# Patient Record
Sex: MALE | Race: ASIAN | NOT HISPANIC OR LATINO | URBAN - METROPOLITAN AREA
[De-identification: names, ages, dates, MRNs, and addresses within clinical notes are randomized per-mention and may not be internally consistent; named-entity substitution may affect disease eponyms.]

---

## 2019-03-03 ENCOUNTER — INPATIENT (INPATIENT)
Facility: HOSPITAL | Age: 74
LOS: 17 days | Discharge: HOME HEALTH | End: 2019-03-21
Attending: HOSPITALIST | Admitting: HOSPITALIST
Payer: MEDICARE

## 2019-03-03 VITALS
TEMPERATURE: 99 F | DIASTOLIC BLOOD PRESSURE: 62 MMHG | SYSTOLIC BLOOD PRESSURE: 127 MMHG | RESPIRATION RATE: 16 BRPM | HEART RATE: 97 BPM | OXYGEN SATURATION: 100 %

## 2019-03-03 DIAGNOSIS — N17.9 ACUTE KIDNEY FAILURE, UNSPECIFIED: ICD-10-CM

## 2019-03-03 LAB
ALBUMIN SERPL ELPH-MCNC: 3 G/DL — LOW (ref 3.3–5)
ALP SERPL-CCNC: 367 U/L — HIGH (ref 40–120)
ALT FLD-CCNC: 25 U/L — SIGNIFICANT CHANGE UP (ref 4–41)
ANION GAP SERPL CALC-SCNC: 18 MMO/L — HIGH (ref 7–14)
ANISOCYTOSIS BLD QL: SLIGHT — SIGNIFICANT CHANGE UP
AST SERPL-CCNC: 21 U/L — SIGNIFICANT CHANGE UP (ref 4–40)
BASE EXCESS BLDV CALC-SCNC: -8.2 MMOL/L — SIGNIFICANT CHANGE UP
BASOPHILS # BLD AUTO: 0.04 K/UL — SIGNIFICANT CHANGE UP (ref 0–0.2)
BASOPHILS NFR BLD AUTO: 0.2 % — SIGNIFICANT CHANGE UP (ref 0–2)
BASOPHILS NFR SPEC: 0 % — SIGNIFICANT CHANGE UP (ref 0–2)
BILIRUB SERPL-MCNC: 0.5 MG/DL — SIGNIFICANT CHANGE UP (ref 0.2–1.2)
BLOOD GAS VENOUS - CREATININE: 1.72 MG/DL — HIGH (ref 0.5–1.3)
BUN SERPL-MCNC: 36 MG/DL — HIGH (ref 7–23)
CALCIUM SERPL-MCNC: 8.3 MG/DL — LOW (ref 8.4–10.5)
CHLORIDE BLDV-SCNC: 105 MMOL/L — SIGNIFICANT CHANGE UP (ref 96–108)
CHLORIDE SERPL-SCNC: 99 MMOL/L — SIGNIFICANT CHANGE UP (ref 98–107)
CO2 SERPL-SCNC: 15 MMOL/L — LOW (ref 22–31)
CREAT SERPL-MCNC: 1.79 MG/DL — HIGH (ref 0.5–1.3)
EOSINOPHIL # BLD AUTO: 0.25 K/UL — SIGNIFICANT CHANGE UP (ref 0–0.5)
EOSINOPHIL NFR BLD AUTO: 1.5 % — SIGNIFICANT CHANGE UP (ref 0–6)
EOSINOPHIL NFR FLD: 2 % — SIGNIFICANT CHANGE UP (ref 0–6)
GAS PNL BLDV: 128 MMOL/L — LOW (ref 136–146)
GLUCOSE BLDV-MCNC: 141 — HIGH (ref 70–99)
GLUCOSE SERPL-MCNC: 150 MG/DL — HIGH (ref 70–99)
HCO3 BLDV-SCNC: 17 MMOL/L — LOW (ref 20–27)
HCT VFR BLD CALC: 32.3 % — LOW (ref 39–50)
HCT VFR BLDV CALC: 32.6 % — LOW (ref 39–51)
HGB BLD-MCNC: 10.3 G/DL — LOW (ref 13–17)
HGB BLDV-MCNC: 10.5 G/DL — LOW (ref 13–17)
HYPOCHROMIA BLD QL: SLIGHT — SIGNIFICANT CHANGE UP
IMM GRANULOCYTES NFR BLD AUTO: 5.4 % — HIGH (ref 0–1.5)
LACTATE BLDV-MCNC: 2.8 MMOL/L — HIGH (ref 0.5–2)
LG PLATELETS BLD QL AUTO: SLIGHT — SIGNIFICANT CHANGE UP
LIDOCAIN IGE QN: 222.5 U/L — HIGH (ref 7–60)
LYMPHOCYTES # BLD AUTO: 0.99 K/UL — LOW (ref 1–3.3)
LYMPHOCYTES # BLD AUTO: 6.1 % — LOW (ref 13–44)
LYMPHOCYTES NFR SPEC AUTO: 9 % — LOW (ref 13–44)
MCHC RBC-ENTMCNC: 26.9 PG — LOW (ref 27–34)
MCHC RBC-ENTMCNC: 31.9 % — LOW (ref 32–36)
MCV RBC AUTO: 84.3 FL — SIGNIFICANT CHANGE UP (ref 80–100)
MICROCYTES BLD QL: SLIGHT — SIGNIFICANT CHANGE UP
MONOCYTES # BLD AUTO: 4.88 K/UL — HIGH (ref 0–0.9)
MONOCYTES NFR BLD AUTO: 30.2 % — HIGH (ref 2–14)
MONOCYTES NFR BLD: 23 % — HIGH (ref 2–9)
NEUTROPHIL AB SER-ACNC: 66 % — SIGNIFICANT CHANGE UP (ref 43–77)
NEUTROPHILS # BLD AUTO: 9.11 K/UL — HIGH (ref 1.8–7.4)
NEUTROPHILS NFR BLD AUTO: 56.6 % — SIGNIFICANT CHANGE UP (ref 43–77)
NRBC # BLD: 0 /100WBC — SIGNIFICANT CHANGE UP
NRBC # FLD: 0.05 K/UL — LOW (ref 25–125)
OVALOCYTES BLD QL SMEAR: SLIGHT — SIGNIFICANT CHANGE UP
PCO2 BLDV: 35 MMHG — LOW (ref 41–51)
PH BLDV: 7.3 PH — LOW (ref 7.32–7.43)
PLATELET # BLD AUTO: 179 K/UL — SIGNIFICANT CHANGE UP (ref 150–400)
PLATELET COUNT - ESTIMATE: NORMAL — SIGNIFICANT CHANGE UP
PMV BLD: 13 FL — SIGNIFICANT CHANGE UP (ref 7–13)
PO2 BLDV: 27 MMHG — LOW (ref 35–40)
POLYCHROMASIA BLD QL SMEAR: SLIGHT — SIGNIFICANT CHANGE UP
POTASSIUM BLDV-SCNC: 4.2 MMOL/L — SIGNIFICANT CHANGE UP (ref 3.4–4.5)
POTASSIUM SERPL-MCNC: 3 MMOL/L — LOW (ref 3.5–5.3)
POTASSIUM SERPL-SCNC: 3 MMOL/L — LOW (ref 3.5–5.3)
PROT SERPL-MCNC: 7.9 G/DL — SIGNIFICANT CHANGE UP (ref 6–8.3)
RBC # BLD: 3.83 M/UL — LOW (ref 4.2–5.8)
RBC # FLD: 19.2 % — HIGH (ref 10.3–14.5)
SAO2 % BLDV: 37.2 % — LOW (ref 60–85)
SODIUM SERPL-SCNC: 132 MMOL/L — LOW (ref 135–145)
TROPONIN T, HIGH SENSITIVITY: 18 NG/L — SIGNIFICANT CHANGE UP (ref ?–14)
WBC # BLD: 16.14 K/UL — HIGH (ref 3.8–10.5)
WBC # FLD AUTO: 16.14 K/UL — HIGH (ref 3.8–10.5)

## 2019-03-03 PROCEDURE — 99223 1ST HOSP IP/OBS HIGH 75: CPT

## 2019-03-03 PROCEDURE — 76705 ECHO EXAM OF ABDOMEN: CPT | Mod: 26

## 2019-03-03 PROCEDURE — 71045 X-RAY EXAM CHEST 1 VIEW: CPT | Mod: 26

## 2019-03-03 RX ORDER — SODIUM CHLORIDE 9 MG/ML
1000 INJECTION, SOLUTION INTRAVENOUS ONCE
Qty: 0 | Refills: 0 | Status: COMPLETED | OUTPATIENT
Start: 2019-03-03 | End: 2019-03-03

## 2019-03-03 RX ADMIN — SODIUM CHLORIDE 1000 MILLILITER(S): 9 INJECTION, SOLUTION INTRAVENOUS at 20:35

## 2019-03-03 NOTE — ED PROVIDER NOTE - ATTENDING CONTRIBUTION TO CARE
Attending note:   After face to face evaluation of this patient, I concur with above noted hx, pe, and care plan for this patient.  74 y/o M with cll, not on medication with three months of diarrhea since being in [place], just returned today.  +Mild abdominal pain, nothing new.   Evaluation in progress

## 2019-03-03 NOTE — H&P ADULT - PROBLEM SELECTOR PLAN 5
- Unclear cause for patient's elevated alk phos, US without any abnormal findings  - will trend in AM

## 2019-03-03 NOTE — H&P ADULT - PROBLEM SELECTOR PLAN 4
- Noted to have elevated blood lactate, likely 2/2 low interstitial volume from diarrhea, will hydrate and recheck in AM

## 2019-03-03 NOTE — ED ADULT NURSE NOTE - OBJECTIVE STATEMENT
Break coverage- Pt received to spot #1. AAOx4, as per daughter at bedside, pt returning from [place] today. c/o recurrent diarrhea, nausea/vomiting, and generalized abdominal pain for over 1 month. Daughter states pt has been seen in the hospital in [place] and received IVF hydration. Abdomen soft and non distended. Denies fever/chills. MD barrientos performed at bedside. #20g IVSL placed to left ac, labs drawn and sent. no acute distress. Family members at bedside. Awaiting further plan of care.

## 2019-03-03 NOTE — H&P ADULT - PROBLEM SELECTOR PLAN 10
- Patient's prelim CXR shows a possible opacity in R basilar area but patient without cough and his lung ausculation is clear, will monitor off abx and f/u of official CXR read

## 2019-03-03 NOTE — H&P ADULT - NSHPLABSRESULTS_GEN_ALL_CORE
LABS and ADDITIONAL STUDIES:                        10.3   16.14 )-----------( 179      ( 03 Mar 2019 20:00 )             32.3     03-03    132<L>  |  99  |  36<H>  ----------------------------<  150<H>  3.0<L>   |  15<L>  |  1.79<H>    Ca    8.3<L>      03 Mar 2019 20:00    TPro  7.9  /  Alb  3.0<L>  /  TBili  0.5  /  DBili  x   /  AST  21  /  ALT  25  /  AlkPhos  367<H>  03-03    LIVER FUNCTIONS - ( 03 Mar 2019 20:00 )  Alb: 3.0 g/dL / Pro: 7.9 g/dL / ALK PHOS: 367 u/L / ALT: 25 u/L / AST: 21 u/L / GGT: x    Blood Gas Venous Comprehensive (03.03.19 @ 20:00)    Blood Gas Venous - Lactate: 2.8: Please note updated reference range. mmol/L    Troponin T, High Sensitivity Result (03.03.19 @ 20:00)    Troponin T, High Sensitivity: 18    < from: Xray Chest 1 View- PORTABLE-Urgent (03.03.19 @ 20:58) >    ******PRELIMINARY REPORT******            INTERPRETATION:  Hazyright basilar opacity may represent pneumonia given   the patient's clinical context.    < end of copied text >    < from: US Abdomen Limited (03.03.19 @ 22:59) >    FINDINGS:    Liver: Within normal limits.    Bile ducts: Normal caliber. Common hepatic duct measures 4 mm.     Gallbladder: Underdistended. No cholelithiasis. No gallbladder wall   thickening or pericholecystic fluid. Unable to assess sonographic Merlos   sign secondary to analgesia.        Pancreas: Visualized portions are within normal limits.    Right kidney: 10.7 cm. No hydronephrosis.    Ascites: None.    IVC: Visualized portions are within normal limits.    IMPRESSION:     No cholelithiasis.    < end of copied text >    EKG - NSR with PAC, nl axis, QTc 442, no significant ST-T wave changes LABS and ADDITIONAL STUDIES:                        10.3   16.14 )-----------( 179      ( 03 Mar 2019 20:00 )             32.3     03-03    132<L>  |  99  |  36<H>  ----------------------------<  150<H>  3.0<L>   |  15<L>  |  1.79<H>    Ca    8.3<L>      03 Mar 2019 20:00    TPro  7.9  /  Alb  3.0<L>  /  TBili  0.5  /  DBili  x   /  AST  21  /  ALT  25  /  AlkPhos  367<H>  03-03    LIVER FUNCTIONS - ( 03 Mar 2019 20:00 )  Alb: 3.0 g/dL / Pro: 7.9 g/dL / ALK PHOS: 367 u/L / ALT: 25 u/L / AST: 21 u/L / GGT: x    Blood Gas Venous Comprehensive (03.03.19 @ 20:00)    Blood Gas Venous - Lactate: 2.8: Please note updated reference range. mmol/L    Troponin T, High Sensitivity Result (03.03.19 @ 20:00)    Troponin T, High Sensitivity: 18  Troponin T, High Sensitivity (03.04.19 @ 00:27)    Troponin T, High Sensitivity: 15    < from: Xray Chest 1 View- PORTABLE-Urgent (03.03.19 @ 20:58) >    ******PRELIMINARY REPORT******            INTERPRETATION:  Hazyright basilar opacity may represent pneumonia given   the patient's clinical context.    < end of copied text >    < from: US Abdomen Limited (03.03.19 @ 22:59) >    FINDINGS:    Liver: Within normal limits.    Bile ducts: Normal caliber. Common hepatic duct measures 4 mm.     Gallbladder: Underdistended. No cholelithiasis. No gallbladder wall   thickening or pericholecystic fluid. Unable to assess sonographic Merlos   sign secondary to analgesia.        Pancreas: Visualized portions are within normal limits.    Right kidney: 10.7 cm. No hydronephrosis.    Ascites: None.    IVC: Visualized portions are within normal limits.    IMPRESSION:     No cholelithiasis.    < end of copied text >    EKG - NSR with PAC, nl axis, QTc 442, no significant ST-T wave changes

## 2019-03-03 NOTE — H&P ADULT - NSHPREVIEWOFSYSTEMS_GEN_ALL_CORE
REVIEW OF SYSTEMS:    CONSTITUTIONAL: No weakness, fevers or chills  EYES: No visual changes or eye discharge  ENT: No rhinorrhea or sore throat  NECK: No pain or stiffness  RESPIRATORY: No cough, wheezing, hemoptysis; No shortness of breath  CARDIOVASCULAR: No chest pain or palpitations; No lower extremity edema  GASTROINTESTINAL: +generalized abdominal pain, +intermittent diarrhea, no nausea/vomiting  BACK: No back pain  GENITOURINARY: No dysuria, frequency or hematuria  NEUROLOGICAL: No numbness or weakness  SKIN: No itching, burning, rashes, or lesions

## 2019-03-03 NOTE — H&P ADULT - PMH
(2) assistive person
Anemia, unspecified type    CLL (chronic lymphocytic leukemia)    Essential hypertension

## 2019-03-03 NOTE — ED ADULT TRIAGE NOTE - CHIEF COMPLAINT QUOTE
just came back from [place] today, c/o abdominal pain, diarrhea, nausea/vomiting x more than a month

## 2019-03-03 NOTE — H&P ADULT - PROBLEM SELECTOR PLAN 2
- Patient and his family denies any history of renal insufficiency in past, said that he was first told he had renal issues while in [place] but they are not sure what the BUN/Cr levels were  - BUN/Cr ratio a little over 20, will give additional hydration - Patient and his family denies any history of renal insufficiency in past, said that he was first told he had renal issues while in [place] but they are not sure what the BUN/Cr levels were  - BUN/Cr ratio a little over 20, will give additional hydration    ADDENDUM:   Patient's FeNa shows his PEDRO to be pre-renal, will c/w IVF and monitor BUN/Cr in AM

## 2019-03-03 NOTE — H&P ADULT - PROBLEM SELECTOR PLAN 1
- Unclear cause of the diarrhea, doubt infectious given chronicity though will r/o for infectious diarrhea with stool cultures and c diff toxin by PCR  - GI eval in AM  - Will check CT A/P  - Diet as tolerated  - Noted to have an elevated lipase but doubt patient has pancreatitis, will monitor his abdomen and will see if there is any pancreatic inflammation on CT A/P - Unclear cause of the diarrhea, doubt infectious given chronicity though will r/o for infectious diarrhea with stool cultures and c diff toxin by PCR  - GI eval in AM  - Will check CT A/P  - Diet as tolerated  - Noted to have an elevated lipase but doubt patient has pancreatitis as he does not have symptoms consistent with pancreatitis and his US does not reveal any pancreatic inflammation, will see if there is any pancreatic inflammation on CT A/P

## 2019-03-03 NOTE — ED PROVIDER NOTE - CLINICAL SUMMARY MEDICAL DECISION MAKING FREE TEXT BOX
Pt presenting with intermittent diarrhea in setting of recent travel to [place] and some diarrhea preceding trip with + weight loss. Will eval for causes with stool cultures and O&P if sample provided by pt, EKG, IV fluid, labs including blood smear, lactate; dispo depending on w/u; no indication for emergent CT at this time given pt well appearing, NAD, normal physical exam.

## 2019-03-03 NOTE — H&P ADULT - PROBLEM SELECTOR PLAN 8
- Holding his losartan given PEDRO  - Will hold metoprolol and amlodipine for now as the patient is volume depleted on examination currently, restart as indicated

## 2019-03-03 NOTE — ED ADULT NURSE NOTE - ED STAT RN HANDOFF DETAILS
handoff report given to RN, pt in NAD, awaiting transportation.  Will continue to monitor patient closely.

## 2019-03-03 NOTE — H&P ADULT - HISTORY OF PRESENT ILLNESS
This is a 73M with history of CLL (not on therapy), Anemia, and HTN who presents to the hospital with complaints of persistent intermittent diarrhea since October of last year. Said that about a week prior to his trip to [facility] he started having significant diarrhea (5-7 episodes of loose stools, ?black stools as per patient at that time) and went to see a doctor. Was told that he likely had a viral illness and that it would improve over time. His diarrhea did improve and he went to [facility] but on his second day there he started having diarrhea again (states that he ate sweets from the [employer] that others in his family did not prior to the onset of his diarrhea in [place]). He started having intermittent diarrhea and went to a doctor in [facility] where he had a colonoscopy done which he states was negative for abnormal findings (pt not sure if he had a biopsy done during the colonoscopy). He was then given some  medications with minimal improvement in his symptoms. He continued to have intermittent diarrhea (said that he has 4-5 days of diarrhea a week) and was unable to get any relief in [facility]. Upon arriving back from [facility] he continued to have intermittent diarrhea and therefore presented to the ED for evaluation. Said that currently he had about 5 episodes of diarrhea today, describes it as yellowish in color, associated with generalized abdominal pain. Denies any hematochezia, melena, or BRBPR currently. No fevers/chills. He denies any other complaints at present.    On arrival to the ED, his vitals were T 98.8, P 97, /62, R 16, O2 sat 100% RA. His lab work was significant for leukocytosis (unknown baseline), normocytic anemia (unknown baseline), mild hyponatremia/hypokalemia/AG gap, elevated BUN/Cr, elevated alk phos, and elevated lipase. He was also noted to have a lactate of 2.8. His CXR showed a possible R basilar opacity but the patient was not complaining of any PNA type symptoms. His US abd was negative for any acute findings. He was given LR 1L. He was admitted to medicine.

## 2019-03-03 NOTE — H&P ADULT - ASSESSMENT
This is a 73M with history as above who presents to the hospital with c/o intermittent diarrhea for the past 6 months.

## 2019-03-03 NOTE — ED PROVIDER NOTE - OBJECTIVE STATEMENT
74 yo male with no pertinent PMH presents to the ED for diarrhea intermittently x several months (since 10/18), just arrived back from [place] where he was since October visiting for leisure. Pt accompanied by family in ED. States he had several days of diarrhea prior to leaving for [facility] and his PMD attributed to gastroenteritis vs. food poisoning. Improved spontaneously and left for [facility]. There, has had recurrent intermittent diarrhea throughout his stay there. Notes about 6 episodes of loose green stool today alone. Denies fever, chills, CP, palpitations, rash, cough, known sick contacts, jaundice. C/o vague chest and abdominal pain, generalized x several months. Daughter states pt default complains of CP when it is unclear when pain is coming from and this is a longstanding way of describing pain for him. Pt appears NAD in the ED. Unable to describe pain well. No h/o afib, not on anticoagulation, non-smoker. Pt had extensive w/u in [place] including endoscopy, labs and given macrobid for unknown reason, Elavil, multivitamins, probiotics. Denies h/o mental health issues, denies urinary symptoms, n/v. 74 yo male with PMH of chronic leukemia never on chemo (being followed by outside oncologist), presents to the ED for diarrhea intermittently x several months (since 10/18), just arrived back from [place] where he was since October visiting for leisure. Pt accompanied by family in ED. States he had several days of diarrhea prior to leaving for [facility] and his PMD attributed to gastroenteritis vs. food poisoning. Improved spontaneously and left for [facility]. There, has had recurrent intermittent diarrhea throughout his stay there. Notes about 6 episodes of loose green stool today alone. Denies fever, chills, CP, palpitations, rash, cough, known sick contacts, jaundice. C/o vague chest and abdominal pain, generalized x several months. Daughter states pt default complains of CP when it is unclear when pain is coming from and this is a longstanding way of describing pain for him. Pt appears NAD in the ED. Unable to describe pain well. No h/o afib, not on anticoagulation, non-smoker. Pt had extensive w/u in [place] including endoscopy, labs and given macrobid for unknown reason, Elavil, multivitamins, probiotics. Denies h/o mental health issues, denies urinary symptoms, n/v.

## 2019-03-03 NOTE — H&P ADULT - FAMILY HISTORY
Father  Still living? Unknown  Family history of myocardial infarction, Age at diagnosis: Age Unknown     Mother  Still living? Unknown  Family history of myocardial infarction, Age at diagnosis: Age Unknown     Sibling  Still living? Unknown  Family history of myocardial infarction, Age at diagnosis: Age Unknown

## 2019-03-03 NOTE — H&P ADULT - NSHPPHYSICALEXAM_GEN_ALL_CORE
Vital Signs Last 24 Hrs  T(C): 37.1 (03 Mar 2019 19:14), Max: 37.1 (03 Mar 2019 19:14)  T(F): 98.8 (03 Mar 2019 19:14), Max: 98.8 (03 Mar 2019 19:14)  HR: 97 (03 Mar 2019 19:14) (97 - 97)  BP: 127/62 (03 Mar 2019 19:14) (127/62 - 127/62)  BP(mean): --  RR: 16 (03 Mar 2019 19:14) (16 - 16)  SpO2: 100% (03 Mar 2019 19:14) (100% - 100%)    GENERAL: No acute distress, well-developed  ENT: EOMI, PERRL, conjunctiva and sclera clear, Neck supple, No JVD, dry mucus membranes   CHEST/LUNG: Clear to auscultation bilaterally; No wheeze, equal breath sounds bilaterally   BACK: No spinal tenderness  HEART: Regular rate and rhythm; No murmurs, rubs, or gallops  ABDOMEN: Soft, Nontender, Nondistended; Bowel sounds present  EXTREMITIES:  No clubbing, cyanosis, or edema  PSYCH: Nl behavior, nl affect  NEUROLOGY: AAOx3, non-focal  SKIN: Normal color, No rashes or lesions

## 2019-03-03 NOTE — H&P ADULT - PROBLEM SELECTOR PLAN 3
- Patient with hyponatremia, hypokalemia, and low bicarb likely due to electrolyte and fluid losses in diarrhea  - Would c/w hydration and monitor

## 2019-03-04 DIAGNOSIS — R74.8 ABNORMAL LEVELS OF OTHER SERUM ENZYMES: ICD-10-CM

## 2019-03-04 DIAGNOSIS — R93.89 ABNORMAL FINDINGS ON DIAGNOSTIC IMAGING OF OTHER SPECIFIED BODY STRUCTURES: ICD-10-CM

## 2019-03-04 DIAGNOSIS — E87.8 OTHER DISORDERS OF ELECTROLYTE AND FLUID BALANCE, NOT ELSEWHERE CLASSIFIED: ICD-10-CM

## 2019-03-04 DIAGNOSIS — R19.7 DIARRHEA, UNSPECIFIED: ICD-10-CM

## 2019-03-04 DIAGNOSIS — D64.9 ANEMIA, UNSPECIFIED: ICD-10-CM

## 2019-03-04 DIAGNOSIS — C91.90 LYMPHOID LEUKEMIA, UNSPECIFIED NOT HAVING ACHIEVED REMISSION: ICD-10-CM

## 2019-03-04 DIAGNOSIS — N17.9 ACUTE KIDNEY FAILURE, UNSPECIFIED: ICD-10-CM

## 2019-03-04 DIAGNOSIS — Z29.9 ENCOUNTER FOR PROPHYLACTIC MEASURES, UNSPECIFIED: ICD-10-CM

## 2019-03-04 DIAGNOSIS — I10 ESSENTIAL (PRIMARY) HYPERTENSION: ICD-10-CM

## 2019-03-04 DIAGNOSIS — R79.89 OTHER SPECIFIED ABNORMAL FINDINGS OF BLOOD CHEMISTRY: ICD-10-CM

## 2019-03-04 LAB
ALBUMIN SERPL ELPH-MCNC: 2.6 G/DL — LOW (ref 3.3–5)
ALP SERPL-CCNC: 337 U/L — HIGH (ref 40–120)
ALT FLD-CCNC: 23 U/L — SIGNIFICANT CHANGE UP (ref 4–41)
ANION GAP SERPL CALC-SCNC: 15 MMO/L — HIGH (ref 7–14)
AST SERPL-CCNC: 21 U/L — SIGNIFICANT CHANGE UP (ref 4–40)
BASOPHILS # BLD AUTO: 0.04 K/UL — SIGNIFICANT CHANGE UP (ref 0–0.2)
BASOPHILS NFR BLD AUTO: 0.2 % — SIGNIFICANT CHANGE UP (ref 0–2)
BILIRUB DIRECT SERPL-MCNC: 0.2 MG/DL — SIGNIFICANT CHANGE UP (ref 0.1–0.2)
BILIRUB SERPL-MCNC: 0.5 MG/DL — SIGNIFICANT CHANGE UP (ref 0.2–1.2)
BUN SERPL-MCNC: 32 MG/DL — HIGH (ref 7–23)
C DIFF TOX GENS STL QL NAA+PROBE: SIGNIFICANT CHANGE UP
CALCIUM SERPL-MCNC: 8.3 MG/DL — LOW (ref 8.4–10.5)
CHLORIDE SERPL-SCNC: 102 MMOL/L — SIGNIFICANT CHANGE UP (ref 98–107)
CO2 SERPL-SCNC: 17 MMOL/L — LOW (ref 22–31)
CREAT ?TM UR-MCNC: 92.4 MG/DL — SIGNIFICANT CHANGE UP
CREAT SERPL-MCNC: 1.54 MG/DL — HIGH (ref 0.5–1.3)
EOSINOPHIL # BLD AUTO: 0.42 K/UL — SIGNIFICANT CHANGE UP (ref 0–0.5)
EOSINOPHIL NFR BLD AUTO: 2.2 % — SIGNIFICANT CHANGE UP (ref 0–6)
GLUCOSE SERPL-MCNC: 118 MG/DL — HIGH (ref 70–99)
HCT VFR BLD CALC: 28.8 % — LOW (ref 39–50)
HGB BLD-MCNC: 9.3 G/DL — LOW (ref 13–17)
IMM GRANULOCYTES NFR BLD AUTO: 4.2 % — HIGH (ref 0–1.5)
LACTATE SERPL-SCNC: 1.7 MMOL/L — SIGNIFICANT CHANGE UP (ref 0.5–2)
LYMPHOCYTES # BLD AUTO: 1.31 K/UL — SIGNIFICANT CHANGE UP (ref 1–3.3)
LYMPHOCYTES # BLD AUTO: 6.9 % — LOW (ref 13–44)
MAGNESIUM SERPL-MCNC: 1.3 MG/DL — LOW (ref 1.6–2.6)
MCHC RBC-ENTMCNC: 26.6 PG — LOW (ref 27–34)
MCHC RBC-ENTMCNC: 32.3 % — SIGNIFICANT CHANGE UP (ref 32–36)
MCV RBC AUTO: 82.5 FL — SIGNIFICANT CHANGE UP (ref 80–100)
MONOCYTES # BLD AUTO: 6.17 K/UL — HIGH (ref 0–0.9)
MONOCYTES NFR BLD AUTO: 32.6 % — HIGH (ref 2–14)
NEUTROPHILS # BLD AUTO: 10.17 K/UL — HIGH (ref 1.8–7.4)
NEUTROPHILS NFR BLD AUTO: 53.9 % — SIGNIFICANT CHANGE UP (ref 43–77)
NRBC # FLD: 0.05 K/UL — LOW (ref 25–125)
PHOSPHATE SERPL-MCNC: 3.5 MG/DL — SIGNIFICANT CHANGE UP (ref 2.5–4.5)
PLASMODIUM AG BLD QL: SIGNIFICANT CHANGE UP
PLATELET # BLD AUTO: 151 K/UL — SIGNIFICANT CHANGE UP (ref 150–400)
PMV BLD: 11.8 FL — SIGNIFICANT CHANGE UP (ref 7–13)
POTASSIUM SERPL-MCNC: 3.8 MMOL/L — SIGNIFICANT CHANGE UP (ref 3.5–5.3)
POTASSIUM SERPL-SCNC: 3.8 MMOL/L — SIGNIFICANT CHANGE UP (ref 3.5–5.3)
PROT SERPL-MCNC: 7.4 G/DL — SIGNIFICANT CHANGE UP (ref 6–8.3)
RBC # BLD: 3.49 M/UL — LOW (ref 4.2–5.8)
RBC # FLD: 19.4 % — HIGH (ref 10.3–14.5)
SODIUM SERPL-SCNC: 134 MMOL/L — LOW (ref 135–145)
SODIUM UR-SCNC: < 20 MMOL/L — SIGNIFICANT CHANGE UP
TROPONIN T, HIGH SENSITIVITY: 15 NG/L — SIGNIFICANT CHANGE UP (ref ?–14)
WBC # BLD: 18.9 K/UL — HIGH (ref 3.8–10.5)
WBC # FLD AUTO: 18.9 K/UL — HIGH (ref 3.8–10.5)

## 2019-03-04 PROCEDURE — 99222 1ST HOSP IP/OBS MODERATE 55: CPT | Mod: GC

## 2019-03-04 PROCEDURE — 74176 CT ABD & PELVIS W/O CONTRAST: CPT | Mod: 26

## 2019-03-04 RX ORDER — METOPROLOL TARTRATE 50 MG
1 TABLET ORAL
Qty: 0 | Refills: 0 | COMMUNITY

## 2019-03-04 RX ORDER — SODIUM CHLORIDE 9 MG/ML
1000 INJECTION, SOLUTION INTRAVENOUS ONCE
Qty: 0 | Refills: 0 | Status: COMPLETED | OUTPATIENT
Start: 2019-03-04 | End: 2019-03-04

## 2019-03-04 RX ORDER — SODIUM CHLORIDE 9 MG/ML
1000 INJECTION, SOLUTION INTRAVENOUS
Qty: 0 | Refills: 0 | Status: DISCONTINUED | OUTPATIENT
Start: 2019-03-04 | End: 2019-03-07

## 2019-03-04 RX ORDER — HEPARIN SODIUM 5000 [USP'U]/ML
5000 INJECTION INTRAVENOUS; SUBCUTANEOUS EVERY 8 HOURS
Qty: 0 | Refills: 0 | Status: DISCONTINUED | OUTPATIENT
Start: 2019-03-04 | End: 2019-03-07

## 2019-03-04 RX ORDER — POTASSIUM CHLORIDE 20 MEQ
20 PACKET (EA) ORAL
Qty: 0 | Refills: 0 | Status: COMPLETED | OUTPATIENT
Start: 2019-03-04 | End: 2019-03-04

## 2019-03-04 RX ORDER — MAGNESIUM SULFATE 500 MG/ML
1 VIAL (ML) INJECTION ONCE
Qty: 0 | Refills: 0 | Status: COMPLETED | OUTPATIENT
Start: 2019-03-04 | End: 2019-03-04

## 2019-03-04 RX ORDER — SODIUM CHLORIDE 9 MG/ML
1000 INJECTION, SOLUTION INTRAVENOUS
Qty: 0 | Refills: 0 | Status: DISCONTINUED | OUTPATIENT
Start: 2019-03-04 | End: 2019-03-04

## 2019-03-04 RX ORDER — MORPHINE SULFATE 50 MG/1
2 CAPSULE, EXTENDED RELEASE ORAL ONCE
Qty: 0 | Refills: 0 | Status: DISCONTINUED | OUTPATIENT
Start: 2019-03-04 | End: 2019-03-04

## 2019-03-04 RX ADMIN — HEPARIN SODIUM 5000 UNIT(S): 5000 INJECTION INTRAVENOUS; SUBCUTANEOUS at 23:11

## 2019-03-04 RX ADMIN — HEPARIN SODIUM 5000 UNIT(S): 5000 INJECTION INTRAVENOUS; SUBCUTANEOUS at 05:11

## 2019-03-04 RX ADMIN — SODIUM CHLORIDE 1000 MILLILITER(S): 9 INJECTION, SOLUTION INTRAVENOUS at 12:05

## 2019-03-04 RX ADMIN — HEPARIN SODIUM 5000 UNIT(S): 5000 INJECTION INTRAVENOUS; SUBCUTANEOUS at 13:54

## 2019-03-04 RX ADMIN — MORPHINE SULFATE 2 MILLIGRAM(S): 50 CAPSULE, EXTENDED RELEASE ORAL at 08:31

## 2019-03-04 RX ADMIN — MORPHINE SULFATE 2 MILLIGRAM(S): 50 CAPSULE, EXTENDED RELEASE ORAL at 07:48

## 2019-03-04 RX ADMIN — SODIUM CHLORIDE 150 MILLILITER(S): 9 INJECTION, SOLUTION INTRAVENOUS at 23:10

## 2019-03-04 RX ADMIN — Medication 20 MILLIEQUIVALENT(S): at 01:09

## 2019-03-04 RX ADMIN — Medication 20 MILLIEQUIVALENT(S): at 05:12

## 2019-03-04 RX ADMIN — SODIUM CHLORIDE 150 MILLILITER(S): 9 INJECTION, SOLUTION INTRAVENOUS at 12:05

## 2019-03-04 RX ADMIN — SODIUM CHLORIDE 75 MILLILITER(S): 9 INJECTION, SOLUTION INTRAVENOUS at 01:09

## 2019-03-04 RX ADMIN — Medication 20 MILLIEQUIVALENT(S): at 03:11

## 2019-03-04 RX ADMIN — Medication 100 GRAM(S): at 13:54

## 2019-03-04 NOTE — CONSULT NOTE ADULT - ATTENDING COMMENTS
Agree with above, except additional workup in [place] provided by family.    Impression:    1.  Chronic diarrhea for more than 6 weeks.  Workup in [place] included A.  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  Colonoscopy with mild inflammatory changes in the rectum and sigmoid colon, biopsies demonstrated chronic active colitis    #2.  Mild anemia, Hb 10, without overt GI bleed.    #3.  Abnormal LFTs (elevated alk phos), with normal bile ducts by ultrasound, which does not exclude choledocholithiasis.      #4.  Elevated lipase more than 3x upper limit of normal.    Recommendations:    #1.  Follow CBC/LFTs    #2.  Send amylase level x 1    #3.  MRI abdomen and MRCP (with and without contrast) to look for evidence of pancreatitis and choledocholithiasis    #4.  IV fluids, suggest LR at rate listed above    #5.  Solid food diet as tolerated (lactose free)    #6.  Pain control as necessary Agree with above, except additional workup in [place] provided by family.    Impression:    1.  Chronic diarrhea for more than 6 weeks.  Workup in [place] included A.  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  Colonoscopy with mild inflammatory changes in the rectum and sigmoid colon, biopsies demonstrated chronic active colitis    #2.  Mild anemia, Hb 10, without overt GI bleed.    #3.  Abnormal LFTs (elevated alk phos), with normal bile ducts by ultrasound, which does not exclude choledocholithiasis.      #4.  Elevated lipase more than 3x upper limit of normal.    Recommendations:    #1.  Follow CBC/LFTs    #2.  Send amylase level x 1    #3.  Consider other lab workup as described abovce.    #4.  MRI abdomen and MRCP (with and without contrast) to look for evidence of pancreatitis and choledocholithiasis    #5.  IV fluids, suggest LR at rate listed above    #6.  Solid food diet as tolerated (lactose free)    #7.  Pain control as necessary    #8.  After stool studies are done infection has been ruled out, may start on mesalamine for possible ulcerative colitis. Agree with above, except additional workup in [place] provided by family.    Impression:    1.  Chronic diarrhea for more than 6 weeks.  Workup in [place] included A.  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  Colonoscopy with mild inflammatory changes in the rectum and sigmoid colon, biopsies demonstrated chronic active colitis    #2.  Mild anemia, Hb 10, without overt GI bleed.    #3.  Abnormal LFTs (elevated alk phos), with normal bile ducts by ultrasound, which does not exclude choledocholithiasis.      #4.  Elevated lipase more than 3x upper limit of normal.    #5.  CLL not on treatment    Recommendations:    #1.  Follow CBC/LFTs    #2.  Send amylase level x 1    #3.  Consider other lab workup as described abovce.    #4.  MRI abdomen and MRCP (with and without contrast) to look for evidence of pancreatitis and choledocholithiasis    #5.  IV fluids, suggest LR at rate listed above    #6.  Solid food diet as tolerated (lactose free)    #7.  Pain control as necessary    #8.  After stool studies are done infection has been ruled out, may start on mesalamine for possible ulcerative colitis.

## 2019-03-04 NOTE — PROGRESS NOTE ADULT - SUBJECTIVE AND OBJECTIVE BOX
Patient is a 73y old  Male who presents with a chief complaint of Diarrhea (04 Mar 2019 08:27)      SUBJECTIVE / OVERNIGHT EVENTS:    Events noted.  RESPIRATORY: No cough, wheezing, chills or hemoptysis;  CARDIOVASCULAR: No chest pain, palpitations, dizziness, or leg swelling  GASTROINTESTINAL: No abdominal or epigastric pain.   NEUROLOGICAL: No headaches,     MEDICATIONS  (STANDING):  heparin  Injectable 5000 Unit(s) SubCutaneous every 8 hours  lactated ringers. 1000 milliLiter(s) (150 mL/Hr) IV Continuous <Continuous>    MEDICATIONS  (PRN):        CAPILLARY BLOOD GLUCOSE        I&O's Summary      PHYSICAL EXAM:  GENERAL: NAD  NECK: Supple, No JVD  CHEST/LUNG: Clear to auscultation bilaterally; No wheezing.  HEART: Regular rate and rhythm; No murmurs, rubs, or gallops  ABDOMEN: Soft, Nontender, Nondistended; Bowel sounds present  EXTREMITIES:   No clubbing, cyanosis, or edema  NEUROLOGY: AAO X 3      LABS:                        9.3    18.90 )-----------( 151      ( 04 Mar 2019 08:15 )             28.8     03-04    134<L>  |  102  |  32<H>  ----------------------------<  118<H>  3.8   |  17<L>  |  1.54<H>    Ca    8.3<L>      04 Mar 2019 05:20  Phos  3.5     03-04  Mg     1.3     03-04    TPro  7.4  /  Alb  2.6<L>  /  TBili  0.5  /  DBili  0.2  /  AST  21  /  ALT  23  /  AlkPhos  337<H>  03-04            CAPILLARY BLOOD GLUCOSE                    RADIOLOGY & ADDITIONAL TESTS:    Imaging Personally Reviewed:    Consultant(s) Notes Reviewed:      Care Discussed with Consultants/Other Providers:

## 2019-03-04 NOTE — ED ADULT NURSE REASSESSMENT NOTE - NS ED NURSE REASSESS COMMENT FT1
handoff received from day RN- pt awake, a/ox3, vitally stable in NAD, pt appears comfortable, fluids running as ordered, will continue to monitor
pt is in bed  A and OX  3 in NAD, resting comfortably in bed  denies pain at thi time,  LR ongoing as per order to left AC by pump  IV flushed well no SS of infection/infiltration.
break coverage: pt awake and alert. breathing unlabored. awaits bed assigmnent in nad.

## 2019-03-04 NOTE — PROGRESS NOTE ADULT - ASSESSMENT
· Assessment	  This is a 73M with history as above who presents to the hospital with c/o intermittent diarrhea for the past 6 months.     Problem/Plan - 1:  ·  Problem: Diarrhea, unspecified type.  Plan: - Unclear cause of the diarrhea, doubt infectious given chronicity though will r/o for infectious diarrhea with stool cultures and c diff toxin by PCR  -      Problem/Plan - 2:  ·  Problem: PEDRO (acute kidney injury).  Plan: - Patient and his family denies any history of renal insufficiency in past, said that he was first told he had renal issues while in [place] but they are not sure what the BUN/Cr levels were  -     ADDENDUM:   Patient's FeNa shows his PEDRO to be pre-renal, will c/w IVF      Problem/Plan - 3:  ·  Problem: Electrolyte abnormality.  Plan: - Patient with hyponatremia, hypokalemia, and low bicarb likely due to electrolyte and fluid losses in diarrhea  - Would c/w hydration and monitor.      Problem/Plan - 4:  ·  Problem: Lactate blood increased.  Plan: - Noted to have elevated blood lactate, likely 2/2 low interstitial volume from diarrhea,      Problem/Plan - 5:  ·  Problem: Alkaline phosphatase elevation.  Plan: - Unclear cause for patient's elevated alk phos, US without any abnormal findings       Problem/Plan - 6:  Problem: CLL (chronic lymphocytic leukemia). Plan: - Patient's leukocytosis likely chronic 2/2 CLL, will monitor for now.     Problem/Plan - 7:  ·  Problem: Anemia, unspecified type.  Plan: -  will monitor for now.

## 2019-03-04 NOTE — CONSULT NOTE ADULT - ASSESSMENT
Impression:  1) Diarrhea- Differential diagnosis includes infectious enteritis given recent trip to [place], osmotic diarrhea from lactose intolerance, malabsorptive disorder (celiac disease, SIBO), IBD (atypical for age and acuity), maldigestive etiology (pancreatic insufficiency), microscopic colitis  2) HTN    Recommendations:  -Check GI PCR, stool giardia antigen, ova and parasite  -Check fecal electrolytes to calculate osmotic gap, fecal osm, fecal fat, fecal calprotectin, stool elastase  -Lactose free diet  -Monitor and chart frequency of BMs  -Check TSH, TTG and total IgA Impression:  1) Diarrhea- Differential diagnosis includes infectious enteritis given recent trip to [place], osmotic diarrhea from lactose intolerance, malabsorptive disorder (celiac disease, SIBO), IBD (atypical for age and acuity), maldigestive etiology (pancreatic insufficiency), microscopic colitis, malignancy  2) Normocytic anemia- Pt currently has no overt bleeding however could have occult bleeding from PUD malignancy (lymphoma, CRC), other differential includes anemia on chronic disease, bone marrow suppression  2) HTN    Recommendations:  -Check GI PCR, stool giardia antigen, ova and parasite  -Check fecal electrolytes to calculate osmotic gap, fecal osm, fecal fat, fecal calprotectin, stool elastase  -Lactose free diet  -Monitor and chart frequency of BMs  -Check TSH, TTG and total IgA  -Check iron studies, ferritin, retic count, LDH, haptoglobin  -Pending above work up patient may need a EGD/colonoscopy for further evaluation given anemia Impression:  1) Diarrhea- Differential diagnosis includes infectious enteritis given recent trip to [place], osmotic diarrhea from lactose intolerance, malabsorptive disorder (celiac disease, SIBO), IBD (atypical for age and acuity), maldigestive etiology (pancreatic insufficiency), microscopic colitis, malignancy  2) Normocytic anemia- Pt currently has no overt bleeding however could have occult bleeding from PUD malignancy (lymphoma, CRC), other differential includes anemia on chronic disease, bone marrow suppression  3) Elevated alkaline phosphatase and lipase- Consistent with gallstone pancreatitis however US negative for CBD stone  2) HTN    Recommendations:  -Check GI PCR, stool giardia antigen, ova and parasite  -Check MRI/MRCP to evaluate biliary tree given elevated alkaline phosphate and lipase  -Give another 1L bolus and then start 150cc/hr for 2L for possible pancreatitis  -Check fecal electrolytes to calculate osmotic gap, fecal osm, fecal fat, fecal calprotectin, stool elastase  -Lactose free diet  -Monitor and chart frequency of BMs  -Check TSH, TTG and total IgA  -Check iron studies, ferritin, retic count, LDH, haptoglobin  -Pending above work up patient may need a EGD/colonoscopy for further evaluation given anemia

## 2019-03-04 NOTE — CONSULT NOTE ADULT - SUBJECTIVE AND OBJECTIVE BOX
Chief Complaint:  Patient is a 73y old  Male who presents with a chief complaint of Diarrhea (03 Mar 2019 23:55)      HPI:  74yo M with history of CLL (not on therapy), HTN who presents to the hospital for complaints of diarrhea for the past 40 days. Pt states that he was in [place] starting 1/29, and was given a type of candy from [employer], he took one of it and the next day started having 6-7 episodes of diarrhea daily, which has subsided to 4-5 BMs since last week. Pt describes his BM as watery, loose initially, but over the past week has gotten soft and more formed. Pt went to see a doctor there, and reportedly had a colonoscopy there that was negative for any pathology. Pt otherwise denies abdominal pain, nausea, vomiting, fever, chills, melena, hematochezia, dyschezia, but has lost about 10kg since then. Pt endorses decreased PO intake as he states that he will have to go to the bathroom after eating. Pt has since only been taking rice water and fruits. Pt had no prior EGD or colonoscopy in the past.         Allergies:  No Known Allergies      Home Medications:    Hospital Medications:  heparin  Injectable 5000 Unit(s) SubCutaneous every 8 hours  lactated ringers. 1000 milliLiter(s) IV Continuous <Continuous>      PMHX/PSHX:  Anemia, unspecified type  Essential hypertension  CLL (chronic lymphocytic leukemia)  No significant past surgical history      Family history:  Family history of myocardial infarction (Father, Mother, Sibling)      There is no family history of peptic ulcer disease, gastric cancer, colon polyps, colon cancer, celiac disease, biliary, hepatic, or pancreatic disease.  None of the female relatives have breast, uterine, or ovarian cancer.     Social History: Pt denies alcohol use, smoke 5-6 cigarettes but stopped 4 months ago    ROS:     General:  No wt loss, fevers, chills, night sweats, fatigue,   Eyes:  Good vision, no reported pain  ENT:  No sore throat, pain, runny nose, dysphagia  CV:  No pain, palpitations, hypo/hypertension  Resp:  No dyspnea, cough, tachypnea, wheezing  GI:  See HPI  :  No pain, bleeding, incontinence, nocturia  Muscle:  No pain, weakness  Neuro:  No weakness, tingling, memory problems  Psych:  No fatigue, insomnia, mood problems, depression  Endocrine:  No polyuria, polydipsia, cold/heat intolerance  Heme:  No petechiae, ecchymosis, easy bruisability  Skin:  No rash, tattoos, scars, edema      PHYSICAL EXAM:     GENERAL:  Appears stated age, well-groomed  HEENT:  NC/AT,  conjunctivae clear and pink, no thyromegaly, nodules  CHEST:  Full & symmetric excursion, no increased effort, breath sounds clear  HEART:  Regular rhythm, S1, S2, no murmur/rub/S3/S4, no abdominal bruit  ABDOMEN:  Soft, non-tender, non-distended, normoactive bowel sounds  EXTEREMITIES:  no cyanosis,clubbing or edema  SKIN:  No rash/erythema/ecchymoses  NEURO:  Alert, oriented, no asterixis, no tremor    Vital Signs:  Vital Signs Last 24 Hrs  T(C): 36.5 (04 Mar 2019 03:10), Max: 37.1 (03 Mar 2019 19:14)  T(F): 97.7 (04 Mar 2019 03:10), Max: 98.8 (03 Mar 2019 19:14)  HR: 96 (04 Mar 2019 06:35) (93 - 97)  BP: 126/76 (04 Mar 2019 06:35) (105/41 - 134/59)  BP(mean): --  RR: 16 (04 Mar 2019 06:35) (16 - 18)  SpO2: 100% (04 Mar 2019 06:35) (100% - 100%)  Daily     Daily     LABS:                        9.3    18.90 )-----------( 151      ( 04 Mar 2019 08:15 )             28.8     Mean Cell Volume: 82.5 fL (03-04-19 @ 08:15)    03-04    134<L>  |  102  |  32<H>  ----------------------------<  118<H>  3.8   |  17<L>  |  1.54<H>    Ca    8.3<L>      04 Mar 2019 05:20  Phos  3.5     03-04  Mg     1.3     03-04    TPro  7.4  /  Alb  2.6<L>  /  TBili  0.5  /  DBili  0.2  /  AST  21  /  ALT  23  /  AlkPhos  337<H>  03-04    LIVER FUNCTIONS - ( 04 Mar 2019 05:20 )  Alb: 2.6 g/dL / Pro: 7.4 g/dL / ALK PHOS: 337 u/L / ALT: 23 u/L / AST: 21 u/L / GGT: x               Amylase Serum--      Lipase [id number]       Ammonia--                          9.3    18.90 )-----------( 151      ( 04 Mar 2019 08:15 )             28.8                         10.3   16.14 )-----------( 179      ( 03 Mar 2019 20:00 )             32.3     Imaging:    Clostridium difficile Toxin by PCR (03.03.19 @ 05:30)    Clostridium difficile Toxin by PCR: NotDetec: The results of this test should be interpreted with  consideration of all clinical and laboratory findings. C.  difficile PCR is more sensitive and specific than EIA,  therefore, repeat testing during one episode does not  provide additional clinically useful information. One test  per episode is sufficient for determining C. difficile  infection status.    A result of C. difficile tcdB gene not detected does not  preclude the possibility of colonization with C. difficile.  Negative results may occur from improper specimen  collection, handling or storage, or because the number of  organisms in the specimen is below the analytical  sensitivity of the test.    This test is performed on the Cepheid Gene Xpert detection  system which automates and integrates sample purification,  Nucleic acid amplification and detection of the target  sequence in simple or complex samples using real-time PCR  and RT-PCR assays.

## 2019-03-05 LAB
ANION GAP SERPL CALC-SCNC: 15 MMO/L — HIGH (ref 7–14)
BASOPHILS # BLD AUTO: 0.03 K/UL — SIGNIFICANT CHANGE UP (ref 0–0.2)
BASOPHILS NFR BLD AUTO: 0.2 % — SIGNIFICANT CHANGE UP (ref 0–2)
BUN SERPL-MCNC: 22 MG/DL — SIGNIFICANT CHANGE UP (ref 7–23)
CALCIUM SERPL-MCNC: 8.1 MG/DL — LOW (ref 8.4–10.5)
CHLORIDE SERPL-SCNC: 103 MMOL/L — SIGNIFICANT CHANGE UP (ref 98–107)
CHLORIDE STL-SCNC: 30 MMOL/L — SIGNIFICANT CHANGE UP
CO2 SERPL-SCNC: 16 MMOL/L — LOW (ref 22–31)
CREAT SERPL-MCNC: 1.08 MG/DL — SIGNIFICANT CHANGE UP (ref 0.5–1.3)
EOSINOPHIL # BLD AUTO: 0.85 K/UL — HIGH (ref 0–0.5)
EOSINOPHIL NFR BLD AUTO: 4.4 % — SIGNIFICANT CHANGE UP (ref 0–6)
FERRITIN SERPL-MCNC: 355.7 NG/ML — SIGNIFICANT CHANGE UP (ref 30–400)
GI PCR PANEL, STOOL: SIGNIFICANT CHANGE UP
GLUCOSE SERPL-MCNC: 101 MG/DL — HIGH (ref 70–99)
HAPTOGLOB SERPL-MCNC: 139 MG/DL — SIGNIFICANT CHANGE UP (ref 34–200)
HCT VFR BLD CALC: 31.3 % — LOW (ref 39–50)
HGB BLD-MCNC: 9.9 G/DL — LOW (ref 13–17)
IMM GRANULOCYTES NFR BLD AUTO: 3 % — HIGH (ref 0–1.5)
IRON SATN MFR SERPL: 156 UG/DL — SIGNIFICANT CHANGE UP (ref 155–535)
IRON SATN MFR SERPL: 38 UG/DL — LOW (ref 45–165)
LDH SERPL L TO P-CCNC: 207 U/L — SIGNIFICANT CHANGE UP (ref 135–225)
LYMPHOCYTES # BLD AUTO: 10.8 % — LOW (ref 13–44)
LYMPHOCYTES # BLD AUTO: 2.1 K/UL — SIGNIFICANT CHANGE UP (ref 1–3.3)
MANUAL SMEAR VERIFICATION: SIGNIFICANT CHANGE UP
MCHC RBC-ENTMCNC: 27.5 PG — SIGNIFICANT CHANGE UP (ref 27–34)
MCHC RBC-ENTMCNC: 31.6 % — LOW (ref 32–36)
MCV RBC AUTO: 86.9 FL — SIGNIFICANT CHANGE UP (ref 80–100)
MONOCYTES # BLD AUTO: 4.89 K/UL — HIGH (ref 0–0.9)
MONOCYTES NFR BLD AUTO: 25.1 % — HIGH (ref 2–14)
NEUTROPHILS # BLD AUTO: 11 K/UL — HIGH (ref 1.8–7.4)
NEUTROPHILS NFR BLD AUTO: 56.5 % — SIGNIFICANT CHANGE UP (ref 43–77)
NRBC # FLD: 0.07 K/UL — LOW (ref 25–125)
PLATELET # BLD AUTO: 163 K/UL — SIGNIFICANT CHANGE UP (ref 150–400)
PMV BLD: 12.2 FL — SIGNIFICANT CHANGE UP (ref 7–13)
POTASSIUM SERPL-MCNC: 3.3 MMOL/L — LOW (ref 3.5–5.3)
POTASSIUM SERPL-SCNC: 3.3 MMOL/L — LOW (ref 3.5–5.3)
POTASSIUM STL-SCNC: 45 MMOL/L — SIGNIFICANT CHANGE UP
RBC # BLD: 3.6 M/UL — LOW (ref 4.2–5.8)
RBC # FLD: 19.4 % — HIGH (ref 10.3–14.5)
RETICS #: 106 K/UL — SIGNIFICANT CHANGE UP (ref 25–125)
RETICS/RBC NFR: 3 % — HIGH (ref 0.5–2.5)
SODIUM SERPL-SCNC: 134 MMOL/L — LOW (ref 135–145)
SODIUM STL-SCNC: <20 MMOL/L — SIGNIFICANT CHANGE UP
SPECIMEN SOURCE: SIGNIFICANT CHANGE UP
SPECIMEN SOURCE: SIGNIFICANT CHANGE UP
TSH SERPL-MCNC: 1.98 UIU/ML — SIGNIFICANT CHANGE UP (ref 0.27–4.2)
UIBC SERPL-MCNC: 117.8 UG/DL — SIGNIFICANT CHANGE UP (ref 110–370)
WBC # BLD: 19.46 K/UL — HIGH (ref 3.8–10.5)
WBC # FLD AUTO: 19.46 K/UL — HIGH (ref 3.8–10.5)

## 2019-03-05 PROCEDURE — 93010 ELECTROCARDIOGRAM REPORT: CPT

## 2019-03-05 PROCEDURE — 99232 SBSQ HOSP IP/OBS MODERATE 35: CPT | Mod: GC

## 2019-03-05 RX ORDER — POTASSIUM CHLORIDE 20 MEQ
40 PACKET (EA) ORAL EVERY 4 HOURS
Qty: 0 | Refills: 0 | Status: COMPLETED | OUTPATIENT
Start: 2019-03-05 | End: 2019-03-05

## 2019-03-05 RX ORDER — ACETAMINOPHEN 500 MG
650 TABLET ORAL EVERY 6 HOURS
Qty: 0 | Refills: 0 | Status: DISCONTINUED | OUTPATIENT
Start: 2019-03-05 | End: 2019-03-21

## 2019-03-05 RX ORDER — MAGNESIUM SULFATE 500 MG/ML
2 VIAL (ML) INJECTION ONCE
Qty: 0 | Refills: 0 | Status: COMPLETED | OUTPATIENT
Start: 2019-03-05 | End: 2019-03-05

## 2019-03-05 RX ADMIN — SODIUM CHLORIDE 150 MILLILITER(S): 9 INJECTION, SOLUTION INTRAVENOUS at 13:33

## 2019-03-05 RX ADMIN — HEPARIN SODIUM 5000 UNIT(S): 5000 INJECTION INTRAVENOUS; SUBCUTANEOUS at 21:17

## 2019-03-05 RX ADMIN — Medication 40 MILLIEQUIVALENT(S): at 18:37

## 2019-03-05 RX ADMIN — Medication 650 MILLIGRAM(S): at 17:08

## 2019-03-05 RX ADMIN — Medication 650 MILLIGRAM(S): at 18:00

## 2019-03-05 RX ADMIN — HEPARIN SODIUM 5000 UNIT(S): 5000 INJECTION INTRAVENOUS; SUBCUTANEOUS at 06:33

## 2019-03-05 RX ADMIN — Medication 40 MILLIEQUIVALENT(S): at 14:03

## 2019-03-05 RX ADMIN — SODIUM CHLORIDE 150 MILLILITER(S): 9 INJECTION, SOLUTION INTRAVENOUS at 21:17

## 2019-03-05 RX ADMIN — Medication 50 GRAM(S): at 17:08

## 2019-03-05 RX ADMIN — HEPARIN SODIUM 5000 UNIT(S): 5000 INJECTION INTRAVENOUS; SUBCUTANEOUS at 13:34

## 2019-03-05 NOTE — PROGRESS NOTE ADULT - SUBJECTIVE AND OBJECTIVE BOX
Chief Complaint:  Patient is a 73y old  Male who presents with a chief complaint of Diarrhea (04 Mar 2019 17:48)      Interval Events: Per conversation with family members last night, pt had an EGD and colonoscopy in [place]. EGD showed portal hypertensive gastropathy and GAVE like mucosa, duodenitis, colonoscopy showing multiple telangiectasia, path with mild chronic active inflammation in sigmoid, rectal mucosa.     ROS: All 12 point system except listed above were otherwise negative.    Allergies:  No Known Allergies        Hospital Medications:  heparin  Injectable 5000 Unit(s) SubCutaneous every 8 hours  lactated ringers. 1000 milliLiter(s) IV Continuous <Continuous>      PMHX/PSHX:  Anemia, unspecified type  Essential hypertension  CLL (chronic lymphocytic leukemia)  No significant past surgical history      Family history:  Family history of myocardial infarction (Father, Mother, Sibling)    There is no family history of peptic ulcer disease, gastric cancer, colon polyps, colon cancer, celiac disease, biliary, hepatic, or pancreatic disease.  None of the female relatives have breast, uterine, or ovarian cancer.     PHYSICAL EXAM:   Vital Signs:  Vital Signs Last 24 Hrs  T(C): 36.3 (05 Mar 2019 06:12), Max: 36.7 (04 Mar 2019 18:25)  T(F): 97.4 (05 Mar 2019 06:12), Max: 98.1 (04 Mar 2019 18:25)  HR: 100 (05 Mar 2019 06:12) (94 - 105)  BP: 141/68 (05 Mar 2019 06:12) (130/72 - 141/68)  BP(mean): --  RR: 18 (05 Mar 2019 06:12) (15 - 18)  SpO2: 100% (05 Mar 2019 06:12) (100% - 100%)  Daily Height in cm: 160.02 (04 Mar 2019 20:49)    Daily     GENERAL:  Appears stated age, well-groomed  HEENT:  NC/AT,  conjunctivae clear and pink, no thyromegaly, nodules  CHEST:  Full & symmetric excursion, no increased effort, breath sounds clear  HEART:  Regular rhythm, S1, S2, no murmur/rub/S3/S4, no abdominal bruit  ABDOMEN:  Soft, non-tender, non-distended, normoactive bowel sounds  EXTEREMITIES:  no cyanosis,clubbing or edema  SKIN:  No rash/erythema/ecchymoses  NEURO:  Alert, oriented, no asterixis, no tremor    LABS:                        9.9    19.46 )-----------( 163      ( 05 Mar 2019 06:45 )             31.3     Mean Cell Volume: 86.9 fL (03-05-19 @ 06:45)    03-05    134<L>  |  103  |  22  ----------------------------<  101<H>  3.3<L>   |  16<L>  |  1.08    Ca    8.1<L>      05 Mar 2019 06:45  Phos  3.5     03-04  Mg     1.3     03-04    TPro  7.4  /  Alb  2.6<L>  /  TBili  0.5  /  DBili  0.2  /  AST  21  /  ALT  23  /  AlkPhos  337<H>  03-04    LIVER FUNCTIONS - ( 04 Mar 2019 05:20 )  Alb: 2.6 g/dL / Pro: 7.4 g/dL / ALK PHOS: 337 u/L / ALT: 23 u/L / AST: 21 u/L / GGT: x                                       9.9    19.46 )-----------( 163      ( 05 Mar 2019 06:45 )             31.3                         9.3    18.90 )-----------( 151      ( 04 Mar 2019 08:15 )             28.8                         10.3   16.14 )-----------( 179      ( 03 Mar 2019 20:00 )             32.3     Imaging:

## 2019-03-05 NOTE — PROGRESS NOTE ADULT - SUBJECTIVE AND OBJECTIVE BOX
Patient is a 73y old  Male who presents with a chief complaint of Diarrhea (05 Mar 2019 11:07)      SUBJECTIVE / OVERNIGHT EVENTS:    Events noted.  RESPIRATORY: No cough, wheezing, chills or hemoptysis; No shortness of breath  CARDIOVASCULAR: No chest pain, palpitations, dizziness, or leg swelling  GASTROINTESTINAL: No abdominal or epigastric pain. No nausea, vomiting, or hematemesis; No diarrhea or constipation.   NEUROLOGICAL: No headaches,     MEDICATIONS  (STANDING):  heparin  Injectable 5000 Unit(s) SubCutaneous every 8 hours  lactated ringers. 1000 milliLiter(s) (150 mL/Hr) IV Continuous <Continuous>    MEDICATIONS  (PRN):  acetaminophen   Tablet .. 650 milliGRAM(s) Oral every 6 hours PRN Moderate Pain (4 - 6)        CAPILLARY BLOOD GLUCOSE        I&O's Summary    04 Mar 2019 07:01  -  05 Mar 2019 07:00  --------------------------------------------------------  IN: 1350 mL / OUT: 0 mL / NET: 1350 mL        PHYSICAL EXAM:  GENERAL: NAD  NECK: Supple, No JVD  CHEST/LUNG: Clear to auscultation bilaterally; No wheezing.  HEART: Regular rate and rhythm; No murmurs, rubs, or gallops  ABDOMEN: Soft, Nontender, Nondistended; Bowel sounds present  EXTREMITIES:   No clubbing, cyanosis, or edema  NEUROLOGY: AAO X 3      LABS:                        9.9    19.46 )-----------( 163      ( 05 Mar 2019 06:45 )             31.3     03-05    134<L>  |  103  |  22  ----------------------------<  101<H>  3.3<L>   |  16<L>  |  1.08    Ca    8.1<L>      05 Mar 2019 06:45  Phos  3.5     03-04  Mg     1.3     03-04    TPro  7.4  /  Alb  2.6<L>  /  TBili  0.5  /  DBili  0.2  /  AST  21  /  ALT  23  /  AlkPhos  337<H>  03-04            CAPILLARY BLOOD GLUCOSE        03-04 @ 16:22  Culture-urine --  Culture results --  method type --  Organism --  Organism Identification --  Specimen source FECES  03-03 @ 22:55  Culture-urine --  Culture results --  method type --  Organism --  Organism Identification --  Specimen source FECES           03-04 @ 16:22  Culture blood --  Culture results --  Gram stain --  Gram stain blood --  Method type --  Organism --  Organism identification --  Specimen source FECES   03-03 @ 22:55  Culture blood --  Culture results --  Gram stain --  Gram stain blood --  Method type --  Organism --  Organism identification --  Specimen source FECES      RADIOLOGY & ADDITIONAL TESTS:    Imaging Personally Reviewed:    Consultant(s) Notes Reviewed:      Care Discussed with Consultants/Other Providers:

## 2019-03-05 NOTE — PROGRESS NOTE ADULT - ASSESSMENT
Impression:  1) Diarrhea- Differential diagnosis includes infectious enteritis given recent trip to [place] vs IBD. Other differential includes osmotic diarrhea from lactose intolerance, malabsorptive disorder (celiac disease, SIBO), maldigestive etiology (pancreatic insufficiency), microscopic colitis, malignancy  2) Normocytic anemia- Pt currently has no overt bleeding, had recent EGD and colonoscopy revealing no PUD, malignancy. Iron studies not consistent with iron def anemia.   3) Elevated alkaline phosphatase and lipase- Consistent with gallstone pancreatitis however US negative for CBD stone, MRCP pending  2) HTN    Recommendations:  -Follow up GI PCR, stool giardia antigen, ova and parasite  -Follow up MRI/MRCP to evaluate biliary tree given elevated alkaline phosphate and lipase  -Continue with aggressive LR hydration   -Lactose free diet  -Monitor and chart frequency of BMs  -Holding off EGD and colonoscopy given recent endoscopic findings, will consider mesalamine for UC once infectious etiology has been ruled out

## 2019-03-05 NOTE — PROGRESS NOTE ADULT - ASSESSMENT
· Assessment	  This is a 73M with history as above who presents to the hospital with c/o intermittent diarrhea for the past 6 months.     Problem/Plan - 1:  ·  Problem: Diarrhea, unspecified type.  Plan: - Stool studies  -      Problem/Plan - 2:  ·  Problem: PEDRO (acute kidney injury).  Plan: -BMP     Problem/Plan - 3:  ·  Problem: Electrolyte abnormality.  Plan: - Patient with hyponatremia, hypokalemia, and low bicarb likely due to electrolyte and fluid losses in diarrhea  - Cw hydration and monitor.      Problem/Plan - 4:  ·  Problem: Lactate blood increased.  Plan: - Noted to have elevated blood lactate, likely 2/2 low interstitial volume from diarrhea,      Problem/Plan - 5:  ·  Problem: Alkaline phosphatase elevation.  Plan: - Unclear cause for patient's elevated alk phos, US without any abnormal findings       Problem/Plan - 6:  Problem: CLL (chronic lymphocytic leukemia). Plan: - Patient's leukocytosis likely chronic 2/2 CLL, Monitor for now     Problem/Plan - 7:  ·  Problem: Anemia, unspecified type.  Plan: -   monitor for now.

## 2019-03-06 LAB
ANION GAP SERPL CALC-SCNC: 13 MMO/L — SIGNIFICANT CHANGE UP (ref 7–14)
BACTERIA STL CULT: SIGNIFICANT CHANGE UP
BUN SERPL-MCNC: 22 MG/DL — SIGNIFICANT CHANGE UP (ref 7–23)
CALCIUM SERPL-MCNC: 8.1 MG/DL — LOW (ref 8.4–10.5)
CHLORIDE SERPL-SCNC: 107 MMOL/L — SIGNIFICANT CHANGE UP (ref 98–107)
CO2 SERPL-SCNC: 18 MMOL/L — LOW (ref 22–31)
CREAT SERPL-MCNC: 1.09 MG/DL — SIGNIFICANT CHANGE UP (ref 0.5–1.3)
GLUCOSE SERPL-MCNC: 117 MG/DL — HIGH (ref 70–99)
HCT VFR BLD CALC: 29.9 % — LOW (ref 39–50)
HGB BLD-MCNC: 9.2 G/DL — LOW (ref 13–17)
MAGNESIUM SERPL-MCNC: 1.8 MG/DL — SIGNIFICANT CHANGE UP (ref 1.6–2.6)
MCHC RBC-ENTMCNC: 26.7 PG — LOW (ref 27–34)
MCHC RBC-ENTMCNC: 30.8 % — LOW (ref 32–36)
MCV RBC AUTO: 86.7 FL — SIGNIFICANT CHANGE UP (ref 80–100)
NRBC # FLD: 0.12 K/UL — LOW (ref 25–125)
O+P SPEC CONC: SIGNIFICANT CHANGE UP
O+P SPEC CONC: SIGNIFICANT CHANGE UP
PLATELET # BLD AUTO: 154 K/UL — SIGNIFICANT CHANGE UP (ref 150–400)
PMV BLD: 12.5 FL — SIGNIFICANT CHANGE UP (ref 7–13)
POTASSIUM SERPL-MCNC: 4 MMOL/L — SIGNIFICANT CHANGE UP (ref 3.5–5.3)
POTASSIUM SERPL-SCNC: 4 MMOL/L — SIGNIFICANT CHANGE UP (ref 3.5–5.3)
RBC # BLD: 3.45 M/UL — LOW (ref 4.2–5.8)
RBC # FLD: 19.5 % — HIGH (ref 10.3–14.5)
SODIUM SERPL-SCNC: 138 MMOL/L — SIGNIFICANT CHANGE UP (ref 135–145)
SPECIMEN SOURCE: SIGNIFICANT CHANGE UP
SPECIMEN SOURCE: SIGNIFICANT CHANGE UP
TRI STN SPEC: SIGNIFICANT CHANGE UP
TRI STN SPEC: SIGNIFICANT CHANGE UP
WBC # BLD: 18.43 K/UL — HIGH (ref 3.8–10.5)
WBC # FLD AUTO: 18.43 K/UL — HIGH (ref 3.8–10.5)

## 2019-03-06 PROCEDURE — 99232 SBSQ HOSP IP/OBS MODERATE 35: CPT

## 2019-03-06 PROCEDURE — 74183 MRI ABD W/O CNTR FLWD CNTR: CPT | Mod: 26

## 2019-03-06 RX ORDER — MORPHINE SULFATE 50 MG/1
2 CAPSULE, EXTENDED RELEASE ORAL ONCE
Qty: 0 | Refills: 0 | Status: DISCONTINUED | OUTPATIENT
Start: 2019-03-06 | End: 2019-03-06

## 2019-03-06 RX ORDER — ONDANSETRON 8 MG/1
4 TABLET, FILM COATED ORAL ONCE
Qty: 0 | Refills: 0 | Status: COMPLETED | OUTPATIENT
Start: 2019-03-06 | End: 2019-03-06

## 2019-03-06 RX ORDER — MESALAMINE 400 MG
800 TABLET, DELAYED RELEASE (ENTERIC COATED) ORAL THREE TIMES A DAY
Qty: 0 | Refills: 0 | Status: DISCONTINUED | OUTPATIENT
Start: 2019-03-06 | End: 2019-03-21

## 2019-03-06 RX ADMIN — Medication 650 MILLIGRAM(S): at 06:17

## 2019-03-06 RX ADMIN — SODIUM CHLORIDE 150 MILLILITER(S): 9 INJECTION, SOLUTION INTRAVENOUS at 04:05

## 2019-03-06 RX ADMIN — MORPHINE SULFATE 2 MILLIGRAM(S): 50 CAPSULE, EXTENDED RELEASE ORAL at 13:10

## 2019-03-06 RX ADMIN — HEPARIN SODIUM 5000 UNIT(S): 5000 INJECTION INTRAVENOUS; SUBCUTANEOUS at 06:07

## 2019-03-06 RX ADMIN — Medication 650 MILLIGRAM(S): at 00:10

## 2019-03-06 RX ADMIN — Medication 650 MILLIGRAM(S): at 07:15

## 2019-03-06 RX ADMIN — HEPARIN SODIUM 5000 UNIT(S): 5000 INJECTION INTRAVENOUS; SUBCUTANEOUS at 21:15

## 2019-03-06 RX ADMIN — Medication 800 MILLIGRAM(S): at 14:26

## 2019-03-06 RX ADMIN — ONDANSETRON 4 MILLIGRAM(S): 8 TABLET, FILM COATED ORAL at 20:23

## 2019-03-06 RX ADMIN — MORPHINE SULFATE 2 MILLIGRAM(S): 50 CAPSULE, EXTENDED RELEASE ORAL at 12:50

## 2019-03-06 RX ADMIN — HEPARIN SODIUM 5000 UNIT(S): 5000 INJECTION INTRAVENOUS; SUBCUTANEOUS at 14:26

## 2019-03-06 RX ADMIN — Medication 650 MILLIGRAM(S): at 01:07

## 2019-03-06 RX ADMIN — MORPHINE SULFATE 2 MILLIGRAM(S): 50 CAPSULE, EXTENDED RELEASE ORAL at 09:20

## 2019-03-06 RX ADMIN — MORPHINE SULFATE 2 MILLIGRAM(S): 50 CAPSULE, EXTENDED RELEASE ORAL at 08:53

## 2019-03-06 RX ADMIN — Medication 800 MILLIGRAM(S): at 21:15

## 2019-03-06 RX ADMIN — SODIUM CHLORIDE 150 MILLILITER(S): 9 INJECTION, SOLUTION INTRAVENOUS at 17:44

## 2019-03-06 NOTE — PROGRESS NOTE ADULT - ASSESSMENT
Impression:  1) Diarrhea- Cdiff and GI PCR returned negative, ova parasite, giardia Ag pending. Pt also could have IBD given chronicity.  S/P colonoscopy in [place] showing mild chronic inflammation  2) Normocytic anemia- Pt currently has no overt bleeding, had recent EGD and colonoscopy revealing no PUD, malignancy. Iron studies not consistent with iron def anemia.   3) Elevated alkaline phosphatase and lipase- Consistent with gallstone pancreatitis however US negative for CBD stone, MRCP pending  2) HTN    Recommendations:  -Follow up stool giardia antigen, ova and parasite  -Follow up MRI/MRCP to evaluate biliary tree given elevated alkaline phosphate and lipase  -Continue with LR hydration   -Lactose free diet  -Monitor and chart frequency of BMs  -Start Mesalamine 1g 4 times daily (Pentasa) for UC Impression:  1) Diarrhea- Cdiff and GI PCR returned negative, ova parasite, giardia Ag pending. Pt also could have IBD given chronicity.  S/P colonoscopy in [place] showing mild chronic inflammation  2) Normocytic anemia- Pt currently has no overt bleeding, had recent EGD and colonoscopy revealing no PUD, malignancy. Iron studies not consistent with iron def anemia.   3) Elevated alkaline phosphatase and lipase- Consistent with gallstone pancreatitis however US negative for CBD stone, MRCP pending  2) HTN    Recommendations:  -Follow up stool giardia antigen, ova and parasite  -Follow up MRI/MRCP to evaluate biliary tree given elevated alkaline phosphate and lipase  -Continue with LR hydration   -Lactose free diet  -Monitor and chart frequency of BMs  -Start Delzicol 800mg TID for UC

## 2019-03-06 NOTE — PROGRESS NOTE ADULT - ASSESSMENT
· Assessment	  This is a 73M with history as above who presents to the hospital with c/o intermittent diarrhea for the past 6 months.     Problem/Plan - 1:  ·  Problem: Diarrhea, unspecified type.  Plan: - Stool studies  - MRI abd: Enteritis/Ascites       Problem/Plan - 3:  ·  Problem: Electrolyte abnormality.  Plan: - Patient with hyponatremia, hypokalemia, and low bicarb likely due to electrolyte and fluid losses in diarrhea  - Cw hydration and monitor.      Problem/Plan - 4:  ·  Problem: Lactate blood increased.  Plan: - Noted to have elevated blood lactate, likely 2/2 low interstitial volume from diarrhea,      Problem/Plan - 5:  ·  Problem: Alkaline phosphatase elevation.  Plan: - Unclear cause for patient's elevated alk phos, US without any abnormal findings       Problem/Plan - 6:  Problem: CLL (chronic lymphocytic leukemia). Plan: - Patient's leukocytosis likely chronic 2/2 CLL, Monitor for now     Problem/Plan - 7:  ·  Problem: Anemia, unspecified type.  Plan: -   monitor for now.

## 2019-03-06 NOTE — PROGRESS NOTE ADULT - SUBJECTIVE AND OBJECTIVE BOX
Patient is a 73y old  Male who presents with a chief complaint of Diarrhea (06 Mar 2019 07:56)      SUBJECTIVE / OVERNIGHT EVENTS:    Events noted.  RESPIRATORY: No cough, wheezing, chills or hemoptysis;   CARDIOVASCULAR: No chest pain, palpitations, dizziness, or leg swelling  GASTROINTESTINAL: No abdominal or epigastric pain.   NEUROLOGICAL: No headaches,     MEDICATIONS  (STANDING):  heparin  Injectable 5000 Unit(s) SubCutaneous every 8 hours  lactated ringers. 1000 milliLiter(s) (150 mL/Hr) IV Continuous <Continuous>  mesalamine DR Capsule 800 milliGRAM(s) Oral three times a day    MEDICATIONS  (PRN):  acetaminophen   Tablet .. 650 milliGRAM(s) Oral every 6 hours PRN Moderate Pain (4 - 6)        CAPILLARY BLOOD GLUCOSE        I&O's Summary      PHYSICAL EXAM:  GENERAL: NAD  NECK: Supple, No JVD  CHEST/LUNG: Clear to auscultation bilaterally; No wheezing.  HEART: Regular rate and rhythm; No murmurs, rubs, or gallops  ABDOMEN: Soft, Nontender, Nondistended; Bowel sounds present  EXTREMITIES:   No clubbing, cyanosis, or edema  NEUROLOGY: AAO X 3      LABS:                        9.2    18.43 )-----------( 154      ( 06 Mar 2019 06:10 )             29.9     03-06    138  |  107  |  22  ----------------------------<  117<H>  4.0   |  18<L>  |  1.09    Ca    8.1<L>      06 Mar 2019 06:10  Mg     1.8     03-06              CAPILLARY BLOOD GLUCOSE        03-04 @ 16:22  Culture-urine --  Culture results --  method type --  Organism --  Organism Identification --  Specimen source FECES  03-04 @ 15:36  Culture-urine --  Culture results --  method type --  Organism --  Organism Identification --  Specimen source FECES  03-03 @ 22:55  Culture-urine --  Culture results --  method type --  Organism --  Organism Identification --  Specimen source FECES           03-04 @ 16:22  Culture blood --  Culture results --  Gram stain --  Gram stain blood --  Method type --  Organism --  Organism identification --  Specimen source FECES   03-04 @ 15:36  Culture blood --  Culture results --  Gram stain --  Gram stain blood --  Method type --  Organism --  Organism identification --  Specimen source FECES   03-03 @ 22:55  Culture blood --  Culture results --  Gram stain --  Gram stain blood --  Method type --  Organism --  Organism identification --  Specimen source FECES      RADIOLOGY & ADDITIONAL TESTS:    Imaging Personally Reviewed:    Consultant(s) Notes Reviewed:      Care Discussed with Consultants/Other Providers:

## 2019-03-06 NOTE — PROGRESS NOTE ADULT - SUBJECTIVE AND OBJECTIVE BOX
Chief Complaint:  Patient is a 73y old  Male who presents with a chief complaint of Diarrhea (05 Mar 2019 17:07)      Interval Events: Pt had 6-7 loose BMs yesterday, still endorses abdominal pain. Cdiff and GI PCR were negative.   ROS: All 12 point system except listed above were otherwise negative.    Allergies:  No Known Allergies        Hospital Medications:  acetaminophen   Tablet .. 650 milliGRAM(s) Oral every 6 hours PRN  heparin  Injectable 5000 Unit(s) SubCutaneous every 8 hours  lactated ringers. 1000 milliLiter(s) IV Continuous <Continuous>      PMHX/PSHX:  Anemia, unspecified type  Essential hypertension  CLL (chronic lymphocytic leukemia)  No significant past surgical history      Family history:  Family history of myocardial infarction (Father, Mother, Sibling)    There is no family history of peptic ulcer disease, gastric cancer, colon polyps, colon cancer, celiac disease, biliary, hepatic, or pancreatic disease.  None of the female relatives have breast, uterine, or ovarian cancer.     PHYSICAL EXAM:   Vital Signs:  Vital Signs Last 24 Hrs  T(C): 36.1 (06 Mar 2019 00:11), Max: 36.9 (05 Mar 2019 12:56)  T(F): 97 (06 Mar 2019 00:11), Max: 98.4 (05 Mar 2019 12:56)  HR: 98 (06 Mar 2019 00:11) (98 - 113)  BP: 147/74 (06 Mar 2019 00:11) (125/71 - 147/74)  BP(mean): --  RR: 18 (06 Mar 2019 00:11) (17 - 20)  SpO2: 99% (06 Mar 2019 00:11) (99% - 100%)  Daily     Daily     GENERAL:  Appears stated age, well-groomed  HEENT:  NC/AT,  conjunctivae clear and pink, no thyromegaly, nodules  CHEST:  Full & symmetric excursion, no increased effort, breath sounds clear  HEART:  Regular rhythm, S1, S2, no murmur/rub/S3/S4, no abdominal bruit  ABDOMEN:  Soft, non-tender, non-distended, normoactive bowel sounds  EXTEREMITIES:  no cyanosis,clubbing or edema  SKIN:  No rash/erythema/ecchymoses  NEURO:  Alert, oriented, no asterixis, no tremor      LABS:                        9.2    18.43 )-----------( 154      ( 06 Mar 2019 06:10 )             29.9     Mean Cell Volume: 86.7 fL (03-06-19 @ 06:10)    03-06    138  |  107  |  22  ----------------------------<  117<H>  4.0   |  18<L>  |  1.09    Ca    8.1<L>      06 Mar 2019 06:10  Mg     1.8     03-06                                    9.2    18.43 )-----------( 154      ( 06 Mar 2019 06:10 )             29.9                         9.9    19.46 )-----------( 163      ( 05 Mar 2019 06:45 )             31.3                         9.3    18.90 )-----------( 151      ( 04 Mar 2019 08:15 )             28.8                         10.3   16.14 )-----------( 179      ( 03 Mar 2019 20:00 )             32.3     Imaging:

## 2019-03-07 LAB
ALBUMIN FLD-MCNC: < 1 G/DL — SIGNIFICANT CHANGE UP
ALBUMIN SERPL ELPH-MCNC: 2 G/DL — LOW (ref 3.3–5)
ALP SERPL-CCNC: 346 U/L — HIGH (ref 40–120)
ALT FLD-CCNC: 22 U/L — SIGNIFICANT CHANGE UP (ref 4–41)
ANION GAP SERPL CALC-SCNC: 13 MMO/L — SIGNIFICANT CHANGE UP (ref 7–14)
APTT BLD: 42.3 SEC — HIGH (ref 27.5–36.3)
AST SERPL-CCNC: 18 U/L — SIGNIFICANT CHANGE UP (ref 4–40)
BASOPHILS # BLD AUTO: 0.03 K/UL — SIGNIFICANT CHANGE UP (ref 0–0.2)
BASOPHILS NFR BLD AUTO: 0.1 % — SIGNIFICANT CHANGE UP (ref 0–2)
BILIRUB SERPL-MCNC: 0.3 MG/DL — SIGNIFICANT CHANGE UP (ref 0.2–1.2)
BODY FLUID TYPE: SIGNIFICANT CHANGE UP
BUN SERPL-MCNC: 22 MG/DL — SIGNIFICANT CHANGE UP (ref 7–23)
CALCIUM SERPL-MCNC: 7.8 MG/DL — LOW (ref 8.4–10.5)
CHLORIDE SERPL-SCNC: 108 MMOL/L — HIGH (ref 98–107)
CLARITY SPEC: SIGNIFICANT CHANGE UP
CO2 SERPL-SCNC: 18 MMOL/L — LOW (ref 22–31)
COLOR FLD: COLORLESS — SIGNIFICANT CHANGE UP
CREAT SERPL-MCNC: 1.12 MG/DL — SIGNIFICANT CHANGE UP (ref 0.5–1.3)
EOSINOPHIL # BLD AUTO: 0.55 K/UL — HIGH (ref 0–0.5)
EOSINOPHIL NFR BLD AUTO: 2.7 % — SIGNIFICANT CHANGE UP (ref 0–6)
G LAMBLIA AG STL QL: SIGNIFICANT CHANGE UP
GLUCOSE FLD-MCNC: 178 MG/DL — SIGNIFICANT CHANGE UP
GLUCOSE SERPL-MCNC: 119 MG/DL — HIGH (ref 70–99)
GRAM STN FLD: SIGNIFICANT CHANGE UP
HCT VFR BLD CALC: 28.6 % — LOW (ref 39–50)
HGB BLD-MCNC: 8.8 G/DL — LOW (ref 13–17)
IMM GRANULOCYTES NFR BLD AUTO: 3.9 % — HIGH (ref 0–1.5)
LDH SERPL L TO P-CCNC: 51 U/L — SIGNIFICANT CHANGE UP
LYMPHOCYTES # BLD AUTO: 1.46 K/UL — SIGNIFICANT CHANGE UP (ref 1–3.3)
LYMPHOCYTES # BLD AUTO: 7 % — LOW (ref 13–44)
LYMPHOCYTES NFR FLD: 14 % — SIGNIFICANT CHANGE UP
MACROPHAGES # FLD: 72 % — SIGNIFICANT CHANGE UP
MCHC RBC-ENTMCNC: 26.5 PG — LOW (ref 27–34)
MCHC RBC-ENTMCNC: 30.8 % — LOW (ref 32–36)
MCV RBC AUTO: 86.1 FL — SIGNIFICANT CHANGE UP (ref 80–100)
MONOCYTES # BLD AUTO: 4.66 K/UL — HIGH (ref 0–0.9)
MONOCYTES NFR BLD AUTO: 22.5 % — HIGH (ref 2–14)
NEUTROPHILS # BLD AUTO: 13.24 K/UL — HIGH (ref 1.8–7.4)
NEUTROPHILS NFR BLD AUTO: 63.8 % — SIGNIFICANT CHANGE UP (ref 43–77)
NEUTS SEG NFR FLD MANUAL: 14 % — SIGNIFICANT CHANGE UP
NRBC # FLD: 0.16 K/UL — LOW (ref 25–125)
PLATELET # BLD AUTO: 152 K/UL — SIGNIFICANT CHANGE UP (ref 150–400)
PMV BLD: 12 FL — SIGNIFICANT CHANGE UP (ref 7–13)
POTASSIUM SERPL-MCNC: 3.8 MMOL/L — SIGNIFICANT CHANGE UP (ref 3.5–5.3)
POTASSIUM SERPL-SCNC: 3.8 MMOL/L — SIGNIFICANT CHANGE UP (ref 3.5–5.3)
PROT FLD-MCNC: 0.7 G/DL — SIGNIFICANT CHANGE UP
PROT SERPL-MCNC: 5.9 G/DL — LOW (ref 6–8.3)
RBC # BLD: 3.32 M/UL — LOW (ref 4.2–5.8)
RBC # FLD: 20 % — HIGH (ref 10.3–14.5)
RCV VOL RI: 1000 CELL/UL — HIGH (ref 0–5)
SODIUM SERPL-SCNC: 139 MMOL/L — SIGNIFICANT CHANGE UP (ref 135–145)
SPECIMEN SOURCE: SIGNIFICANT CHANGE UP
TOTAL CELLS COUNTED, BODY FLUID: 100 CELLS — SIGNIFICANT CHANGE UP
TOTAL NUCLEATED CELL COUNT, BODY FLUID: 302 CELL/UL — HIGH (ref 0–5)
WBC # BLD: 20.75 K/UL — HIGH (ref 3.8–10.5)
WBC # FLD AUTO: 20.75 K/UL — HIGH (ref 3.8–10.5)

## 2019-03-07 PROCEDURE — 49083 ABD PARACENTESIS W/IMAGING: CPT | Mod: GC

## 2019-03-07 PROCEDURE — 88189 FLOWCYTOMETRY/READ 16 & >: CPT

## 2019-03-07 PROCEDURE — 99232 SBSQ HOSP IP/OBS MODERATE 35: CPT | Mod: GC

## 2019-03-07 RX ORDER — HEPARIN SODIUM 5000 [USP'U]/ML
5000 INJECTION INTRAVENOUS; SUBCUTANEOUS EVERY 8 HOURS
Qty: 0 | Refills: 0 | Status: DISCONTINUED | OUTPATIENT
Start: 2019-03-07 | End: 2019-03-12

## 2019-03-07 RX ORDER — SODIUM CHLORIDE 9 MG/ML
1000 INJECTION, SOLUTION INTRAVENOUS
Qty: 0 | Refills: 0 | Status: DISCONTINUED | OUTPATIENT
Start: 2019-03-07 | End: 2019-03-12

## 2019-03-07 RX ORDER — MORPHINE SULFATE 50 MG/1
2 CAPSULE, EXTENDED RELEASE ORAL ONCE
Qty: 0 | Refills: 0 | Status: DISCONTINUED | OUTPATIENT
Start: 2019-03-07 | End: 2019-03-07

## 2019-03-07 RX ORDER — ONDANSETRON 8 MG/1
4 TABLET, FILM COATED ORAL ONCE
Qty: 0 | Refills: 0 | Status: COMPLETED | OUTPATIENT
Start: 2019-03-07 | End: 2019-03-07

## 2019-03-07 RX ORDER — LIDOCAINE HCL 20 MG/ML
10 VIAL (ML) INJECTION ONCE
Qty: 0 | Refills: 0 | Status: DISCONTINUED | OUTPATIENT
Start: 2019-03-07 | End: 2019-03-21

## 2019-03-07 RX ADMIN — HEPARIN SODIUM 5000 UNIT(S): 5000 INJECTION INTRAVENOUS; SUBCUTANEOUS at 05:06

## 2019-03-07 RX ADMIN — SODIUM CHLORIDE 150 MILLILITER(S): 9 INJECTION, SOLUTION INTRAVENOUS at 05:06

## 2019-03-07 RX ADMIN — SODIUM CHLORIDE 75 MILLILITER(S): 9 INJECTION, SOLUTION INTRAVENOUS at 14:37

## 2019-03-07 RX ADMIN — Medication 800 MILLIGRAM(S): at 21:36

## 2019-03-07 RX ADMIN — Medication 800 MILLIGRAM(S): at 05:06

## 2019-03-07 RX ADMIN — MORPHINE SULFATE 2 MILLIGRAM(S): 50 CAPSULE, EXTENDED RELEASE ORAL at 16:49

## 2019-03-07 RX ADMIN — MORPHINE SULFATE 2 MILLIGRAM(S): 50 CAPSULE, EXTENDED RELEASE ORAL at 17:15

## 2019-03-07 RX ADMIN — ONDANSETRON 4 MILLIGRAM(S): 8 TABLET, FILM COATED ORAL at 19:55

## 2019-03-07 RX ADMIN — Medication 800 MILLIGRAM(S): at 14:36

## 2019-03-07 RX ADMIN — HEPARIN SODIUM 5000 UNIT(S): 5000 INJECTION INTRAVENOUS; SUBCUTANEOUS at 21:37

## 2019-03-07 NOTE — PROCEDURE NOTE - ADDITIONAL PROCEDURE DETAILS
Primary team requesting diagnostic paracentesis. Pt initially examined with bedside US, 2.0 cm pocket found in RLQ. Site cleansed with chlorhexidene swabs. 18G needle used to aspirate 65 cc of straw colored fluid.

## 2019-03-07 NOTE — DISCHARGE NOTE PROVIDER - NSDCCPCAREPLAN_GEN_ALL_CORE_FT
PRINCIPAL DISCHARGE DIAGNOSIS  Problem: Diarrhea  Assessment and Plan of Treatment:       SECONDARY DISCHARGE DIAGNOSES  Problem: PEDRO (acute kidney injury)  Assessment and Plan of Treatment: PRINCIPAL DISCHARGE DIAGNOSIS  Diagnosis: Diarrhea  Assessment and Plan of Treatment: You were evaluated by gatroenterology.      SECONDARY DISCHARGE DIAGNOSES  Diagnosis: PEDRO (acute kidney injury)  Assessment and Plan of Treatment: PRINCIPAL DISCHARGE DIAGNOSIS  Diagnosis: Diarrhea  Assessment and Plan of Treatment: You were evaluated by gatroenterology and infectious disease. Your stool studies were negative for infectious process. Please continue with delzicol. Your diarrhea has improved, please follow up with GI and your PCP as outpatient if it returns.      SECONDARY DISCHARGE DIAGNOSES  Diagnosis: GI bleed  Assessment and Plan of Treatment: You underwent an a push enteroscopy on 3/12 and an endoscopy on 3/16. Biopsies were taken which showed chronic duodenitis. You were transfused 2 u PRBC while admitted. Your gastrin level was found to be normal. Please follow up with GI as outpatient.    Diagnosis: Anemia, unspecified type  Assessment and Plan of Treatment: Follow-up with your outpatient provider for further care/recommendations. Monitor for signs/symptoms indicating worsening of disease, such as, easy bleeding/bruising, pale skin, fatigue, dizziness, increased heart rate, or chest pain.    Diagnosis: Leukocytosis  Assessment and Plan of Treatment: You were found to have an elevated WBC, which has improved during admission. You were also found to have a ESBL in the urine - please continue with ertapenem as directed. You were found to ha    Diagnosis: CLL (chronic lymphocytic leukemia)  Assessment and Plan of Treatment: PB flow cytometry was not indicative of CLL. Please follow up with hematology as outpatient for further management and to follow up FISH results.    Diagnosis: PEDRO (acute kidney injury)  Assessment and Plan of Treatment:     Diagnosis: Acute hypernatremia  Assessment and Plan of Treatment: PRINCIPAL DISCHARGE DIAGNOSIS  Diagnosis: Diarrhea  Assessment and Plan of Treatment: You were evaluated by gatroenterology and infectious disease. Your stool studies were negative for infectious process. Per ID, test for microsporidium is still pending. Please follow up with ID as outpatient to follow up results and for further managment. Please continue with delzicol. Your diarrhea has improved, please follow up with GI and your PCP as outpatient if it returns.      SECONDARY DISCHARGE DIAGNOSES  Diagnosis: GI bleed  Assessment and Plan of Treatment: You underwent an a push enteroscopy on 3/12 and an endoscopy on 3/16. Biopsies were taken which showed chronic duodenitis. You were transfused 2 u PRBC while admitted. Your gastrin level was found to be normal. Please continue with PPI. Please follow up with GI as outpatient.    Diagnosis: Anemia, unspecified type  Assessment and Plan of Treatment: Follow-up with your outpatient provider for further care/recommendations. Monitor for signs/symptoms indicating worsening of disease, such as, easy bleeding/bruising, pale skin, fatigue, dizziness, increased heart rate, or chest pain.    Diagnosis: Leukocytosis  Assessment and Plan of Treatment: You were found to have an elevated WBC, which has improved during admission. You were also found to have a ESBL in the urine - please continue with ertapenem as directed. Post endoscopy, you were also found to have with increased cough - CT chest with TIB pattern in upper lungs, and pulm edema.  Azithromycin added for atypical coverage.  Legionella Ag neg.  Complete 5 day course of azithro through 3/19    Diagnosis: CLL (chronic lymphocytic leukemia)  Assessment and Plan of Treatment: PB flow cytometry was not indicative of CLL. Please follow up with hematology as outpatient for further management and to follow up FISH results.    Diagnosis: PEDRO (acute kidney injury)  Assessment and Plan of Treatment: In order to prevent further disease progression, continue to follow recommendations made by your primary provider/nephrologist. Continue a diet that is low in sodium and avoid foods that are concentrated in potassium and phosphorus. Continue your medications/supplementations as directed and avoid over-the-counter drugs that are harmful to kidneys, such as, Non-Steroidal Anti-Inflammatory Drugs (NSAIDs). Follow-up as outpatient to monitor your kidney function, as well as, vitamin D, Calcium, potassium, and phosphorus levels.    Diagnosis: Acute hypernatremia  Assessment and Plan of Treatment: You were given IV fluid to decrease sodium levels. Please ensure adequate oral intake and hydration. PRINCIPAL DISCHARGE DIAGNOSIS  Diagnosis: Diarrhea  Assessment and Plan of Treatment: You were evaluated by gatroenterology and infectious disease. Your stool studies were negative for infectious process. Per ID, test for microsporidium is still pending. Please follow up with ID as outpatient to follow up results and for further managment. Please continue with delzicol. Your diarrhea has improved, please follow up with GI as outpatient.      SECONDARY DISCHARGE DIAGNOSES  Diagnosis: GI bleed  Assessment and Plan of Treatment: You underwent an a push enteroscopy on 3/12 and an endoscopy on 3/16. Biopsies were taken which showed chronic duodenitis. You were transfused 2 u PRBC while admitted. Your gastrin level was found to be normal. Please continue with PPI. Please follow up with GI as outpatient.    Diagnosis: Anemia, unspecified type  Assessment and Plan of Treatment: Follow-up with your outpatient provider for further care/recommendations. Monitor for signs/symptoms indicating worsening of disease, such as, easy bleeding/bruising, pale skin, fatigue, dizziness, increased heart rate, or chest pain.    Diagnosis: Leukocytosis  Assessment and Plan of Treatment: You were found to have an elevated WBC, which has improved during admission. You were also found to have a ESBL in the urine - please continue with ertapenem as directed. Post endoscopy, you were also found to have with increased cough - CT chest with TIB pattern in upper lungs, and pulm edema.  Azithromycin added for atypical coverage.  Legionella Ag neg.  Complete 5 day course of azithro through 3/19    Diagnosis: CLL (chronic lymphocytic leukemia)  Assessment and Plan of Treatment: PB flow cytometry was not indicative of CLL. Please follow up with hematology as outpatient for further management and to follow up FISH results.    Diagnosis: PEDRO (acute kidney injury)  Assessment and Plan of Treatment: In order to prevent further disease progression, continue to follow recommendations made by your primary provider/nephrologist. Continue a diet that is low in sodium and avoid foods that are concentrated in potassium and phosphorus. Continue your medications/supplementations as directed and avoid over-the-counter drugs that are harmful to kidneys, such as, Non-Steroidal Anti-Inflammatory Drugs (NSAIDs). Follow-up as outpatient to monitor your kidney function, as well as, vitamin D, Calcium, potassium, and phosphorus levels.    Diagnosis: Acute hypernatremia  Assessment and Plan of Treatment: You were given IV fluid to decrease sodium levels. Please ensure adequate oral intake and hydration.

## 2019-03-07 NOTE — PROGRESS NOTE ADULT - SUBJECTIVE AND OBJECTIVE BOX
Chief Complaint:  Patient is a 73y old  Male who presents with a chief complaint of Diarrhea (06 Mar 2019 16:49)      Interval Events: Pt endorses 7 episodes of loose BMs yesterday. Pt was started on Mesalamine. MRCP showed no CBD stone, but with mural thickening of the duodenum and proximal jejunum suggesting enteritis, mod ascites. Pt still endorses abdominal pain but is improved. After MRI, patient vomited x1, given zofran.   ROS: All 12 point system except listed above were otherwise negative.    Allergies:  No Known Allergies        Hospital Medications:  acetaminophen   Tablet .. 650 milliGRAM(s) Oral every 6 hours PRN  heparin  Injectable 5000 Unit(s) SubCutaneous every 8 hours  lactated ringers. 1000 milliLiter(s) IV Continuous <Continuous>  mesalamine DR Capsule 800 milliGRAM(s) Oral three times a day      PMHX/PSHX:  Anemia, unspecified type  Essential hypertension  CLL (chronic lymphocytic leukemia)  No significant past surgical history      Family history:  Family history of myocardial infarction (Father, Mother, Sibling)    There is no family history of peptic ulcer disease, gastric cancer, colon polyps, colon cancer, celiac disease, biliary, hepatic, or pancreatic disease.  None of the female relatives have breast, uterine, or ovarian cancer.     PHYSICAL EXAM:   Vital Signs:  Vital Signs Last 24 Hrs  T(C): 37 (07 Mar 2019 05:09), Max: 37 (07 Mar 2019 05:09)  T(F): 98.6 (07 Mar 2019 05:09), Max: 98.6 (07 Mar 2019 05:09)  HR: 106 (07 Mar 2019 05:09) (106 - 115)  BP: 128/72 (07 Mar 2019 05:09) (115/74 - 133/79)  BP(mean): --  RR: 17 (07 Mar 2019 05:09) (17 - 19)  SpO2: 98% (07 Mar 2019 05:09) (98% - 100%)  Daily     Daily     GENERAL:  Appears stated age, well-groomed  HEENT:  NC/AT,  conjunctivae clear and pink, no thyromegaly, nodules  CHEST:  Full & symmetric excursion, no increased effort, breath sounds clear  HEART:  Regular rhythm, S1, S2, no murmur/rub/S3/S4, no abdominal bruit  ABDOMEN:  Soft, non-tender, non-distended, normoactive bowel sounds  EXTEREMITIES:  no cyanosis,clubbing or edema  SKIN:  No rash/erythema/ecchymoses  NEURO:  Alert, oriented, no asterixis, no tremor    LABS:                        8.8    20.75 )-----------( 152      ( 07 Mar 2019 05:30 )             28.6     Mean Cell Volume: 86.1 fL (03-07-19 @ 05:30)    03-07    139  |  108<H>  |  22  ----------------------------<  119<H>  3.8   |  18<L>  |  1.12    Ca    7.8<L>      07 Mar 2019 05:30  Mg     1.8     03-06    TPro  5.9<L>  /  Alb  2.0<L>  /  TBili  0.3  /  DBili  x   /  AST  18  /  ALT  22  /  AlkPhos  346<H>  03-07    LIVER FUNCTIONS - ( 07 Mar 2019 05:30 )  Alb: 2.0 g/dL / Pro: 5.9 g/dL / ALK PHOS: 346 u/L / ALT: 22 u/L / AST: 18 u/L / GGT: x                                       8.8    20.75 )-----------( 152      ( 07 Mar 2019 05:30 )             28.6                         9.2    18.43 )-----------( 154      ( 06 Mar 2019 06:10 )             29.9                         9.9    19.46 )-----------( 163      ( 05 Mar 2019 06:45 )             31.3     Imaging:

## 2019-03-07 NOTE — DISCHARGE NOTE PROVIDER - NSDCCAREPROVSEEN_GEN_ALL_CORE_FT
[facility] Gastroenterology, Team  Elina [facility] Medicine, ADS  Adeline Marques

## 2019-03-07 NOTE — DISCHARGE NOTE PROVIDER - CARE PROVIDER_API CALL
Yara CARDONA,   Phone: [phone]  Fax: (   )    -  Follow Up Time: [facility],   Phone: [phone]  Fax: (   )    -  Follow Up Time:     Adeline Marques)  Infectious Disease; Internal Medicine  [address]  [place] [address]  Phone: [phone]  Fax: [phone]  Follow Up Time:     Coonr Garcia)  Hematology; Medical Oncology  [address]  [place] [address]  Phone: [phone]  Fax: [phone]  Follow Up Time:

## 2019-03-07 NOTE — DISCHARGE NOTE PROVIDER - HOSPITAL COURSE
This is a 73M with history as above who presents to the hospital with c/o intermittent diarrhea for the past 6 months. This is a 73M with history as above who presents to the hospital with c/o intermittent diarrhea for the past 6 months.        HOSPITAL COURSE    GI BLEED/Diarrhea     - GI Following     - s/p push enteroscopy 3/12 with bx: Duodenum, biopsy-Chronic active duodenitis with gastric foveolar metaplasia.    - GIB 2/2 ulcerations seen on push enteroscopy and EGD, rpt scope on 3/16 with healing non-bleeding ulcerations.    - Gastrin level normal     - CT A/P: Mild wall thickening of the duodenum extending of the adjacent mesenteric fat, suggesting underlying duodenitis.    - MRI/MRCP - MRCP- No evidence of cholelithiasis or biliary obstruction. Hepatic steatosis. Mural thickening proximal small bowel consistent with enteritis. Moderate ascites.    - stool studies- negative    - TODD:<1:80, ASMA: <1:20,  LKM Ab:<20.1, quant IgM: 118 WNL , IgA: 564 elevated, Ig elevated, AMA:<20.1    - Flow cytometry:  The findings show mild increase in large granular lymphocytes (NK-like T-cells and natural killer cells).    -Lactose free diet    -Continue with Delzicol 800mg TID; unclear if patient actually has IBD - would benefit from eventual repeat endoscopic evaluation        LEUKOCYTOSIS    - ID consulted     - CT chest- Tracheal wall thickening, centrilobular and tree-in-bud micronodules predominantly within the upper lungs suggestive of bronchitis/bronchiolitis. Interlobular septal thickening and patchy groundglass opacities, trace bilateral pleural effusions and anasarca suggestive of superimposed pulmonary edema. Ascites new from 3/4/2019. Narrowing of bilateral main bronchi which may represent bronchomalacia    - Check blood cultures- NTD    - UA pos, Ucx grew esbl ecoli     - IV ceftriaxone ;3/18:switched to ertapenem - continue with treatment till 3/24 per ID     - Legionella Ag neg.      - Complete 5 day course of azithro through 3/19    - CMV pcr (-), Cmv IGg/IGM (prior exposure), histoplasma (-), HIV (neg), strongyloides (neg)        Anemia     - w/u shows no evidence of iron def, b12 or folate def or hemolysis - anemia of chronic disease, renal insufficiency and from acute blood loss    - 3/12  & 3/14 - s/p 1 unit PRBC     - vit K for elevated INR x 3 days as per heme Dr. Garcia     - keep plts > 50K         Hypernatremia    -renal following     -improved with IVF        Tachycardia    --120s    -EKG- ST low voltage-no ST T wave abnormalities    -CK/trop negative    -CXR- Small bilateral pleural effusions and atelectasis.    --Echo:Mitral annular calcification, otherwise normal mitral valve. Minimal mitral regurgitation.Normal left ventricular internal dimensions and wall thicknesses. Endocardium not well visualized; grossly normal left ventricular systolic function. Normal right ventricular size and function.    - Metoprolol 25mg BID    -improved        Moderate ascities    -3/ s/p diagnostic paracentesis performed by procedure team        PEDRO    - BUN/Cr ratio a little over 20    -renal following     -Worsening PEDRO, may be prerenal azotemia v ATN. No evidence of obstruction on abd CT.     -improved        Lactate blood increased:    - Noted to have elevated blood lactate, likely 2/2 low interstitial volume from diarrhea    -resolved         Alkaline phosphatase elevation:    -GI following     -GGTP is normal thus elevation of alk phos not from liver, US and MRCP are negative for gallstones. Other differential dx includes DILI (patient had no new meds), ischemic cholangiopathy, infiltrative disease, congestive cholangiopathy, cholestasis of sepsis    -f/u HBsAg neg, HBsAb neg; HCV, alpha-1-antitrypsin     - GI f/u as OP         CLL    - Patient's leukocytosis likely chronic 2/2 CLL    -heme/onc (lev) consulted    --  no current evidence of CLL on PB flow cytometry    - obtain FISH on PB - sent out; to be followed up as outpatient             Essential hypertension:    - Holding his losartan given PEDRO    -c/w metoprolol         Dispo: DC home with home PT - pt's hospital course was d/w attending

## 2019-03-07 NOTE — PROGRESS NOTE ADULT - ASSESSMENT
Impression:  1) Diarrhea- Infectious vs IBD. Cdiff, GI PCR, ova and parasite returned negative. S/P colonoscopy in [place] showing mild chronic inflammation.   2) Normocytic anemia- Pt currently has no overt bleeding, had recent EGD and colonoscopy revealing no PUD, malignancy. Iron studies not consistent with iron def anemia.   3) Elevated alkaline phosphatase and lipase- Consistent with gallstone pancreatitis however US and MRCP are negative for gallstones. Other differential dx includes DILI (patient had no new meds), ischemic cholangiopathy, infiltrative disease, congestive cholangiopathy, cholestasis of sepsis  2) HTN    Recommendations:  -Follow up stool giardia antigen and viral hepatitis  -Consider diagnostic paracentesis of the ascites seen on imaging, likely contributing to patient's abdominal pain  -Obtain TODD, ASMA, LKM Ab, quant IgM, IgA, IgG, AMA  -Consider TTE to evaluate for congestive cholangiopathy  -Continue with LR hydration   -Check strongyloides Ab  -Lactose free diet  -Monitor and chart frequency of BMs  -Possible push enteroscopy with biopsy tomorrow  -NPO past midnight  -Continue with Delzicol 800mg TID Impression:  1) Diarrhea- Infectious vs IBD. Cdiff, GI PCR, ova and parasite returned negative. S/P colonoscopy in [place] showing mild chronic inflammation.   2) Normocytic anemia- Pt currently has no overt bleeding, had recent EGD and colonoscopy revealing no PUD, malignancy. Iron studies not consistent with iron def anemia.   3) Elevated alkaline phosphatase and lipase- Consistent with gallstone pancreatitis however US and MRCP are negative for gallstones. Other differential dx includes DILI (patient had no new meds), ischemic cholangiopathy, infiltrative disease, congestive cholangiopathy, cholestasis of sepsis  2) HTN    Recommendations:  -Follow up stool giardia antigen and viral hepatitis  -Consider diagnostic paracentesis of the ascites seen on imaging, likely contributing to patient's abdominal pain  -Obtain TODD, ASMA, LKM Ab, quant IgM, IgA, IgG, AMA  -Consider TTE to evaluate for congestive cholangiopathy  -Continue with LR hydration   -Check strongyloides Ab  -Lactose free diet  -Monitor and chart frequency of BMs  -Check GGT  -Possible push enteroscopy with biopsy tomorrow  -NPO past midnight  -Continue with Delzicol 800mg TID Impression:  1) Diarrhea- Infectious vs IBD. Cdiff, GI PCR, ova and parasite returned negative. S/P colonoscopy in [place] showing mild chronic inflammation.   2) Normocytic anemia- Pt currently has no overt bleeding, had recent EGD and colonoscopy revealing no PUD, malignancy. Iron studies not consistent with iron def anemia.   3) Elevated alkaline phosphatase and lipase- Consistent with gallstone pancreatitis however US and MRCP are negative for gallstones. Other differential dx includes DILI (patient had no new meds), ischemic cholangiopathy, infiltrative disease, congestive cholangiopathy, cholestasis of sepsis  2) HTN    Recommendations:  -Follow up stool giardia antigen and viral hepatitis  -Consider diagnostic paracentesis of the ascites seen on imaging, likely contributing to patient's abdominal pain  -Obtain TODD, ASMA, LKM Ab, quant IgM, IgA, IgG, AMA  -Consider TTE to evaluate for congestive cholangiopathy  -Continue with LR hydration   -Lactose free diet  -Monitor and chart frequency of BMs  -Check GGT  -Possible push enteroscopy with biopsy tomorrow  -NPO past midnight  -Continue with Delzicol 800mg TID

## 2019-03-07 NOTE — PROGRESS NOTE ADULT - SUBJECTIVE AND OBJECTIVE BOX
Patient is a 73y old  Male who presents with a chief complaint of Diarrhea (07 Mar 2019 13:01)      SUBJECTIVE / OVERNIGHT EVENTS:    Events noted.  Feels better.  CONSTITUTIONAL: No fever,  or fatigue  NECK: No pain or stiffness  RESPIRATORY: No cough, wheezing, chills or hemoptysis; No shortness of breath  CARDIOVASCULAR: No chest pain, palpitations, dizziness, or leg swelling  GASTROINTESTINAL: No abdominal or epigastric pain. No nausea, vomiting, or hematemesis; No diarrhea or constipation.   NEUROLOGICAL: No headaches,     MEDICATIONS  (STANDING):  heparin  Injectable 5000 Unit(s) SubCutaneous every 8 hours  lactated ringers. 1000 milliLiter(s) (75 mL/Hr) IV Continuous <Continuous>  lidocaine 1% Injectable 10 milliLiter(s) Local Injection once  mesalamine DR Capsule 800 milliGRAM(s) Oral three times a day    MEDICATIONS  (PRN):  acetaminophen   Tablet .. 650 milliGRAM(s) Oral every 6 hours PRN Moderate Pain (4 - 6)        CAPILLARY BLOOD GLUCOSE        I&O's Summary    06 Mar 2019 07:01  -  07 Mar 2019 07:00  --------------------------------------------------------  IN: 1920 mL / OUT: 0 mL / NET: 1920 mL        PHYSICAL EXAM:  GENERAL: NAD  NECK: Supple, No JVD  CHEST/LUNG: Clear to auscultation bilaterally; No wheezing.  HEART: Regular rate and rhythm; No murmurs, rubs, or gallops  ABDOMEN: Soft, Nontender, Nondistended; Bowel sounds present  EXTREMITIES:   No clubbing, cyanosis, or edema  NEUROLOGY: AAO X 3      LABS:                        8.8    20.75 )-----------( 152      ( 07 Mar 2019 05:30 )             28.6     03-07    139  |  108<H>  |  22  ----------------------------<  119<H>  3.8   |  18<L>  |  1.12    Ca    7.8<L>      07 Mar 2019 05:30  Mg     1.8     03-06    TPro  5.9<L>  /  Alb  2.0<L>  /  TBili  0.3  /  DBili  x   /  AST  18  /  ALT  22  /  AlkPhos  346<H>  03-07            CAPILLARY BLOOD GLUCOSE        03-04 @ 16:22  Culture-urine --  Culture results --  method type --  Organism --  Organism Identification --  Specimen source FECES  03-04 @ 15:36  Culture-urine --  Culture results --  method type --  Organism --  Organism Identification --  Specimen source FECES  03-03 @ 22:55  Culture-urine --  Culture results --  method type --  Organism --  Organism Identification --  Specimen source FECES           03-04 @ 16:22  Culture blood --  Culture results --  Gram stain --  Gram stain blood --  Method type --  Organism --  Organism identification --  Specimen source FECES   03-04 @ 15:36  Culture blood --  Culture results --  Gram stain --  Gram stain blood --  Method type --  Organism --  Organism identification --  Specimen source FECES   03-03 @ 22:55  Culture blood --  Culture results --  Gram stain --  Gram stain blood --  Method type --  Organism --  Organism identification --  Specimen source FECES      RADIOLOGY & ADDITIONAL TESTS:    Imaging Personally Reviewed:    Consultant(s) Notes Reviewed:      Care Discussed with Consultants/Other Providers:

## 2019-03-07 NOTE — DISCHARGE NOTE PROVIDER - PROVIDER TOKENS
FREE:[LAST:[Yara CARDONA],PHONE:[phone]],FAX:[(   )    -]] FREE:[LAST:[Yara GI],PHONE:[phone]],FAX:[(   )    -]],PROVIDER:[TOKEN:[[id number]]],PROVIDER:[TOKEN:[[id number]]]

## 2019-03-07 NOTE — PROGRESS NOTE ADULT - ASSESSMENT
· Assessment	  This is a 73M with history as above who presents to the hospital with c/o intermittent diarrhea for the past 6 months.     Problem/Plan - 1:  ·  Problem: Diarrhea, unspecified type.  Plan: -  - MRI abd: Enteritis/Ascites. Stable. Dc planning.

## 2019-03-08 LAB
ALBUMIN SERPL ELPH-MCNC: 2.1 G/DL — LOW (ref 3.3–5)
ALP SERPL-CCNC: 421 U/L — HIGH (ref 40–120)
ALT FLD-CCNC: 26 U/L — SIGNIFICANT CHANGE UP (ref 4–41)
ANION GAP SERPL CALC-SCNC: 11 MMO/L — SIGNIFICANT CHANGE UP (ref 7–14)
AST SERPL-CCNC: 24 U/L — SIGNIFICANT CHANGE UP (ref 4–40)
BILIRUB SERPL-MCNC: 0.4 MG/DL — SIGNIFICANT CHANGE UP (ref 0.2–1.2)
BUN SERPL-MCNC: 24 MG/DL — HIGH (ref 7–23)
CALCIUM SERPL-MCNC: 7.8 MG/DL — LOW (ref 8.4–10.5)
CHLORIDE SERPL-SCNC: 111 MMOL/L — HIGH (ref 98–107)
CK SERPL-CCNC: 29 U/L — LOW (ref 30–200)
CO2 SERPL-SCNC: 17 MMOL/L — LOW (ref 22–31)
CREAT SERPL-MCNC: 1.17 MG/DL — SIGNIFICANT CHANGE UP (ref 0.5–1.3)
CULTURE - ACID FAST SMEAR CONCENTRATED: SIGNIFICANT CHANGE UP
GGT SERPL-CCNC: 48 U/L — SIGNIFICANT CHANGE UP (ref 8–61)
GLUCOSE SERPL-MCNC: 117 MG/DL — HIGH (ref 70–99)
HCT VFR BLD CALC: 29.7 % — LOW (ref 39–50)
HGB BLD-MCNC: 9.1 G/DL — LOW (ref 13–17)
IGA FLD-MCNC: 564 MG/DL — HIGH (ref 70–400)
IGG FLD-MCNC: 1875 MG/DL — HIGH (ref 700–1600)
IGM SERPL-MCNC: 118 MG/DL — SIGNIFICANT CHANGE UP (ref 40–230)
MCHC RBC-ENTMCNC: 26.5 PG — LOW (ref 27–34)
MCHC RBC-ENTMCNC: 30.6 % — LOW (ref 32–36)
MCV RBC AUTO: 86.6 FL — SIGNIFICANT CHANGE UP (ref 80–100)
NRBC # FLD: 0.22 K/UL — LOW (ref 25–125)
PLATELET # BLD AUTO: 146 K/UL — LOW (ref 150–400)
PMV BLD: 12.7 FL — SIGNIFICANT CHANGE UP (ref 7–13)
POTASSIUM SERPL-MCNC: 3.7 MMOL/L — SIGNIFICANT CHANGE UP (ref 3.5–5.3)
POTASSIUM SERPL-SCNC: 3.7 MMOL/L — SIGNIFICANT CHANGE UP (ref 3.5–5.3)
PROT SERPL-MCNC: 6.2 G/DL — SIGNIFICANT CHANGE UP (ref 6–8.3)
RBC # BLD: 3.43 M/UL — LOW (ref 4.2–5.8)
RBC # FLD: 20.4 % — HIGH (ref 10.3–14.5)
SODIUM SERPL-SCNC: 139 MMOL/L — SIGNIFICANT CHANGE UP (ref 135–145)
SPECIMEN SOURCE: SIGNIFICANT CHANGE UP
TROPONIN T, HIGH SENSITIVITY: 15 NG/L — SIGNIFICANT CHANGE UP (ref ?–14)
WBC # BLD: 25.2 K/UL — HIGH (ref 3.8–10.5)
WBC # FLD AUTO: 25.2 K/UL — HIGH (ref 3.8–10.5)

## 2019-03-08 PROCEDURE — 71045 X-RAY EXAM CHEST 1 VIEW: CPT | Mod: 26

## 2019-03-08 PROCEDURE — 93010 ELECTROCARDIOGRAM REPORT: CPT

## 2019-03-08 RX ORDER — PANTOPRAZOLE SODIUM 20 MG/1
40 TABLET, DELAYED RELEASE ORAL ONCE
Qty: 0 | Refills: 0 | Status: COMPLETED | OUTPATIENT
Start: 2019-03-08 | End: 2019-03-08

## 2019-03-08 RX ORDER — METOPROLOL TARTRATE 50 MG
25 TABLET ORAL
Qty: 0 | Refills: 0 | Status: DISCONTINUED | OUTPATIENT
Start: 2019-03-08 | End: 2019-03-12

## 2019-03-08 RX ORDER — METOPROLOL TARTRATE 50 MG
25 TABLET ORAL
Qty: 0 | Refills: 0 | Status: DISCONTINUED | OUTPATIENT
Start: 2019-03-08 | End: 2019-03-08

## 2019-03-08 RX ADMIN — HEPARIN SODIUM 5000 UNIT(S): 5000 INJECTION INTRAVENOUS; SUBCUTANEOUS at 22:01

## 2019-03-08 RX ADMIN — SODIUM CHLORIDE 75 MILLILITER(S): 9 INJECTION, SOLUTION INTRAVENOUS at 17:30

## 2019-03-08 RX ADMIN — HEPARIN SODIUM 5000 UNIT(S): 5000 INJECTION INTRAVENOUS; SUBCUTANEOUS at 06:14

## 2019-03-08 RX ADMIN — Medication 800 MILLIGRAM(S): at 22:01

## 2019-03-08 RX ADMIN — SODIUM CHLORIDE 75 MILLILITER(S): 9 INJECTION, SOLUTION INTRAVENOUS at 22:02

## 2019-03-08 RX ADMIN — Medication 25 MILLIGRAM(S): at 12:38

## 2019-03-08 RX ADMIN — Medication 25 MILLIGRAM(S): at 22:02

## 2019-03-08 RX ADMIN — Medication 800 MILLIGRAM(S): at 06:13

## 2019-03-08 RX ADMIN — PANTOPRAZOLE SODIUM 40 MILLIGRAM(S): 20 TABLET, DELAYED RELEASE ORAL at 09:16

## 2019-03-08 NOTE — CONSULT NOTE ADULT - SUBJECTIVE AND OBJECTIVE BOX
CHIEF COMPLAINT:    HPI:This is a 73M with history of CLL (not on therapy), Anemia, and HTN who presents to the hospital with complaints of persistent intermittent diarrhea since October of last year. Said that about a week prior to his trip to [facility] he started having significant diarrhea (5-7 episodes of loose stools, ?black stools as per patient at that time)  He continued to have intermittent diarrhea (said that he has 4-5 days of diarrhea a week) and was unable to get any relief in [place]. Upon arriving back from [facility] he continued to have intermittent diarrhea and therefore presented to the ED for evaluation. Said that currently he had about 5 episodes of diarrhea today, describes it as yellowish in color, associated with generalized abdominal pain. Denies any hematochezia, melena, or BRBPR currently. No fevers/chills. He denies any other complaints at present.    On arrival to the ED, his vitals were T 98.8, P 97, /62, R 16, O2 sat 100% RA. His lab work was significant for leukocytosis (unknown baseline), normocytic anemia (unknown baseline), mild hyponatremia/hypokalemia/AG gap, elevated BUN/Cr, elevated alk phos, and elevated lipase. He was also noted to have a lactate of 2.8. His CXR showed a possible R basilar opacity but the patient was not complaining of any PNA type symptoms. His US abd was negative for any acute findings. He was given LR 1L. He was admitted to medicine.scheduled for endoscopy-noted to be tachycardic no chest pain or dyspnea has abdominal pain.      PAST MEDICAL & SURGICAL HISTORY:  Anemia, unspecified type  Essential hypertension  CLL (chronic lymphocytic leukemia)  No significant past surgical history      MEDICATIONS  (STANDING):  heparin  Injectable 5000 Unit(s) SubCutaneous every 8 hours  lactated ringers. 1000 milliLiter(s) (75 mL/Hr) IV Continuous <Continuous>  lidocaine 1% Injectable 10 milliLiter(s) Local Injection once  mesalamine DR Capsule 800 milliGRAM(s) Oral three times a day  pantoprazole  Injectable 40 milliGRAM(s) IV Push once    MEDICATIONS  (PRN):  acetaminophen   Tablet .. 650 milliGRAM(s) Oral every 6 hours PRN Moderate Pain (4 - 6)      FAMILY HISTORY:  Family history of myocardial infarction (Father, Mother, Sibling): Father, mother, brother    No family history of sudden cardiac death    SOCIAL HISTORY:  Smoking-Non Smoker  Alcohol-Denies  Ilic[employer] Drug use-Denies    REVIEW OF SYSTEMS:  Constitutional: [ ] fever, [ ]weight loss, [ ]fatigue Activity [ ] Bedbound,[x ] Ambulates [x ] Unassisted[ ] Cane/Walker [ ] Assistence.  Eyes: [ ] visual changes  Respiratory: [ ]shortness of breath;  [ ] cough, [ ]wheezing, [ ]chills, [ ]hemoptysis  Cardiovascular: [ ] chest pain, [ ]palpitations, [ ]dizziness,  [ ]leg swelling[ ]orthopnea [ ]PND  Gastrointestinal: [x ] abdominal pain, [ ]nausea, [ ]vomiting,  [ ]diarrhea,[ ]constipation  Genitourinary: [ ] dysuria, [ ] hematuria  Neurologic: [ ] headaches [ ] tremors[ ] weakness  Skin: [ ] itching, [ ]burning, [ ] rashes  Endocrine: [ ] heat or cold intolerance  Musculoskeletal: [ ] joint pain or swelling; [ ] muscle, back, or extremity pain  Psychiatric: [ ] depression, [ ]anxiety, [ ]mood swings, or [ ]difficulty sleeping  Hematologic: [ ] easy bruising, [ ] bleeding gums       [ x] All others negative	  [ ] Unable to obtain    Vital Signs Last 24 Hrs  T(C): 36.4 (08 Mar 2019 06:11), Max: 36.9 (07 Mar 2019 18:43)  T(F): 97.5 (08 Mar 2019 06:11), Max: 98.5 (07 Mar 2019 18:43)  HR: 117 (08 Mar 2019 07:33) (101 - 122)  BP: 144/86 (08 Mar 2019 06:11) (120/70 - 144/86)  RR: 18 (08 Mar 2019 06:11) (16 - 18)  SpO2: 100% (08 Mar 2019 06:11) (99% - 100%)  I&O's Summary      PHYSICAL EXAM:  General: No acute distress BMI-20.6  HEENT: EOMI, PERRL[ ] Icteric  Neck: Supple, No JVD  Lungs: Equal air entry bilaterally; [ ] Rales [ ] Rhonchi [ ] Wheezing  Heart: Regular rate and rhythm;[x ] Murmurs-   2/6 [x ] Systolic [ ] Diastolic [ ] Radiation,No rubs, or gallops  Abdomen: Nontender, bowel sounds present  Extremities: No clubbing, cyanosis, or edema[ ] Calf tenderness  Nervous system:  Alert & Oriented X3, no focal deficits  Psychiatric: Normal affect  Skin: No rashes or lesions      LABS:  03-08    139  |  111<H>  |  24<H>  ----------------------------<  117<H>  3.7   |  17<L>  |  1.17    Ca    7.8<L>      08 Mar 2019 06:30    TPro  6.2  /  Alb  2.1<L>  /  TBili  0.4  /  DBili  x   /  AST  24  /  ALT  26  /  AlkPhos  421<H>  03-08    Creatinine Trend: 1.17<--, 1.12<--, 1.09<--, 1.08<--, 1.54<--, 1.79<--                        9.1    25.20 )-----------( 146      ( 08 Mar 2019 06:30 )             29.7     PTT - ( 07 Mar 2019 17:30 )  PTT:42.3 SEC    Lipid Panel:   Cardiac Enzymes: CARDIAC MARKERS ( 08 Mar 2019 06:30 )  x     / x     / 29 u/L / x     / x            Thyroid Stimulating Hormone, Serum in AM (03.05.19 @ 06:45)    Thyroid Stimulating Hormone, Serum: 1.98 uIU/mL        RADIOLOGY:  MR MRCP WAW IC  IMPRESSION: No evidence of cholelithiasis or biliary obstruction.  Hepatic steatosis.  Mural thickening proximal small bowel consistent with enteritis.  Moderate ascites.    ECG [my interpretation]:  Sinus rhythm at 94 BPM with Premature atrial complexes  Low voltage QRS  Borderline ECG

## 2019-03-08 NOTE — CONSULT NOTE ADULT - ATTENDING COMMENTS
Thank you for the courtesy of the consultation,I would be available for any further discussion if needed.  Johny Lemus MD,FACC.  [address] [place], [place] [id number] [phone]

## 2019-03-08 NOTE — CONSULT NOTE ADULT - ASSESSMENT
73M with history of CLL (not on therapy), Anemia, and HTN who presents to the hospital with complaints of persistent intermittent diarrhea since October of last year.    Noted to be tachycardic-sinus no evidence for ischemia.  ECG-Sinus tachycardia low voltage-no ST T wave abnormalities  TTE-evaluate for effusion.  Start low dose BBlocker-Metoprolol 25mg BID.  Will follow

## 2019-03-08 NOTE — PROGRESS NOTE ADULT - SUBJECTIVE AND OBJECTIVE BOX
Patient is a 73y old  Male who presents with a chief complaint of Diarrhea (08 Mar 2019 09:07)      SUBJECTIVE / OVERNIGHT EVENTS:    Events noted.  Tachycardia  CONSTITUTIONAL: No fever,  or fatigue  RESPIRATORY: No cough, wheezing,  No shortness of breath  CARDIOVASCULAR: No chest pain, palpitations, dizziness, or leg swelling  GASTROINTESTINAL: No abdominal or epigastric pain. No nausea, vomiting.  NEUROLOGICAL: No headaches,     MEDICATIONS  (STANDING):  heparin  Injectable 5000 Unit(s) SubCutaneous every 8 hours  lactated ringers. 1000 milliLiter(s) (75 mL/Hr) IV Continuous <Continuous>  lidocaine 1% Injectable 10 milliLiter(s) Local Injection once  mesalamine DR Capsule 800 milliGRAM(s) Oral three times a day  metoprolol tartrate 25 milliGRAM(s) Oral two times a day    MEDICATIONS  (PRN):  acetaminophen   Tablet .. 650 milliGRAM(s) Oral every 6 hours PRN Moderate Pain (4 - 6)        CAPILLARY BLOOD GLUCOSE        I&O's Summary      PHYSICAL EXAM:  GENERAL: NAD  NECK: Supple, No JVD  CHEST/LUNG: Clear to auscultation bilaterally; No wheezing.  HEART: Regular rate and rhythm; No murmurs, rubs, or gallops  ABDOMEN: Soft, Nontender, Nondistended; Bowel sounds present  EXTREMITIES:   No edema  NEUROLOGY: AAO X 3      LABS:                        9.1    25.20 )-----------( 146      ( 08 Mar 2019 06:30 )             29.7     03-08    139  |  111<H>  |  24<H>  ----------------------------<  117<H>  3.7   |  17<L>  |  1.17    Ca    7.8<L>      08 Mar 2019 06:30    TPro  6.2  /  Alb  2.1<L>  /  TBili  0.4  /  DBili  x   /  AST  24  /  ALT  26  /  AlkPhos  421<H>  03-08    PTT - ( 07 Mar 2019 17:30 )  PTT:42.3 SEC  CARDIAC MARKERS ( 08 Mar 2019 06:30 )  x     / x     / 29 u/L / x     / x            CAPILLARY BLOOD GLUCOSE        03-07 @ 16:35  Culture-urine --  Culture results --  method type --  Organism --  Organism Identification --  Specimen source PERITONEAL FLUID  03-04 @ 16:22  Culture-urine --  Culture results --  method type --  Organism --  Organism Identification --  Specimen source FECES  03-04 @ 15:36  Culture-urine --  Culture results --  method type --  Organism --  Organism Identification --  Specimen source FECES  03-03 @ 22:55  Culture-urine --  Culture results --  method type --  Organism --  Organism Identification --  Specimen source FECES           03-07 @ 16:35  Culture blood --  Culture results --  Gram stain   NOS^No Organisms Seen  WBC^White Blood Cells  QNTY CELLS IN GRAM STAIN: RARE (1+)  Gram stain blood --  Method type --  Organism --  Organism identification --  Specimen source PERITONEAL FLUID   03-04 @ 16:22  Culture blood --  Culture results --  Gram stain --  Gram stain blood --  Method type --  Organism --  Organism identification --  Specimen source FECES   03-04 @ 15:36  Culture blood --  Culture results --  Gram stain --  Gram stain blood --  Method type --  Organism --  Organism identification --  Specimen source FECES   03-03 @ 22:55  Culture blood --  Culture results --  Gram stain --  Gram stain blood --  Method type --  Organism --  Organism identification --  Specimen source FECES      RADIOLOGY & ADDITIONAL TESTS:    Imaging Personally Reviewed:    Consultant(s) Notes Reviewed:      Care Discussed with Consultants/Other Providers:

## 2019-03-08 NOTE — PROGRESS NOTE ADULT - ASSESSMENT
· Assessment	  This is a 73M with history as above who presents to the hospital with c/o intermittent diarrhea for the past 6 months.     Problem/Plan - 1:  ·  Problem: Diarrhea, unspecified type.  Plan: -  - MRI abd: Enteritis/Ascites.     Tachycardia:  On metoprolol

## 2019-03-09 LAB
ALBUMIN SERPL ELPH-MCNC: 1.9 G/DL — LOW (ref 3.3–5)
ALP SERPL-CCNC: 395 U/L — HIGH (ref 40–120)
ALT FLD-CCNC: 29 U/L — SIGNIFICANT CHANGE UP (ref 4–41)
ANION GAP SERPL CALC-SCNC: 11 MMO/L — SIGNIFICANT CHANGE UP (ref 7–14)
AST SERPL-CCNC: 33 U/L — SIGNIFICANT CHANGE UP (ref 4–40)
BILIRUB SERPL-MCNC: 0.4 MG/DL — SIGNIFICANT CHANGE UP (ref 0.2–1.2)
BUN SERPL-MCNC: 27 MG/DL — HIGH (ref 7–23)
CALCIUM SERPL-MCNC: 7.6 MG/DL — LOW (ref 8.4–10.5)
CHLORIDE SERPL-SCNC: 112 MMOL/L — HIGH (ref 98–107)
CO2 SERPL-SCNC: 19 MMOL/L — LOW (ref 22–31)
CREAT SERPL-MCNC: 1.28 MG/DL — SIGNIFICANT CHANGE UP (ref 0.5–1.3)
GLUCOSE SERPL-MCNC: 95 MG/DL — SIGNIFICANT CHANGE UP (ref 70–99)
HCT VFR BLD CALC: 25.5 % — LOW (ref 39–50)
HGB BLD-MCNC: 7.8 G/DL — LOW (ref 13–17)
LKM AB SER-ACNC: < 20.1 UNITS — SIGNIFICANT CHANGE UP (ref 0–20)
MCHC RBC-ENTMCNC: 26.7 PG — LOW (ref 27–34)
MCHC RBC-ENTMCNC: 30.6 % — LOW (ref 32–36)
MCV RBC AUTO: 87.3 FL — SIGNIFICANT CHANGE UP (ref 80–100)
NRBC # FLD: 0.17 K/UL — LOW (ref 25–125)
PLATELET # BLD AUTO: 126 K/UL — LOW (ref 150–400)
PMV BLD: 12.9 FL — SIGNIFICANT CHANGE UP (ref 7–13)
POTASSIUM SERPL-MCNC: 3.3 MMOL/L — LOW (ref 3.5–5.3)
POTASSIUM SERPL-SCNC: 3.3 MMOL/L — LOW (ref 3.5–5.3)
PROT SERPL-MCNC: 5.4 G/DL — LOW (ref 6–8.3)
RBC # BLD: 2.92 M/UL — LOW (ref 4.2–5.8)
RBC # FLD: 20.3 % — HIGH (ref 10.3–14.5)
SODIUM SERPL-SCNC: 142 MMOL/L — SIGNIFICANT CHANGE UP (ref 135–145)
WBC # BLD: 20.95 K/UL — HIGH (ref 3.8–10.5)
WBC # FLD AUTO: 20.95 K/UL — HIGH (ref 3.8–10.5)

## 2019-03-09 RX ADMIN — SODIUM CHLORIDE 75 MILLILITER(S): 9 INJECTION, SOLUTION INTRAVENOUS at 21:46

## 2019-03-09 RX ADMIN — HEPARIN SODIUM 5000 UNIT(S): 5000 INJECTION INTRAVENOUS; SUBCUTANEOUS at 05:55

## 2019-03-09 RX ADMIN — HEPARIN SODIUM 5000 UNIT(S): 5000 INJECTION INTRAVENOUS; SUBCUTANEOUS at 21:46

## 2019-03-09 RX ADMIN — Medication 800 MILLIGRAM(S): at 21:47

## 2019-03-09 RX ADMIN — Medication 800 MILLIGRAM(S): at 05:55

## 2019-03-09 RX ADMIN — Medication 800 MILLIGRAM(S): at 14:17

## 2019-03-09 RX ADMIN — Medication 25 MILLIGRAM(S): at 21:47

## 2019-03-09 RX ADMIN — Medication 25 MILLIGRAM(S): at 09:06

## 2019-03-09 RX ADMIN — HEPARIN SODIUM 5000 UNIT(S): 5000 INJECTION INTRAVENOUS; SUBCUTANEOUS at 14:17

## 2019-03-09 NOTE — PROGRESS NOTE ADULT - SUBJECTIVE AND OBJECTIVE BOX
Patient is a 73y old  Male who presents with a chief complaint of Diarrhea (08 Mar 2019 15:27)      SUBJECTIVE / OVERNIGHT EVENTS:    Events noted.  CONSTITUTIONAL: No fever,  or fatigue  RESPIRATORY: No cough, wheezing,  No shortness of breath  CARDIOVASCULAR: No chest pain, palpitations, dizziness, or leg swelling  GASTROINTESTINAL: No abdominal or epigastric pain. No nausea, vomiting.  NEUROLOGICAL: No headaches,     MEDICATIONS  (STANDING):  heparin  Injectable 5000 Unit(s) SubCutaneous every 8 hours  lactated ringers. 1000 milliLiter(s) (75 mL/Hr) IV Continuous <Continuous>  lidocaine 1% Injectable 10 milliLiter(s) Local Injection once  mesalamine DR Capsule 800 milliGRAM(s) Oral three times a day  metoprolol tartrate 25 milliGRAM(s) Oral two times a day    MEDICATIONS  (PRN):  acetaminophen   Tablet .. 650 milliGRAM(s) Oral every 6 hours PRN Moderate Pain (4 - 6)        CAPILLARY BLOOD GLUCOSE        I&O's Summary      PHYSICAL EXAM:  GENERAL: NAD  NECK: Supple, No JVD  CHEST/LUNG: Clear to auscultation bilaterally; No wheezing.  HEART: Regular rate and rhythm; No murmurs, rubs, or gallops  ABDOMEN: Soft, Nontender, Nondistended; Bowel sounds present  EXTREMITIES:   No edema  NEUROLOGY: AAO X 3      LABS:                        7.8    20.95 )-----------( 126      ( 09 Mar 2019 06:30 )             25.5     03-09    142  |  112<H>  |  27<H>  ----------------------------<  95  3.3<L>   |  19<L>  |  1.28    Ca    7.6<L>      09 Mar 2019 06:30    TPro  5.4<L>  /  Alb  1.9<L>  /  TBili  0.4  /  DBili  x   /  AST  33  /  ALT  29  /  AlkPhos  395<H>  03-09      CARDIAC MARKERS ( 08 Mar 2019 06:30 )  x     / x     / 29 u/L / x     / x            CAPILLARY BLOOD GLUCOSE        03-07 @ 16:35  Culture-urine --  Culture results --  method type --  Organism --  Organism Identification --  Specimen source PERITONEAL FLUID  03-04 @ 16:22  Culture-urine --  Culture results --  method type --  Organism --  Organism Identification --  Specimen source FECES  03-04 @ 15:36  Culture-urine --  Culture results --  method type --  Organism --  Organism Identification --  Specimen source FECES  03-03 @ 22:55  Culture-urine --  Culture results --  method type --  Organism --  Organism Identification --  Specimen source FECES           03-07 @ 16:35  Culture blood --  Culture results --  Gram stain   NOS^No Organisms Seen  WBC^White Blood Cells  QNTY CELLS IN GRAM STAIN: RARE (1+)  Gram stain blood --  Method type --  Organism --  Organism identification --  Specimen source PERITONEAL FLUID   03-04 @ 16:22  Culture blood --  Culture results --  Gram stain --  Gram stain blood --  Method type --  Organism --  Organism identification --  Specimen source FECES   03-04 @ 15:36  Culture blood --  Culture results --  Gram stain --  Gram stain blood --  Method type --  Organism --  Organism identification --  Specimen source FECES   03-03 @ 22:55  Culture blood --  Culture results --  Gram stain --  Gram stain blood --  Method type --  Organism --  Organism identification --  Specimen source FECES      RADIOLOGY & ADDITIONAL TESTS:    Imaging Personally Reviewed:    Consultant(s) Notes Reviewed:      Care Discussed with Consultants/Other Providers:

## 2019-03-09 NOTE — PROGRESS NOTE ADULT - ASSESSMENT
· Assessment	  This is a 73M with history as above who presents to the hospital with c/o intermittent diarrhea for the past 6 months.     Problem/Plan - 1:  ·  Problem: Diarrhea, unspecified type.  Plan: -  - MRI abd: Enteritis/Ascites.     Tachycardia:  On metoprolol  Cardio f/up    Anemia:  EGD once stable

## 2019-03-10 LAB
ALBUMIN SERPL ELPH-MCNC: 1.9 G/DL — LOW (ref 3.3–5)
ALP SERPL-CCNC: 395 U/L — HIGH (ref 40–120)
ALT FLD-CCNC: 37 U/L — SIGNIFICANT CHANGE UP (ref 4–41)
ANION GAP SERPL CALC-SCNC: 13 MMO/L — SIGNIFICANT CHANGE UP (ref 7–14)
AST SERPL-CCNC: 35 U/L — SIGNIFICANT CHANGE UP (ref 4–40)
BASOPHILS # BLD AUTO: 0.06 K/UL — SIGNIFICANT CHANGE UP (ref 0–0.2)
BASOPHILS NFR BLD AUTO: 0.3 % — SIGNIFICANT CHANGE UP (ref 0–2)
BASOPHILS NFR SPEC: 0 % — SIGNIFICANT CHANGE UP (ref 0–2)
BILIRUB SERPL-MCNC: 0.4 MG/DL — SIGNIFICANT CHANGE UP (ref 0.2–1.2)
BUN SERPL-MCNC: 25 MG/DL — HIGH (ref 7–23)
CALCIUM SERPL-MCNC: 7.7 MG/DL — LOW (ref 8.4–10.5)
CHLORIDE SERPL-SCNC: 112 MMOL/L — HIGH (ref 98–107)
CO2 SERPL-SCNC: 19 MMOL/L — LOW (ref 22–31)
CREAT SERPL-MCNC: 1.34 MG/DL — HIGH (ref 0.5–1.3)
EOSINOPHIL # BLD AUTO: 1.59 K/UL — HIGH (ref 0–0.5)
EOSINOPHIL NFR BLD AUTO: 7.3 % — HIGH (ref 0–6)
EOSINOPHIL NFR FLD: 8 % — HIGH (ref 0–6)
GLUCOSE SERPL-MCNC: 86 MG/DL — SIGNIFICANT CHANGE UP (ref 70–99)
HCT VFR BLD CALC: 27.4 % — LOW (ref 39–50)
HGB BLD-MCNC: 8.3 G/DL — LOW (ref 13–17)
IMM GRANULOCYTES NFR BLD AUTO: 4.4 % — HIGH (ref 0–1.5)
LYMPHOCYTES # BLD AUTO: 11 % — LOW (ref 13–44)
LYMPHOCYTES # BLD AUTO: 2.41 K/UL — SIGNIFICANT CHANGE UP (ref 1–3.3)
LYMPHOCYTES NFR SPEC AUTO: 14 % — SIGNIFICANT CHANGE UP (ref 13–44)
MAGNESIUM SERPL-MCNC: 1.2 MG/DL — LOW (ref 1.6–2.6)
MCHC RBC-ENTMCNC: 26.6 PG — LOW (ref 27–34)
MCHC RBC-ENTMCNC: 30.3 % — LOW (ref 32–36)
MCV RBC AUTO: 87.8 FL — SIGNIFICANT CHANGE UP (ref 80–100)
MONOCYTES # BLD AUTO: 5.72 K/UL — HIGH (ref 0–0.9)
MONOCYTES NFR BLD AUTO: 26.1 % — HIGH (ref 2–14)
MONOCYTES NFR BLD: 17 % — HIGH (ref 2–9)
NEUTROPHIL AB SER-ACNC: 61 % — SIGNIFICANT CHANGE UP (ref 43–77)
NEUTROPHILS # BLD AUTO: 11.16 K/UL — HIGH (ref 1.8–7.4)
NEUTROPHILS NFR BLD AUTO: 50.9 % — SIGNIFICANT CHANGE UP (ref 43–77)
NRBC # BLD: 1 /100WBC — SIGNIFICANT CHANGE UP
NRBC # FLD: 0.26 K/UL — LOW (ref 25–125)
NRBC FLD-RTO: 1.2 — SIGNIFICANT CHANGE UP
PHOSPHATE SERPL-MCNC: 2.1 MG/DL — LOW (ref 2.5–4.5)
PLATELET # BLD AUTO: 111 K/UL — LOW (ref 150–400)
PMV BLD: SIGNIFICANT CHANGE UP FL (ref 7–13)
POTASSIUM SERPL-MCNC: 3.5 MMOL/L — SIGNIFICANT CHANGE UP (ref 3.5–5.3)
POTASSIUM SERPL-SCNC: 3.5 MMOL/L — SIGNIFICANT CHANGE UP (ref 3.5–5.3)
PROT SERPL-MCNC: 5.7 G/DL — LOW (ref 6–8.3)
RBC # BLD: 3.12 M/UL — LOW (ref 4.2–5.8)
RBC # FLD: 20.3 % — HIGH (ref 10.3–14.5)
SODIUM SERPL-SCNC: 144 MMOL/L — SIGNIFICANT CHANGE UP (ref 135–145)
WBC # BLD: 21.91 K/UL — HIGH (ref 3.8–10.5)
WBC # FLD AUTO: 21.91 K/UL — HIGH (ref 3.8–10.5)

## 2019-03-10 PROCEDURE — 93306 TTE W/DOPPLER COMPLETE: CPT | Mod: 26

## 2019-03-10 RX ORDER — MAGNESIUM SULFATE 500 MG/ML
2 VIAL (ML) INJECTION ONCE
Qty: 0 | Refills: 0 | Status: COMPLETED | OUTPATIENT
Start: 2019-03-10 | End: 2019-03-10

## 2019-03-10 RX ADMIN — HEPARIN SODIUM 5000 UNIT(S): 5000 INJECTION INTRAVENOUS; SUBCUTANEOUS at 06:05

## 2019-03-10 RX ADMIN — Medication 50 GRAM(S): at 17:10

## 2019-03-10 RX ADMIN — Medication 800 MILLIGRAM(S): at 17:09

## 2019-03-10 RX ADMIN — HEPARIN SODIUM 5000 UNIT(S): 5000 INJECTION INTRAVENOUS; SUBCUTANEOUS at 17:09

## 2019-03-10 RX ADMIN — HEPARIN SODIUM 5000 UNIT(S): 5000 INJECTION INTRAVENOUS; SUBCUTANEOUS at 22:01

## 2019-03-10 RX ADMIN — Medication 25 MILLIGRAM(S): at 10:28

## 2019-03-10 RX ADMIN — SODIUM CHLORIDE 75 MILLILITER(S): 9 INJECTION, SOLUTION INTRAVENOUS at 06:05

## 2019-03-10 RX ADMIN — Medication 800 MILLIGRAM(S): at 22:02

## 2019-03-10 RX ADMIN — Medication 800 MILLIGRAM(S): at 06:05

## 2019-03-10 NOTE — PROGRESS NOTE ADULT - SUBJECTIVE AND OBJECTIVE BOX
Patient is a 73y old  Male who presents with a chief complaint of Diarrhea (09 Mar 2019 17:53)      SUBJECTIVE / OVERNIGHT EVENTS:    Events noted.  CONSTITUTIONAL: No fever,  or fatigue  RESPIRATORY: No cough, wheezing,  No shortness of breath  CARDIOVASCULAR: No chest pain, palpitations, dizziness, or leg swelling  GASTROINTESTINAL: No abdominal or epigastric pain. No nausea, vomiting.  NEUROLOGICAL: No headaches,     MEDICATIONS  (STANDING):  heparin  Injectable 5000 Unit(s) SubCutaneous every 8 hours  lactated ringers. 1000 milliLiter(s) (75 mL/Hr) IV Continuous <Continuous>  lidocaine 1% Injectable 10 milliLiter(s) Local Injection once  mesalamine DR Capsule 800 milliGRAM(s) Oral three times a day  metoprolol tartrate 25 milliGRAM(s) Oral two times a day    MEDICATIONS  (PRN):  acetaminophen   Tablet .. 650 milliGRAM(s) Oral every 6 hours PRN Moderate Pain (4 - 6)        CAPILLARY BLOOD GLUCOSE        I&O's Summary      PHYSICAL EXAM:  GENERAL: NAD  NECK: Supple, No JVD  CHEST/LUNG: Clear to auscultation bilaterally; No wheezing.  HEART: Regular rate and rhythm; No murmurs, rubs, or gallops  ABDOMEN: Soft, Nontender, Nondistended; Bowel sounds present  EXTREMITIES:   No edema  NEUROLOGY: AAO X 3      LABS:                        8.3    21.91 )-----------( 111      ( 10 Mar 2019 06:34 )             27.4     03-10    144  |  112<H>  |  25<H>  ----------------------------<  86  3.5   |  19<L>  |  1.34<H>    Ca    7.7<L>      10 Mar 2019 06:34  Phos  2.1     03-10  Mg     1.2     03-10    TPro  5.7<L>  /  Alb  1.9<L>  /  TBili  0.4  /  DBili  x   /  AST  35  /  ALT  37  /  AlkPhos  395<H>  03-10            CAPILLARY BLOOD GLUCOSE        03-07 @ 16:35  Culture-urine --  Culture results --  method type --  Organism --  Organism Identification --  Specimen source PERITONEAL FLUID  03-04 @ 16:22  Culture-urine --  Culture results --  method type --  Organism --  Organism Identification --  Specimen source FECES  03-04 @ 15:36  Culture-urine --  Culture results --  method type --  Organism --  Organism Identification --  Specimen source FECES           03-07 @ 16:35  Culture blood --  Culture results --  Gram stain   NOS^No Organisms Seen  WBC^White Blood Cells  QNTY CELLS IN GRAM STAIN: RARE (1+)  Gram stain blood --  Method type --  Organism --  Organism identification --  Specimen source PERITONEAL FLUID   03-04 @ 16:22  Culture blood --  Culture results --  Gram stain --  Gram stain blood --  Method type --  Organism --  Organism identification --  Specimen source FECES   03-04 @ 15:36  Culture blood --  Culture results --  Gram stain --  Gram stain blood --  Method type --  Organism --  Organism identification --  Specimen source FECES      RADIOLOGY & ADDITIONAL TESTS:    Imaging Personally Reviewed:    Consultant(s) Notes Reviewed:      Care Discussed with Consultants/Other Providers:

## 2019-03-11 LAB
ALBUMIN SERPL ELPH-MCNC: 2.1 G/DL — LOW (ref 3.3–5)
ALP SERPL-CCNC: 392 U/L — HIGH (ref 40–120)
ALT FLD-CCNC: 40 U/L — SIGNIFICANT CHANGE UP (ref 4–41)
ANA TITR SER: NEGATIVE — SIGNIFICANT CHANGE UP
ANION GAP SERPL CALC-SCNC: 11 MMO/L — SIGNIFICANT CHANGE UP (ref 7–14)
ANION GAP SERPL CALC-SCNC: 12 MMO/L — SIGNIFICANT CHANGE UP (ref 7–14)
AST SERPL-CCNC: 35 U/L — SIGNIFICANT CHANGE UP (ref 4–40)
BASOPHILS # BLD AUTO: 0.06 K/UL — SIGNIFICANT CHANGE UP (ref 0–0.2)
BASOPHILS NFR BLD AUTO: 0.3 % — SIGNIFICANT CHANGE UP (ref 0–2)
BILIRUB SERPL-MCNC: 0.4 MG/DL — SIGNIFICANT CHANGE UP (ref 0.2–1.2)
BUN SERPL-MCNC: 22 MG/DL — SIGNIFICANT CHANGE UP (ref 7–23)
BUN SERPL-MCNC: 22 MG/DL — SIGNIFICANT CHANGE UP (ref 7–23)
CALCIUM SERPL-MCNC: 7.8 MG/DL — LOW (ref 8.4–10.5)
CALCIUM SERPL-MCNC: 8.2 MG/DL — LOW (ref 8.4–10.5)
CHLORIDE SERPL-SCNC: 112 MMOL/L — HIGH (ref 98–107)
CHLORIDE SERPL-SCNC: 114 MMOL/L — HIGH (ref 98–107)
CO2 SERPL-SCNC: 16 MMOL/L — LOW (ref 22–31)
CO2 SERPL-SCNC: 20 MMOL/L — LOW (ref 22–31)
CREAT SERPL-MCNC: 1.25 MG/DL — SIGNIFICANT CHANGE UP (ref 0.5–1.3)
CREAT SERPL-MCNC: 1.29 MG/DL — SIGNIFICANT CHANGE UP (ref 0.5–1.3)
EOSINOPHIL # BLD AUTO: 1.53 K/UL — HIGH (ref 0–0.5)
EOSINOPHIL NFR BLD AUTO: 7.2 % — HIGH (ref 0–6)
GLUCOSE SERPL-MCNC: 142 MG/DL — HIGH (ref 70–99)
GLUCOSE SERPL-MCNC: 97 MG/DL — SIGNIFICANT CHANGE UP (ref 70–99)
HCT VFR BLD CALC: 26.6 % — LOW (ref 39–50)
HGB BLD-MCNC: 8.1 G/DL — LOW (ref 13–17)
IMM GRANULOCYTES NFR BLD AUTO: 5.5 % — HIGH (ref 0–1.5)
LYMPHOCYTES # BLD AUTO: 11.1 % — LOW (ref 13–44)
LYMPHOCYTES # BLD AUTO: 2.35 K/UL — SIGNIFICANT CHANGE UP (ref 1–3.3)
MAGNESIUM SERPL-MCNC: 1.4 MG/DL — LOW (ref 1.6–2.6)
MAGNESIUM SERPL-MCNC: 1.9 MG/DL — SIGNIFICANT CHANGE UP (ref 1.6–2.6)
MANUAL SMEAR VERIFICATION: SIGNIFICANT CHANGE UP
MCHC RBC-ENTMCNC: 26.9 PG — LOW (ref 27–34)
MCHC RBC-ENTMCNC: 30.5 % — LOW (ref 32–36)
MCV RBC AUTO: 88.4 FL — SIGNIFICANT CHANGE UP (ref 80–100)
MITOCHONDRIA AB SER-ACNC: SIGNIFICANT CHANGE UP
MONOCYTES # BLD AUTO: 6.02 K/UL — HIGH (ref 0–0.9)
MONOCYTES NFR BLD AUTO: 28.5 % — HIGH (ref 2–14)
NEUTROPHILS # BLD AUTO: 10.01 K/UL — HIGH (ref 1.8–7.4)
NEUTROPHILS NFR BLD AUTO: 47.4 % — SIGNIFICANT CHANGE UP (ref 43–77)
NRBC # FLD: 0.23 K/UL — LOW (ref 25–125)
NRBC FLD-RTO: 1.1 — SIGNIFICANT CHANGE UP
PHOSPHATE SERPL-MCNC: 2.2 MG/DL — LOW (ref 2.5–4.5)
PHOSPHATE SERPL-MCNC: 3.9 MG/DL — SIGNIFICANT CHANGE UP (ref 2.5–4.5)
PLATELET # BLD AUTO: 98 K/UL — LOW (ref 150–400)
PMV BLD: 13.2 FL — HIGH (ref 7–13)
POTASSIUM SERPL-MCNC: 3.2 MMOL/L — LOW (ref 3.5–5.3)
POTASSIUM SERPL-MCNC: 5.4 MMOL/L — HIGH (ref 3.5–5.3)
POTASSIUM SERPL-SCNC: 3.2 MMOL/L — LOW (ref 3.5–5.3)
POTASSIUM SERPL-SCNC: 5.4 MMOL/L — HIGH (ref 3.5–5.3)
PROT SERPL-MCNC: 6 G/DL — SIGNIFICANT CHANGE UP (ref 6–8.3)
RBC # BLD: 3.01 M/UL — LOW (ref 4.2–5.8)
RBC # FLD: 20.4 % — HIGH (ref 10.3–14.5)
SMOOTH MUSCLE AB SER-ACNC: SIGNIFICANT CHANGE UP
SODIUM SERPL-SCNC: 142 MMOL/L — SIGNIFICANT CHANGE UP (ref 135–145)
SODIUM SERPL-SCNC: 143 MMOL/L — SIGNIFICANT CHANGE UP (ref 135–145)
STRONGYLOIDES AB SER-ACNC: NEGATIVE — SIGNIFICANT CHANGE UP
WBC # BLD: 21.14 K/UL — HIGH (ref 3.8–10.5)
WBC # FLD AUTO: 21.14 K/UL — HIGH (ref 3.8–10.5)

## 2019-03-11 PROCEDURE — 93010 ELECTROCARDIOGRAM REPORT: CPT

## 2019-03-11 PROCEDURE — 99232 SBSQ HOSP IP/OBS MODERATE 35: CPT | Mod: GC

## 2019-03-11 RX ORDER — POTASSIUM CHLORIDE 20 MEQ
20 PACKET (EA) ORAL ONCE
Qty: 0 | Refills: 0 | Status: COMPLETED | OUTPATIENT
Start: 2019-03-11 | End: 2019-03-11

## 2019-03-11 RX ORDER — PANTOPRAZOLE SODIUM 20 MG/1
40 TABLET, DELAYED RELEASE ORAL DAILY
Qty: 0 | Refills: 0 | Status: DISCONTINUED | OUTPATIENT
Start: 2019-03-11 | End: 2019-03-12

## 2019-03-11 RX ORDER — POTASSIUM PHOSPHATE, MONOBASIC POTASSIUM PHOSPHATE, DIBASIC 236; 224 MG/ML; MG/ML
15 INJECTION, SOLUTION INTRAVENOUS ONCE
Qty: 0 | Refills: 0 | Status: COMPLETED | OUTPATIENT
Start: 2019-03-11 | End: 2019-03-11

## 2019-03-11 RX ORDER — SOD SULF/SODIUM/NAHCO3/KCL/PEG
1000 SOLUTION, RECONSTITUTED, ORAL ORAL EVERY 4 HOURS
Qty: 0 | Refills: 0 | Status: COMPLETED | OUTPATIENT
Start: 2019-03-11 | End: 2019-03-11

## 2019-03-11 RX ORDER — MAGNESIUM SULFATE 500 MG/ML
1 VIAL (ML) INJECTION ONCE
Qty: 0 | Refills: 0 | Status: COMPLETED | OUTPATIENT
Start: 2019-03-11 | End: 2019-03-11

## 2019-03-11 RX ADMIN — Medication 25 MILLIGRAM(S): at 21:55

## 2019-03-11 RX ADMIN — Medication 30 MILLILITER(S): at 15:20

## 2019-03-11 RX ADMIN — Medication 100 GRAM(S): at 10:21

## 2019-03-11 RX ADMIN — Medication 20 MILLIEQUIVALENT(S): at 13:33

## 2019-03-11 RX ADMIN — SODIUM CHLORIDE 75 MILLILITER(S): 9 INJECTION, SOLUTION INTRAVENOUS at 21:56

## 2019-03-11 RX ADMIN — Medication 25 MILLIGRAM(S): at 10:26

## 2019-03-11 RX ADMIN — HEPARIN SODIUM 5000 UNIT(S): 5000 INJECTION INTRAVENOUS; SUBCUTANEOUS at 13:33

## 2019-03-11 RX ADMIN — Medication 1000 MILLILITER(S): at 19:04

## 2019-03-11 RX ADMIN — POTASSIUM PHOSPHATE, MONOBASIC POTASSIUM PHOSPHATE, DIBASIC 62.5 MILLIMOLE(S): 236; 224 INJECTION, SOLUTION INTRAVENOUS at 10:21

## 2019-03-11 RX ADMIN — PANTOPRAZOLE SODIUM 40 MILLIGRAM(S): 20 TABLET, DELAYED RELEASE ORAL at 15:24

## 2019-03-11 RX ADMIN — Medication 800 MILLIGRAM(S): at 21:55

## 2019-03-11 RX ADMIN — Medication 1000 MILLILITER(S): at 21:56

## 2019-03-11 RX ADMIN — HEPARIN SODIUM 5000 UNIT(S): 5000 INJECTION INTRAVENOUS; SUBCUTANEOUS at 21:55

## 2019-03-11 RX ADMIN — Medication 800 MILLIGRAM(S): at 10:25

## 2019-03-11 RX ADMIN — SODIUM CHLORIDE 75 MILLILITER(S): 9 INJECTION, SOLUTION INTRAVENOUS at 08:36

## 2019-03-11 RX ADMIN — Medication 800 MILLIGRAM(S): at 13:33

## 2019-03-11 RX ADMIN — HEPARIN SODIUM 5000 UNIT(S): 5000 INJECTION INTRAVENOUS; SUBCUTANEOUS at 05:16

## 2019-03-11 NOTE — PROGRESS NOTE ADULT - SUBJECTIVE AND OBJECTIVE BOX
Chief Complaint:  Patient is a 73y old  Male who presents with a chief complaint of Diarrhea (11 Mar 2019 07:36)      Interval Events:   Patient states he feels the same today.  Diarrhea not improved.    Allergies:  No Known Allergies      Hospital Medications:  acetaminophen   Tablet .. 650 milliGRAM(s) Oral every 6 hours PRN  heparin  Injectable 5000 Unit(s) SubCutaneous every 8 hours  lactated ringers. 1000 milliLiter(s) IV Continuous <Continuous>  lidocaine 1% Injectable 10 milliLiter(s) Local Injection once  mesalamine DR Capsule 800 milliGRAM(s) Oral three times a day  metoprolol tartrate 25 milliGRAM(s) Oral two times a day      PMHX/PSHX:  Anemia, unspecified type  Essential hypertension  CLL (chronic lymphocytic leukemia)  No significant past surgical history      Family history:  Family history of myocardial infarction (Father, Mother, Sibling)          PHYSICAL EXAM:     GENERAL:  Appears stated age, well-groomed, well-nourished, no distress  HEENT:  NC/AT,  conjunctivae clear, sclera -anicteric  CHEST:  Full & symmetric excursion, no increased  HEART:  Regular rhythm  ABDOMEN:  Soft, non-tender, non-distended, normoactive bowel sounds,  no masses ,no hepato-splenomegaly,   EXTREMITIES:  no cyanosis,clubbing or edema  SKIN:  No rash/erythema/ecchymoses/petechiae/wounds/abscess/warm/dry  NEURO:  Alert, oriented    Vital Signs:  Vital Signs Last 24 Hrs  T(C): 36.5 (11 Mar 2019 05:14), Max: 36.5 (10 Mar 2019 21:58)  T(F): 97.7 (11 Mar 2019 05:14), Max: 97.7 (10 Mar 2019 21:58)  HR: 94 (11 Mar 2019 05:14) (84 - 94)  BP: 131/64 (11 Mar 2019 05:14) (105/58 - 131/64)  BP(mean): --  RR: 18 (11 Mar 2019 05:14) (17 - 18)  SpO2: 100% (11 Mar 2019 05:14) (100% - 100%)  Daily     Daily     LABS:                        8.1    21.14 )-----------( 98       ( 11 Mar 2019 06:30 )             26.6     03-11    143  |  112<H>  |  22  ----------------------------<  97  3.2<L>   |  20<L>  |  1.29    Ca    7.8<L>      11 Mar 2019 06:30  Phos  2.2     03-11  Mg     1.4     03-11    TPro  6.0  /  Alb  2.1<L>  /  TBili  0.4  /  DBili  x   /  AST  35  /  ALT  40  /  AlkPhos  392<H>  03-11    LIVER FUNCTIONS - ( 11 Mar 2019 06:30 )  Alb: 2.1 g/dL / Pro: 6.0 g/dL / ALK PHOS: 392 u/L / ALT: 40 u/L / AST: 35 u/L / GGT: x                   Imaging: Chief Complaint:  Patient is a 73y old  Male who presents with a chief complaint of Diarrhea (11 Mar 2019 07:36)      Interval Events:   Patient states he feels the same today.  Diarrhea not improved.    Allergies:  No Known Allergies      Hospital Medications:  acetaminophen   Tablet .. 650 milliGRAM(s) Oral every 6 hours PRN  heparin  Injectable 5000 Unit(s) SubCutaneous every 8 hours  lactated ringers. 1000 milliLiter(s) IV Continuous <Continuous>  lidocaine 1% Injectable 10 milliLiter(s) Local Injection once  mesalamine DR Capsule 800 milliGRAM(s) Oral three times a day  metoprolol tartrate 25 milliGRAM(s) Oral two times a day      PMHX/PSHX:  Anemia, unspecified type  Essential hypertension  CLL (chronic lymphocytic leukemia)  No significant past surgical history      Family history:  Family history of myocardial infarction (Father, Mother, Sibling).  Patient denies history of ulcer disease or colon cancer.          PHYSICAL EXAM:     GENERAL:  Appears stated age, well-groomed, well-nourished, no distress  HEENT:  NC/AT,  conjunctivae clear, sclera -anicteric  CHEST:  Full & symmetric excursion, no increased  HEART:  Regular rhythm  ABDOMEN:  Soft, non-tender, non-distended, normoactive bowel sounds,  no masses ,no hepato-splenomegaly,   EXTREMITIES:  no cyanosis,clubbing or edema  SKIN:  No rash/erythema/ecchymoses/petechiae/wounds/abscess/warm/dry  NEURO:  Alert, oriented    Vital Signs:  Vital Signs Last 24 Hrs  T(C): 36.5 (11 Mar 2019 05:14), Max: 36.5 (10 Mar 2019 21:58)  T(F): 97.7 (11 Mar 2019 05:14), Max: 97.7 (10 Mar 2019 21:58)  HR: 94 (11 Mar 2019 05:14) (84 - 94)  BP: 131/64 (11 Mar 2019 05:14) (105/58 - 131/64)  BP(mean): --  RR: 18 (11 Mar 2019 05:14) (17 - 18)  SpO2: 100% (11 Mar 2019 05:14) (100% - 100%)  Daily     Daily     LABS:                        8.1    21.14 )-----------( 98       ( 11 Mar 2019 06:30 )             26.6     03-11    143  |  112<H>  |  22  ----------------------------<  97  3.2<L>   |  20<L>  |  1.29    Ca    7.8<L>      11 Mar 2019 06:30  Phos  2.2     03-11  Mg     1.4     03-11    TPro  6.0  /  Alb  2.1<L>  /  TBili  0.4  /  DBili  x   /  AST  35  /  ALT  40  /  AlkPhos  392<H>  03-11    LIVER FUNCTIONS - ( 11 Mar 2019 06:30 )  Alb: 2.1 g/dL / Pro: 6.0 g/dL / ALK PHOS: 392 u/L / ALT: 40 u/L / AST: 35 u/L / GGT: x                   Imaging:

## 2019-03-11 NOTE — PROGRESS NOTE ADULT - SUBJECTIVE AND OBJECTIVE BOX
Patient is a 73y old  Male who presents with a chief complaint of Diarrhea (11 Mar 2019 18:33)      SUBJECTIVE / OVERNIGHT EVENTS:    Events noted.  Diarrhea    MEDICATIONS  (STANDING):  heparin  Injectable 5000 Unit(s) SubCutaneous every 8 hours  lactated ringers. 1000 milliLiter(s) (75 mL/Hr) IV Continuous <Continuous>  lidocaine 1% Injectable 10 milliLiter(s) Local Injection once  mesalamine DR Capsule 800 milliGRAM(s) Oral three times a day  metoprolol tartrate 25 milliGRAM(s) Oral two times a day  pantoprazole    Tablet 40 milliGRAM(s) Oral daily    MEDICATIONS  (PRN):  acetaminophen   Tablet .. 650 milliGRAM(s) Oral every 6 hours PRN Moderate Pain (4 - 6)  aluminum hydroxide/magnesium hydroxide/simethicone Suspension 30 milliLiter(s) Oral every 6 hours PRN Dyspepsia        CAPILLARY BLOOD GLUCOSE        I&O's Summary    10 Mar 2019 07:01  -  11 Mar 2019 07:00  --------------------------------------------------------  IN: 1110 mL / OUT: 0 mL / NET: 1110 mL        PHYSICAL EXAM:  GENERAL: NAD  NECK: Supple, No JVD  CHEST/LUNG: Clear to auscultation bilaterally; No wheezing.  HEART: Regular rate and rhythm; No murmurs, rubs, or gallops  ABDOMEN: Soft, Nontender, Nondistended; Bowel sounds present  EXTREMITIES:   No edema  NEUROLOGY: AAO X 3      LABS:                        8.1    21.14 )-----------( 98       ( 11 Mar 2019 06:30 )             26.6     03-11    142  |  114<H>  |  22  ----------------------------<  142<H>  5.4<H>   |  16<L>  |  1.25    Ca    8.2<L>      11 Mar 2019 20:20  Phos  3.9     03-11  Mg     1.9     03-11    TPro  6.0  /  Alb  2.1<L>  /  TBili  0.4  /  DBili  x   /  AST  35  /  ALT  40  /  AlkPhos  392<H>  03-11            CAPILLARY BLOOD GLUCOSE        03-07 @ 16:35  Culture-urine --  Culture results --  method type --  Organism --  Organism Identification --  Specimen source PERITONEAL FLUID           03-07 @ 16:35  Culture blood --  Culture results --  Gram stain   NOS^No Organisms Seen  WBC^White Blood Cells  QNTY CELLS IN GRAM STAIN: RARE (1+)  Gram stain blood --  Method type --  Organism --  Organism identification --  Specimen source PERITONEAL FLUID      RADIOLOGY & ADDITIONAL TESTS:    Imaging Personally Reviewed:    Consultant(s) Notes Reviewed:      Care Discussed with Consultants/Other Providers:

## 2019-03-11 NOTE — PROGRESS NOTE ADULT - ASSESSMENT
Impression:  1) Diarrhea- Infectious vs IBD. Cdiff, GI PCR, ova and parasite returned negative. S/P colonoscopy in [place] showing mild chronic inflammation.   2) Normocytic anemia- Pt currently has no overt bleeding, had recent EGD and colonoscopy revealing no PUD, malignancy. Iron studies not consistent with iron def anemia.   3) Elevated alkaline phosphatase and lipase- Consistent with gallstone pancreatitis however US and MRCP are negative for gallstones. Other differential dx includes DILI (patient had no new meds), ischemic cholangiopathy, infiltrative disease, congestive cholangiopathy, cholestasis of sepsis  2) HTN    Recommendations:  -f/u TODD, ASMA, LKM Ab, quant IgM, IgA, IgG, AMA  -Consider TTE to evaluate for congestive cholangiopathy  -Continue with LR hydration   -Lactose free diet  -Monitor and chart frequency of BMs  -Continue with Delzicol 800mg TID   -Possible push enteroscopy with biopsy tomorrow  -NPO past midnight Impression:  1) Diarrhea- Infectious vs IBD vs. microscopic colitis (lymphocytic, collagenous).  Cdiff, GI PCR, ova and parasite studies all returned negative. S/P colonoscopy in [place] showing mild chronic inflammation.   2) Normocytic anemia- Pt currently has no overt bleeding, had recent EGD and colonoscopy revealing no PUD, colon cancer. Iron studies not consistent with iron def anemia.   3) Elevated alkaline phosphatase and lipase- Consistent with gallstone pancreatitis however US and MRCP are negative for gallstones. Other differential dx includes DILI (patient had no new meds), ischemic cholangiopathy, infiltrative disease, congestive cholangiopathy, cholestasis of sepsis  4) ascites, rule out veno-occlusive disease from pyrrolizidine alkaloids (reported in [place]), lymphoma,    5) duodenal thickening noted on MRCP with duodenitis suggested, rule out infection such as Strongyloides, celiac disease (can cause ascites and liver dysfunction)    Recommendations:  -f/u TTG IgA for celiac testing, TODD, ASMA, LKM Ab, quant IgM, IgA, IgG, AMA  -Consider abdominal sonogram with Doppler study  -Continue with LR hydration   -Lactose free diet  -Monitor and chart frequency of BMs  -Continue with Delzicol 800mg TID   -Possible push enteroscopy with biopsy tomorrow  -NPO past midnight Impression:  1) Diarrhea- Infectious vs IBD vs. microscopic colitis (lymphocytic, collagenous).  Cdiff, GI PCR, ova and parasite studies all returned negative. S/P colonoscopy in [place] showing mild chronic inflammation.   2) Normocytic anemia- Pt currently has no overt bleeding, had recent EGD and colonoscopy revealing no PUD, colon cancer. Iron studies not consistent with iron def anemia.   3) Elevated alkaline phosphatase and lipase- Consistent with gallstone pancreatitis however US and MRCP are negative for gallstones. Other differential dx includes DILI (patient had no new meds), ischemic cholangiopathy, infiltrative disease, congestive cholangiopathy, cholestasis of sepsis  4) ascites, rule out veno-occlusive disease from pyrrolizidine alkaloids (reported in [place]), lymphoma,    5) duodenal thickening noted on MRCP with duodenitis suggested, rule out infection such as Strongyloides, celiac disease (can cause ascites and liver dysfunction)    Recommendations:  -f/u TTG IgA for celiac testing, TODD, ASMA, LKM Ab, quant IgM, IgA, IgG, AMA  -Consider abdominal sonogram with Doppler study  -Continue with LR hydration   -Lactose free diet  -Monitor and chart frequency of BMs  -Continue with Delzicol 800mg TID   -Possible push enteroscopy with flexible sig with biopsy tomorrow  -NPO past midnight  -Movi prep tonight

## 2019-03-11 NOTE — PROGRESS NOTE ADULT - SUBJECTIVE AND OBJECTIVE BOX
Patient is a 73y old  Male who presents with a chief complaint of Diarrhea (11 Mar 2019 08:28)  Late note   Called by RN to evaluate pt. with c/o chest pain  Vital Signs Last 24 Hrs  T(C): 36.4 (11 Mar 2019 13:31), Max: 36.7 (11 Mar 2019 10:42)  T(F): 97.6 (11 Mar 2019 13:31), Max: 98 (11 Mar 2019 10:42)  HR: 81 (11 Mar 2019 17:52) (71 - 114)  BP: 133/74 (11 Mar 2019 17:52) (105/58 - 133/74)  BP(mean): --  RR: 17 (11 Mar 2019 17:52) (17 - 18)  SpO2: 100% (11 Mar 2019 17:52) (100% - 100%)                              8.1    21.14 )-----------( 98       ( 11 Mar 2019 06:30 )             26.6     03-11    143  |  112<H>  |  22  ----------------------------<  97  3.2<L>   |  20<L>  |  1.29    Ca    7.8<L>      11 Mar 2019 06:30  Phos  2.2     03-11  Mg     1.4     03-11    TPro  6.0  /  Alb  2.1<L>  /  TBili  0.4  /  DBili  x   /  AST  35  /  ALT  40  /  AlkPhos  392<H>  03-11    LIVER FUNCTIONS - ( 11 Mar 2019 06:30 )  Alb: 2.1 g/dL / Pro: 6.0 g/dL / ALK PHOS: 392 u/L / ALT: 40 u/L / AST: 35 u/L / GGT: x                                   CAPILLARY BLOOD GLUCOSE        acetaminophen   Tablet .. 650 milliGRAM(s) Oral every 6 hours PRN  aluminum hydroxide/magnesium hydroxide/simethicone Suspension 30 milliLiter(s) Oral every 6 hours PRN  heparin  Injectable 5000 Unit(s) SubCutaneous every 8 hours  lactated ringers. 1000 milliLiter(s) IV Continuous <Continuous>  lidocaine 1% Injectable 10 milliLiter(s) Local Injection once  mesalamine DR Capsule 800 milliGRAM(s) Oral three times a day  metoprolol tartrate 25 milliGRAM(s) Oral two times a day  pantoprazole    Tablet 40 milliGRAM(s) Oral daily  polyethylene glycol/electrolyte Solution 1000 milliLiter(s) Oral every 4 hours      REVIEW OF SYSTEMS:    RESPIRATORY: No cough, wheezing, chills or hemoptysis; No shortness of breath  CARDIOVASCULAR: poschest pain, palpitations, dizziness, or leg swelling  GASTROINTESTINAL: pos epigastric pain. No nausea, vomiting, or hematemesis; No diarrhea or constipation. No melena or hematochezia.  GENITOURINARY: No dysuria, frequency, hematuria, or incontinence  NEUROLOGICAL: No headaches, memory loss, loss of strength, numbness, or tremors    PHYSICAL EXAM:    GENERAL: NAD, well-groomed, well-developed  NERVOUS SYSTEM:  Alert & Oriented X3, Good concentration; Motor Strength 5/5 B/L upper and lower extremities; DTRs 2+ intact and symmetric  CHEST/LUNG: Clear to percussion bilaterally; No rales, rhonchi, wheezing, or rubs  HEART: Regular rate and rhythm; No murmurs, rubs, or gallops  ABDOMEN: Soft, Nontender, Nondistended; Bowel sounds present    RADIOLOGY :      Assessment: Patient 73y with Acute renal failure  Anemia, unspecified type  Essential hypertension  CLL (chronic lymphocytic leukemia)  No significant past surgical history      Plan:  -d/w Dr. Lemus Cardiologist no need cardiac enzymes as this pain is not cardiac related   -EKG with no changes   -maalox given and protonix added : chest pain resolved   d/w medical attending

## 2019-03-11 NOTE — PROGRESS NOTE ADULT - SUBJECTIVE AND OBJECTIVE BOX
PRESENTING CC:Abdominal pain    SUBJ: 73M with history of CLL (not on therapy), Anemia, and HTN who presents to the hospital with complaints of persistent intermittent diarrhea since October of last year.noted to be tachycardic no chest pain or dyspnea has abdominal pain.Started on BBlockers is less tachycardic-TTE-Normal LV Systolic Function no effusion.      PMH -reviewed admission note, no change since admission  Heart failure: acute [ ] chronic [ ] acute or chronic [ ] diastolic [ ] systolic [ ] combined systolic and diastolic[ ]  PEDRO: ATN[ ] renal medullary necrosis [ ] CKD I [ ]CKDII [ ]CKD III [ ]CKD IV [ ]CKD V [ ]Other pathological lesions [ ]    MEDICATIONS  (STANDING):  heparin  Injectable 5000 Unit(s) SubCutaneous every 8 hours  lactated ringers. 1000 milliLiter(s) (75 mL/Hr) IV Continuous <Continuous>  lidocaine 1% Injectable 10 milliLiter(s) Local Injection once  mesalamine DR Capsule 800 milliGRAM(s) Oral three times a day  metoprolol tartrate 25 milliGRAM(s) Oral two times a day    MEDICATIONS  (PRN):  acetaminophen   Tablet .. 650 milliGRAM(s) Oral every 6 hours PRN Moderate Pain (4 - 6)          FAMILY HISTORY:  Family history of myocardial infarction (Father, Mother, Sibling): Father, mother, brother  No family history of sudden cardiac death      REVIEW OF SYSTEMS:  Constitutional: [ ] fever, [ ]weight loss,  [ ]fatigue  Eyes: [ ] visual changes  Respiratory: [ ]shortness of breath;  [ ] cough, [ ]wheezing, [ ]chills, [ ]hemoptysis  Cardiovascular: [ ] chest pain, [ ]palpitations, [ ]dizziness,  [ ]leg swelling[ ]orthopnea[ ]PND  Gastrointestinal: [x] abdominal pain, [ ]nausea, [ ]vomiting,  [x ]diarrhea   Genitourinary: [ ] dysuria, [ ] hematuria  Neurologic: [ ] headaches [ ] tremors[ ]weakness  Skin: [ ] itching, [ ]burning, [ ] rashes  Endocrine: [ ] heat or cold intolerance  Musculoskeletal: [ ] joint pain or swelling; [ ] muscle, back, or extremity pain  Psychiatric: [ ] depression, [ ]anxiety, [ ]mood swings, or [ ]difficulty sleeping  Hematologic: [ ] easy bruising, [ ] bleeding gums    [x] All remaining systems negative except as per above.   [ ]Unable to obtain.    Vital Signs Last 24 Hrs  T(C): 36.5 (11 Mar 2019 05:14), Max: 36.5 (10 Mar 2019 21:58)  T(F): 97.7 (11 Mar 2019 05:14), Max: 97.7 (10 Mar 2019 21:58)  HR: 94 (11 Mar 2019 05:14) (84 - 94)  BP: 131/64 (11 Mar 2019 05:14) (105/58 - 131/64)  RR: 18 (11 Mar 2019 05:14) (17 - 18)  SpO2: 100% (11 Mar 2019 05:14) (100% - 100%)  I&O's Summary    10 Mar 2019 07:01  -  11 Mar 2019 07:00  --------------------------------------------------------  IN: 1110 mL / OUT: 0 mL / NET: 1110 mL        PHYSICAL EXAM:  General: No acute distress BMI-  HEENT: EOMI, PERRL  Neck: Supple, [ ] JVD  Lungs: Equal air entry bilaterally; [ ] rales [ ] wheezing [ ] rhonchi  Heart: Regular rate and rhythm; [x ] murmur   2/6 [x ] systolic [ ] diastolic [ ] radiation[ ] rubs [ ]  gallops  Abdomen: Nontender, bowel sounds present  Extremities: No clubbing, cyanosis, [ ] edema  Nervous system:  Alert & Oriented X3, no focal deficits  Psychiatric: Normal affect  Skin: No rashes or lesions    LABS:  03-10    144  |  112<H>  |  25<H>  ----------------------------<  86  3.5   |  19<L>  |  1.34<H>    Ca    7.7<L>      10 Mar 2019 06:34  Phos  2.1     03-10  Mg     1.2     03-10    TPro  5.7<L>  /  Alb  1.9<L>  /  TBili  0.4  /  DBili  x   /  AST  35  /  ALT  37  /  AlkPhos  395<H>  03-10    Creatinine Trend: 1.34<--, 1.28<--, 1.17<--, 1.12<--, 1.09<--, 1.08<--                        8.3    21.91 )-----------( 111      ( 10 Mar 2019 06:34 )             27.4     TELEMETRY:Sinus rhythm    ECHO:Study Date: 3/10/2019  Grossly normal left ventricular systolic function. EF-62 %  Minimal mitral regurgitation.  Normal left atrium.   Normal right ventricular size and function  Normal pericardium with no pericardial effusion.        IMPRESSION AND PLAN:    · Assessment		  73M with history of CLL (not on therapy), Anemia, and HTN who presents to the hospital with complaints of persistent intermittent diarrhea since October of last year.    Noted to be tachycardic-sinus no evidence for ischemia.  ECG-Sinus tachycardia low voltage-no ST T wave abnormalities  TTE-EF 62% No effusion.  Start low dose BBlocker-Metoprolol 25mg BID.Tachycardia resolved  PEDRO-consider IVF.  No cardiac contraindications for Endoscopy.

## 2019-03-11 NOTE — PROGRESS NOTE ADULT - ASSESSMENT
Assessment	  This is a 73M with history as above who presents to the hospital with c/o intermittent diarrhea for the past 6 months.     Problem/Plan - 1:  ·  Problem: Diarrhea, Anemia:    Push enteroscopy and flexible sigmoidoscopy tomorrow.  Gi f/up noted.

## 2019-03-12 LAB
ANION GAP SERPL CALC-SCNC: 14 MMO/L — SIGNIFICANT CHANGE UP (ref 7–14)
ANION GAP SERPL CALC-SCNC: 14 MMO/L — SIGNIFICANT CHANGE UP (ref 7–14)
ANION GAP SERPL CALC-SCNC: 16 MMO/L — HIGH (ref 7–14)
APTT BLD: 45.1 SEC — HIGH (ref 27.5–36.3)
BASE EXCESS BLDA CALC-SCNC: -7.3 MMOL/L — SIGNIFICANT CHANGE UP
BLD GP AB SCN SERPL QL: NEGATIVE — SIGNIFICANT CHANGE UP
BUN SERPL-MCNC: 21 MG/DL — SIGNIFICANT CHANGE UP (ref 7–23)
BUN SERPL-MCNC: 21 MG/DL — SIGNIFICANT CHANGE UP (ref 7–23)
BUN SERPL-MCNC: 24 MG/DL — HIGH (ref 7–23)
CALCIUM SERPL-MCNC: 8 MG/DL — LOW (ref 8.4–10.5)
CALCIUM SERPL-MCNC: 8.2 MG/DL — LOW (ref 8.4–10.5)
CALCIUM SERPL-MCNC: 8.2 MG/DL — LOW (ref 8.4–10.5)
CHLORIDE SERPL-SCNC: 114 MMOL/L — HIGH (ref 98–107)
CHLORIDE SERPL-SCNC: 118 MMOL/L — HIGH (ref 98–107)
CHLORIDE SERPL-SCNC: 120 MMOL/L — HIGH (ref 98–107)
CO2 SERPL-SCNC: 15 MMOL/L — LOW (ref 22–31)
CO2 SERPL-SCNC: 18 MMOL/L — LOW (ref 22–31)
CO2 SERPL-SCNC: 19 MMOL/L — LOW (ref 22–31)
CREAT SERPL-MCNC: 1.32 MG/DL — HIGH (ref 0.5–1.3)
CREAT SERPL-MCNC: 1.36 MG/DL — HIGH (ref 0.5–1.3)
CREAT SERPL-MCNC: 1.39 MG/DL — HIGH (ref 0.5–1.3)
GLUCOSE BLDA-MCNC: 143 MG/DL — HIGH (ref 70–99)
GLUCOSE SERPL-MCNC: 121 MG/DL — HIGH (ref 70–99)
GLUCOSE SERPL-MCNC: 147 MG/DL — HIGH (ref 70–99)
GLUCOSE SERPL-MCNC: 99 MG/DL — SIGNIFICANT CHANGE UP (ref 70–99)
HCO3 BLDA-SCNC: 19 MMOL/L — LOW (ref 22–26)
HCT VFR BLD CALC: 26.7 % — LOW (ref 39–50)
HCT VFR BLD CALC: 28.1 % — LOW (ref 39–50)
HCT VFR BLDA CALC: 26.5 % — LOW (ref 39–51)
HGB BLD-MCNC: 8.1 G/DL — LOW (ref 13–17)
HGB BLD-MCNC: 8.7 G/DL — LOW (ref 13–17)
HGB BLDA-MCNC: 8.5 G/DL — LOW (ref 13–17)
INR BLD: 1.82 — HIGH (ref 0.88–1.17)
MCHC RBC-ENTMCNC: 26.7 PG — LOW (ref 27–34)
MCHC RBC-ENTMCNC: 27.9 PG — SIGNIFICANT CHANGE UP (ref 27–34)
MCHC RBC-ENTMCNC: 30.3 % — LOW (ref 32–36)
MCHC RBC-ENTMCNC: 31 % — LOW (ref 32–36)
MCV RBC AUTO: 88.1 FL — SIGNIFICANT CHANGE UP (ref 80–100)
MCV RBC AUTO: 90.1 FL — SIGNIFICANT CHANGE UP (ref 80–100)
NRBC # FLD: 0.31 K/UL — LOW (ref 25–125)
NRBC # FLD: 0.44 K/UL — LOW (ref 25–125)
NRBC FLD-RTO: 1.2 — SIGNIFICANT CHANGE UP
NRBC FLD-RTO: 1.4 — SIGNIFICANT CHANGE UP
PCO2 BLDA: 28 MMHG — LOW (ref 35–48)
PH BLDA: 7.39 PH — SIGNIFICANT CHANGE UP (ref 7.35–7.45)
PLATELET # BLD AUTO: 104 K/UL — LOW (ref 150–400)
PLATELET # BLD AUTO: 96 K/UL — LOW (ref 150–400)
PMV BLD: SIGNIFICANT CHANGE UP FL (ref 7–13)
PMV BLD: SIGNIFICANT CHANGE UP FL (ref 7–13)
PO2 BLDA: 89 MMHG — SIGNIFICANT CHANGE UP (ref 83–108)
POTASSIUM BLDA-SCNC: 3.2 MMOL/L — LOW (ref 3.4–4.5)
POTASSIUM SERPL-MCNC: 3.4 MMOL/L — LOW (ref 3.5–5.3)
POTASSIUM SERPL-MCNC: 3.4 MMOL/L — LOW (ref 3.5–5.3)
POTASSIUM SERPL-MCNC: 3.5 MMOL/L — SIGNIFICANT CHANGE UP (ref 3.5–5.3)
POTASSIUM SERPL-SCNC: 3.4 MMOL/L — LOW (ref 3.5–5.3)
POTASSIUM SERPL-SCNC: 3.4 MMOL/L — LOW (ref 3.5–5.3)
POTASSIUM SERPL-SCNC: 3.5 MMOL/L — SIGNIFICANT CHANGE UP (ref 3.5–5.3)
PROTHROM AB SERPL-ACNC: 21.1 SEC — HIGH (ref 9.8–13.1)
RBC # BLD: 3.03 M/UL — LOW (ref 4.2–5.8)
RBC # BLD: 3.12 M/UL — LOW (ref 4.2–5.8)
RBC # FLD: 21.7 % — HIGH (ref 10.3–14.5)
RBC # FLD: 22 % — HIGH (ref 10.3–14.5)
RH IG SCN BLD-IMP: POSITIVE — SIGNIFICANT CHANGE UP
RH IG SCN BLD-IMP: POSITIVE — SIGNIFICANT CHANGE UP
SAO2 % BLDA: 97.1 % — SIGNIFICANT CHANGE UP (ref 95–99)
SODIUM BLDA-SCNC: 144 MMOL/L — SIGNIFICANT CHANGE UP (ref 136–146)
SODIUM SERPL-SCNC: 148 MMOL/L — HIGH (ref 135–145)
SODIUM SERPL-SCNC: 149 MMOL/L — HIGH (ref 135–145)
SODIUM SERPL-SCNC: 151 MMOL/L — HIGH (ref 135–145)
SPECIMEN SOURCE: SIGNIFICANT CHANGE UP
WBC # BLD: 25.31 K/UL — HIGH (ref 3.8–10.5)
WBC # BLD: 31.5 K/UL — HIGH (ref 3.8–10.5)
WBC # FLD AUTO: 25.31 K/UL — HIGH (ref 3.8–10.5)
WBC # FLD AUTO: 31.5 K/UL — HIGH (ref 3.8–10.5)

## 2019-03-12 PROCEDURE — 88305 TISSUE EXAM BY PATHOLOGIST: CPT | Mod: 26

## 2019-03-12 PROCEDURE — 88312 SPECIAL STAINS GROUP 1: CPT | Mod: 26

## 2019-03-12 PROCEDURE — 71045 X-RAY EXAM CHEST 1 VIEW: CPT | Mod: 26

## 2019-03-12 PROCEDURE — 93010 ELECTROCARDIOGRAM REPORT: CPT

## 2019-03-12 PROCEDURE — 44361 SMALL BOWEL ENDOSCOPY/BIOPSY: CPT | Mod: GC

## 2019-03-12 RX ORDER — PANTOPRAZOLE SODIUM 20 MG/1
80 TABLET, DELAYED RELEASE ORAL ONCE
Qty: 0 | Refills: 0 | Status: COMPLETED | OUTPATIENT
Start: 2019-03-12 | End: 2019-03-12

## 2019-03-12 RX ORDER — PANTOPRAZOLE SODIUM 20 MG/1
8 TABLET, DELAYED RELEASE ORAL
Qty: 80 | Refills: 0 | Status: DISCONTINUED | OUTPATIENT
Start: 2019-03-12 | End: 2019-03-19

## 2019-03-12 RX ORDER — SODIUM CHLORIDE 9 MG/ML
1000 INJECTION INTRAMUSCULAR; INTRAVENOUS; SUBCUTANEOUS
Qty: 0 | Refills: 0 | Status: DISCONTINUED | OUTPATIENT
Start: 2019-03-12 | End: 2019-03-13

## 2019-03-12 RX ORDER — PANTOPRAZOLE SODIUM 20 MG/1
40 TABLET, DELAYED RELEASE ORAL EVERY 12 HOURS
Qty: 0 | Refills: 0 | Status: DISCONTINUED | OUTPATIENT
Start: 2019-03-12 | End: 2019-03-12

## 2019-03-12 RX ADMIN — SODIUM CHLORIDE 75 MILLILITER(S): 9 INJECTION, SOLUTION INTRAVENOUS at 03:45

## 2019-03-12 RX ADMIN — SODIUM CHLORIDE 100 MILLILITER(S): 9 INJECTION INTRAMUSCULAR; INTRAVENOUS; SUBCUTANEOUS at 18:17

## 2019-03-12 RX ADMIN — PANTOPRAZOLE SODIUM 10 MG/HR: 20 TABLET, DELAYED RELEASE ORAL at 18:17

## 2019-03-12 RX ADMIN — PANTOPRAZOLE SODIUM 80 MILLIGRAM(S): 20 TABLET, DELAYED RELEASE ORAL at 18:17

## 2019-03-12 NOTE — PROGRESS NOTE ADULT - SUBJECTIVE AND OBJECTIVE BOX
Patient is a 73y old  Male who presents with a chief complaint of Diarrhea (11 Mar 2019 18:33)    Called by RN to evaluate pt. with tachycardia and hypoxic   Vital Signs Last 24 Hrs  T(C): 36.4 (12 Mar 2019 09:31), Max: 36.4 (11 Mar 2019 20:48)  T(F): 97.6 (12 Mar 2019 09:31), Max: 97.6 (11 Mar 2019 20:48)  HR: 85 (12 Mar 2019 09:31) (81 - 94)  BP: 142/77 (12 Mar 2019 09:31) (118/84 - 142/77)  BP(mean): --  RR: 18 (12 Mar 2019 09:31) (17 - 18)  SpO2: 98% (12 Mar 2019 09:31) (98% - 100%)      PT/INR - ( 12 Mar 2019 15:38 )   PT: 21.1 SEC;   INR: 1.82          PTT - ( 12 Mar 2019 15:38 )  PTT:45.1 SEC                        8.1    31.50 )-----------( 96       ( 12 Mar 2019 15:38 )             26.7     03-12    149<H>  |  120<H>  |  24<H>  ----------------------------<  147<H>  3.5   |  15<L>  |  1.39<H>    Ca    8.2<L>      12 Mar 2019 15:38  Phos  3.9     03-11  Mg     1.9     03-11    TPro  6.0  /  Alb  2.1<L>  /  TBili  0.4  /  DBili  x   /  AST  35  /  ALT  40  /  AlkPhos  392<H>  03-11    LIVER FUNCTIONS - ( 11 Mar 2019 06:30 )  Alb: 2.1 g/dL / Pro: 6.0 g/dL / ALK PHOS: 392 u/L / ALT: 40 u/L / AST: 35 u/L / GGT: x                                   CAPILLARY BLOOD GLUCOSE        acetaminophen   Tablet .. 650 milliGRAM(s) Oral every 6 hours PRN  aluminum hydroxide/magnesium hydroxide/simethicone Suspension 30 milliLiter(s) Oral every 6 hours PRN  lidocaine 1% Injectable 10 milliLiter(s) Local Injection once  mesalamine DR Capsule 800 milliGRAM(s) Oral three times a day  metoprolol tartrate 25 milliGRAM(s) Oral two times a day  pantoprazole  Injectable 80 milliGRAM(s) IV Push once  pantoprazole Infusion 8 mG/Hr IV Continuous <Continuous>  sodium chloride 0.9%. 1000 milliLiter(s) IV Continuous <Continuous>      REVIEW OF SYSTEMS:    RESPIRATORY: No cough, wheezing, chills or hemoptysis; No shortness of breath  CARDIOVASCULAR: No chest pain, palpitations, dizziness, or leg swelling  GASTROINTESTINAL: No abdominal or epigastric pain. No nausea, vomiting, or hematemesis; No diarrhea or constipation. No melena or hematochezia.  GENITOURINARY: No dysuria, frequency, hematuria, or incontinence  NEUROLOGICAL: No headaches, memory loss, loss of strength, numbness, or tremors    PHYSICAL EXAM:    GENERAL: NAD, well-groomed, well-developed  NERVOUS SYSTEM:  Alert & Oriented X3, Good concentration; Motor Strength 5/5 B/L upper and lower extremities; DTRs 2+ intact and symmetric  CHEST/LUNG: Clear to percussion bilaterally; No rales, pos rhonchi, wheezing, or rubs  HEART: Fast rate and rhythm; No murmurs, rubs, or gallops  ABDOMEN: Soft, Nontender, Nondistended; Bowel sounds present    RADIOLOGY :      Assessment: Patient 73y with Acute renal failure  Anemia, unspecified type  Essential hypertension  CLL (chronic lymphocytic leukemia)  No significant past surgical history      Plan:  -pt seen and examined   -s/p push enteroscopy: - Normal esophagus.                       - Non-bleeding erosive gastropathy. Biopsied.                       - Duodenitis.                       - One oozing duodenal ulcer with no stigmata of bleeding.                       - Multiple non-bleeding duodenal ulcers with no stigmata                        of bleeding Biopsied. This is suggestive of the presence                        of gastrinoma.                       - Await pathology results.                       - STAT serum gastrin level.                       - AFTER serum gastrin is obtained start IV bolus of 80                        mg pantoprazole followed by infusion.                       - Q6H CBC.                       - Transfuse as needed  -pt on 4 liter nasal cannula o2 sat >95%  -Ekg sinus tachycardia: CXR: No signs of pneumonia  MICU consulted   tachycardia most likely 2/2 anemia   cbc hgb down trending   1 unit PRBC ordered will repeat cbc may require 2nd PRBC  no need for tele as per MICU   d/w medical attending and Dr. Lemus Cardiologist Patient is a 73y old  Male who presents with a chief complaint of Diarrhea (11 Mar 2019 18:33)    Called by RN to evaluate pt. with tachycardia and hypoxic   Vital Signs Last 24 Hrs  T(C): 36.4 (12 Mar 2019 09:31), Max: 36.4 (11 Mar 2019 20:48)  T(F): 97.6 (12 Mar 2019 09:31), Max: 97.6 (11 Mar 2019 20:48)  HR: 85 (12 Mar 2019 09:31) (81 - 94)  BP: 142/77 (12 Mar 2019 09:31) (118/84 - 142/77)  BP(mean): --  RR: 18 (12 Mar 2019 09:31) (17 - 18)  SpO2: 98% (12 Mar 2019 09:31) (98% - 100%)      PT/INR - ( 12 Mar 2019 15:38 )   PT: 21.1 SEC;   INR: 1.82          PTT - ( 12 Mar 2019 15:38 )  PTT:45.1 SEC                        8.1    31.50 )-----------( 96       ( 12 Mar 2019 15:38 )             26.7     03-12    149<H>  |  120<H>  |  24<H>  ----------------------------<  147<H>  3.5   |  15<L>  |  1.39<H>    Ca    8.2<L>      12 Mar 2019 15:38  Phos  3.9     03-11  Mg     1.9     03-11    TPro  6.0  /  Alb  2.1<L>  /  TBili  0.4  /  DBili  x   /  AST  35  /  ALT  40  /  AlkPhos  392<H>  03-11    LIVER FUNCTIONS - ( 11 Mar 2019 06:30 )  Alb: 2.1 g/dL / Pro: 6.0 g/dL / ALK PHOS: 392 u/L / ALT: 40 u/L / AST: 35 u/L / GGT: x                                   CAPILLARY BLOOD GLUCOSE        acetaminophen   Tablet .. 650 milliGRAM(s) Oral every 6 hours PRN  aluminum hydroxide/magnesium hydroxide/simethicone Suspension 30 milliLiter(s) Oral every 6 hours PRN  lidocaine 1% Injectable 10 milliLiter(s) Local Injection once  mesalamine DR Capsule 800 milliGRAM(s) Oral three times a day  metoprolol tartrate 25 milliGRAM(s) Oral two times a day  pantoprazole  Injectable 80 milliGRAM(s) IV Push once  pantoprazole Infusion 8 mG/Hr IV Continuous <Continuous>  sodium chloride 0.9%. 1000 milliLiter(s) IV Continuous <Continuous>      REVIEW OF SYSTEMS:    RESPIRATORY: No cough, wheezing, chills or hemoptysis; No shortness of breath  CARDIOVASCULAR: No chest pain, palpitations, dizziness, or leg swelling  GASTROINTESTINAL: No abdominal or epigastric pain. No nausea, vomiting, or hematemesis; No diarrhea or constipation. No melena or hematochezia.  GENITOURINARY: No dysuria, frequency, hematuria, or incontinence  NEUROLOGICAL: No headaches, memory loss, loss of strength, numbness, or tremors    PHYSICAL EXAM:    GENERAL: NAD, well-groomed, well-developed  NERVOUS SYSTEM:  Alert & Oriented X3, Good concentration; Motor Strength 5/5 B/L upper and lower extremities; DTRs 2+ intact and symmetric  CHEST/LUNG: Clear to percussion bilaterally; No rales, pos rhonchi, wheezing, or rubs  HEART: Fast rate and rhythm; No murmurs, rubs, or gallops  ABDOMEN: Soft, Nontender, Nondistended; Bowel sounds present    RADIOLOGY :      Assessment: Patient 73y with Acute renal failure  Anemia, unspecified type  Essential hypertension  CLL (chronic lymphocytic leukemia)  No significant past surgical history      Plan:  -pt seen and examined   -s/p push enteroscopy: - Normal esophagus.                       - Non-bleeding erosive gastropathy. Biopsied.                       - Duodenitis.                       - One oozing duodenal ulcer with no stigmata of bleeding.                       - Multiple non-bleeding duodenal ulcers with no stigmata                        of bleeding Biopsied. This is suggestive of the presence                        of gastrinoma.                       - Await pathology results.                       - STAT serum gastrin level.                       - AFTER serum gastrin is obtained start IV bolus of 80                        mg pantoprazole followed by infusion.                       - Q6H CBC.                       - Transfuse as needed  -pt on 4 liter nasal cannula o2 sat >95%  -Ekg sinus tachycardia: CXR: No signs of pneumonia  MICU consulted   tachycardia most likely 2/2 anemia   cbc hgb down trending   1 unit PRBC ordered will repeat cbc may require 2nd PRBC  no need for tele or micu admission  as per MICU   d/w medical attending and Dr. Lemus Cardiologist   clinical impact nurse aware of pt : vss q 4hrs

## 2019-03-12 NOTE — DIETITIAN INITIAL EVALUATION ADULT. - PERTINENT LABORATORY DATA
03-12 Na 149 mmol/L<H> Glu 147 mg/dL<H> K+ 3.5 mmol/L Cr 1.39 mg/dL<H> BUN 24 mg/dL<H> Phos n/a   Alb n/a   PAB n/a   Hgb 8.1 g/dL<L> Hct 26.7 %<L> HgA1C n/a    Glucose, Serum: 147 mg/dL <H>

## 2019-03-12 NOTE — DIETITIAN INITIAL EVALUATION ADULT. - ORAL INTAKE PTA
PO intake likely poor given patient noted to have chronic diarrhea prior to admission and weight loss/poor

## 2019-03-12 NOTE — DIETITIAN INITIAL EVALUATION ADULT. - PROBLEM SELECTOR PLAN 1
- Unclear cause of the diarrhea, doubt infectious given chronicity though will r/o for infectious diarrhea with stool cultures and c diff toxin by PCR  - GI eval in AM  - Will check CT A/P  - Diet as tolerated  - Noted to have an elevated lipase but doubt patient has pancreatitis as he does not have symptoms consistent with pancreatitis and his US does not reveal any pancreatic inflammation, will see if there is any pancreatic inflammation on CT A/P

## 2019-03-12 NOTE — DIETITIAN INITIAL EVALUATION ADULT. - ENERGY NEEDS
Height (cm): 160.02 (04 Mar 2019 20:49)  Weight (kg): 52.8 (04 Mar 2019 20:49)  BMI (kg/m2): 20.6 (04 Mar 2019 20:49)  IBW: 56kg +/-10% *IBW used to calculate protein needs.  No edema noted. No pressure ulcers/DTI noted per flowsheets.

## 2019-03-12 NOTE — PROGRESS NOTE ADULT - SUBJECTIVE AND OBJECTIVE BOX
Patient is a 73y old  Male who presents with a chief complaint of Diarrhea (12 Mar 2019 17:47)      SUBJECTIVE / OVERNIGHT EVENTS:    Events noted.  Bj NP.  NP's note reviewed  S/p push enteroscopy: Oozing duodenal ulcer.      MEDICATIONS  (STANDING):  lidocaine 1% Injectable 10 milliLiter(s) Local Injection once  mesalamine DR Capsule 800 milliGRAM(s) Oral three times a day  pantoprazole Infusion 8 mG/Hr (10 mL/Hr) IV Continuous <Continuous>  sodium chloride 0.9%. 1000 milliLiter(s) (100 mL/Hr) IV Continuous <Continuous>    MEDICATIONS  (PRN):  acetaminophen   Tablet .. 650 milliGRAM(s) Oral every 6 hours PRN Moderate Pain (4 - 6)  aluminum hydroxide/magnesium hydroxide/simethicone Suspension 30 milliLiter(s) Oral every 6 hours PRN Dyspepsia        CAPILLARY BLOOD GLUCOSE        I&O's Summary      PHYSICAL EXAM:  GENERAL: NAD  NECK: Supple, No JVD  CHEST/LUNG: Clear to auscultation bilaterally; No wheezing.  HEART: Regular rate and rhythm; No murmurs, rubs, or gallops  ABDOMEN: Soft, Nontender, Nondistended; Bowel sounds present  EXTREMITIES:   No edema  NEUROLOGY: AAO X 3      LABS:                        8.1    31.50 )-----------( 96       ( 12 Mar 2019 15:38 )             26.7     03-12    149<H>  |  120<H>  |  24<H>  ----------------------------<  147<H>  3.5   |  15<L>  |  1.39<H>    Ca    8.2<L>      12 Mar 2019 15:38  Phos  3.9     03-11  Mg     1.9     03-11    TPro  6.0  /  Alb  2.1<L>  /  TBili  0.4  /  DBili  x   /  AST  35  /  ALT  40  /  AlkPhos  392<H>  03-11    PT/INR - ( 12 Mar 2019 15:38 )   PT: 21.1 SEC;   INR: 1.82          PTT - ( 12 Mar 2019 15:38 )  PTT:45.1 SEC        CAPILLARY BLOOD GLUCOSE        03-07 @ 17:11  Culture-urine --  Culture results --  method type --  Organism --  Organism Identification --  Specimen source PERITONEAL FLUID  03-07 @ 16:35  Culture-urine --  Culture results --  method type --  Organism --  Organism Identification --  Specimen source PERITONEAL FLUID           03-07 @ 17:11  Culture blood --  Culture results --  Gram stain --  Gram stain blood --  Method type --  Organism --  Organism identification --  Specimen source PERITONEAL FLUID   03-07 @ 16:35  Culture blood --  Culture results --  Gram stain   NOS^No Organisms Seen  WBC^White Blood Cells  QNTY CELLS IN GRAM STAIN: RARE (1+)  Gram stain blood --  Method type --  Organism --  Organism identification --  Specimen source PERITONEAL FLUID      RADIOLOGY & ADDITIONAL TESTS:    Imaging Personally Reviewed:    Consultant(s) Notes Reviewed:      Care Discussed with Consultants/Other Providers:

## 2019-03-12 NOTE — DIETITIAN INITIAL EVALUATION ADULT. - OTHER INFO
Initial Dietitian Evaluation 2/2 to extended length of stay. Per chart review patient with medical history of CLL (not on therapy), Anemia, and HTN presenting with report of persistent intermittent diarrhea since October 2018. Unable to obtain detailed diet history as patient off of unit during time of visit. Per EMR, patient recently returned from trip to [place]. Patient reported chronic diarrhea since October and endorses a10kg weight loss. NKFA. Patient currently NPO for enteroscopy and flexible sigmoidoscopy. Patient still continues to have diarrhea.

## 2019-03-12 NOTE — CONSULT NOTE ADULT - ASSESSMENT
73M with history of CLL (not on therapy), Anemia, and HTN who presents to the hospital with complaints of persistent intermittent diarrhea since October of last year today received push enteroscopy showing on duodenal ulcer, multiple non-bleeding duodenal ulcers afterwards pt w/ tachypnea with tachycardia.    - on exam pt w/o SOB or CP. Pt hyperdynamic on bedside US, lungs A-lined out  - sinus tachycardia likely 2/2 possible anemia, repeat Hgb 8.1, stable  - tranfuse pt w/ 1 u pRBC  - return to floor for care  - not MICU candidate at this time

## 2019-03-12 NOTE — DIETITIAN INITIAL EVALUATION ADULT. - SOURCE
Patient off of floor for procedure. Information obtained from extensive chart review./other (specify)
2017 13:51

## 2019-03-12 NOTE — DIETITIAN INITIAL EVALUATION ADULT. - DIET TYPE
NPO after midnight/DASH/TLC (sodium and cholesterol restricted diet)/clear fluid/NPO for procedure/test/Lactose Restricted

## 2019-03-12 NOTE — PROGRESS NOTE ADULT - ASSESSMENT
Assessment	  This is a 73M with history as above who presents to the hospital with c/o intermittent diarrhea for the past 6 months.    Active upper GI bleed:  Sec to PUD  IV PPI  Serial CBC  PRBC    Leukocytosis:  CBC    Hypoxia/Tachycardia:  Likely from acute blood loss  MICU eval noted.    Dw family.

## 2019-03-12 NOTE — DIETITIAN INITIAL EVALUATION ADULT. - NS AS NUTRI INTERV MEALS SNACK
Patient NPO for procedure. Recommend to advance diet as tolerated and medically appropriate: Clear Liquids -> Regular, Lactose Free diet.

## 2019-03-12 NOTE — CONSULT NOTE ADULT - SUBJECTIVE AND OBJECTIVE BOX
CHIEF COMPLAINT:    HPI:  This is a 73M with history of CLL (not on therapy), Anemia, and HTN who presents to the hospital with complaints of persistent intermittent diarrhea since October of last year. Said that about a week prior to his trip to [facility] he started having significant diarrhea (5-7 episodes of loose stools, ?black stools as per patient at that time) and went to see a doctor. Was told that he likely had a viral illness and that it would improve over time. His diarrhea did improve and he went to [facility] but on his second day there he started having diarrhea again (states that he ate sweets from the [employer] that others in his family did not prior to the onset of his diarrhea in [place]). He started having intermittent diarrhea and went to a doctor in [facility] where he had a colonoscopy done which he states was negative for abnormal findings (pt not sure if he had a biopsy done during the colonoscopy). He was then given some  medications with minimal improvement in his symptoms. He continued to have intermittent diarrhea (said that he has 4-5 days of diarrhea a week) and was unable to get any relief in [facility]. Upon arriving back from [facility] he continued to have intermittent diarrhea and therefore presented to the ED for evaluation. Said that currently he had about 5 episodes of diarrhea today, describes it as yellowish in color, associated with generalized abdominal pain. Denies any hematochezia, melena, or BRBPR currently. No fevers/chills. He denies any other complaints at present.    On arrival to the ED, his vitals were T 98.8, P 97, /62, R 16, O2 sat 100% RA. His lab work was significant for leukocytosis (unknown baseline), normocytic anemia (unknown baseline), mild hyponatremia/hypokalemia/AG gap, elevated BUN/Cr, elevated alk phos, and elevated lipase. He was also noted to have a lactate of 2.8. His CXR showed a possible R basilar opacity but the patient was not complaining of any PNA type symptoms. His US abd was negative for any acute findings. He was given LR 1L. He was admitted to medicine. (03 Mar 2019 23:55)      PAST MEDICAL & SURGICAL HISTORY:  Anemia, unspecified type  Essential hypertension  CLL (chronic lymphocytic leukemia)  No significant past surgical history      FAMILY HISTORY:  Family history of myocardial infarction (Father, Mother, Sibling): Father, mother, brother      SOCIAL HISTORY:  Smoking: __ packs x ___ years  EtOH Use:  Marital Status:  Occupation:  Recent Travel:  Country of Birth:  Advance Directives:    Allergies    No Known Allergies    Intolerances        HOME MEDICATIONS:      CONSTITUTIONAL: No weakness, fevers or chills  EYES/ENT: No visual changes;  No vertigo or throat pain   NECK: No pain or stiffness  RESPIRATORY: No cough, wheezing, hemoptysis; No shortness of breath  CARDIOVASCULAR: No chest pain or palpitations  GASTROINTESTINAL: No abdominal or epigastric pain. No nausea, vomiting, or hematemesis; No diarrhea or constipation. No melena or hematochezia.  GENITOURINARY: No dysuria, frequency or hematuria  NEUROLOGICAL: No numbness or weakness  SKIN: No itching, burning, rashes, or lesions   All other review of systems is negative unless indicated above.    [ ] All other systems negative  [ ] Unable to assess ROS because ________    OBJECTIVE:  ICU Vital Signs Last 24 Hrs  T(C): 36.4 (12 Mar 2019 09:31), Max: 36.4 (11 Mar 2019 20:48)  T(F): 97.6 (12 Mar 2019 09:31), Max: 97.6 (11 Mar 2019 20:48)  HR: 85 (12 Mar 2019 09:31) (85 - 94)  BP: 142/77 (12 Mar 2019 09:31) (118/84 - 142/77)  BP(mean): --  ABP: --  ABP(mean): --  RR: 18 (12 Mar 2019 09:31) (18 - 18)  SpO2: 98% (12 Mar 2019 09:31) (98% - 100%)        CAPILLARY BLOOD GLUCOSE          PHYSICAL EXAM:  General: Awake, alert, oriented X 3.   HEENT: Atraumatic, normocephalic.                Mallampatti Grade 4  Lymph Nodes: No palpable lymphadenopathy  Neck: No JVD. No carotid bruit.   Respiratory: CTABL  Cardiovascular: S1 S2 normal.Abdomen: Soft, non-tender, non-distended. No organomegaly.  Extremities: Warm to touch. 1+ pitting edema noted in both lower extremities this am.  Skin: No rashes or skin lesions  Neurological: Motor and sensory examination equal and normal in all four extremities.  Psychiatry: Appropriate mood and affect.  HOSPITAL MEDICATIONS:  MEDICATIONS  (STANDING):  lidocaine 1% Injectable 10 milliLiter(s) Local Injection once  mesalamine DR Capsule 800 milliGRAM(s) Oral three times a day  pantoprazole Infusion 8 mG/Hr (10 mL/Hr) IV Continuous <Continuous>  sodium chloride 0.9%. 1000 milliLiter(s) (100 mL/Hr) IV Continuous <Continuous>    MEDICATIONS  (PRN):  acetaminophen   Tablet .. 650 milliGRAM(s) Oral every 6 hours PRN Moderate Pain (4 - 6)  aluminum hydroxide/magnesium hydroxide/simethicone Suspension 30 milliLiter(s) Oral every 6 hours PRN Dyspepsia      LABS:                        8.1    31.50 )-----------( 96       ( 12 Mar 2019 15:38 )             26.7     03-12    149<H>  |  120<H>  |  24<H>  ----------------------------<  147<H>  3.5   |  15<L>  |  1.39<H>    Ca    8.2<L>      12 Mar 2019 15:38  Phos  3.9     03-11  Mg     1.9     03-11    TPro  6.0  /  Alb  2.1<L>  /  TBili  0.4  /  DBili  x   /  AST  35  /  ALT  40  /  AlkPhos  392<H>  03-11    PT/INR - ( 12 Mar 2019 15:38 )   PT: 21.1 SEC;   INR: 1.82          PTT - ( 12 Mar 2019 15:38 )  PTT:45.1 SEC    Arterial Blood Gas:  03-12 @ 15:38  7.39/28/89/19/97.1/-7.3  ABG lactate: --        MICROBIOLOGY:     RADIOLOGY:  [ ] Reviewed and interpreted by me    EKG:

## 2019-03-12 NOTE — DIETITIAN INITIAL EVALUATION ADULT. - PERTINENT MEDS FT
acetaminophen   Tablet .. PRN  aluminum hydroxide/magnesium hydroxide/simethicone Suspension PRN  lidocaine 1% Injectable  mesalamine DR Capsule  metoprolol tartrate  pantoprazole  Injectable  pantoprazole Infusion  sodium chloride 0.9%.

## 2019-03-13 DIAGNOSIS — E87.0 HYPEROSMOLALITY AND HYPERNATREMIA: ICD-10-CM

## 2019-03-13 LAB
ANION GAP SERPL CALC-SCNC: 15 MMO/L — HIGH (ref 7–14)
APPEARANCE UR: SIGNIFICANT CHANGE UP
BACTERIA # UR AUTO: HIGH
BACTERIA FLD CULT: SIGNIFICANT CHANGE UP
BILIRUB UR-MCNC: NEGATIVE — SIGNIFICANT CHANGE UP
BLOOD UR QL VISUAL: SIGNIFICANT CHANGE UP
BUN SERPL-MCNC: 27 MG/DL — HIGH (ref 7–23)
CALCIUM SERPL-MCNC: 7.6 MG/DL — LOW (ref 8.4–10.5)
CHLORIDE SERPL-SCNC: 119 MMOL/L — HIGH (ref 98–107)
CHLORIDE UR-SCNC: 33 MMOL/L — SIGNIFICANT CHANGE UP
CO2 SERPL-SCNC: 16 MMOL/L — LOW (ref 22–31)
COLOR SPEC: YELLOW — SIGNIFICANT CHANGE UP
CREAT SERPL-MCNC: 1.68 MG/DL — HIGH (ref 0.5–1.3)
GLUCOSE SERPL-MCNC: 140 MG/DL — HIGH (ref 70–99)
GLUCOSE UR-MCNC: NEGATIVE — SIGNIFICANT CHANGE UP
HCT VFR BLD CALC: 28.4 % — LOW (ref 39–50)
HCT VFR BLD CALC: 30.4 % — LOW (ref 39–50)
HGB BLD-MCNC: 8.9 G/DL — LOW (ref 13–17)
HGB BLD-MCNC: 9.3 G/DL — LOW (ref 13–17)
HYALINE CASTS # UR AUTO: NEGATIVE — SIGNIFICANT CHANGE UP
KETONES UR-MCNC: NEGATIVE — SIGNIFICANT CHANGE UP
LEUKOCYTE ESTERASE UR-ACNC: SIGNIFICANT CHANGE UP
MAGNESIUM SERPL-MCNC: 1.6 MG/DL — SIGNIFICANT CHANGE UP (ref 1.6–2.6)
MCHC RBC-ENTMCNC: 28.3 PG — SIGNIFICANT CHANGE UP (ref 27–34)
MCHC RBC-ENTMCNC: 28.5 PG — SIGNIFICANT CHANGE UP (ref 27–34)
MCHC RBC-ENTMCNC: 30.6 % — LOW (ref 32–36)
MCHC RBC-ENTMCNC: 31.3 % — LOW (ref 32–36)
MCV RBC AUTO: 91 FL — SIGNIFICANT CHANGE UP (ref 80–100)
MCV RBC AUTO: 92.4 FL — SIGNIFICANT CHANGE UP (ref 80–100)
NITRITE UR-MCNC: NEGATIVE — SIGNIFICANT CHANGE UP
NRBC # FLD: 0.39 K/UL — LOW (ref 25–125)
NRBC # FLD: 0.4 K/UL — LOW (ref 25–125)
OSMOLALITY UR: 445 MOSMO/KG — SIGNIFICANT CHANGE UP (ref 50–1200)
PH UR: 5.5 — SIGNIFICANT CHANGE UP (ref 5–8)
PHOSPHATE SERPL-MCNC: 4.3 MG/DL — SIGNIFICANT CHANGE UP (ref 2.5–4.5)
PLATELET # BLD AUTO: 85 K/UL — LOW (ref 150–400)
PLATELET # BLD AUTO: 91 K/UL — LOW (ref 150–400)
PMV BLD: SIGNIFICANT CHANGE UP FL (ref 7–13)
PMV BLD: SIGNIFICANT CHANGE UP FL (ref 7–13)
POTASSIUM SERPL-MCNC: 3.9 MMOL/L — SIGNIFICANT CHANGE UP (ref 3.5–5.3)
POTASSIUM SERPL-SCNC: 3.9 MMOL/L — SIGNIFICANT CHANGE UP (ref 3.5–5.3)
POTASSIUM UR-SCNC: 47.6 MMOL/L — SIGNIFICANT CHANGE UP
PROT UR-MCNC: 30 — SIGNIFICANT CHANGE UP
RBC # BLD: 3.12 M/UL — LOW (ref 4.2–5.8)
RBC # BLD: 3.29 M/UL — LOW (ref 4.2–5.8)
RBC # FLD: 20 % — HIGH (ref 10.3–14.5)
RBC # FLD: 20.1 % — HIGH (ref 10.3–14.5)
RBC CASTS # UR COMP ASSIST: SIGNIFICANT CHANGE UP (ref 0–?)
SODIUM SERPL-SCNC: 150 MMOL/L — HIGH (ref 135–145)
SODIUM UR-SCNC: < 20 MMOL/L — SIGNIFICANT CHANGE UP
SP GR SPEC: 1.02 — SIGNIFICANT CHANGE UP (ref 1–1.04)
SQUAMOUS # UR AUTO: SIGNIFICANT CHANGE UP
UROBILINOGEN FLD QL: NORMAL — SIGNIFICANT CHANGE UP
WBC # BLD: 52.81 K/UL — CRITICAL HIGH (ref 3.8–10.5)
WBC # BLD: 54.12 K/UL — CRITICAL HIGH (ref 3.8–10.5)
WBC # FLD AUTO: 52.81 K/UL — CRITICAL HIGH (ref 3.8–10.5)
WBC # FLD AUTO: 54.12 K/UL — CRITICAL HIGH (ref 3.8–10.5)
WBC UR QL: >50 — HIGH (ref 0–?)

## 2019-03-13 PROCEDURE — 99232 SBSQ HOSP IP/OBS MODERATE 35: CPT | Mod: GC

## 2019-03-13 RX ORDER — SODIUM CHLORIDE 9 MG/ML
1000 INJECTION, SOLUTION INTRAVENOUS
Qty: 0 | Refills: 0 | Status: DISCONTINUED | OUTPATIENT
Start: 2019-03-13 | End: 2019-03-14

## 2019-03-13 RX ORDER — SODIUM CHLORIDE 9 MG/ML
1000 INJECTION, SOLUTION INTRAVENOUS
Qty: 0 | Refills: 0 | Status: DISCONTINUED | OUTPATIENT
Start: 2019-03-13 | End: 2019-03-13

## 2019-03-13 RX ORDER — CEFTRIAXONE 500 MG/1
1 INJECTION, POWDER, FOR SOLUTION INTRAMUSCULAR; INTRAVENOUS EVERY 24 HOURS
Qty: 0 | Refills: 0 | Status: DISCONTINUED | OUTPATIENT
Start: 2019-03-14 | End: 2019-03-18

## 2019-03-13 RX ORDER — METOPROLOL TARTRATE 50 MG
25 TABLET ORAL
Qty: 0 | Refills: 0 | Status: DISCONTINUED | OUTPATIENT
Start: 2019-03-13 | End: 2019-03-21

## 2019-03-13 RX ORDER — CEFTRIAXONE 500 MG/1
INJECTION, POWDER, FOR SOLUTION INTRAMUSCULAR; INTRAVENOUS
Qty: 0 | Refills: 0 | Status: DISCONTINUED | OUTPATIENT
Start: 2019-03-13 | End: 2019-03-18

## 2019-03-13 RX ORDER — CEFTRIAXONE 500 MG/1
1 INJECTION, POWDER, FOR SOLUTION INTRAMUSCULAR; INTRAVENOUS ONCE
Qty: 0 | Refills: 0 | Status: COMPLETED | OUTPATIENT
Start: 2019-03-13 | End: 2019-03-13

## 2019-03-13 RX ADMIN — CEFTRIAXONE 100 GRAM(S): 500 INJECTION, POWDER, FOR SOLUTION INTRAMUSCULAR; INTRAVENOUS at 23:30

## 2019-03-13 RX ADMIN — SODIUM CHLORIDE 75 MILLILITER(S): 9 INJECTION, SOLUTION INTRAVENOUS at 13:24

## 2019-03-13 RX ADMIN — Medication 800 MILLIGRAM(S): at 21:15

## 2019-03-13 RX ADMIN — SODIUM CHLORIDE 100 MILLILITER(S): 9 INJECTION INTRAMUSCULAR; INTRAVENOUS; SUBCUTANEOUS at 06:02

## 2019-03-13 RX ADMIN — Medication 25 MILLIGRAM(S): at 18:00

## 2019-03-13 RX ADMIN — Medication 800 MILLIGRAM(S): at 13:24

## 2019-03-13 RX ADMIN — Medication 800 MILLIGRAM(S): at 06:02

## 2019-03-13 RX ADMIN — PANTOPRAZOLE SODIUM 10 MG/HR: 20 TABLET, DELAYED RELEASE ORAL at 06:02

## 2019-03-13 NOTE — PROVIDER CONTACT NOTE (OTHER) - BACKGROUND
Patient admitted with acute renal failure
S/P enteroscopy 3/12
patient admitted for acute renal failure

## 2019-03-13 NOTE — CONSULT NOTE ADULT - SUBJECTIVE AND OBJECTIVE BOX
Patient is a 73y old  Male who presents with a chief complaint of Diarrhea (13 Mar 2019 14:54), has a diagnosis of CLL for ~ 1 year, never needed treatment, was in [place] for 3 months, denies any kind of treatment there, developed diarrhea and was at 2 different hospitals, had a colonoscopy, no better and came here, s/p enteroscopy, has started having GI bleeding. Has lost 20 pounds in 3 months from diarrhea. No fevers or chills, no pain and rest of the detailed ROS unremarkable.       PAST MEDICAL & SURGICAL HISTORY:  Anemia, unspecified type  Essential hypertension  CLL (chronic lymphocytic leukemia)  No significant past surgical history      Meds:  acetaminophen   Tablet .. 650 milliGRAM(s) Oral every 6 hours PRN  aluminum hydroxide/magnesium hydroxide/simethicone Suspension 30 milliLiter(s) Oral every 6 hours PRN  lidocaine 1% Injectable 10 milliLiter(s) Local Injection once  mesalamine DR Capsule 800 milliGRAM(s) Oral three times a day  metoprolol tartrate 25 milliGRAM(s) Oral two times a day  pantoprazole Infusion 8 mG/Hr IV Continuous <Continuous>  sodium chloride 0.45%. 1000 milliLiter(s) IV Continuous <Continuous>      No Known Allergies      FAMILY HISTORY:  Family history of myocardial infarction (Father, Mother, Sibling): Father, mother, brother      Vital Signs Last 24 Hrs  T(C): 36.2 (13 Mar 2019 14:28), Max: 36.4 (13 Mar 2019 10:19)  T(F): 97.1 (13 Mar 2019 14:28), Max: 97.6 (13 Mar 2019 10:19)  HR: 103 (13 Mar 2019 14:28) (76 - 110)  BP: 142/84 (13 Mar 2019 14:28) (96/68 - 142/84)  BP(mean): --  RR: 19 (13 Mar 2019 14:28) (17 - 22)  SpO2: 100% (13 Mar 2019 14:28) (99% - 100%)                          9.3    54.12 )-----------( 85       ( 13 Mar 2019 09:15 )             30.4       03-13    150<H>  |  119<H>  |  27<H>  ----------------------------<  140<H>  3.9   |  16<L>  |  1.68<H>    Ca    7.6<L>      13 Mar 2019 05:11  Phos  4.3     03-13  Mg     1.6     03-13        PT/INR - ( 12 Mar 2019 15:38 )   PT: 21.1 SEC;   INR: 1.82          PTT - ( 12 Mar 2019 15:38 )  PTT:45.1 SEC        Ass/rec: Patient is a 73y old  Male who presents with a chief complaint of Diarrhea (13 Mar 2019 14:54), has a diagnosis of CLL for ~ 1 year, never needed treatment, was in [place] for 3 months, denies any kind of treatment there, developed diarrhea and was at 2 different hospitals, had a colonoscopy, no better and came here, s/p enteroscopy, has started having GI bleeding. Has lost 20 pounds in 3 months from diarrhea. No fevers or chills, no pain and rest of the detailed ROS unremarkable.       PAST MEDICAL & SURGICAL HISTORY:  Anemia, unspecified type  Essential hypertension  CLL (chronic lymphocytic leukemia)  No significant past surgical history      Meds:  acetaminophen   Tablet .. 650 milliGRAM(s) Oral every 6 hours PRN  aluminum hydroxide/magnesium hydroxide/simethicone Suspension 30 milliLiter(s) Oral every 6 hours PRN  lidocaine 1% Injectable 10 milliLiter(s) Local Injection once  mesalamine DR Capsule 800 milliGRAM(s) Oral three times a day  metoprolol tartrate 25 milliGRAM(s) Oral two times a day  pantoprazole Infusion 8 mG/Hr IV Continuous <Continuous>  sodium chloride 0.45%. 1000 milliLiter(s) IV Continuous <Continuous>      No Known Allergies    SHx: Smokes 4-5 cigarettes a day for > 40 years, drinks alcohol in reasonable amounts      FAMILY HISTORY:  Family history of myocardial infarction (Father, Mother, Sibling): Father, mother, brother      Vital Signs Last 24 Hrs  T(C): 36.2 (13 Mar 2019 14:28), Max: 36.4 (13 Mar 2019 10:19)  T(F): 97.1 (13 Mar 2019 14:28), Max: 97.6 (13 Mar 2019 10:19)  HR: 103 (13 Mar 2019 14:28) (76 - 110)  BP: 142/84 (13 Mar 2019 14:28) (96/68 - 142/84)  BP(mean): --  RR: 19 (13 Mar 2019 14:28) (17 - 22)  SpO2: 100% (13 Mar 2019 14:28) (99% - 100%)                          9.3    54.12 )-----------( 85       ( 13 Mar 2019 09:15 )             30.4       03-13    150<H>  |  119<H>  |  27<H>  ----------------------------<  140<H>  3.9   |  16<L>  |  1.68<H>    Ca    7.6<L>      13 Mar 2019 05:11  Phos  4.3     03-13  Mg     1.6     03-13        PT/INR - ( 12 Mar 2019 15:38 )   PT: 21.1 SEC;   INR: 1.82          PTT - ( 12 Mar 2019 15:38 )  PTT:45.1 SEC      PBS: RBCs are mainly normocytic, normochromic, significant polychromasia, moderate rouleoux formation, occasional nucleated red cell with deformed nucleus seen.   WBCs increased, majority of cells are PMNs with prominent granules, no bilobed or hypersegmented PMNs seen, slight left shift. Lymphocytes are large with convoluted nuclei and do not appear to be mature, small cells (CLL), and occasional smudge cell seen, no blasts seen.  Plts decreased, a few large and giant plts seen.      Flow cytometry on peritoneal fluid: TM Interpretation:   Flow Cytometry Final Report  ________________________________________________________________________  Specimen: Peritoneal fluid  Collected: 03/07/2019 17:20  Received: 03/07/2019 17:20  Processed: 03/07/2019 18:00  Reported: 03/11/2019 17:39  Accession #: [id number]  Surgical Pathology Number:  ________________________________________________________________________  CLINICAL DATA:  _______________________________________________________________________  DIAGNOSIS:  Peritoneal fluid:    - The findings show mild increase in large granular lymphocytes (NK-like T-cells and natural  killer cells).    Please see interpretation.    INTERPRETATION:  CYTOSPIN: Scattered lymphocytes.  IMMUNOPHENOTYPE: Lymphocytes (26% of cells): Heterogeneous population of T-cells (with normal CD4  to CD8 ratio, including increased proportion natural killer-like T-cells), mildly increased  proportion of natural killer cells, and rare polytypic B-cells.    The findings show mild increase in large granular lymphocytes (NK-like T-cells and natural killer  cells).  _____________________________________________________________________  Viability ................. 92 %    Values reported are based on the lymphocyte gate. (Bright CD45 positive; low side scatter, low  forward scatter).  26% of cells.    CD45 .......... 100 %  CD2 ........... 94 %  CD3 ........... 67 %  CD5 ........... 70 %  CD7 ........... 81 %  CD4 ........... 13 %  CD8 ........... 10 %  CD16 .......... 27 %  CD56........... 37 %  CD57 .......... 49 %  CD10 .......... <1  CD19 .......... 2 %  CD20 .......... 1 %  CD23 .......... <1  FMC-7 ......... 2 %  CD19/kappa ......... 1 %  CD19/lambda ........ <1  HLA-DR ........ 35 %  CD38 .......... 38 %    Verified By: Nataly Berg  (Electronic Signature)This test was developed and its performance characteristics determined by the  Flow Cytometry Laboratory at [facility]. It has not been cleared or  approved by the U.S. Food and Drug Administration.  The FDA has determined that such clearance or approval is not necessary. This test is used for  clinical purposes. It should not be regarded as investigational or for research. This laboratory is  certified under the Clinical Laboratory Improvement Amendment of 1988 ("CLIA") as qualified to  perform high complexity clinical testing. (03.07.19 @ 17:20)    CT a/p: IMPRESSION:   .  Mild wall thickening of the duodenum extending of the adjacent mesenteric   fat, suggesting underlying duodenitis.                    ELA KEARNEY M.D., ATTENDING RADIOLOGIST  This document has been electronically signed. Mar  4 2019  8:40AM      MRCP: IMPRESSION: No evidence of cholelithiasis or biliary obstruction.    Hepatic steatosis.    Mural thickening proximal small bowel consistent with enteritis.    Moderate ascites.                      COOKIE CALHOUN M.D., ATTENDING RADIOLOGIST  This document has been electronically signed. Mar  6 2019 11:51AM

## 2019-03-13 NOTE — PROGRESS NOTE ADULT - ASSESSMENT
Assessment	  This is a 73M with history as above who presents to the hospital with c/o intermittent diarrhea for the past 6 months.    Active upper GI bleed:  Sec to PUD  IV PPI  Serial CBC  PRBC    Leukocytosis:  CBC  Hx of CLL  Infection work up in progress  IV Ceftriaxone      Dw family.

## 2019-03-13 NOTE — PROGRESS NOTE ADULT - ASSESSMENT
Impression:  1) GIB - 2/2 ulcerations seen on push enteroscopy.  Concerning for gastrinoma cause.  2) Elevated alkaline phosphatase and lipase- Consistent with gallstone pancreatitis however US and MRCP are negative for gallstones. Other differential dx includes DILI (patient had no new meds), ischemic cholangiopathy, infiltrative disease, congestive cholangiopathy, cholestasis of sepsis      Recommendations:  -f/u gastrin level  -PPI IV for 72 hours then PO  -Continue with LR hydration   -Lactose free diet  -Monitor and chart frequency of BMs  -Continue with Delzicol 800mg TID     [place], PGY-4  Gastroenterology Fellow  Pager x [phone] or [phone]  (After 5 pm or on weekends please page GI on call) Impression:  1) GIB - 2/2 ulcerations seen on push enteroscopy.  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  2) Elevated alkaline phosphatase and lipase- Consistent with gallstone pancreatitis however US and MRCP are negative for gallstones. Other differential dx includes DILI (patient had no new meds), ischemic cholangiopathy, infiltrative disease, congestive cholangiopathy, cholestasis of sepsis      Recommendations:  -f/u gastrin level  -PPI IV for 72 hours then PO  -Continue with LR hydration   -Lactose free diet  -Monitor and chart frequency of BMs  -Continue with Delzicol 800mg TID       [place], PGY-4  Gastroenterology Fellow  Pager x [phone] or [phone]  (After 5 pm or on weekends please page GI on call) Impression:  1) GIB - 2/2 ulcerations seen on push enteroscopy.  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  2) Elevated alkaline phosphatase and lipase- Consistent with gallstone pancreatitis however US and MRCP are negative for gallstones. Other differential dx includes DILI (patient had no new meds), ischemic cholangiopathy, infiltrative disease, congestive cholangiopathy, cholestasis of sepsis  3) history of colonic inflammation found on colonoscopy in [place], no other results known, possible colitis      Recommendations:  -f/u gastrin level  -PPI IV for 72 hours then PO  -Continue with LR hydration   -Lactose free diet  -Monitor and chart frequency of BMs  -Continue with Delzicol 800mg TID       [place], PGY-4  Gastroenterology Fellow  Pager x [phone] or [phone]  (After 5 pm or on weekends please page GI on call)

## 2019-03-13 NOTE — CONSULT NOTE ADULT - ASSESSMENT
73y Male w CLL (not on therapy), Anemia, and HTN a/w  persistent diarrhea, and suspected GIB, with PEDRO and hypernatremia

## 2019-03-13 NOTE — CONSULT NOTE ADULT - SUBJECTIVE AND OBJECTIVE BOX
[facility] Consult Note Nephrology - CONSULTATION NOTE - [phone] - Dr Marquis / Dr Matthews / Dr Dallas / Dr Lo / Dr Morales / Dr Nelson / Dr Marcum / Dr Valero    Patient is a 73y Male w CLL (not on therapy), Anemia, and HTN who presents to the hospital with complaints of persistent intermittent diarrhea since October of last year. Said that about a week prior to his trip to [facility] he started having significant diarrhea (5-7 episodes of loose stools, ?black stools as per patient at that time) and went to see a doctor. Was told that he likely had a viral illness and that it would improve over time. His diarrhea did improve and he went to [facility] but on his second day there he started having diarrhea again (states that [facility] that others in his family did not prior to the onset of his diarrhea in [facility]). He started having intermittent diarrhea and went to a doctor in [facility] where he had a colonoscopy done which he states was negative for abnormal findings (pt not sure if he had a biopsy done during the colonoscopy). He was then given some  medications with minimal improvement in his symptoms. He continued to have intermittent diarrhea (said that he has 4-5 days of diarrhea a week) and was unable to get any relief in [place]. Upon arriving back from [facility] he continued to have intermittent diarrhea and therefore presented to the ED for evaluation. Said that currently he had about 5 episodes of diarrhea today, describes it as yellowish in color, associated with generalized abdominal pain. Denies any hematochezia, melena, or BRBPR currently. No fevers/chills. He denies any other complaints at present.    On arrival to the ED, his vitals were T 98.8, P 97, /62, R 16, O2 sat 100% RA. His lab work was significant for leukocytosis (unknown baseline), normocytic anemia (unknown baseline), mild hyponatremia/hypokalemia/AG gap, elevated BUN/Cr, elevated alk phos, and elevated lipase. He was also noted to have a lactate of 2.8. His CXR showed a possible R basilar opacity but the patient was not complaining of any PNA type symptoms. His US abd was negative for any acute findings. He was given LR 1L. He was admitted to medicine.     PAST MEDICAL & SURGICAL HISTORY:  Anemia, unspecified type  Essential hypertension  CLL (chronic lymphocytic leukemia)  No significant past surgical history    Allergies:  No Known Allergies    Home Medications Reviewed  Hospital Medications:   MEDICATIONS  (STANDING):  lidocaine 1% Injectable 10 milliLiter(s) Local Injection once  mesalamine DR Capsule 800 milliGRAM(s) Oral three times a day  pantoprazole Infusion 8 mG/Hr (10 mL/Hr) IV Continuous <Continuous>  sodium chloride 0.45%. 1000 milliLiter(s) (75 mL/Hr) IV Continuous <Continuous>    SOCIAL HISTORY:  Denies ETOh,Smoking,   FAMILY HISTORY:  Family history of myocardial infarction (Father, Mother, Sibling): Father, mother, brother    REVIEW OF SYSTEMS:  CONSTITUTIONAL: No weakness, fevers or chills  EYES/ENT: No visual changes;  No vertigo or throat pain   NECK: No pain or stiffness  RESPIRATORY: No cough, wheezing, hemoptysis; No shortness of breath  CARDIOVASCULAR: No chest pain or palpitations.  GASTROINTESTINAL: No abdominal or epigastric pain. No nausea, vomiting, or hematemesis; No diarrhea or constipation. No melena or hematochezia.  GENITOURINARY: No dysuria, frequency, foamy urine, urinary urgency, incontinence or hematuria  NEUROLOGICAL: No numbness or weakness  SKIN: No itching, burning, rashes, or lesions   VASCULAR: No bilateral lower extremity edema.   All other review of systems is negative unless indicated above.    VITALS:  T(F): 97.6 (03-13-19 @ 10:19), Max: 97.6 (03-13-19 @ 10:19)  HR: 109 (03-13-19 @ 10:19)  BP: 96/68 (03-13-19 @ 10:19)  RR: 22 (03-13-19 @ 10:19)  SpO2: 100% (03-13-19 @ 10:19)  Wt(kg): --    03-12 @ 07:01  -  03-13 @ 07:00  --------------------------------------------------------  IN: 310 mL / OUT: 0 mL / NET: 310 mL        PHYSICAL EXAM:  Constitutional: NAD  HEENT: anicteric sclera, oropharynx clear, MMM  Neck: No JVD  Respiratory: CTAB, no wheezes, rales or rhonchi  Cardiovascular: S1, S2, RRR  Gastrointestinal: BS+, soft, NT/ND  Extremities: No cyanosis or clubbing. No peripheral edema  Neurological: A/O x 3, no focal deficits  Psychiatric: Normal mood, normal affect  : No CVA tenderness. No mata.   Skin: No rashes  Vascular Access:    LABS:  03-13    150<H>  |  119<H>  |  27<H>  ----------------------------<  140<H>  3.9   |  16<L>  |  1.68<H>    Ca    7.6<L>      13 Mar 2019 05:11  Phos  4.3     03-13  Mg     1.6     03-13      Creatinine Trend: 1.68 <--, 1.39 <--, 1.32 <--, 1.36 <--, 1.25 <--, 1.29 <--, 1.34 <--, 1.28 <--, 1.17 <--, 1.12 <--                        9.3    54.12 )-----------( 85       ( 13 Mar 2019 09:15 )             30.4     Urine Studies:      RADIOLOGY & ADDITIONAL STUDIES:  < from: Xray Chest 1 View- PORTABLE-Urgent (03.12.19 @ 15:28) >  IMPRESSION:  Platelike atelectasis in left upper lung and linear   atelectasis in both lower lungs.    No focal lung consolidation seen.    6 mm nodular density projecting over the periphery of the left lower   lung. Question whether this might represent the nipple as no left lower   lobe nodule is seen on included portions of the chest on the CT scan of   the abdomen and pelvis from March 4, 2019. Repeat PA chest x-ray with   nipple markers could be obtained for further evaluation, if felt to be   clinically indicated.    < end of copied text > [facility] Consult Note Nephrology - CONSULTATION NOTE - [phone] - Dr Marquis / Dr Matthews / Dr Dallas / Dr Lo / Dr Morales / Dr Nelson / Dr Marcum / Dr Valero    Patient is a 73y Male w CLL (not on therapy), Anemia, and HTN a/w  persistent diarrhea, begining since October of 2018. He reports 5-8 loose BM daily. No improvement with various medications hes been given by other doctors. Stool is described as yellowish in color, associated with generalized abdominal pain. No fevers/chills. On admission, labs significant for leukocytosis, anemia, and PEDRO. GI was consult, and push enteroscopy performed, showing multiple nonbleeding ulcers. Concerning for gastrinoma. Pt now c/o bloody stool, he was transfused one unit prbc yesterday. MICU called yesterday for tachypnea and tachycardia after enteroscopy. Pt placed on protonix gtt. Renal consult for hypernatremia, serum Na 150 today, rising since admission, and PEDRO with Cr peak 1.68 today. Pt without hx of renal disease, no urinary complaints.     PAST MEDICAL & SURGICAL HISTORY:  Anemia, unspecified type  Essential hypertension  CLL (chronic lymphocytic leukemia)  No significant past surgical history    Allergies:  No Known Allergies    Home Medications Reviewed  Hospital Medications:   MEDICATIONS  (STANDING):  lidocaine 1% Injectable 10 milliLiter(s) Local Injection once  mesalamine DR Capsule 800 milliGRAM(s) Oral three times a day  pantoprazole Infusion 8 mG/Hr (10 mL/Hr) IV Continuous <Continuous>  sodium chloride 0.45%. 1000 milliLiter(s) (75 mL/Hr) IV Continuous <Continuous>    SOCIAL HISTORY:  Denies ETOh,Smoking,   FAMILY HISTORY:  Family history of myocardial infarction (Father, Mother, Sibling): Father, mother, brother    REVIEW OF SYSTEMS:  CONSTITUTIONAL: +weakness, No fevers or chills  EYES/ENT: No visual changes;  No vertigo or throat pain   NECK: No pain or stiffness  RESPIRATORY: No cough, wheezing, hemoptysis; No shortness of breath  CARDIOVASCULAR: No chest pain or palpitations.  GASTROINTESTINAL: Mild abdominal pain. No nausea, vomiting, or hematemesis; +diarrhea. +hematochezia.  GENITOURINARY: No dysuria, frequency, foamy urine, urinary urgency, incontinence or hematuria  NEUROLOGICAL: No numbness or weakness  SKIN: No itching, burning, rashes, or lesions   VASCULAR: No bilateral lower extremity edema.   All other review of systems is negative unless indicated above.    VITALS:  T(F): 97.6 (03-13-19 @ 10:19), Max: 97.6 (03-13-19 @ 10:19)  HR: 109 (03-13-19 @ 10:19)  BP: 96/68 (03-13-19 @ 10:19)  RR: 22 (03-13-19 @ 10:19)  SpO2: 100% (03-13-19 @ 10:19)  Wt(kg): --    03-12 @ 07:01  -  03-13 @ 07:00  --------------------------------------------------------  IN: 310 mL / OUT: 0 mL / NET: 310 mL        PHYSICAL EXAM:  Constitutional: NAD  HEENT: anicteric sclera, oropharynx clear, MMM  Neck: No JVD  Respiratory: CTAB, no wheezes, rales or rhonchi  Cardiovascular: S1, S2, RRR  Gastrointestinal: BS+, soft, NT/ND  Extremities: No cyanosis or clubbing. No peripheral edema  Neurological: A/O x 3, no focal deficits  Psychiatric: Normal mood, normal affect  : No CVA tenderness. No mata.   Skin: No rashes    LABS:  03-13    150<H>  |  119<H>  |  27<H>  ----------------------------<  140<H>  3.9   |  16<L>  |  1.68<H>    Ca    7.6<L>      13 Mar 2019 05:11  Phos  4.3     03-13  Mg     1.6     03-13      Creatinine Trend: 1.68 <--, 1.39 <--, 1.32 <--, 1.36 <--, 1.25 <--, 1.29 <--, 1.34 <--, 1.28 <--, 1.17 <--, 1.12 <--                        9.3    54.12 )-----------( 85       ( 13 Mar 2019 09:15 )             30.4     Urine Studies:      RADIOLOGY & ADDITIONAL STUDIES:  < from: Xray Chest 1 View- PORTABLE-Urgent (03.12.19 @ 15:28) >  IMPRESSION:  Platelike atelectasis in left upper lung and linear   atelectasis in both lower lungs.    No focal lung consolidation seen.    6 mm nodular density projecting over the periphery of the left lower   lung. Question whether this might represent the nipple as no left lower   lobe nodule is seen on included portions of the chest on the CT scan of   the abdomen and pelvis from March 4, 2019. Repeat PA chest x-ray with   nipple markers could be obtained for further evaluation, if felt to be   clinically indicated.    < end of copied text >    < from: CT Abdomen and Pelvis No Cont (03.04.19 @ 05:56) >  IMPRESSION:   .  Mild wall thickening of the duodenum extending of the adjacent mesenteric   fat, suggesting underlying duodenitis.    < end of copied text >

## 2019-03-13 NOTE — CONSULT NOTE ADULT - SUBJECTIVE AND OBJECTIVE BOX
Patient is a 73y old  Male who presents with a chief complaint of Diarrhea (13 Mar 2019 10:48)      HPI:    This is a 73M with history of CLL (not on therapy), Anemia, and HTN who presents to the hospital with complaints of persistent intermittent diarrhea since October of last year. Said that about a week prior to his trip to [facility] he started having significant diarrhea (5-7 episodes of loose stools, ?black stools as per patient at that time) and went to see a doctor. Was told that he likely had a viral illness and that it would improve over time. His diarrhea did improve and he went to [facility] but on his second day there he started having diarrhea again (states that he ate sweets from the [facility] that others in his family did not prior to the onset of his diarrhea in [facility]). He started having intermittent diarrhea and went to a doctor in [facility] where he had a colonoscopy done which he states was negative for abnormal findings (pt not sure if he had a biopsy done during the colonoscopy). He was then given some  medications with minimal improvement in his symptoms. He continued to have intermittent diarrhea (said that he has 4-5 days of diarrhea a week) and was unable to get any relief in [facility]. Upon arriving back from [facility] he continued to have intermittent diarrhea and therefore presented to the ED for evaluation. Said that currently he had about 5 episodes of diarrhea today, describes it as yellowish in color, associated with generalized abdominal pain. Denies any hematochezia, melena, or BRBPR currently. No fevers/chills. He denies any other complaints at present.    On arrival to the ED, his vitals were T 98.8, P 97, /62, R 16, O2 sat 100% RA. His lab work was significant for leukocytosis (unknown baseline), normocytic anemia (unknown baseline), mild hyponatremia/hypokalemia/AG gap, elevated BUN/Cr, elevated alk phos, and elevated lipase. He was also noted to have a lactate of 2.8. His CXR showed a possible R basilar opacity but the patient was not complaining of any PNA type symptoms. His US abd was negative for any acute findings. He was given LR 1L. He was admitted to medicine. (03 Mar 2019 23:55)      HOSP COURSE:          REVIEW OF SYSTEMS:    CONSTITUTIONAL: No fever, weight loss, or fatigue  EYES: No eye pain, visual disturbances, or discharge  ENMT:  No sore throat  NECK: No pain or stiffness  RESPIRATORY: No cough, wheezing, chills or hemoptysis; No shortness of breath  CARDIOVASCULAR: No chest pain, palpitations, dizziness, or leg swelling  GASTROINTESTINAL: No abdominal or epigastric pain. No nausea, vomiting, or hematemesis; No diarrhea or constipation. No melena or hematochezia.  GENITOURINARY: No dysuria, frequency, hematuria, or incontinence  NEUROLOGICAL: No headaches, memory loss, loss of strength, numbness, or tremors  SKIN: No itching, burning, rashes, or lesions   LYMPH NODES: No enlarged glands  MUSCULOSKELETAL: No joint pain or swelling; No muscle, back, or extremity pain      PAST MEDICAL & SURGICAL HISTORY:  Anemia, unspecified type  Essential hypertension  CLL (chronic lymphocytic leukemia)  No significant past surgical history      Allergies    No Known Allergies    Intolerances        FAMILY HISTORY:  Family history of myocardial infarction (Father, Mother, Sibling): Father, mother, brother      SOCIAL HISTORY:        MEDICATIONS  (STANDING):  lidocaine 1% Injectable 10 milliLiter(s) Local Injection once  mesalamine DR Capsule 800 milliGRAM(s) Oral three times a day  pantoprazole Infusion 8 mG/Hr (10 mL/Hr) IV Continuous <Continuous>  sodium chloride 0.45%. 1000 milliLiter(s) (75 mL/Hr) IV Continuous <Continuous>    MEDICATIONS  (PRN):  acetaminophen   Tablet .. 650 milliGRAM(s) Oral every 6 hours PRN Moderate Pain (4 - 6)  aluminum hydroxide/magnesium hydroxide/simethicone Suspension 30 milliLiter(s) Oral every 6 hours PRN Dyspepsia      Vital Signs Last 24 Hrs  T(C): 36.2 (13 Mar 2019 14:28), Max: 36.4 (13 Mar 2019 10:19)  T(F): 97.1 (13 Mar 2019 14:28), Max: 97.6 (13 Mar 2019 10:19)  HR: 103 (13 Mar 2019 14:28) (76 - 110)  BP: 142/84 (13 Mar 2019 14:28) (96/68 - 142/84)  BP(mean): --  RR: 19 (13 Mar 2019 14:28) (17 - 22)  SpO2: 100% (13 Mar 2019 14:28) (99% - 100%)    PHYSICAL EXAM:    GENERAL: NAD, well-groomed  HEAD:  Atraumatic, Normocephalic  EYES: EOMI, PERRLA, conjunctiva and sclera clear  ENMT: No tonsillar erythema, exudates, or enlargement; Moist mucous membranes  NECK: Supple, No JVD  CHEST/LUNG: Clear to percussion bilaterally; No rales, rhonchi, wheezing, or rubs  HEART: Regular rate and rhythm; No murmurs, rubs, or gallops  ABDOMEN: Soft, Nontender, Nondistended; Bowel sounds present  EXTREMITIES:  2+ Peripheral Pulses, No clubbing, cyanosis, or edema  LYMPH: No lymphadenopathy noted  SKIN: No rashes or lesions    LABS:  CBC Full  -  ( 13 Mar 2019 09:15 )  WBC Count : 54.12 K/uL  Hemoglobin : 9.3 g/dL  Hematocrit : 30.4 %  Platelet Count - Automated : 85 K/uL  Mean Cell Volume : 92.4 fL  Mean Cell Hemoglobin : 28.3 pg  Mean Cell Hemoglobin Concentration : 30.6 %  Auto Neutrophil # : x  Auto Lymphocyte # : x  Auto Monocyte # : x  Auto Eosinophil # : x  Auto Basophil # : x  Auto Neutrophil % : x  Auto Lymphocyte % : x  Auto Monocyte % : x  Auto Eosinophil % : x  Auto Basophil % : x      03-13    150<H>  |  119<H>  |  27<H>  ----------------------------<  140<H>  3.9   |  16<L>  |  1.68<H>    Ca    7.6<L>      13 Mar 2019 05:11  Phos  4.3     03-13  Mg     1.6     03-13              MICROBIOLOGY:      Culture - Yeast and Fungus (03.07.19 @ 17:11)    Culture - Yeast and Fungus:   CULTURE NEGATIVE FOR YEASTS AND MOLDS   AFTER 4 DAYS    Specimen Source: PERITONEAL FLUID        Culture - Body Fluid with Gram Stain (03.07.19 @ 16:35)    Gram Stain:   NOS^No Organisms Seen  WBC^White Blood Cells  QNTY CELLS IN GRAM STAIN: RARE (1+)    Culture - Body Fluid:   NO GROWTH - PRELIMINARY RESULTS  NO ORGANISMS ISOLATED AT 24 HOURS  NO ORGANISMS ISOLATED AT 48 HRS.  NO ORGANISMS ISOLATED AT 72 HRS.  NO GROWTH AFTER 4 DAYS INCUBATION  NO GROWTH AFTER 5 DAYS INCUBATION    Specimen Source: PERITONEAL FLUID        Culture - Acid Fast - Body Fluid w/Smear (03.07.19 @ 16:35)    Culture - Acid Fast Smear Concentrated:   AFB SMEAR= NO ACID FAST BACILLI SEEN    Specimen Source: PERITONEAL FLUID      Culture - Stool (03.03.19 @ 22:55)    Culture - Stool:   ****************** PRELIMINARY **************************  NEGATIVE FOR SALMONELLA, SHIGELLA, YERSINIA,  E. COLI O157, AEROMONAS, PLESIOMONAS, AND VIBRIO SP.                      AFTER 24 HOURS  *********************************************************  **********************FINAL REPORT************************  CULTURE NEGATIVE FOR SALMONELLA, SHIGELLA, YERSINIA, E COLI  O157, AEROMONAS, PLESIOMONAS, CAMPYLOBACTER AND VIBRIO SP.  **********************************************************    Specimen Source: FECES                      RADIOLOGY:      < from: Xray Chest 1 View- PORTABLE-Urgent (03.12.19 @ 15:28) >  IMPRESSION:  Platelike atelectasis in left upper lung and linear   atelectasis in both lower lungs.    No focal lung consolidation seen.    6 mm nodular density projecting over the periphery of the left lower   lung. Question whether this might represent the nipple as no left lower   lobe nodule is seen on included portions of the chest on the CT scan of   the abdomen and pelvis from March 4, 2019. Repeat PA chest x-ray with   nipple markers could be obtained for further evaluation, if felt to be   clinically indicated.    < end of copied text >        < from: MR MRCP w/wo IV Cont (03.06.19 @ 11:22) >  FINDINGS:    LOWER CHEST: Dependent atelectasis right lower lobe.    LIVER: Diffuse fatty infiltration. No liver mass.     BILE DUCTS: No intra or extrahepatic biliary ductal dilatation. Common   bile duct measures 3 mm.  GALLBLADDER: Within normal limits.  SPLEEN: 10 cm. Within normal limits.  PANCREAS: Within normal limits.  ADRENALS: Within normal limits.  KIDNEYS/URETERS: Within normal limits.    VISUALIZED PORTIONS:    BOWEL: Circumferential mural thickening duodenum and proximal jejunum   suggesting enteritis.   PERITONEUM: Moderate ascites.  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  RETROPERITONEUM: Mildly enlarged retroperitoneal lymph nodes. Reference   left para-aortic node (10, 75) measures 1.1 cm in short axis.    ABDOMINAL WALL: Within normal limits.  BONES: Within normal limits.    IMPRESSION: No evidence of cholelithiasis or biliary obstruction.    Hepatic steatosis.    Mural thickening proximal small bowel consistent with enteritis.    Moderate ascites.    < end of copied text >        < from: CT Abdomen and Pelvis No Cont (03.04.19 @ 05:56) >  PROCEDURE:   CT of the Abdomen and Pelvis wasperformed without intravenous contrast.   Intravenous contrast: None.  Oral contrast: None.  Sagittal and coronal reformats were performed.    FINDINGS:    LOWER CHEST: Within normal limits.    LIVER: Within normal limits.  BILE DUCTS: Normal caliber.  GALLBLADDER: Within normal limits.  SPLEEN: Within normal limits.  PANCREAS: Within normal limits.  ADRENALS: Within normal limits.  KIDNEYS/URETERS: Within normal limits.    BLADDER: Within normal limits.  REPRODUCTIVE ORGANS: Prostate within normal limits.    BOWEL: No bowel obstruction. Appendix is normal. Mild wall thickening of   the duodenum with stranding of the adjacent mesenteric fat.  PERITONEUM: No ascites.  VESSELS:  Within normal limits.  RETROPERITONEUM: Nonspecific increase in number of small retroperitoneal   and gastrohepatic lymph nodes,  likely reactive.  ABDOMINAL WALL: Small amount of subcutaneous tissues.  BONES: Degenerative changes    IMPRESSION:   .  Mild wall thickening of the duodenum extending of the adjacent mesenteric   fat, suggesting underlying duodenitis.    < end of copied text >            < from: US Abdomen Limited (03.03.19 @ 22:59) >  FINDINGS:    Liver: Within normal limits.    Bile ducts: Normal caliber. Common hepatic duct measures 4 mm.     Gallbladder: Underdistended. No cholelithiasis. No gallbladder wall   thickening or pericholecystic fluid. Unable to assess sonographic Merlos   sign secondary to analgesia.        Pancreas: Visualized portions are within normal limits.    Right kidney: 10.7 cm. No hydronephrosis.    Ascites: None.    IVC: Visualized portions are within normal limits.    IMPRESSION:     No cholelithiasis.    < end of copied text > Patient is a 73y old  Male who presents with a chief complaint of Diarrhea (13 Mar 2019 10:48)      HPI:    This is a 73M with history of CLL (not on therapy), Anemia, and HTN who presents to the hospital with complaints of persistent intermittent diarrhea since October of last year. Said that about a week prior to his trip to [facility] he started having significant diarrhea (5-7 episodes of loose stools, ?black stools as per patient at that time) and went to see a doctor. Was told that he likely had a viral illness and that it would improve over time. His diarrhea did improve and he went to [facility] but on his second day there he started having diarrhea again (states that he ate sweets from the [facility] that others in his family did not prior to the onset of his diarrhea in [place]). He started having intermittent diarrhea and went to a doctor in [facility] where he had a colonoscopy done which he states was negative for abnormal findings (pt not sure if he had a biopsy done during the colonoscopy). He was then given some  medications with minimal improvement in his symptoms. He continued to have intermittent diarrhea (said that he has 4-5 days of diarrhea a week) and was unable to get any relief in [facility]. Upon arriving back from [facility] he continued to have intermittent diarrhea and therefore presented to the ED for evaluation. Said that currently he had about 5 episodes of diarrhea today, describes it as yellowish in color, associated with generalized abdominal pain. Denies any hematochezia, melena, or BRBPR currently. No fevers/chills. He denies any other complaints at present.    On arrival to the ED, his vitals were T 98.8, P 97, /62, R 16, O2 sat 100% RA. His lab work was significant for leukocytosis (unknown baseline), normocytic anemia (unknown baseline), mild hyponatremia/hypokalemia/AG gap, elevated BUN/Cr, elevated alk phos, and elevated lipase. He was also noted to have a lactate of 2.8. His CXR showed a possible R basilar opacity but the patient was not complaining of any PNA type symptoms. His US abd was negative for any acute findings. He was given LR 1L. He was admitted to medicine. (03 Mar 2019 23:55)      HOSP COURSE:    CTap showed mild wall thickening of the duodenum extending of the adjacent mesenteric fat, suggesting underlying duodenitis.  EGD showed normal esophagus, non bleeding erosive gastropathy - biopsied (s/o gastrinoma), duodenitis and one oozing duodenal ulcer with no bleeding, Multiple non-bleeding duodenal ulcers,                    MRCP- No evidence of cholelithiasis or biliary obstruction.Hepatic steatosis.Mural thickening proximal small bowel consistent with enteritis. Moderate ascites.  GI is following, s/p push enteroscopy with biopsy  3/8 showing erosive gastritis and oozing duodenal ulcer.        Thus far  stool cx (-).   On 3/7 had diagnostic paracentesis.  WBC trending upwards.  s/p push enteroscopy with bx : erosive gastritis, oozing duodenal ulcer.  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    MICU consulted for  tachy/SOB.      ID consult called to evaluate leukocytosis.              REVIEW OF SYSTEMS:    CONSTITUTIONAL: No fever, weight loss, or fatigue  EYES: No eye pain, visual disturbances, or discharge  ENMT:  No sore throat  NECK: No pain or stiffness  RESPIRATORY: No cough, wheezing, chills or hemoptysis; No shortness of breath  CARDIOVASCULAR: No chest pain, palpitations, dizziness, or leg swelling  GASTROINTESTINAL: No abdominal or epigastric pain. No nausea, vomiting, or hematemesis; No diarrhea or constipation. No melena or hematochezia.  GENITOURINARY: No dysuria, frequency, hematuria, or incontinence  NEUROLOGICAL: No headaches, memory loss, loss of strength, numbness, or tremors  SKIN: No itching, burning, rashes, or lesions   LYMPH NODES: No enlarged glands  MUSCULOSKELETAL: No joint pain or swelling; No muscle, back, or extremity pain      PAST MEDICAL & SURGICAL HISTORY:  Anemia, unspecified type  Essential hypertension  CLL (chronic lymphocytic leukemia)  No significant past surgical history      Allergies    No Known Allergies    Intolerances        FAMILY HISTORY:  Family history of myocardial infarction (Father, Mother, Sibling): Father, mother, brother      SOCIAL HISTORY:        MEDICATIONS  (STANDING):  lidocaine 1% Injectable 10 milliLiter(s) Local Injection once  mesalamine DR Capsule 800 milliGRAM(s) Oral three times a day  pantoprazole Infusion 8 mG/Hr (10 mL/Hr) IV Continuous <Continuous>  sodium chloride 0.45%. 1000 milliLiter(s) (75 mL/Hr) IV Continuous <Continuous>    MEDICATIONS  (PRN):  acetaminophen   Tablet .. 650 milliGRAM(s) Oral every 6 hours PRN Moderate Pain (4 - 6)  aluminum hydroxide/magnesium hydroxide/simethicone Suspension 30 milliLiter(s) Oral every 6 hours PRN Dyspepsia      Vital Signs Last 24 Hrs  T(C): 36.2 (13 Mar 2019 14:28), Max: 36.4 (13 Mar 2019 10:19)  T(F): 97.1 (13 Mar 2019 14:28), Max: 97.6 (13 Mar 2019 10:19)  HR: 103 (13 Mar 2019 14:28) (76 - 110)  BP: 142/84 (13 Mar 2019 14:28) (96/68 - 142/84)  BP(mean): --  RR: 19 (13 Mar 2019 14:28) (17 - 22)  SpO2: 100% (13 Mar 2019 14:28) (99% - 100%)    PHYSICAL EXAM:    GENERAL: NAD, well-groomed  HEAD:  Atraumatic, Normocephalic  EYES: EOMI, PERRLA, conjunctiva and sclera clear  ENMT: No tonsillar erythema, exudates, or enlargement; Moist mucous membranes  NECK: Supple, No JVD  CHEST/LUNG: Clear to percussion bilaterally; No rales, rhonchi, wheezing, or rubs  HEART: Regular rate and rhythm; No murmurs, rubs, or gallops  ABDOMEN: Soft, Nontender, Nondistended; Bowel sounds present  EXTREMITIES:  2+ Peripheral Pulses, No clubbing, cyanosis, or edema  LYMPH: No lymphadenopathy noted  SKIN: No rashes or lesions    LABS:  CBC Full  -  ( 13 Mar 2019 09:15 )  WBC Count : 54.12 K/uL  Hemoglobin : 9.3 g/dL  Hematocrit : 30.4 %  Platelet Count - Automated : 85 K/uL  Mean Cell Volume : 92.4 fL  Mean Cell Hemoglobin : 28.3 pg  Mean Cell Hemoglobin Concentration : 30.6 %  Auto Neutrophil # : x  Auto Lymphocyte # : x  Auto Monocyte # : x  Auto Eosinophil # : x  Auto Basophil # : x  Auto Neutrophil % : x  Auto Lymphocyte % : x  Auto Monocyte % : x  Auto Eosinophil % : x  Auto Basophil % : x      03-13    150<H>  |  119<H>  |  27<H>  ----------------------------<  140<H>  3.9   |  16<L>  |  1.68<H>    Ca    7.6<L>      13 Mar 2019 05:11  Phos  4.3     03-13  Mg     1.6     03-13              MICROBIOLOGY:      Culture - Yeast and Fungus (03.07.19 @ 17:11)    Culture - Yeast and Fungus:   CULTURE NEGATIVE FOR YEASTS AND MOLDS   AFTER 4 DAYS    Specimen Source: PERITONEAL FLUID        Culture - Body Fluid with Gram Stain (03.07.19 @ 16:35)    Gram Stain:   NOS^No Organisms Seen  WBC^White Blood Cells  QNTY CELLS IN GRAM STAIN: RARE (1+)    Culture - Body Fluid:   NO GROWTH - PRELIMINARY RESULTS  NO ORGANISMS ISOLATED AT 24 HOURS  NO ORGANISMS ISOLATED AT 48 HRS.  NO ORGANISMS ISOLATED AT 72 HRS.  NO GROWTH AFTER 4 DAYS INCUBATION  NO GROWTH AFTER 5 DAYS INCUBATION    Specimen Source: PERITONEAL FLUID        Culture - Acid Fast - Body Fluid w/Smear (03.07.19 @ 16:35)    Culture - Acid Fast Smear Concentrated:   AFB SMEAR= NO ACID FAST BACILLI SEEN    Specimen Source: PERITONEAL FLUID      Culture - Stool (03.03.19 @ 22:55)    Culture - Stool:   ****************** PRELIMINARY **************************  NEGATIVE FOR SALMONELLA, SHIGELLA, YERSINIA,  E. COLI O157, AEROMONAS, PLESIOMONAS, AND VIBRIO SP.                      AFTER 24 HOURS  *********************************************************  **********************FINAL REPORT************************  CULTURE NEGATIVE FOR SALMONELLA, SHIGELLA, YERSINIA, E COLI  O157, AEROMONAS, PLESIOMONAS, CAMPYLOBACTER AND VIBRIO SP.  **********************************************************    Specimen Source: FECES                      RADIOLOGY:      < from: Xray Chest 1 View- PORTABLE-Urgent (03.12.19 @ 15:28) >  IMPRESSION:  Platelike atelectasis in left upper lung and linear   atelectasis in both lower lungs.    No focal lung consolidation seen.    6 mm nodular density projecting over the periphery of the left lower   lung. Question whether this might represent the nipple as no left lower   lobe nodule is seen on included portions of the chest on the CT scan of   the abdomen and pelvis from March 4, 2019. Repeat PA chest x-ray with   nipple markers could be obtained for further evaluation, if felt to be   clinically indicated.    < end of copied text >        < from: MR MRCP w/wo IV Cont (03.06.19 @ 11:22) >  FINDINGS:    LOWER CHEST: Dependent atelectasis right lower lobe.    LIVER: Diffuse fatty infiltration. No liver mass.     BILE DUCTS: No intra or extrahepatic biliary ductal dilatation. Common   bile duct measures 3 mm.  GALLBLADDER: Within normal limits.  SPLEEN: 10 cm. Within normal limits.  PANCREAS: Within normal limits.  ADRENALS: Within normal limits.  KIDNEYS/URETERS: Within normal limits.    VISUALIZED PORTIONS:    BOWEL: Circumferential mural thickening duodenum and proximal jejunum   suggesting enteritis.   PERITONEUM: Moderate ascites.  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  RETROPERITONEUM: Mildly enlarged retroperitoneal lymph nodes. Reference   left para-aortic node (10, 75) measures 1.1 cm in short axis.    ABDOMINAL WALL: Within normal limits.  BONES: Within normal limits.    IMPRESSION: No evidence of cholelithiasis or biliary obstruction.    Hepatic steatosis.    Mural thickening proximal small bowel consistent with enteritis.    Moderate ascites.    < end of copied text >        < from: CT Abdomen and Pelvis No Cont (03.04.19 @ 05:56) >  PROCEDURE:   CT of the Abdomen and Pelvis wasperformed without intravenous contrast.   Intravenous contrast: None.  Oral contrast: None.  Sagittal and coronal reformats were performed.    FINDINGS:    LOWER CHEST: Within normal limits.    LIVER: Within normal limits.  BILE DUCTS: Normal caliber.  GALLBLADDER: Within normal limits.  SPLEEN: Within normal limits.  PANCREAS: Within normal limits.  ADRENALS: Within normal limits.  KIDNEYS/URETERS: Within normal limits.    BLADDER: Within normal limits.  REPRODUCTIVE ORGANS: Prostate within normal limits.    BOWEL: No bowel obstruction. Appendix is normal. Mild wall thickening of   the duodenum with stranding of the adjacent mesenteric fat.  PERITONEUM: No ascites.  VESSELS:  Within normal limits.  RETROPERITONEUM: Nonspecific increase in number of small retroperitoneal   and gastrohepatic lymph nodes,  likely reactive.  ABDOMINAL WALL: Small amount of subcutaneous tissues.  BONES: Degenerative changes    IMPRESSION:   .  Mild wall thickening of the duodenum extending of the adjacent mesenteric   fat, suggesting underlying duodenitis.    < end of copied text >            < from: US Abdomen Limited (03.03.19 @ 22:59) >  FINDINGS:    Liver: Within normal limits.    Bile ducts: Normal caliber. Common hepatic duct measures 4 mm.     Gallbladder: Underdistended. No cholelithiasis. No gallbladder wall   thickening or pericholecystic fluid. Unable to assess sonographic Merlos   sign secondary to analgesia.        Pancreas: Visualized portions are within normal limits.    Right kidney: 10.7 cm. No hydronephrosis.    Ascites: None.    IVC: Visualized portions are within normal limits.    IMPRESSION:     No cholelithiasis.    < end of copied text > Patient is a 73y old  Male who presents with a chief complaint of Diarrhea (13 Mar 2019 10:48)      HPI:    This is a 73M with history of CLL (not on therapy), Anemia, and HTN who presents to the hospital with complaints of persistent intermittent diarrhea since October of last year. Said that about a week prior to his trip to [facility] he started having significant diarrhea (5-7 episodes of loose stools, ?black stools as per patient at that time) and went to see a doctor. Was told that he likely had a viral illness and that it would improve over time. His diarrhea did improve and he went to [facility] but on his second day there he started having diarrhea again (states that he ate sweets from the [facility] that others in his family did not prior to the onset of his diarrhea in [place]). He started having intermittent diarrhea and went to a doctor in [facility] where he had a colonoscopy done which he states was negative for abnormal findings (pt not sure if he had a biopsy done during the colonoscopy). He was then given some  medications with minimal improvement in his symptoms. He continued to have intermittent diarrhea (said that he has 4-5 days of diarrhea a week) and was unable to get any relief in [facility]. Upon arriving back from [facility] he continued to have intermittent diarrhea and therefore presented to the ED for evaluation. Said that currently he had about 5 episodes of diarrhea today, describes it as yellowish in color, associated with generalized abdominal pain. Denies any hematochezia, melena, or BRBPR currently. No fevers/chills. He denies any other complaints at present.    On arrival to the ED, his vitals were T 98.8, P 97, /62, R 16, O2 sat 100% RA. His lab work was significant for leukocytosis (unknown baseline), normocytic anemia (unknown baseline), mild hyponatremia/hypokalemia/AG gap, elevated BUN/Cr, elevated alk phos, and elevated lipase. He was also noted to have a lactate of 2.8. His CXR showed a possible R basilar opacity but the patient was not complaining of any PNA type symptoms. His US abd was negative for any acute findings. He was given LR 1L. He was admitted to medicine. (03 Mar 2019 23:55)      HOSP COURSE:    CTap showed mild wall thickening of the duodenum extending of the adjacent mesenteric fat, suggesting underlying duodenitis.  EGD showed normal esophagus, non bleeding erosive gastropathy - biopsied (s/o gastrinoma), duodenitis and one oozing duodenal ulcer with no bleeding, Multiple non-bleeding duodenal ulcers,                    MRCP- No evidence of cholelithiasis or biliary obstruction.Hepatic steatosis.Mural thickening proximal small bowel consistent with enteritis. Moderate ascites.  GI is following, s/p push enteroscopy with biopsy  3/8 showing erosive gastritis and oozing duodenal ulcer.        Thus far  stool studies neg, including stool cx, O&P, GI pcr, C.diff, Giardia.   Malaria scrn (-). On 3/7 had diagnostic paracentesis.  WBC trending upwards.  s/p push enteroscopy with bx : erosive gastritis, oozing duodenal ulcer.  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    MICU consulted for  tachy/SOB.      ID consult called to evaluate leukocytosis.              REVIEW OF SYSTEMS:    CONSTITUTIONAL: No fever, weight loss, or fatigue  EYES: No eye pain, visual disturbances, or discharge  ENMT:  No sore throat  NECK: No pain or stiffness  RESPIRATORY: No cough, wheezing, chills or hemoptysis; No shortness of breath  CARDIOVASCULAR: No chest pain, palpitations, dizziness, or leg swelling  GASTROINTESTINAL: No abdominal or epigastric pain. No nausea, vomiting, or hematemesis; No diarrhea or constipation. No melena or hematochezia.  GENITOURINARY: No dysuria, frequency, hematuria, or incontinence  NEUROLOGICAL: No headaches, memory loss, loss of strength, numbness, or tremors  SKIN: No itching, burning, rashes, or lesions   LYMPH NODES: No enlarged glands  MUSCULOSKELETAL: No joint pain or swelling; No muscle, back, or extremity pain      PAST MEDICAL & SURGICAL HISTORY:  Anemia, unspecified type  Essential hypertension  CLL (chronic lymphocytic leukemia)  No significant past surgical history      Allergies    No Known Allergies    Intolerances        FAMILY HISTORY:  Family history of myocardial infarction (Father, Mother, Sibling): Father, mother, brother      SOCIAL HISTORY:        MEDICATIONS  (STANDING):  lidocaine 1% Injectable 10 milliLiter(s) Local Injection once  mesalamine DR Capsule 800 milliGRAM(s) Oral three times a day  pantoprazole Infusion 8 mG/Hr (10 mL/Hr) IV Continuous <Continuous>  sodium chloride 0.45%. 1000 milliLiter(s) (75 mL/Hr) IV Continuous <Continuous>    MEDICATIONS  (PRN):  acetaminophen   Tablet .. 650 milliGRAM(s) Oral every 6 hours PRN Moderate Pain (4 - 6)  aluminum hydroxide/magnesium hydroxide/simethicone Suspension 30 milliLiter(s) Oral every 6 hours PRN Dyspepsia      Vital Signs Last 24 Hrs  T(C): 36.2 (13 Mar 2019 14:28), Max: 36.4 (13 Mar 2019 10:19)  T(F): 97.1 (13 Mar 2019 14:28), Max: 97.6 (13 Mar 2019 10:19)  HR: 103 (13 Mar 2019 14:28) (76 - 110)  BP: 142/84 (13 Mar 2019 14:28) (96/68 - 142/84)  BP(mean): --  RR: 19 (13 Mar 2019 14:28) (17 - 22)  SpO2: 100% (13 Mar 2019 14:28) (99% - 100%)    PHYSICAL EXAM:    GENERAL: NAD, well-groomed  HEAD:  Atraumatic, Normocephalic  EYES: EOMI, PERRLA, conjunctiva and sclera clear  ENMT: No tonsillar erythema, exudates, or enlargement; Moist mucous membranes  NECK: Supple, No JVD  CHEST/LUNG: Clear to percussion bilaterally; No rales, rhonchi, wheezing, or rubs  HEART: Regular rate and rhythm; No murmurs, rubs, or gallops  ABDOMEN: Soft, Nontender, Nondistended; Bowel sounds present  EXTREMITIES:  2+ Peripheral Pulses, No clubbing, cyanosis, or edema  LYMPH: No lymphadenopathy noted  SKIN: No rashes or lesions    LABS:  CBC Full  -  ( 13 Mar 2019 09:15 )  WBC Count : 54.12 K/uL  Hemoglobin : 9.3 g/dL  Hematocrit : 30.4 %  Platelet Count - Automated : 85 K/uL  Mean Cell Volume : 92.4 fL  Mean Cell Hemoglobin : 28.3 pg  Mean Cell Hemoglobin Concentration : 30.6 %  Auto Neutrophil # : x  Auto Lymphocyte # : x  Auto Monocyte # : x  Auto Eosinophil # : x  Auto Basophil # : x  Auto Neutrophil % : x  Auto Lymphocyte % : x  Auto Monocyte % : x  Auto Eosinophil % : x  Auto Basophil % : x      03-13    150<H>  |  119<H>  |  27<H>  ----------------------------<  140<H>  3.9   |  16<L>  |  1.68<H>    Ca    7.6<L>      13 Mar 2019 05:11  Phos  4.3     03-13  Mg     1.6     03-13              MICROBIOLOGY:      Culture - Yeast and Fungus (03.07.19 @ 17:11)    Culture - Yeast and Fungus:   CULTURE NEGATIVE FOR YEASTS AND MOLDS   AFTER 4 DAYS    Specimen Source: PERITONEAL FLUID        Culture - Body Fluid with Gram Stain (03.07.19 @ 16:35)    Gram Stain:   NOS^No Organisms Seen  WBC^White Blood Cells  QNTY CELLS IN GRAM STAIN: RARE (1+)    Culture - Body Fluid:   NO GROWTH - PRELIMINARY RESULTS  NO ORGANISMS ISOLATED AT 24 HOURS  NO ORGANISMS ISOLATED AT 48 HRS.  NO ORGANISMS ISOLATED AT 72 HRS.  NO GROWTH AFTER 4 DAYS INCUBATION  NO GROWTH AFTER 5 DAYS INCUBATION    Specimen Source: PERITONEAL FLUID        Culture - Acid Fast - Body Fluid w/Smear (03.07.19 @ 16:35)    Culture - Acid Fast Smear Concentrated:   AFB SMEAR= NO ACID FAST BACILLI SEEN    Specimen Source: PERITONEAL FLUID      Culture - Stool (03.03.19 @ 22:55)    Culture - Stool:   ****************** PRELIMINARY **************************  NEGATIVE FOR SALMONELLA, SHIGELLA, YERSINIA,  E. COLI O157, AEROMONAS, PLESIOMONAS, AND VIBRIO SP.                      AFTER 24 HOURS  *********************************************************  **********************FINAL REPORT************************  CULTURE NEGATIVE FOR SALMONELLA, SHIGELLA, YERSINIA, E COLI  O157, AEROMONAS, PLESIOMONAS, CAMPYLOBACTER AND VIBRIO SP.  **********************************************************    Specimen Source: FECES                      RADIOLOGY:      < from: Xray Chest 1 View- PORTABLE-Urgent (03.12.19 @ 15:28) >  IMPRESSION:  Platelike atelectasis in left upper lung and linear   atelectasis in both lower lungs.    No focal lung consolidation seen.    6 mm nodular density projecting over the periphery of the left lower   lung. Question whether this might represent the nipple as no left lower   lobe nodule is seen on included portions of the chest on the CT scan of   the abdomen and pelvis from March 4, 2019. Repeat PA chest x-ray with   nipple markers could be obtained for further evaluation, if felt to be   clinically indicated.    < end of copied text >        < from: MR MRCP w/wo IV Cont (03.06.19 @ 11:22) >  FINDINGS:    LOWER CHEST: Dependent atelectasis right lower lobe.    LIVER: Diffuse fatty infiltration. No liver mass.     BILE DUCTS: No intra or extrahepatic biliary ductal dilatation. Common   bile duct measures 3 mm.  GALLBLADDER: Within normal limits.  SPLEEN: 10 cm. Within normal limits.  PANCREAS: Within normal limits.  ADRENALS: Within normal limits.  KIDNEYS/URETERS: Within normal limits.    VISUALIZED PORTIONS:    BOWEL: Circumferential mural thickening duodenum and proximal jejunum   suggesting enteritis.   PERITONEUM: Moderate ascites.  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  RETROPERITONEUM: Mildly enlarged retroperitoneal lymph nodes. Reference   left para-aortic node (10, 75) measures 1.1 cm in short axis.    ABDOMINAL WALL: Within normal limits.  BONES: Within normal limits.    IMPRESSION: No evidence of cholelithiasis or biliary obstruction.    Hepatic steatosis.    Mural thickening proximal small bowel consistent with enteritis.    Moderate ascites.    < end of copied text >        < from: CT Abdomen and Pelvis No Cont (03.04.19 @ 05:56) >  PROCEDURE:   CT of the Abdomen and Pelvis wasperformed without intravenous contrast.   Intravenous contrast: None.  Oral contrast: None.  Sagittal and coronal reformats were performed.    FINDINGS:    LOWER CHEST: Within normal limits.    LIVER: Within normal limits.  BILE DUCTS: Normal caliber.  GALLBLADDER: Within normal limits.  SPLEEN: Within normal limits.  PANCREAS: Within normal limits.  ADRENALS: Within normal limits.  KIDNEYS/URETERS: Within normal limits.    BLADDER: Within normal limits.  REPRODUCTIVE ORGANS: Prostate within normal limits.    BOWEL: No bowel obstruction. Appendix is normal. Mild wall thickening of   the duodenum with stranding of the adjacent mesenteric fat.  PERITONEUM: No ascites.  VESSELS:  Within normal limits.  RETROPERITONEUM: Nonspecific increase in number of small retroperitoneal   and gastrohepatic lymph nodes,  likely reactive.  ABDOMINAL WALL: Small amount of subcutaneous tissues.  BONES: Degenerative changes    IMPRESSION:   .  Mild wall thickening of the duodenum extending of the adjacent mesenteric   fat, suggesting underlying duodenitis.    < end of copied text >            < from: US Abdomen Limited (03.03.19 @ 22:59) >  FINDINGS:    Liver: Within normal limits.    Bile ducts: Normal caliber. Common hepatic duct measures 4 mm.     Gallbladder: Underdistended. No cholelithiasis. No gallbladder wall   thickening or pericholecystic fluid. Unable to assess sonographic Merlos   sign secondary to analgesia.        Pancreas: Visualized portions are within normal limits.    Right kidney: 10.7 cm. No hydronephrosis.    Ascites: None.    IVC: Visualized portions are within normal limits.    IMPRESSION:     No cholelithiasis.    < end of copied text > Patient is a 73y old  Male who presents with a chief complaint of Diarrhea (13 Mar 2019 10:48)      HPI:    This is a 73M with history of CLL (not on therapy), Anemia, and HTN who presents to the hospital with complaints of persistent intermittent diarrhea since October of last year. Said that about a week prior to his trip to [facility] he started having significant diarrhea (5-7 episodes of loose stools, ?black stools as per patient at that time) and went to see a doctor. Was told that he likely had a viral illness and that it would improve over time. His diarrhea did improve and he went to [facility] but on his second day there he started having diarrhea again (states that he ate sweets from the [facility] [employer] that others in his family did not prior to the onset of his diarrhea in [place]). He started having intermittent diarrhea and went to a doctor in [facility] where he had a colonoscopy done which he states was negative for abnormal findings (pt not sure if he had a biopsy done during the colonoscopy). He was then given some  medications with minimal improvement in his symptoms. He continued to have intermittent diarrhea (said that he has 4-5 days of diarrhea a week) and was unable to get any relief in [facility]. Upon arriving back from [facility] he continued to have intermittent diarrhea and therefore presented to the ED for evaluation. Said that currently he had about 5 episodes of diarrhea today, describes it as yellowish in color, associated with generalized abdominal pain. Denies any hematochezia, melena, or BRBPR currently. No fevers/chills. He denies any other complaints at present.    On arrival to the ED, his vitals were T 98.8, P 97, /62, R 16, O2 sat 100% RA. His lab work was significant for leukocytosis (unknown baseline), normocytic anemia (unknown baseline), mild hyponatremia/hypokalemia/AG gap, elevated BUN/Cr, elevated alk phos, and elevated lipase. He was also noted to have a lactate of 2.8. His CXR showed a possible R basilar opacity but the patient was not complaining of any PNA type symptoms. His US abd was negative for any acute findings. He was given LR 1L. He was admitted to medicine. (03 Mar 2019 23:55)      HOSP COURSE:    CTap showed mild wall thickening of the duodenum extending of the adjacent mesenteric fat, suggesting underlying duodenitis.  EGD showed normal esophagus, non bleeding erosive gastropathy - biopsied (s/o gastrinoma), duodenitis and one oozing duodenal ulcer with no bleeding, Multiple non-bleeding duodenal ulcers,                    MRCP- No evidence of cholelithiasis or biliary obstruction.Hepatic steatosis.Mural thickening proximal small bowel consistent with enteritis. Moderate ascites.  GI is following, s/p push enteroscopy with biopsy  3/8 showing erosive gastritis and oozing duodenal ulcer.        Thus far  stool studies neg, including stool cx, O&P, GI pcr, C.diff, Giardia.   Malaria scrn (-). On 3/7 had diagnostic paracentesis.  WBC trending upwards.  s/p push enteroscopy with bx : erosive gastritis, oozing duodenal ulcer.  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    MICU consulted for  tachy/SOB.      ID consult called to evaluate leukocytosis.  Pt still with diarrhea.  Denies abd pain or epigastric pain.  No rash.  (+) nonproductive cough.  (+) wt loss.  No difficulty swallowing, no N/V.  c/o sore throat.  No thrush.  No f/c/r.  Pt has outside Heme/Onc, does not know baseline WBC.   Pt denies recent use of abx, no sick contacts, change in diet.              REVIEW OF SYSTEMS:    CONSTITUTIONAL: No fever, weight loss, or fatigue  EYES: No eye pain, visual disturbances, or discharge  ENMT:  No sore throat  NECK: No pain or stiffness  RESPIRATORY: No cough, wheezing, chills or hemoptysis; No shortness of breath  CARDIOVASCULAR: No chest pain, palpitations, dizziness, or leg swelling  GASTROINTESTINAL: No abdominal or epigastric pain. No nausea, vomiting, or hematemesis; No diarrhea or constipation. No melena or hematochezia.  GENITOURINARY: No dysuria, frequency, hematuria, or incontinence  NEUROLOGICAL: No headaches, memory loss, loss of strength, numbness, or tremors  SKIN: No itching, burning, rashes, or lesions   LYMPH NODES: No enlarged glands  MUSCULOSKELETAL: No joint pain or swelling; No muscle, back, or extremity pain      PAST MEDICAL & SURGICAL HISTORY:  Anemia, unspecified type  Essential hypertension  CLL (chronic lymphocytic leukemia)  No significant past surgical history      Allergies    No Known Allergies    Intolerances        FAMILY HISTORY:  Family history of myocardial infarction (Father, Mother, Sibling): Father, mother, brother      SOCIAL HISTORY:      	Lives with family  	Former smoker  No EtOH or illicit substance use    MEDICATIONS  (STANDING):  lidocaine 1% Injectable 10 milliLiter(s) Local Injection once  mesalamine DR Capsule 800 milliGRAM(s) Oral three times a day  pantoprazole Infusion 8 mG/Hr (10 mL/Hr) IV Continuous <Continuous>  sodium chloride 0.45%. 1000 milliLiter(s) (75 mL/Hr) IV Continuous <Continuous>    MEDICATIONS  (PRN):  acetaminophen   Tablet .. 650 milliGRAM(s) Oral every 6 hours PRN Moderate Pain (4 - 6)  aluminum hydroxide/magnesium hydroxide/simethicone Suspension 30 milliLiter(s) Oral every 6 hours PRN Dyspepsia      Vital Signs Last 24 Hrs  T(C): 36.2 (13 Mar 2019 14:28), Max: 36.4 (13 Mar 2019 10:19)  T(F): 97.1 (13 Mar 2019 14:28), Max: 97.6 (13 Mar 2019 10:19)  HR: 103 (13 Mar 2019 14:28) (76 - 110)  BP: 142/84 (13 Mar 2019 14:28) (96/68 - 142/84)  BP(mean): --  RR: 19 (13 Mar 2019 14:28) (17 - 22)  SpO2: 100% (13 Mar 2019 14:28) (99% - 100%)    PHYSICAL EXAM:    GENERAL: NAD, well-groomed  HEAD:  Atraumatic, Normocephalic  EYES: EOMI, PERRLA, conjunctiva and sclera clear  ENMT: No tonsillar erythema, exudates, or enlargement; Moist mucous membranes  NECK: Supple, No JVD  CHEST/LUNG: Clear to percussion bilaterally; No rales, rhonchi, wheezing, or rubs  HEART: Regular rate and rhythm; No murmurs, rubs, or gallops  ABDOMEN: Soft, Nontender, Nondistended; Bowel sounds present  EXTREMITIES:  2+ Peripheral Pulses, No clubbing, cyanosis, or edema  LYMPH: No lymphadenopathy noted  SKIN: No rashes or lesions    LABS:  CBC Full  -  ( 13 Mar 2019 09:15 )  WBC Count : 54.12 K/uL  Hemoglobin : 9.3 g/dL  Hematocrit : 30.4 %  Platelet Count - Automated : 85 K/uL  Mean Cell Volume : 92.4 fL  Mean Cell Hemoglobin : 28.3 pg  Mean Cell Hemoglobin Concentration : 30.6 %  Auto Neutrophil # : x  Auto Lymphocyte # : x  Auto Monocyte # : x  Auto Eosinophil # : x  Auto Basophil # : x  Auto Neutrophil % : x  Auto Lymphocyte % : x  Auto Monocyte % : x  Auto Eosinophil % : x  Auto Basophil % : x      03-13    150<H>  |  119<H>  |  27<H>  ----------------------------<  140<H>  3.9   |  16<L>  |  1.68<H>    Ca    7.6<L>      13 Mar 2019 05:11  Phos  4.3     03-13  Mg     1.6     03-13              MICROBIOLOGY:      Culture - Yeast and Fungus (03.07.19 @ 17:11)    Culture - Yeast and Fungus:   CULTURE NEGATIVE FOR YEASTS AND MOLDS   AFTER 4 DAYS    Specimen Source: PERITONEAL FLUID        Culture - Body Fluid with Gram Stain (03.07.19 @ 16:35)    Gram Stain:   NOS^No Organisms Seen  WBC^White Blood Cells  QNTY CELLS IN GRAM STAIN: RARE (1+)    Culture - Body Fluid:   NO GROWTH - PRELIMINARY RESULTS  NO ORGANISMS ISOLATED AT 24 HOURS  NO ORGANISMS ISOLATED AT 48 HRS.  NO ORGANISMS ISOLATED AT 72 HRS.  NO GROWTH AFTER 4 DAYS INCUBATION  NO GROWTH AFTER 5 DAYS INCUBATION    Specimen Source: PERITONEAL FLUID        Culture - Acid Fast - Body Fluid w/Smear (03.07.19 @ 16:35)    Culture - Acid Fast Smear Concentrated:   AFB SMEAR= NO ACID FAST BACILLI SEEN    Specimen Source: PERITONEAL FLUID      Culture - Stool (03.03.19 @ 22:55)    Culture - Stool:   ****************** PRELIMINARY **************************  NEGATIVE FOR SALMONELLA, SHIGELLA, YERSINIA,  E. COLI O157, AEROMONAS, PLESIOMONAS, AND VIBRIO SP.                      AFTER 24 HOURS  *********************************************************  **********************FINAL REPORT************************  CULTURE NEGATIVE FOR SALMONELLA, SHIGELLA, YERSINIA, E COLI  O157, AEROMONAS, PLESIOMONAS, CAMPYLOBACTER AND VIBRIO SP.  **********************************************************    Specimen Source: FECES                      RADIOLOGY:      < from: Xray Chest 1 View- PORTABLE-Urgent (03.12.19 @ 15:28) >  IMPRESSION:  Platelike atelectasis in left upper lung and linear   atelectasis in both lower lungs.    No focal lung consolidation seen.    6 mm nodular density projecting over the periphery of the left lower   lung. Question whether this might represent the nipple as no left lower   lobe nodule is seen on included portions of the chest on the CT scan of   the abdomen and pelvis from March 4, 2019. Repeat PA chest x-ray with   nipple markers could be obtained for further evaluation, if felt to be   clinically indicated.    < end of copied text >        < from: MR MRCP w/wo IV Cont (03.06.19 @ 11:22) >  FINDINGS:    LOWER CHEST: Dependent atelectasis right lower lobe.    LIVER: Diffuse fatty infiltration. No liver mass.     BILE DUCTS: No intra or extrahepatic biliary ductal dilatation. Common   bile duct measures 3 mm.  GALLBLADDER: Within normal limits.  SPLEEN: 10 cm. Within normal limits.  PANCREAS: Within normal limits.  ADRENALS: Within normal limits.  KIDNEYS/URETERS: Within normal limits.    VISUALIZED PORTIONS:    BOWEL: Circumferential mural thickening duodenum and proximal jejunum   suggesting enteritis.   PERITONEUM: Moderate ascites.  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  RETROPERITONEUM: Mildly enlarged retroperitoneal lymph nodes. Reference   left para-aortic node (10, 75) measures 1.1 cm in short axis.    ABDOMINAL WALL: Within normal limits.  BONES: Within normal limits.    IMPRESSION: No evidence of cholelithiasis or biliary obstruction.    Hepatic steatosis.    Mural thickening proximal small bowel consistent with enteritis.    Moderate ascites.    < end of copied text >        < from: CT Abdomen and Pelvis No Cont (03.04.19 @ 05:56) >  PROCEDURE:   CT of the Abdomen and Pelvis wasperformed without intravenous contrast.   Intravenous contrast: None.  Oral contrast: None.  Sagittal and coronal reformats were performed.    FINDINGS:    LOWER CHEST: Within normal limits.    LIVER: Within normal limits.  BILE DUCTS: Normal caliber.  GALLBLADDER: Within normal limits.  SPLEEN: Within normal limits.  PANCREAS: Within normal limits.  ADRENALS: Within normal limits.  KIDNEYS/URETERS: Within normal limits.    BLADDER: Within normal limits.  REPRODUCTIVE ORGANS: Prostate within normal limits.    BOWEL: No bowel obstruction. Appendix is normal. Mild wall thickening of   the duodenum with stranding of the adjacent mesenteric fat.  PERITONEUM: No ascites.  VESSELS:  Within normal limits.  RETROPERITONEUM: Nonspecific increase in number of small retroperitoneal   and gastrohepatic lymph nodes,  likely reactive.  ABDOMINAL WALL: Small amount of subcutaneous tissues.  BONES: Degenerative changes    IMPRESSION:   .  Mild wall thickening of the duodenum extending of the adjacent mesenteric   fat, suggesting underlying duodenitis.    < end of copied text >            < from: US Abdomen Limited (03.03.19 @ 22:59) >  FINDINGS:    Liver: Within normal limits.    Bile ducts: Normal caliber. Common hepatic duct measures 4 mm.     Gallbladder: Underdistended. No cholelithiasis. No gallbladder wall   thickening or pericholecystic fluid. Unable to assess sonographic Merlos   sign secondary to analgesia.        Pancreas: Visualized portions are within normal limits.    Right kidney: 10.7 cm. No hydronephrosis.    Ascites: None.    IVC: Visualized portions are within normal limits.    IMPRESSION:     No cholelithiasis.    < end of copied text >          < from: Xray Chest 1 View- PORTABLE-Urgent (03.12.19 @ 15:28) >  IMPRESSION:  Platelike atelectasis in left upper lung and linear   atelectasis in both lower lungs.    No focal lung consolidation seen.    6 mm nodular density projecting over the periphery of the left lower   lung. Question whether this might represent the nipple as no left lower   lobe nodule is seen on included portions of the chest on the CT scan of   the abdomen and pelvis from March 4, 2019. Repeat PA chest x-ray with   nipple markers could be obtained for further evaluation, if felt to be   clinically indicated.    < end of copied text >

## 2019-03-13 NOTE — PROVIDER CONTACT NOTE (OTHER) - ASSESSMENT
Patient AxOx4, asymptomatic, denies chest pain, SOB, dyspnea on exertion, N/V/dizziness, asleep between care, will continue to monitor
Patient states it feels hot. VS stable. HR 72 /86
Patient AxOx4, complaining of chest pain 10/10, states throbbing feeling, denies SOB, no dyspnea on exertion noted, denies nausea, vomiting, chills, headache, or pain radiating elsewhere.
Patient AxOx4, no acute distress noted at this time, asymptomatic, asleep between care, denies chest pain, SOB, dyspnea on exertion, N/V/dizziness, will continue to monitor
Vitals stable, Asymptomatic.
elevated heart rate [id number], denies pain, patient stated he takes " cozaar and amlodipine" at home, IVF infusing @150

## 2019-03-13 NOTE — CONSULT NOTE ADULT - CONSULT REQUESTED DATE/TIME
08-Mar-2019 08:08
12-Mar-2019 20:03
13-Mar-2019 10:48
13-Mar-2019 14:00
13-Mar-2019 16:23
04-Mar-2019 08:27

## 2019-03-13 NOTE — PROGRESS NOTE ADULT - SUBJECTIVE AND OBJECTIVE BOX
Chief Complaint:  Patient is a 73y old  Male who presents with a chief complaint of Diarrhea (12 Mar 2019 20:02)      Interval Events:   Patient had push enteroscopy yesterday with multiple ulcers seen.  Tachycardia noted in post procedure area.      Allergies:  No Known Allergies      Hospital Medications:  acetaminophen   Tablet .. 650 milliGRAM(s) Oral every 6 hours PRN  aluminum hydroxide/magnesium hydroxide/simethicone Suspension 30 milliLiter(s) Oral every 6 hours PRN  lidocaine 1% Injectable 10 milliLiter(s) Local Injection once  mesalamine DR Capsule 800 milliGRAM(s) Oral three times a day  pantoprazole Infusion 8 mG/Hr IV Continuous <Continuous>  sodium chloride 0.9%. 1000 milliLiter(s) IV Continuous <Continuous>      PMHX/PSHX:  Anemia, unspecified type  Essential hypertension  CLL (chronic lymphocytic leukemia)  No significant past surgical history      Family history:  Family history of myocardial infarction (Father, Mother, Sibling)          PHYSICAL EXAM:     GENERAL:  Appears stated age, well-groomed, well-nourished, no distress  HEENT:  NC/AT,  conjunctivae clear, sclera -anicteric  CHEST:  Full & symmetric excursion, no increased  HEART:  Regular rhythm  ABDOMEN:  Soft, non-tender, non-distended, normoactive bowel sounds,  no masses ,no hepato-splenomegaly,   EXTREMITIES:  no cyanosis,clubbing or edema  SKIN:  No rash/erythema/ecchymoses/petechiae/wounds/abscess/warm/dry  NEURO:  Alert, oriented    Vital Signs:  Vital Signs Last 24 Hrs  T(C): 36.1 (13 Mar 2019 06:00), Max: 36.4 (12 Mar 2019 09:31)  T(F): 97 (13 Mar 2019 06:00), Max: 97.6 (12 Mar 2019 09:31)  HR: 76 (13 Mar 2019 06:00) (76 - 110)  BP: 132/90 (13 Mar 2019 06:00) (115/99 - 142/77)  BP(mean): --  RR: 19 (13 Mar 2019 06:00) (17 - 19)  SpO2: 99% (13 Mar 2019 06:00) (98% - 100%)  Daily     Daily Weight in k.6 (12 Mar 2019 16:42)    LABS:                        8.9    52.81 )-----------( 91       ( 13 Mar 2019 05:11 )             28.4     03-13    150<H>  |  119<H>  |  27<H>  ----------------------------<  140<H>  3.9   |  16<L>  |  1.68<H>    Ca    7.6<L>      13 Mar 2019 05:11  Phos  4.3     03-13  Mg     1.6     03-13        PT/INR - ( 12 Mar 2019 15:38 )   PT: 21.1 SEC;   INR: 1.82          PTT - ( 12 Mar 2019 15:38 )  PTT:45.1 SEC        Imaging: Chief Complaint:  Patient is a 73y old  Male who presents with a chief complaint of Diarrhea (12 Mar 2019 20:02)      Interval Events:   Patient had push enteroscopy yesterday with multiple ulcers seen.  Tachycardia noted in post procedure area now resolved  Patient continues to have dark BM's with increasing hemoglobin and WBC count of >54.    Allergies:  No Known Allergies      Hospital Medications:  acetaminophen   Tablet .. 650 milliGRAM(s) Oral every 6 hours PRN  aluminum hydroxide/magnesium hydroxide/simethicone Suspension 30 milliLiter(s) Oral every 6 hours PRN  lidocaine 1% Injectable 10 milliLiter(s) Local Injection once  mesalamine DR Capsule 800 milliGRAM(s) Oral three times a day  pantoprazole Infusion 8 mG/Hr IV Continuous <Continuous>  sodium chloride 0.9%. 1000 milliLiter(s) IV Continuous <Continuous>      PMHX/PSHX:  Anemia, unspecified type  Essential hypertension  CLL (chronic lymphocytic leukemia)  No significant past surgical history      Family history:  Family history of myocardial infarction (Father, Mother, Sibling)          PHYSICAL EXAM:     GENERAL:  Appears stated age, well-groomed, well-nourished, no distress  HEENT:  NC/AT,  conjunctivae clear, sclera -anicteric  CHEST:  Full & symmetric excursion, no increased  HEART:  Regular rhythm  ABDOMEN:  Soft, non-tender, non-distended, normoactive bowel sounds,  no masses ,no hepato-splenomegaly,   EXTREMITIES:  no cyanosis,clubbing or edema  SKIN:  No rash/erythema/ecchymoses/petechiae/wounds/abscess/warm/dry  NEURO:  Alert, oriented    Vital Signs:  Vital Signs Last 24 Hrs  T(C): 36.1 (13 Mar 2019 06:00), Max: 36.4 (12 Mar 2019 09:31)  T(F): 97 (13 Mar 2019 06:00), Max: 97.6 (12 Mar 2019 09:31)  HR: 76 (13 Mar 2019 06:00) (76 - 110)  BP: 132/90 (13 Mar 2019 06:00) (115/99 - 142/77)  BP(mean): --  RR: 19 (13 Mar 2019 06:00) (17 - 19)  SpO2: 99% (13 Mar 2019 06:00) (98% - 100%)  Daily     Daily Weight in k.6 (12 Mar 2019 16:42)    LABS:                        8.9    52.81 )-----------( 91       ( 13 Mar 2019 05:11 )             28.4     03-13    150<H>  |  119<H>  |  27<H>  ----------------------------<  140<H>  3.9   |  16<L>  |  1.68<H>    Ca    7.6<L>      13 Mar 2019 05:11  Phos  4.3     -  Mg     1.6     -13        PT/INR - ( 12 Mar 2019 15:38 )   PT: 21.1 SEC;   INR: 1.82          PTT - ( 12 Mar 2019 15:38 )  PTT:45.1 SEC        Imaging:

## 2019-03-13 NOTE — PROGRESS NOTE ADULT - SUBJECTIVE AND OBJECTIVE BOX
Patient is a 73y old  Male who presents with a chief complaint of Diarrhea (13 Mar 2019 17:22)      SUBJECTIVE / OVERNIGHT EVENTS:    Events noted.  CONSTITUTIONAL: No fever,  or fatigue  RESPIRATORY: No cough, wheezing,  No shortness of breath  CARDIOVASCULAR: No chest pain, palpitations, dizziness, or leg swelling  GASTROINTESTINAL: No abdominal or epigastric pain.   NEUROLOGICAL: No headaches,     MEDICATIONS  (STANDING):  cefTRIAXone   IVPB      lidocaine 1% Injectable 10 milliLiter(s) Local Injection once  mesalamine DR Capsule 800 milliGRAM(s) Oral three times a day  metoprolol tartrate 25 milliGRAM(s) Oral two times a day  pantoprazole Infusion 8 mG/Hr (10 mL/Hr) IV Continuous <Continuous>  sodium chloride 0.45%. 1000 milliLiter(s) (75 mL/Hr) IV Continuous <Continuous>    MEDICATIONS  (PRN):  acetaminophen   Tablet .. 650 milliGRAM(s) Oral every 6 hours PRN Moderate Pain (4 - 6)  aluminum hydroxide/magnesium hydroxide/simethicone Suspension 30 milliLiter(s) Oral every 6 hours PRN Dyspepsia        CAPILLARY BLOOD GLUCOSE        I&O's Summary    12 Mar 2019 07:01  -  13 Mar 2019 07:00  --------------------------------------------------------  IN: 310 mL / OUT: 0 mL / NET: 310 mL        PHYSICAL EXAM:  GENERAL: NAD  NECK: Supple, No JVD  CHEST/LUNG: Clear to auscultation bilaterally; No wheezing.  HEART: Regular rate and rhythm; No murmurs, rubs, or gallops  ABDOMEN: Soft, Nontender, Nondistended; Bowel sounds present  EXTREMITIES:   No edema  NEUROLOGY: AAO X 3      LABS:                        9.3    54.12 )-----------( 85       ( 13 Mar 2019 09:15 )             30.4     03-13    150<H>  |  119<H>  |  27<H>  ----------------------------<  140<H>  3.9   |  16<L>  |  1.68<H>    Ca    7.6<L>      13 Mar 2019 05:11  Phos  4.3     -  Mg     1.6     -13      PT/INR - ( 12 Mar 2019 15:38 )   PT: 21.1 SEC;   INR: 1.82          PTT - ( 12 Mar 2019 15:38 )  PTT:45.1 SEC      Urinalysis Basic - ( 13 Mar 2019 16:50 )    Color: YELLOW / Appearance: Lt TURBID / S.017 / pH: 5.5  Gluc: NEGATIVE / Ketone: NEGATIVE  / Bili: NEGATIVE / Urobili: NORMAL   Blood: TRACE / Protein: 30 / Nitrite: NEGATIVE   Leuk Esterase: LARGE / RBC: 3-5 / WBC >50   Sq Epi: OCC / Non Sq Epi: x / Bacteria: MODERATE      CAPILLARY BLOOD GLUCOSE         @ 17:11  Culture-urine --  Culture results --  method type --  Organism --  Organism Identification --  Specimen source PERITONEAL FLUID   @ 16:35  Culture-urine --  Culture results --  method type --  Organism --  Organism Identification --  Specimen source PERITONEAL FLUID            @ 17:11  Culture blood --  Culture results --  Gram stain --  Gram stain blood --  Method type --  Organism --  Organism identification --  Specimen source PERITONEAL FLUID    @ 16:35  Culture blood --  Culture results --  Gram stain   NOS^No Organisms Seen  WBC^White Blood Cells  QNTY CELLS IN GRAM STAIN: RARE (1+)  Gram stain blood --  Method type --  Organism --  Organism identification --  Specimen source PERITONEAL FLUID      RADIOLOGY & ADDITIONAL TESTS:    Imaging Personally Reviewed:    Consultant(s) Notes Reviewed:      Care Discussed with Consultants/Other Providers:

## 2019-03-13 NOTE — CONSULT NOTE ADULT - ASSESSMENT
This is a 73M with history of CLL (not on therapy), Anemia, and HTN who presents to the hospital with complaints of persistent intermittent diarrhea since October of last year. Said that about a week prior to his trip to [facility] he started having significant diarrhea (5-7 episodes of loose stools, ?black stools as per patient at that time) and went to see a doctor. Was told that he likely had a viral illness and that it would improve over time. His diarrhea did improve and he went to [facility] but on his second day there he started having diarrhea again (states that he ate sweets from the [employer] that others in his family did not prior to the onset of his diarrhea in [place]). He started having intermittent diarrhea and went to a doctor in [facility] where he had a colonoscopy done which he states was negative for abnormal findings (pt not sure if he had a biopsy done during the colonoscopy). He was then given some  medications with minimal improvement in his symptoms. He continued to have intermittent diarrhea (said that he has 4-5 days of diarrhea a week) and was unable to get any relief in [facility]. Upon arriving back from [facility] he continued to have intermittent diarrhea and therefore presented to the ED for evaluation. Said that currently he had about 5 episodes of diarrhea today, describes it as yellowish in color, associated with generalized abdominal pain. Denies any hematochezia, melena, or BRBPR currently. No fevers/chills. He denies any other complaints at present.    On arrival to the ED, his vitals were T 98.8, P 97, /62, R 16, O2 sat 100% RA. His lab work was significant for leukocytosis (unknown baseline), normocytic anemia (unknown baseline), mild hyponatremia/hypokalemia/AG gap, elevated BUN/Cr, elevated alk phos, and elevated lipase. He was also noted to have a lactate of 2.8. His CXR showed a possible R basilar opacity but the patient was not complaining of any PNA type symptoms. His US abd was negative for any acute findings. He was given LR 1L. He was admitted to medicine. (03 Mar 2019 23:55)      HOSP COURSE:    CTap showed mild wall thickening of the duodenum extending of the adjacent mesenteric fat, suggesting underlying duodenitis.  EGD showed normal esophagus, non bleeding erosive gastropathy - biopsied (s/o gastrinoma), duodenitis and one oozing duodenal ulcer with no bleeding, Multiple non-bleeding duodenal ulcers,                    MRCP- No evidence of cholelithiasis or biliary obstruction.Hepatic steatosis.Mural thickening proximal small bowel consistent with enteritis. Moderate ascites.  GI is following, s/p push enteroscopy with biopsy  3/8 showing erosive gastritis and oozing duodenal ulcer.        Thus far  stool studies neg, including stool cx, O&P, GI pcr, C.diff, Giardia.   Malaria scrn (-). On 3/7 had diagnostic paracentesis.  WBC trending upwards.  s/p push enteroscopy with bx : erosive gastritis, oozing duodenal ulcer.  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    MICU consulted for  tachy/SOB.      ID consult called to evaluate leukocytosis.  Pt still with diarrhea.  Denies abd pain or epigastric pain.  No rash.  (+) nonproductive cough.  (+) wt loss.  No difficulty swallowing, no N/V.  c/o sore throat.  No thrush.  No f/c/r.  Pt has outside Heme/Onc, does not know baseline WBC.   Pt denies recent use of abx, no sick contacts, change in diet.          Problem/Plan - 1:    ·	Leukocytosis    - Pt with h/o CLL, likely immunocompromised, baseline cbc u/a.  Trend CBC with differential.      - Check blood cultures, UA, Ucx.  Stool studies negative.  Send repeat stool O&P x 3 (test for Microsporidium, cyclospora, blastomyces. E.histolytica, mycobacterium)    - Check CMV pcr, histoplasma, HIV, strongyloides    Will follow,    Adeline Marques  [phone]

## 2019-03-13 NOTE — CONSULT NOTE ADULT - NSICDXPROBLEM_GEN_ALL_CORE_FT
PROBLEM DIAGNOSES  Problem: Acute hypernatremia  Recommendation: Rising serum Na, with ~2L water dificit. Correct with 1/2NS for now, trend daily BMP. Encourage increase water intake as tolerated     Problem: PEDRO (acute kidney injury)  Recommendation: Worsening PEDRO, may be prerenal azotemia v ATN. No evidence of obstruction on abd CT.   Agree with gentle IVF resuscitation with 1/2 NS.   CHeck UA and urine lytes, osm.   Consider heme eval, given rising WBC and thrombocytopenia, in hx of CLL.

## 2019-03-13 NOTE — CONSULT NOTE ADULT - ASSESSMENT
73M with a diagnosis of CLL for a year, never needed treatment, here with chronic diarrhea causing weight loss, has anemia, thrombocytopenia and acute increase in WBC, mainly PMNs and left shift (suggesting infection or inflammatory response and not CLL), will recommend:  - pancultures  - if any fevers or any other signs of infection, start abx  - obtain flow cytometry and FISH on PB - sent out  - anemia w/u shows no evidence of iron def, b12 or folate def or hemolysis - anemia of chronic disease, renal insufficiency  - all other supportive rx  - discussed in detail with the medical team

## 2019-03-14 LAB
ALBUMIN SERPL ELPH-MCNC: 1.7 G/DL — LOW (ref 3.3–5)
ALP SERPL-CCNC: 259 U/L — HIGH (ref 40–120)
ALT FLD-CCNC: 21 U/L — SIGNIFICANT CHANGE UP (ref 4–41)
ANION GAP SERPL CALC-SCNC: 11 MMO/L — SIGNIFICANT CHANGE UP (ref 7–14)
ANISOCYTOSIS BLD QL: SIGNIFICANT CHANGE UP
APTT BLD: 42.5 SEC — HIGH (ref 27.5–36.3)
AST SERPL-CCNC: 15 U/L — SIGNIFICANT CHANGE UP (ref 4–40)
BILIRUB SERPL-MCNC: 0.4 MG/DL — SIGNIFICANT CHANGE UP (ref 0.2–1.2)
BUN SERPL-MCNC: 25 MG/DL — HIGH (ref 7–23)
BURR CELLS BLD QL SMEAR: PRESENT — SIGNIFICANT CHANGE UP
CALCIUM SERPL-MCNC: 6.8 MG/DL — LOW (ref 8.4–10.5)
CHLORIDE SERPL-SCNC: 116 MMOL/L — HIGH (ref 98–107)
CMV IGG FLD QL: >10 U/ML — HIGH
CMV IGG SERPL-IMP: POSITIVE — SIGNIFICANT CHANGE UP
CMV IGM FLD-ACNC: <8 AU/ML — SIGNIFICANT CHANGE UP
CMV IGM SERPL QL: NEGATIVE — SIGNIFICANT CHANGE UP
CO2 SERPL-SCNC: 14 MMOL/L — LOW (ref 22–31)
CREAT SERPL-MCNC: 1.47 MG/DL — HIGH (ref 0.5–1.3)
GASTRIN SERPL-MCNC: 37 PG/ML — SIGNIFICANT CHANGE UP
GLUCOSE SERPL-MCNC: 74 MG/DL — SIGNIFICANT CHANGE UP (ref 70–99)
HCT VFR BLD CALC: 25.2 % — LOW (ref 39–50)
HCT VFR BLD CALC: 26.3 % — LOW (ref 39–50)
HGB BLD-MCNC: 7.6 G/DL — LOW (ref 13–17)
HGB BLD-MCNC: 8.1 G/DL — LOW (ref 13–17)
HYPOCHROMIA BLD QL: SIGNIFICANT CHANGE UP
INR BLD: 2.1 — HIGH (ref 0.88–1.17)
MACROCYTES BLD QL: SIGNIFICANT CHANGE UP
MCHC RBC-ENTMCNC: 27.9 PG — SIGNIFICANT CHANGE UP (ref 27–34)
MCHC RBC-ENTMCNC: 28 PG — SIGNIFICANT CHANGE UP (ref 27–34)
MCHC RBC-ENTMCNC: 30.2 % — LOW (ref 32–36)
MCHC RBC-ENTMCNC: 30.8 % — LOW (ref 32–36)
MCV RBC AUTO: 91 FL — SIGNIFICANT CHANGE UP (ref 80–100)
MCV RBC AUTO: 92.6 FL — SIGNIFICANT CHANGE UP (ref 80–100)
MICROCYTES BLD QL: SLIGHT — SIGNIFICANT CHANGE UP
NRBC # FLD: 0.18 K/UL — LOW (ref 25–125)
NRBC # FLD: 0.23 K/UL — LOW (ref 25–125)
OSMOLALITY SERPL: 309 MOSMO/KG — HIGH (ref 275–295)
PLATELET # BLD AUTO: 66 K/UL — LOW (ref 150–400)
PLATELET # BLD AUTO: 70 K/UL — LOW (ref 150–400)
PLATELET COUNT - ESTIMATE: SIGNIFICANT CHANGE UP
PMV BLD: SIGNIFICANT CHANGE UP FL (ref 7–13)
PMV BLD: SIGNIFICANT CHANGE UP FL (ref 7–13)
POIKILOCYTOSIS BLD QL AUTO: SIGNIFICANT CHANGE UP
POLYCHROMASIA BLD QL SMEAR: SIGNIFICANT CHANGE UP
POTASSIUM SERPL-MCNC: 2.9 MMOL/L — CRITICAL LOW (ref 3.5–5.3)
POTASSIUM SERPL-SCNC: 2.9 MMOL/L — CRITICAL LOW (ref 3.5–5.3)
PROT SERPL-MCNC: 5 G/DL — LOW (ref 6–8.3)
PROTHROM AB SERPL-ACNC: 24.5 SEC — HIGH (ref 9.8–13.1)
RBC # BLD: 2.72 M/UL — LOW (ref 4.2–5.8)
RBC # BLD: 2.89 M/UL — LOW (ref 4.2–5.8)
RBC # FLD: 21.2 % — HIGH (ref 10.3–14.5)
RBC # FLD: 21.3 % — HIGH (ref 10.3–14.5)
SODIUM SERPL-SCNC: 141 MMOL/L — SIGNIFICANT CHANGE UP (ref 135–145)
SPECIMEN SOURCE: SIGNIFICANT CHANGE UP
SURGICAL PATHOLOGY STUDY: SIGNIFICANT CHANGE UP
WBC # BLD: 33.49 K/UL — HIGH (ref 3.8–10.5)
WBC # BLD: 42.18 K/UL — CRITICAL HIGH (ref 3.8–10.5)
WBC # FLD AUTO: 33.49 K/UL — HIGH (ref 3.8–10.5)
WBC # FLD AUTO: 42.18 K/UL — CRITICAL HIGH (ref 3.8–10.5)

## 2019-03-14 PROCEDURE — 99232 SBSQ HOSP IP/OBS MODERATE 35: CPT | Mod: GC

## 2019-03-14 RX ORDER — PHYTONADIONE (VIT K1) 5 MG
10 TABLET ORAL EVERY 24 HOURS
Qty: 0 | Refills: 0 | Status: COMPLETED | OUTPATIENT
Start: 2019-03-14 | End: 2019-03-16

## 2019-03-14 RX ORDER — SODIUM CHLORIDE 9 MG/ML
1000 INJECTION, SOLUTION INTRAVENOUS
Qty: 0 | Refills: 0 | Status: COMPLETED | OUTPATIENT
Start: 2019-03-14 | End: 2019-03-15

## 2019-03-14 RX ORDER — IPRATROPIUM/ALBUTEROL SULFATE 18-103MCG
3 AEROSOL WITH ADAPTER (GRAM) INHALATION EVERY 12 HOURS
Qty: 0 | Refills: 0 | Status: DISCONTINUED | OUTPATIENT
Start: 2019-03-14 | End: 2019-03-14

## 2019-03-14 RX ORDER — POTASSIUM CHLORIDE 20 MEQ
10 PACKET (EA) ORAL
Qty: 0 | Refills: 0 | Status: COMPLETED | OUTPATIENT
Start: 2019-03-14 | End: 2019-03-14

## 2019-03-14 RX ORDER — MAGNESIUM SULFATE 500 MG/ML
1 VIAL (ML) INJECTION ONCE
Qty: 0 | Refills: 0 | Status: COMPLETED | OUTPATIENT
Start: 2019-03-14 | End: 2019-03-14

## 2019-03-14 RX ORDER — POTASSIUM CHLORIDE 20 MEQ
20 PACKET (EA) ORAL ONCE
Qty: 0 | Refills: 0 | Status: COMPLETED | OUTPATIENT
Start: 2019-03-14 | End: 2019-03-14

## 2019-03-14 RX ADMIN — Medication 25 MILLIGRAM(S): at 05:03

## 2019-03-14 RX ADMIN — Medication 100 MILLIEQUIVALENT(S): at 21:31

## 2019-03-14 RX ADMIN — Medication 102 MILLIGRAM(S): at 16:48

## 2019-03-14 RX ADMIN — Medication 800 MILLIGRAM(S): at 23:42

## 2019-03-14 RX ADMIN — Medication 100 MILLIEQUIVALENT(S): at 20:07

## 2019-03-14 RX ADMIN — Medication 20 MILLIEQUIVALENT(S): at 17:32

## 2019-03-14 RX ADMIN — Medication 25 MILLIGRAM(S): at 17:39

## 2019-03-14 RX ADMIN — Medication 100 GRAM(S): at 17:31

## 2019-03-14 RX ADMIN — Medication 100 MILLIEQUIVALENT(S): at 18:50

## 2019-03-14 RX ADMIN — Medication 800 MILLIGRAM(S): at 05:03

## 2019-03-14 RX ADMIN — CEFTRIAXONE 100 GRAM(S): 500 INJECTION, POWDER, FOR SOLUTION INTRAMUSCULAR; INTRAVENOUS at 17:31

## 2019-03-14 RX ADMIN — PANTOPRAZOLE SODIUM 10 MG/HR: 20 TABLET, DELAYED RELEASE ORAL at 16:48

## 2019-03-14 NOTE — PROGRESS NOTE ADULT - ASSESSMENT
Assessment	  This is a 73M with history as above who presents to the hospital with c/o intermittent diarrhea for the past 6 months.    Active upper GI bleed:  Sec to PUD  IV PPI  Serial CBC  PRBC    Leukocytosis:  CBC  Hx of CLL  Ct chest  IV Ceftriaxone      Dw family.

## 2019-03-14 NOTE — PROGRESS NOTE ADULT - ASSESSMENT
Impression:  1) GIB - 2/2 ulcerations seen on push enteroscopy.  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  2) Elevated alkaline phosphatase and lipase- Consistent with gallstone pancreatitis however US and MRCP are negative for gallstones. Other differential dx includes DILI (patient had no new meds), ischemic cholangiopathy, infiltrative disease, congestive cholangiopathy, cholestasis of sepsis  3) history of colonic inflammation found on colonoscopy in [place], no other results known, possible colitis      Recommendations:  -please obtain Q6H CBC  -f/u gastrin level  -PPI IV for 72 hours then PO  -Continue with LR hydration   -Lactose free diet  -Monitor and chart frequency of BMs  -Continue with Delzicol 800mg TID       [place], PGY-4  Gastroenterology Fellow  Pager x [phone] or [phone]  (After 5 pm or on weekends please page GI on call) Impression:  1) GIB - 2/2 ulcerations seen on push enteroscopy.  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 Hemoglobin dropped to 8.1  2) Elevated alkaline phosphatase and lipase- GGTP is normal thus elevation of alk phos not from liver, US and MRCP are negative for gallstones. Other differential dx includes DILI (patient had no new meds), ischemic cholangiopathy, infiltrative disease, congestive cholangiopathy, cholestasis of sepsis  3) history of colonic inflammation found on colonoscopy in [place], no other results known, possible colitis      Recommendations:  -please obtain Q6H CBC  -f/u gastrin level  -PPI IV for 72 hours then PO  -Continue with LR hydration   -Lactose free diet  -Monitor and chart frequency of BMs  -Continue with Delzicol 800mg TID       [place], PGY-4  Gastroenterology Fellow  Pager x [phone] or [phone]  (After 5 pm or on weekends please page GI on call) Impression:  1) GIB - 2/2 ulcerations seen on push enteroscopy.  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 Hemoglobin dropped to 8.1  2) Elevated alkaline phosphatase and lipase- GGTP is normal thus elevation of alk phos not from liver, US and MRCP are negative for gallstones. Other differential dx includes DILI (patient had no new meds), ischemic cholangiopathy, infiltrative disease, congestive cholangiopathy, cholestasis of sepsis  3) history of colonic inflammation found on colonoscopy in [place], no other results known, possible colitis      Recommendations:  -please obtain Q6H CBC  -f/u gastrin level  -PPI IV for 72 hours then PO  -Continue with LR hydration   -Lactose free diet  -Monitor and chart frequency of BMs  -Continue with Delzicol 800mg TID   -NPO after midnight for possible EGD tomorrow      [place], PGY-4  Gastroenterology Fellow  Pager x [phone] or [phone]  (After 5 pm or on weekends please page GI on call) Impression:  1) GIB - 2/2 ulcerations seen on push enteroscopy.  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 Hemoglobin dropped to 8.1  2) Elevated alkaline phosphatase and lipase- GGTP is normal thus elevation of alk phos not from liver, US and MRCP are negative for gallstones. Other differential dx includes DILI (patient had no new meds), ischemic cholangiopathy, infiltrative disease, congestive cholangiopathy, cholestasis of sepsis  3) history of colonic inflammation found on colonoscopy in [place], no other results known, possible colitis although loose stools could be due to gastrinoma      Recommendations:  -please obtain Q6H CBC  -f/u gastrin level  -PPI IV for 72 hours then PO  -Continue with LR hydration   -Lactose free diet  -Monitor and chart frequency of BMs  -Continue with Delzicol 800mg TID   -NPO after midnight for possible EGD tomorrow      [place], PGY-4  Gastroenterology Fellow  Pager x [phone] or [phone]  (After 5 pm or on weekends please page GI on call)

## 2019-03-14 NOTE — PROGRESS NOTE ADULT - SUBJECTIVE AND OBJECTIVE BOX
Pt is bleeding from the GIT, not dizzy or lightheaded, no CP, np abdominal pain and no n/v.      Meds:  acetaminophen   Tablet .. 650 milliGRAM(s) Oral every 6 hours PRN  aluminum hydroxide/magnesium hydroxide/simethicone Suspension 30 milliLiter(s) Oral every 6 hours PRN  cefTRIAXone   IVPB      cefTRIAXone   IVPB 1 Gram(s) IV Intermittent every 24 hours  lidocaine 1% Injectable 10 milliLiter(s) Local Injection once  mesalamine DR Capsule 800 milliGRAM(s) Oral three times a day  metoprolol tartrate 25 milliGRAM(s) Oral two times a day  pantoprazole Infusion 8 mG/Hr IV Continuous <Continuous>  sodium chloride 0.45%. 1000 milliLiter(s) IV Continuous <Continuous>      Vital Signs Last 24 Hrs  T(C): 36.7 (14 Mar 2019 05:01), Max: 36.7 (14 Mar 2019 05:01)  T(F): 98 (14 Mar 2019 05:01), Max: 98 (14 Mar 2019 05:01)  HR: 97 (14 Mar 2019 05:01) (88 - 109)  BP: 123/53 (14 Mar 2019 05:01) (96/68 - 142/84)  BP(mean): --  RR: 15 (14 Mar 2019 05:01) (15 - 22)  SpO2: 98% (14 Mar 2019 05:01) (98% - 100%)                          8.1    42.18 )-----------( 66       ( 14 Mar 2019 04:50 )             26.3       03-13    150<H>  |  119<H>  |  27<H>  ----------------------------<  140<H>  3.9   |  16<L>  |  1.68<H>    Ca    7.6<L>      13 Mar 2019 05:11  Phos  4.3     03-13  Mg     1.6     03-13                PT/INR - ( 12 Mar 2019 15:38 )   PT: 21.1 SEC;   INR: 1.82          PTT - ( 12 Mar 2019 15:38 )  PTT:45.1 SEC

## 2019-03-14 NOTE — PROGRESS NOTE ADULT - SUBJECTIVE AND OBJECTIVE BOX
Patient is a 73y old  Male who presents with a chief complaint of Diarrhea (14 Mar 2019 17:40)      SUBJECTIVE / OVERNIGHT EVENTS:    Events noted.  CONSTITUTIONAL: No fever,  or fatigue  RESPIRATORY: No cough, wheezing,  No shortness of breath  CARDIOVASCULAR: No chest pain, palpitations, dizziness, or leg swelling  GASTROINTESTINAL: No abdominal or epigastric pain. No nausea, vomiting.  NEUROLOGICAL: No headaches,     MEDICATIONS  (STANDING):  cefTRIAXone   IVPB      cefTRIAXone   IVPB 1 Gram(s) IV Intermittent every 24 hours  lidocaine 1% Injectable 10 milliLiter(s) Local Injection once  mesalamine DR Capsule 800 milliGRAM(s) Oral three times a day  metoprolol tartrate 25 milliGRAM(s) Oral two times a day  pantoprazole Infusion 8 mG/Hr (10 mL/Hr) IV Continuous <Continuous>  phytonadione  IVPB 10 milliGRAM(s) IV Intermittent every 24 hours  sodium chloride 0.45%. 1000 milliLiter(s) (125 mL/Hr) IV Continuous <Continuous>    MEDICATIONS  (PRN):  acetaminophen   Tablet .. 650 milliGRAM(s) Oral every 6 hours PRN Moderate Pain (4 - 6)  aluminum hydroxide/magnesium hydroxide/simethicone Suspension 30 milliLiter(s) Oral every 6 hours PRN Dyspepsia        CAPILLARY BLOOD GLUCOSE        I&O's Summary    14 Mar 2019 07:01  -  15 Mar 2019 00:36  --------------------------------------------------------  IN: 495 mL / OUT: 0 mL / NET: 495 mL        PHYSICAL EXAM:  GENERAL: NAD  NECK: Supple, No JVD  CHEST/LUNG: Clear to auscultation bilaterally; No wheezing.  HEART: Regular rate and rhythm; No murmurs, rubs, or gallops  ABDOMEN: Soft, Nontender, Nondistended; Bowel sounds present  EXTREMITIES:   No edema  NEUROLOGY: AAO X 3      LABS:                        7.6    33.49 )-----------( 70       ( 14 Mar 2019 15:40 )             25.2     03-14    141  |  116<H>  |  25<H>  ----------------------------<  74  2.9<LL>   |  14<L>  |  1.47<H>    Ca    6.8<L>      14 Mar 2019 15:40  Phos  4.3       Mg     1.6         TPro  5.0<L>  /  Alb  1.7<L>  /  TBili  0.4  /  DBili  x   /  AST  15  /  ALT  21  /  AlkPhos  259<H>      PT/INR - ( 14 Mar 2019 15:40 )   PT: 24.5 SEC;   INR: 2.10          PTT - ( 14 Mar 2019 15:40 )  PTT:42.5 SEC      Urinalysis Basic - ( 13 Mar 2019 16:50 )    Color: YELLOW / Appearance: Lt TURBID / S.017 / pH: 5.5  Gluc: NEGATIVE / Ketone: NEGATIVE  / Bili: NEGATIVE / Urobili: NORMAL   Blood: TRACE / Protein: 30 / Nitrite: NEGATIVE   Leuk Esterase: LARGE / RBC: 3-5 / WBC >50   Sq Epi: OCC / Non Sq Epi: x / Bacteria: MODERATE      CAPILLARY BLOOD GLUCOSE         @ 23:42  Culture-urine   Insufficient growth, culture re-incubated.  Culture results --  method type --  Organism --  Organism Identification --  Specimen source URINE MIDSTREAM            @ 23:42  Culture blood --  Culture results --  Gram stain --  Gram stain blood --  Method type --  Organism --  Organism identification --  Specimen source URINE MIDSTREAM      RADIOLOGY & ADDITIONAL TESTS:    Imaging Personally Reviewed:    Consultant(s) Notes Reviewed:      Care Discussed with Consultants/Other Providers:

## 2019-03-14 NOTE — PROGRESS NOTE ADULT - ASSESSMENT
This is a 73M with history of CLL (not on therapy), Anemia, and HTN who presents to the hospital with complaints of persistent intermittent diarrhea since October of last year. Said that about a week prior to his trip to [facility] he started having significant diarrhea (5-7 episodes of loose stools, ?black stools as per patient at that time) and went to see a doctor. Was told that he likely had a viral illness and that it would improve over time. His diarrhea did improve and he went to [facility] but on his second day there he started having diarrhea again (states that he ate sweets from the [employer] that others in his family did not prior to the onset of his diarrhea in [place]). He started having intermittent diarrhea and went to a doctor in [facility] where he had a colonoscopy done which he states was negative for abnormal findings (pt not sure if he had a biopsy done during the colonoscopy). He was then given some  medications with minimal improvement in his symptoms. He continued to have intermittent diarrhea (said that he has 4-5 days of diarrhea a week) and was unable to get any relief in [facility]. Upon arriving back from [facility] he continued to have intermittent diarrhea and therefore presented to the ED for evaluation. Said that currently he had about 5 episodes of diarrhea today, describes it as yellowish in color, associated with generalized abdominal pain. Denies any hematochezia, melena, or BRBPR currently. No fevers/chills. He denies any other complaints at present.    On arrival to the ED, his vitals were T 98.8, P 97, /62, R 16, O2 sat 100% RA. His lab work was significant for leukocytosis (unknown baseline), normocytic anemia (unknown baseline), mild hyponatremia/hypokalemia/AG gap, elevated BUN/Cr, elevated alk phos, and elevated lipase. He was also noted to have a lactate of 2.8. His CXR showed a possible R basilar opacity but the patient was not complaining of any PNA type symptoms. His US abd was negative for any acute findings. He was given LR 1L. He was admitted to medicine. (03 Mar 2019 23:55)      HOSP COURSE:    CTap showed mild wall thickening of the duodenum extending of the adjacent mesenteric fat, suggesting underlying duodenitis.  EGD showed normal esophagus, non bleeding erosive gastropathy - biopsied (s/o gastrinoma), duodenitis and one oozing duodenal ulcer with no bleeding, Multiple non-bleeding duodenal ulcers,                    MRCP- No evidence of cholelithiasis or biliary obstruction.Hepatic steatosis.Mural thickening proximal small bowel consistent with enteritis. Moderate ascites.  GI is following, s/p push enteroscopy with biopsy  3/8 showing erosive gastritis and oozing duodenal ulcer.        Thus far  stool studies neg, including stool cx, O&P, GI pcr, C.diff, Giardia.   Malaria scrn (-). On 3/7 had diagnostic paracentesis.  WBC trending upwards.  s/p push enteroscopy with bx : erosive gastritis, oozing duodenal ulcer.  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    MICU consulted for  tachy/SOB.      ID consult called to evaluate leukocytosis.  Pt still with diarrhea.  Denies abd pain or epigastric pain.  No rash.  (+) nonproductive cough.  (+) wt loss.  No difficulty swallowing, no N/V.  c/o sore throat.  No thrush.  No f/c/r.  Pt has outside Heme/Onc, does not know baseline WBC.   Pt denies recent use of abx, no sick contacts, change in diet.          Problem/Plan - 1:    ·	Leukocytosis    - Pt with h/o CLL, likely immunocompromised, baseline cbc u/a.  Trend CBC with differential.  Now improving.      - Check blood cultures, UA, Ucx.   UA (+) LE/pyuria.  Pt started on rocephin on 3/13, WBC responding.  Pt with recent h/o E.coli UTI and treated in [place].  Pt was hospitalized multiple times between Nov through Feb.  f/u Ucx    - Pt also with cough and phlegm production since EGD.  Check CT chest to evaluate for developing pna.      - Stool studies negative.  Send repeat stool O&P x 3 (test for Microsporidium, cyclospora, blastomyces. E.histolytica, mycobacterium)    - Check CMV pcr, histoplasma, HIV, strongyloides    Will follow,    Adeline Marques  [phone]

## 2019-03-14 NOTE — PROGRESS NOTE ADULT - SUBJECTIVE AND OBJECTIVE BOX
Infectious Diseases progress note:    Subjective:  UA (+), pt started on rocephin.  WBC improving.  Pt denies f/c/r/dysuria/urgency or frequency.  Has been treated for E.coli UTI while in [place] (sensis unavailable).  Family at bedside.  Pt also with cough since EGD procedure with phlegm.      ROS:  CONSTITUTIONAL:  No fever, chills, rigors  CARDIOVASCULAR:  No chest pain or palpitations  RESPIRATORY:   No SOB, cough, dyspnea on exertion.  No wheezing  GASTROINTESTINAL:  No abd pain, N/V, diarrhea/constipation  EXTREMITIES:  No swelling or joint pain  GENITOURINARY:  No burning on urination, increased frequency or urgency.  No flank pain  NEUROLOGIC:  No HA, visual disturbances  SKIN: No rashes    Allergies    No Known Allergies    Intolerances        ANTIBIOTICS/RELEVANT:  antimicrobials  cefTRIAXone   IVPB      cefTRIAXone   IVPB 1 Gram(s) IV Intermittent every 24 hours    immunologic:    OTHER:  acetaminophen   Tablet .. 650 milliGRAM(s) Oral every 6 hours PRN  aluminum hydroxide/magnesium hydroxide/simethicone Suspension 30 milliLiter(s) Oral every 6 hours PRN  lidocaine 1% Injectable 10 milliLiter(s) Local Injection once  mesalamine DR Capsule 800 milliGRAM(s) Oral three times a day  metoprolol tartrate 25 milliGRAM(s) Oral two times a day  pantoprazole Infusion 8 mG/Hr IV Continuous <Continuous>  phytonadione  IVPB 10 milliGRAM(s) IV Intermittent every 24 hours  potassium chloride  10 mEq/100 mL IVPB 10 milliEquivalent(s) IV Intermittent every 1 hour  sodium chloride 0.45%. 1000 milliLiter(s) IV Continuous <Continuous>      Objective:  Vital Signs Last 24 Hrs  T(C): 36.3 (14 Mar 2019 13:46), Max: 36.7 (14 Mar 2019 05:01)  T(F): 97.3 (14 Mar 2019 13:46), Max: 98 (14 Mar 2019 05:01)  HR: 91 (14 Mar 2019 13:46) (85 - 108)  BP: 117/61 (14 Mar 2019 13:46) (116/75 - 141/77)  BP(mean): --  RR: 16 (14 Mar 2019 13:46) (15 - 19)  SpO2: 99% (14 Mar 2019 13:46) (98% - 100%)    PHYSICAL EXAM:  Constitutional:NAD  Eyes:TODD, EOMI  Ear/Nose/Throat: no thrush, mucositis.  Moist mucous membranes	  Neck:no JVD, no lymphadenopathy, supple  Respiratory: CTA ruth  Cardiovascular: S1S2 RRR, no murmurs  Gastrointestinal:soft, nontender,  nondistended (+) BS  Extremities:no e/e/c  Skin:  no rashes, open wounds or ulcerations        LABS:                        7.6    33.49 )-----------( 70       ( 14 Mar 2019 15:40 )             25.2     03-14    141  |  116<H>  |  25<H>  ----------------------------<  74  2.9<LL>   |  14<L>  |  1.47<H>    Ca    6.8<L>      14 Mar 2019 15:40  Phos  4.3     03-  Mg     1.6     -    TPro  5.0<L>  /  Alb  1.7<L>  /  TBili  0.4  /  DBili  x   /  AST  15  /  ALT  21  /  AlkPhos  259<H>  14    PT/INR - ( 14 Mar 2019 15:40 )   PT: 24.5 SEC;   INR: 2.10          PTT - ( 14 Mar 2019 15:40 )  PTT:42.5 SEC  Urinalysis Basic - ( 13 Mar 2019 16:50 )    Color: YELLOW / Appearance: Lt TURBID / S.017 / pH: 5.5  Gluc: NEGATIVE / Ketone: NEGATIVE  / Bili: NEGATIVE / Urobili: NORMAL   Blood: TRACE / Protein: 30 / Nitrite: NEGATIVE   Leuk Esterase: LARGE / RBC: 3-5 / WBC >50   Sq Epi: OCC / Non Sq Epi: x / Bacteria: MODERATE                          MICROBIOLOGY:    Culture - Urine (19 @ 23:42)    Culture - Urine:   Insufficient growth, culture re-incubated.    Specimen Source: URINE MIDSTREAM    Urinalysis (19 @ 16:50)    Color: YELLOW    Urine Appearance: Lt TURBID    Glucose: NEGATIVE    Bilirubin: NEGATIVE    Ketone - Urine: NEGATIVE    Specific Gravity: 1.017    Blood: TRACE    pH - Urine: 5.5    Protein, Urine: 30    Urobilinogen: NORMAL    Nitrite: NEGATIVE    Leukocyte Esterase Concentration: LARGE    Red Blood Cell - Urine: 3-5    White Blood Cell - Urine: >50    Hyaline Casts: NEGATIVE    Bacteria: MODERATE    Squamous Epithelial: OCC          RADIOLOGY & ADDITIONAL STUDIES:    < from: Xray Chest 1 View- PORTABLE-Urgent (19 @ 15:28) >  IMPRESSION:  Platelike atelectasis in left upper lung and linear   atelectasis in both lower lungs.    No focal lung consolidation seen.    6 mm nodular density projecting over the periphery of the left lower   lung. Question whether this might represent the nipple as no left lower   lobe nodule is seen on included portions of the chest on the CT scan of   the abdomen and pelvis from 2019. Repeat PA chest x-ray with   nipple markers could be obtained for further evaluation, if felt to be   clinically indicated.    < end of copied text >

## 2019-03-14 NOTE — PROGRESS NOTE ADULT - ASSESSMENT
73M with a diagnosis of CLL for a year, never needed treatment, here with chronic diarrhea causing weight loss, has anemia, thrombocytopenia and acute increase in WBC, mainly PMNs and left shift (suggesting infection or inflammatory response and not CLL), will recommend:  - f/u on pancultures' results  - on ceftriaxone  - obtain flow cytometry and FISH on PB - sent out  - anemia w/u shows no evidence of iron def, b12 or folate def or hemolysis - anemia of chronic disease, renal insufficiency and from acute blood loss - keep hgb ~ 8 with transfusions  - vit K for elevated INR and aPTT, FFPs as needed.  - keep plts > 50K as he is actively bleeding  - all other supportive rx  - all other supportive Rx as per medicine and GI.

## 2019-03-14 NOTE — PROGRESS NOTE ADULT - SUBJECTIVE AND OBJECTIVE BOX
Chief Complaint:  Patient is a 73y old  Male who presents with a chief complaint of Diarrhea (13 Mar 2019 17:22)      Interval Events:   Patient only receiving daily CBC.  WBC count downtrending as is hemoglobin.    Allergies:  No Known Allergies      Hospital Medications:  acetaminophen   Tablet .. 650 milliGRAM(s) Oral every 6 hours PRN  aluminum hydroxide/magnesium hydroxide/simethicone Suspension 30 milliLiter(s) Oral every 6 hours PRN  cefTRIAXone   IVPB      cefTRIAXone   IVPB 1 Gram(s) IV Intermittent every 24 hours  lidocaine 1% Injectable 10 milliLiter(s) Local Injection once  mesalamine DR Capsule 800 milliGRAM(s) Oral three times a day  metoprolol tartrate 25 milliGRAM(s) Oral two times a day  pantoprazole Infusion 8 mG/Hr IV Continuous <Continuous>  sodium chloride 0.45%. 1000 milliLiter(s) IV Continuous <Continuous>      PMHX/PSHX:  Anemia, unspecified type  Essential hypertension  CLL (chronic lymphocytic leukemia)  No significant past surgical history      Family history:  Family history of myocardial infarction (Father, Mother, Sibling)          PHYSICAL EXAM:     GENERAL:  Appears stated age, well-groomed, well-nourished, no distress  HEENT:  NC/AT,  conjunctivae clear, sclera -anicteric  CHEST:  Full & symmetric excursion, no increased  HEART:  Regular rhythm  ABDOMEN:  Soft, non-tender, non-distended, normoactive bowel sounds,  no masses ,no hepato-splenomegaly,   EXTREMITIES:  no cyanosis,clubbing or edema  SKIN:  No rash/erythema/ecchymoses/petechiae/wounds/abscess/warm/dry  NEURO:  Alert, oriented    Vital Signs:  Vital Signs Last 24 Hrs  T(C): 36.7 (14 Mar 2019 05:01), Max: 36.7 (14 Mar 2019 05:01)  T(F): 98 (14 Mar 2019 05:01), Max: 98 (14 Mar 2019 05:01)  HR: 97 (14 Mar 2019 05:01) (88 - 109)  BP: 123/53 (14 Mar 2019 05:01) (96/68 - 142/84)  BP(mean): --  RR: 15 (14 Mar 2019 05:01) (15 - 22)  SpO2: 98% (14 Mar 2019 05:01) (98% - 100%)  Daily     Daily     LABS:                        8.1    42.18 )-----------( 66       ( 14 Mar 2019 04:50 )             26.3     03-13    150<H>  |  119<H>  |  27<H>  ----------------------------<  140<H>  3.9   |  16<L>  |  1.68<H>    Ca    7.6<L>      13 Mar 2019 05:11  Phos  4.3     03-13  Mg     1.6     -13        PT/INR - ( 12 Mar 2019 15:38 )   PT: 21.1 SEC;   INR: 1.82          PTT - ( 12 Mar 2019 15:38 )  PTT:45.1 SEC  Urinalysis Basic - ( 13 Mar 2019 16:50 )    Color: YELLOW / Appearance: Lt TURBID / S.017 / pH: 5.5  Gluc: NEGATIVE / Ketone: NEGATIVE  / Bili: NEGATIVE / Urobili: NORMAL   Blood: TRACE / Protein: 30 / Nitrite: NEGATIVE   Leuk Esterase: LARGE / RBC: 3-5 / WBC >50   Sq Epi: OCC / Non Sq Epi: x / Bacteria: MODERATE          Imaging: Chief Complaint:  Patient is a 73y old  Male who presents with a chief complaint of Diarrhea (13 Mar 2019 17:22)      Interval Events:   Patient only receiving daily CBC.  WBC count downtrending as is hemoglobin and platelets     Allergies:  No Known Allergies      Hospital Medications:  acetaminophen   Tablet .. 650 milliGRAM(s) Oral every 6 hours PRN  aluminum hydroxide/magnesium hydroxide/simethicone Suspension 30 milliLiter(s) Oral every 6 hours PRN  cefTRIAXone   IVPB      cefTRIAXone   IVPB 1 Gram(s) IV Intermittent every 24 hours  lidocaine 1% Injectable 10 milliLiter(s) Local Injection once  mesalamine DR Capsule 800 milliGRAM(s) Oral three times a day  metoprolol tartrate 25 milliGRAM(s) Oral two times a day  pantoprazole Infusion 8 mG/Hr IV Continuous <Continuous>  sodium chloride 0.45%. 1000 milliLiter(s) IV Continuous <Continuous>      PMHX/PSHX:  Anemia, unspecified type  Essential hypertension  CLL (chronic lymphocytic leukemia)  No significant past surgical history      Family history:  Family history of myocardial infarction (Father, Mother, Sibling)          PHYSICAL EXAM:     GENERAL:  Appears stated age, well-groomed, well-nourished, no distress  HEENT:  NC/AT,  conjunctivae clear, sclera -anicteric  CHEST:  Full & symmetric excursion, no increased  HEART:  Regular rhythm  ABDOMEN:  Soft, non-tender, non-distended, normoactive bowel sounds,  no masses ,no hepato-splenomegaly,   EXTREMITIES:  no cyanosis,clubbing or edema  SKIN:  No rash/erythema/ecchymoses/petechiae/wounds/abscess/warm/dry  NEURO:  Alert, oriented    Vital Signs:  Vital Signs Last 24 Hrs  T(C): 36.7 (14 Mar 2019 05:01), Max: 36.7 (14 Mar 2019 05:01)  T(F): 98 (14 Mar 2019 05:01), Max: 98 (14 Mar 2019 05:01)  HR: 97 (14 Mar 2019 05:01) (88 - 109)  BP: 123/53 (14 Mar 2019 05:01) (96/68 - 142/84)  BP(mean): --  RR: 15 (14 Mar 2019 05:01) (15 - 22)  SpO2: 98% (14 Mar 2019 05:01) (98% - 100%)  Daily     Daily     LABS:                        8.1    42.18 )-----------( 66       ( 14 Mar 2019 04:50 )             26.3     03-13    150<H>  |  119<H>  |  27<H>  ----------------------------<  140<H>  3.9   |  16<L>  |  1.68<H>    Ca    7.6<L>      13 Mar 2019 05:11  Phos  4.3     03-13  Mg     1.6     -13        PT/INR - ( 12 Mar 2019 15:38 )   PT: 21.1 SEC;   INR: 1.82          PTT - ( 12 Mar 2019 15:38 )  PTT:45.1 SEC  Urinalysis Basic - ( 13 Mar 2019 16:50 )    Color: YELLOW / Appearance: Lt TURBID / S.017 / pH: 5.5  Gluc: NEGATIVE / Ketone: NEGATIVE  / Bili: NEGATIVE / Urobili: NORMAL   Blood: TRACE / Protein: 30 / Nitrite: NEGATIVE   Leuk Esterase: LARGE / RBC: 3-5 / WBC >50   Sq Epi: OCC / Non Sq Epi: x / Bacteria: MODERATE          Imaging:

## 2019-03-14 NOTE — PROGRESS NOTE ADULT - SUBJECTIVE AND OBJECTIVE BOX
Pt seen and examined at bedside  still having Diarrea- bloody  feels weak  no dyspnea  no abd pain,vomiting, no fever  eating,drinking poorly      Allergies:  No Known Allergies    Hospital Medications:   MEDICATIONS  (STANDING):  cefTRIAXone   IVPB      cefTRIAXone   IVPB 1 Gram(s) IV Intermittent every 24 hours  lidocaine 1% Injectable 10 milliLiter(s) Local Injection once  mesalamine DR Capsule 800 milliGRAM(s) Oral three times a day  metoprolol tartrate 25 milliGRAM(s) Oral two times a day  pantoprazole Infusion 8 mG/Hr (10 mL/Hr) IV Continuous <Continuous>  phytonadione  IVPB 10 milliGRAM(s) IV Intermittent every 24 hours  sodium chloride 0.45%. 1000 milliLiter(s) (75 mL/Hr) IV Continuous <Continuous>         VITALS:  T(F): 98 (19 @ 05:01), Max: 98 (19 @ 05:01)  HR: 97 (19 @ 05:01)  BP: 123/53 (19 @ 05:01)  RR: 15 (19 @ 05:01)  SpO2: 98% (19 @ 05:01)  Wt(kg): --      PHYSICAL EXAM:  Constitutional: NAD, chronically sick looking  HEENT: anicteric sclera, oropharynx clear, MMM  Neck: No JVD  Respiratory: CTAB, no wheezes, rales or rhonchi  Cardiovascular: S1, S2, RRR  Gastrointestinal: BS+, soft, slight distension  Extremities: No cyanosis or clubbing. No peripheral edema  Neurological: A/O x 3, no focal deficits  Psychiatric: Normal mood, normal affect  : No CVA tenderness. No mata.   Skin: No rashes       LABS:      150<H>  |  119<H>  |  27<H>  ----------------------------<  140<H>  3.9   |  16<L>  |  1.68<H>    Ca    7.6<L>      13 Mar 2019 05:11  Phos  4.3       Mg     1.6           Creatinine Trend: 1.68 <--, 1.39 <--, 1.32 <--, 1.36 <--, 1.25 <--, 1.29 <--, 1.34 <--, 1.28 <--, 1.17 <--                        8.1    42.18 )-----------( 66       ( 14 Mar 2019 04:50 )             26.3     Urine Studies:  Urinalysis Basic - ( 13 Mar 2019 16:50 )    Color: YELLOW / Appearance: Lt TURBID / S.017 / pH: 5.5  Gluc: NEGATIVE / Ketone: NEGATIVE  / Bili: NEGATIVE / Urobili: NORMAL   Blood: TRACE / Protein: 30 / Nitrite: NEGATIVE   Leuk Esterase: LARGE / RBC: 3-5 / WBC >50   Sq Epi: OCC / Non Sq Epi:  / Bacteria: MODERATE      Sodium, Random Urine: < 20 mmol/L ( @ 16:50)  Potassium, Random Urine: 47.6 mmol/L ( @ 16:50)  Chloride, Random Urine: 33 mmol/L ( @ 16:50)  Osmolality, Random Urine: 445 mosmo/kg ( @ 16:50)    RADIOLOGY & ADDITIONAL STUDIES:

## 2019-03-15 LAB
ANION GAP SERPL CALC-SCNC: 14 MMO/L — SIGNIFICANT CHANGE UP (ref 7–14)
ANISOCYTOSIS BLD QL: SLIGHT — SIGNIFICANT CHANGE UP
ANISOCYTOSIS BLD QL: SLIGHT — SIGNIFICANT CHANGE UP
BASOPHILS # BLD AUTO: 0.05 K/UL — SIGNIFICANT CHANGE UP (ref 0–0.2)
BASOPHILS NFR BLD AUTO: 0.2 % — SIGNIFICANT CHANGE UP (ref 0–2)
BASOPHILS NFR SPEC: 1 % — SIGNIFICANT CHANGE UP (ref 0–2)
BASOPHILS NFR SPEC: 1 % — SIGNIFICANT CHANGE UP (ref 0–2)
BLD GP AB SCN SERPL QL: NEGATIVE — SIGNIFICANT CHANGE UP
BUN SERPL-MCNC: 26 MG/DL — HIGH (ref 7–23)
BURR CELLS BLD QL SMEAR: PRESENT — SIGNIFICANT CHANGE UP
BURR CELLS BLD QL SMEAR: PRESENT — SIGNIFICANT CHANGE UP
CALCIUM SERPL-MCNC: 7.8 MG/DL — LOW (ref 8.4–10.5)
CHLORIDE SERPL-SCNC: 117 MMOL/L — HIGH (ref 98–107)
CMV DNA CSF QL NAA+PROBE: NOT DETECTED — SIGNIFICANT CHANGE UP
CMV DNA SPEC NAA+PROBE-LOG#: SIGNIFICANT CHANGE UP LOGIU/ML
CO2 SERPL-SCNC: 14 MMOL/L — LOW (ref 22–31)
CREAT SERPL-MCNC: 1.59 MG/DL — HIGH (ref 0.5–1.3)
EOSINOPHIL # BLD AUTO: 1.15 K/UL — HIGH (ref 0–0.5)
EOSINOPHIL NFR BLD AUTO: 4 % — SIGNIFICANT CHANGE UP (ref 0–6)
EOSINOPHIL NFR FLD: 5 % — SIGNIFICANT CHANGE UP (ref 0–6)
EOSINOPHIL NFR FLD: 5 % — SIGNIFICANT CHANGE UP (ref 0–6)
GLUCOSE SERPL-MCNC: 71 MG/DL — SIGNIFICANT CHANGE UP (ref 70–99)
H CAPSUL AG SPEC-ACNC: SIGNIFICANT CHANGE UP
H CAPSUL AG UR QL IA: SIGNIFICANT CHANGE UP
HCT VFR BLD CALC: 32.8 % — LOW (ref 39–50)
HCT VFR BLD CALC: 32.8 % — LOW (ref 39–50)
HGB BLD-MCNC: 10.2 G/DL — LOW (ref 13–17)
HGB BLD-MCNC: 10.2 G/DL — LOW (ref 13–17)
IMM GRANULOCYTES NFR BLD AUTO: 2.9 % — HIGH (ref 0–1.5)
INR BLD: 1.67 — HIGH (ref 0.88–1.17)
LYMPHOCYTES # BLD AUTO: 2.14 K/UL — SIGNIFICANT CHANGE UP (ref 1–3.3)
LYMPHOCYTES # BLD AUTO: 7.4 % — LOW (ref 13–44)
LYMPHOCYTES NFR SPEC AUTO: 11 % — LOW (ref 13–44)
LYMPHOCYTES NFR SPEC AUTO: 11 % — LOW (ref 13–44)
MACROCYTES BLD QL: SLIGHT — SIGNIFICANT CHANGE UP
MACROCYTES BLD QL: SLIGHT — SIGNIFICANT CHANGE UP
MAGNESIUM SERPL-MCNC: 1.6 MG/DL — SIGNIFICANT CHANGE UP (ref 1.6–2.6)
MANUAL SMEAR VERIFICATION: SIGNIFICANT CHANGE UP
MANUAL SMEAR VERIFICATION: SIGNIFICANT CHANGE UP
MCHC RBC-ENTMCNC: 28.8 PG — SIGNIFICANT CHANGE UP (ref 27–34)
MCHC RBC-ENTMCNC: 28.8 PG — SIGNIFICANT CHANGE UP (ref 27–34)
MCHC RBC-ENTMCNC: 31.1 % — LOW (ref 32–36)
MCHC RBC-ENTMCNC: 31.1 % — LOW (ref 32–36)
MCV RBC AUTO: 92.7 FL — SIGNIFICANT CHANGE UP (ref 80–100)
MCV RBC AUTO: 92.7 FL — SIGNIFICANT CHANGE UP (ref 80–100)
MONOCYTES # BLD AUTO: 5.02 K/UL — HIGH (ref 0–0.9)
MONOCYTES NFR BLD AUTO: 17.3 % — HIGH (ref 2–14)
MONOCYTES NFR BLD: 12 % — HIGH (ref 2–9)
MONOCYTES NFR BLD: 12 % — HIGH (ref 2–9)
NEUTROPHIL AB SER-ACNC: 71 % — SIGNIFICANT CHANGE UP (ref 43–77)
NEUTROPHIL AB SER-ACNC: 71 % — SIGNIFICANT CHANGE UP (ref 43–77)
NEUTROPHILS # BLD AUTO: 19.89 K/UL — HIGH (ref 1.8–7.4)
NEUTROPHILS NFR BLD AUTO: 68.2 % — SIGNIFICANT CHANGE UP (ref 43–77)
NRBC # BLD: 0 /100WBC — SIGNIFICANT CHANGE UP
NRBC # BLD: 0 /100WBC — SIGNIFICANT CHANGE UP
NRBC # FLD: 0.15 K/UL — LOW (ref 25–125)
NRBC # FLD: 0.15 K/UL — LOW (ref 25–125)
O+P SPEC CONC: SIGNIFICANT CHANGE UP
PHOSPHATE SERPL-MCNC: 3 MG/DL — SIGNIFICANT CHANGE UP (ref 2.5–4.5)
PLATELET # BLD AUTO: 67 K/UL — LOW (ref 150–400)
PLATELET # BLD AUTO: 67 K/UL — LOW (ref 150–400)
PLATELET COUNT - ESTIMATE: SIGNIFICANT CHANGE UP
PLATELET COUNT - ESTIMATE: SIGNIFICANT CHANGE UP
PMV BLD: SIGNIFICANT CHANGE UP FL (ref 7–13)
PMV BLD: SIGNIFICANT CHANGE UP FL (ref 7–13)
POIKILOCYTOSIS BLD QL AUTO: SLIGHT — SIGNIFICANT CHANGE UP
POIKILOCYTOSIS BLD QL AUTO: SLIGHT — SIGNIFICANT CHANGE UP
POLYCHROMASIA BLD QL SMEAR: SLIGHT — SIGNIFICANT CHANGE UP
POLYCHROMASIA BLD QL SMEAR: SLIGHT — SIGNIFICANT CHANGE UP
POTASSIUM SERPL-MCNC: 3.9 MMOL/L — SIGNIFICANT CHANGE UP (ref 3.5–5.3)
POTASSIUM SERPL-SCNC: 3.9 MMOL/L — SIGNIFICANT CHANGE UP (ref 3.5–5.3)
PROTHROM AB SERPL-ACNC: 19.4 SEC — HIGH (ref 9.8–13.1)
RBC # BLD: 3.54 M/UL — LOW (ref 4.2–5.8)
RBC # BLD: 3.54 M/UL — LOW (ref 4.2–5.8)
RBC # FLD: 20 % — HIGH (ref 10.3–14.5)
RBC # FLD: 20 % — HIGH (ref 10.3–14.5)
RH IG SCN BLD-IMP: POSITIVE — SIGNIFICANT CHANGE UP
SMUDGE CELLS # BLD: PRESENT — SIGNIFICANT CHANGE UP
SMUDGE CELLS # BLD: PRESENT — SIGNIFICANT CHANGE UP
SODIUM SERPL-SCNC: 145 MMOL/L — SIGNIFICANT CHANGE UP (ref 135–145)
SPECIMEN SOURCE: SIGNIFICANT CHANGE UP
STRONGYLOIDES AB SER-ACNC: NEGATIVE — SIGNIFICANT CHANGE UP
TRI STN SPEC: SIGNIFICANT CHANGE UP
WBC # BLD: 29.09 K/UL — HIGH (ref 3.8–10.5)
WBC # BLD: 29.09 K/UL — HIGH (ref 3.8–10.5)
WBC # FLD AUTO: 29.09 K/UL — HIGH (ref 3.8–10.5)
WBC # FLD AUTO: 29.09 K/UL — HIGH (ref 3.8–10.5)

## 2019-03-15 PROCEDURE — 71250 CT THORAX DX C-: CPT | Mod: 26

## 2019-03-15 PROCEDURE — 88305 TISSUE EXAM BY PATHOLOGIST: CPT | Mod: 26

## 2019-03-15 PROCEDURE — 88312 SPECIAL STAINS GROUP 1: CPT | Mod: 26

## 2019-03-15 PROCEDURE — 43239 EGD BIOPSY SINGLE/MULTIPLE: CPT | Mod: GC

## 2019-03-15 RX ORDER — AZITHROMYCIN 500 MG/1
TABLET, FILM COATED ORAL
Qty: 0 | Refills: 0 | Status: DISCONTINUED | OUTPATIENT
Start: 2019-03-15 | End: 2019-03-20

## 2019-03-15 RX ORDER — AZITHROMYCIN 500 MG/1
500 TABLET, FILM COATED ORAL ONCE
Qty: 0 | Refills: 0 | Status: COMPLETED | OUTPATIENT
Start: 2019-03-15 | End: 2019-03-15

## 2019-03-15 RX ORDER — AZITHROMYCIN 500 MG/1
500 TABLET, FILM COATED ORAL EVERY 24 HOURS
Qty: 0 | Refills: 0 | Status: DISCONTINUED | OUTPATIENT
Start: 2019-03-16 | End: 2019-03-20

## 2019-03-15 RX ADMIN — AZITHROMYCIN 250 MILLIGRAM(S): 500 TABLET, FILM COATED ORAL at 12:09

## 2019-03-15 RX ADMIN — CEFTRIAXONE 100 GRAM(S): 500 INJECTION, POWDER, FOR SOLUTION INTRAMUSCULAR; INTRAVENOUS at 18:40

## 2019-03-15 RX ADMIN — PANTOPRAZOLE SODIUM 10 MG/HR: 20 TABLET, DELAYED RELEASE ORAL at 12:09

## 2019-03-15 RX ADMIN — Medication 102 MILLIGRAM(S): at 16:02

## 2019-03-15 RX ADMIN — Medication 25 MILLIGRAM(S): at 18:40

## 2019-03-15 RX ADMIN — SODIUM CHLORIDE 125 MILLILITER(S): 9 INJECTION, SOLUTION INTRAVENOUS at 12:10

## 2019-03-15 RX ADMIN — Medication 800 MILLIGRAM(S): at 21:40

## 2019-03-15 NOTE — PROGRESS NOTE ADULT - ASSESSMENT
Assessment	  This is a 73M with history as above who presents to the hospital with c/o intermittent diarrhea for the past 6 months.    Active upper GI bleed:  Sec to PUD  IV PPI      Leukocytosis:  CBC  Hx of CLL  Ct chest reviewed  IV Ceftriaxone/Zithromax      Dw family.

## 2019-03-15 NOTE — PROGRESS NOTE ADULT - SUBJECTIVE AND OBJECTIVE BOX
Patient is a 73y old  Male who presents with a chief complaint of Diarrhea (15 Mar 2019 16:21)      SUBJECTIVE / OVERNIGHT EVENTS:    Events noted.  CONSTITUTIONAL: No fever,  or fatigue  RESPIRATORY: No cough, wheezing,    CARDIOVASCULAR: No chest pain, palpitations,   GASTROINTESTINAL: No abdominal or epigastric pain.   NEUROLOGICAL: No headaches,     MEDICATIONS  (STANDING):  azithromycin  IVPB      cefTRIAXone   IVPB      cefTRIAXone   IVPB 1 Gram(s) IV Intermittent every 24 hours  lidocaine 1% Injectable 10 milliLiter(s) Local Injection once  mesalamine DR Capsule 800 milliGRAM(s) Oral three times a day  metoprolol tartrate 25 milliGRAM(s) Oral two times a day  pantoprazole Infusion 8 mG/Hr (10 mL/Hr) IV Continuous <Continuous>  phytonadione  IVPB 10 milliGRAM(s) IV Intermittent every 24 hours    MEDICATIONS  (PRN):  acetaminophen   Tablet .. 650 milliGRAM(s) Oral every 6 hours PRN Moderate Pain (4 - 6)  aluminum hydroxide/magnesium hydroxide/simethicone Suspension 30 milliLiter(s) Oral every 6 hours PRN Dyspepsia        CAPILLARY BLOOD GLUCOSE        I&O's Summary    14 Mar 2019 07:01  -  15 Mar 2019 07:00  --------------------------------------------------------  IN: 2115 mL / OUT: 0 mL / NET: 2115 mL    15 Mar 2019 07:01  -  15 Mar 2019 23:21  --------------------------------------------------------  IN: 120 mL / OUT: 300 mL / NET: -180 mL        PHYSICAL EXAM:  GENERAL: NAD  NECK: Supple, No JVD  CHEST/LUNG: Clear to auscultation bilaterally; No wheezing.  HEART: Regular rate and rhythm; No murmurs, rubs, or gallops  ABDOMEN: Soft, Nontender, Nondistended; Bowel sounds present  EXTREMITIES:   No edema  NEUROLOGY: AAO X 3      LABS:                        10.2   29.09 )-----------( 67       ( 15 Mar 2019 05:30 )             32.8     03-15    145  |  117<H>  |  26<H>  ----------------------------<  71  3.9   |  14<L>  |  1.59<H>    Ca    7.8<L>      15 Mar 2019 05:30  Phos  3.0     03-15  Mg     1.6     03-15    TPro  5.0<L>  /  Alb  1.7<L>  /  TBili  0.4  /  DBili  x   /  AST  15  /  ALT  21  /  AlkPhos  259<H>  03-14    PT/INR - ( 15 Mar 2019 05:30 )   PT: 19.4 SEC;   INR: 1.67          PTT - ( 14 Mar 2019 15:40 )  PTT:42.5 SEC        CAPILLARY BLOOD GLUCOSE        03-15 @ 13:42  Culture-urine --  Culture results --  method type --  Organism --  Organism Identification --  Specimen source FECES  03-14 @ 07:34  Culture-urine --  Culture results --  method type --  Organism --  Organism Identification --  Specimen source BLOOD  03-14 @ 05:02  Culture-urine --  Culture results --  method type --  Organism --  Organism Identification --  Specimen source BLOOD PERIPHERAL  03-13 @ 23:42  Culture-urine   Insufficient growth, culture re-incubated.  Culture results --  method type --  Organism --  Organism Identification --  Specimen source URINE MIDSTREAM      03-15 @ 13:42  Culture-CSF --  Culture results --  Gram stain --  Gram stain spinal fluid --  Method type --  Organism --  Organism identification --  Specimen source FECES       03-15 @ 13:42  Culture blood --  Culture results --  Gram stain --  Gram stain blood --  Method type --  Organism --  Organism identification --  Specimen source FECES   03-14 @ 07:34  Culture blood   NO ORGANISMS ISOLATED  NO ORGANISMS ISOLATED AT 24 HOURS  Culture results --  Gram stain --  Gram stain blood --  Method type --  Organism --  Organism identification --  Specimen source BLOOD   03-14 @ 05:02  Culture blood   NO ORGANISMS ISOLATED  NO ORGANISMS ISOLATED AT 24 HOURS  Culture results --  Gram stain --  Gram stain blood --  Method type --  Organism --  Organism identification --  Specimen source BLOOD PERIPHERAL   03-13 @ 23:42  Culture blood --  Culture results --  Gram stain --  Gram stain blood --  Method type --  Organism --  Organism identification --  Specimen source URINE MIDSTREAM      RADIOLOGY & ADDITIONAL TESTS:    Imaging Personally Reviewed:    Consultant(s) Notes Reviewed:      Care Discussed with Consultants/Other Providers:

## 2019-03-15 NOTE — PROGRESS NOTE ADULT - SUBJECTIVE AND OBJECTIVE BOX
Infectious Diseases progress note:    Subjective: Pt at study at time of bedside visit.  No new fevers, WBC trending down.  No acute events - d/w RN    ROS:  u/a    Allergies    No Known Allergies    Intolerances        ANTIBIOTICS/RELEVANT:  antimicrobials  azithromycin  IVPB      cefTRIAXone   IVPB      cefTRIAXone   IVPB 1 Gram(s) IV Intermittent every 24 hours    immunologic:    OTHER:  acetaminophen   Tablet .. 650 milliGRAM(s) Oral every 6 hours PRN  aluminum hydroxide/magnesium hydroxide/simethicone Suspension 30 milliLiter(s) Oral every 6 hours PRN  lidocaine 1% Injectable 10 milliLiter(s) Local Injection once  mesalamine DR Capsule 800 milliGRAM(s) Oral three times a day  metoprolol tartrate 25 milliGRAM(s) Oral two times a day  pantoprazole Infusion 8 mG/Hr IV Continuous <Continuous>  phytonadione  IVPB 10 milliGRAM(s) IV Intermittent every 24 hours      Objective:  Vital Signs Last 24 Hrs  T(C): 36.2 (15 Mar 2019 11:14), Max: 36.7 (14 Mar 2019 22:47)  T(F): 97.2 (15 Mar 2019 11:14), Max: 98.1 (14 Mar 2019 22:47)  HR: 88 (15 Mar 2019 11:14) (68 - 106)  BP: 141/75 (15 Mar 2019 11:14) (110/57 - 141/78)  BP(mean): --  RR: 17 (15 Mar 2019 11:14) (17 - 18)  SpO2: 100% (15 Mar 2019 11:14) (94% - 100%)    PHYSICAL EXAM:  u/a        LABS:                        10.2   29.09 )-----------( 67       ( 15 Mar 2019 05:30 )             32.8     03-15    145  |  117<H>  |  26<H>  ----------------------------<  71  3.9   |  14<L>  |  1.59<H>    Ca    7.8<L>      15 Mar 2019 05:30  Phos  3.0     03-15  Mg     1.6     03-15    TPro  5.0<L>  /  Alb  1.7<L>  /  TBili  0.4  /  DBili  x   /  AST  15  /  ALT  21  /  AlkPhos  259<H>  03-14    PT/INR - ( 15 Mar 2019 05:30 )   PT: 19.4 SEC;   INR: 1.67          PTT - ( 14 Mar 2019 15:40 )  PTT:42.5 SEC  Urinalysis Basic - ( 13 Mar 2019 16:50 )    Color: YELLOW / Appearance: Lt TURBID / S.017 / pH: 5.5  Gluc: NEGATIVE / Ketone: NEGATIVE  / Bili: NEGATIVE / Urobili: NORMAL   Blood: TRACE / Protein: 30 / Nitrite: NEGATIVE   Leuk Esterase: LARGE / RBC: 3-5 / WBC >50   Sq Epi: OCC / Non Sq Epi: x / Bacteria: MODERATE                          MICROBIOLOGY:      Culture - Blood (19 @ 07:34)    Culture - Blood:   NO ORGANISMS ISOLATED  NO ORGANISMS ISOLATED AT 24 HOURS    Specimen Source: BLOOD      Culture - Blood in AM (19 @ 05:02)    Culture - Blood:   NO ORGANISMS ISOLATED  NO ORGANISMS ISOLATED AT 24 HOURS    Specimen Source: BLOOD PERIPHERAL    Culture - Urine (19 @ 23:42)    Culture - Urine:   Insufficient growth, culture re-incubated.    Culture - Urine:   EC^Escherichia coli  COLONY COUNT: > = 100,000 CFU/ML  STCN^Staphylococcus sp.,coag neg  COLONY COUNT: LESS THAN 10,000 CFU/ML    Specimen Source: URINE MIDSTREAM        RADIOLOGY & ADDITIONAL STUDIES:    < from: CT Chest No Cont (03.15.19 @ 09:22) >  IMPRESSION:     *  Tracheal wall thickening, centrilobular and tree-in-bud micronodules   predominantly within the upper lungs suggestive of   bronchitis/bronchiolitis.    *  Interlobular septal thickening and patchy groundglass opacities, trace   bilateral pleural effusions and anasarca suggestive of superimposed   pulmonary edema.    *  Ascites new from 3/4/2019.    *  Narrowing of bilateral main bronchi which may represent bronchomalacia.    < end of copied text >        < from: Xray Chest 1 View- PORTABLE-Urgent (19 @ 15:28) >  IMPRESSION:  Platelike atelectasis in left upper lung and linear   atelectasis in both lower lungs.    No focal lung consolidation seen.    6 mm nodular density projecting over the periphery of the left lower   lung. Question whether this might represent the nipple as no left lower   lobe nodule is seen on included portions of the chest on the CT scan of   the abdomen and pelvis from 2019. Repeat PA chest x-ray with   nipple markers could be obtained for further evaluation, if felt to be   clinically indicated.    < end of copied text >

## 2019-03-15 NOTE — PROGRESS NOTE ADULT - NSICDXPROBLEM_GEN_ALL_CORE_FT
PROBLEM DIAGNOSES  Problem: Acute hypernatremia  Assessment and Plan: poor oral intake, no labs from today.repeat cmp, inc iv fluids 1/2 ns 125 cc hrly, daily bmp    Problem: PEDRO (acute kidney injury)  Assessment and Plan: await labs from today, pre-renal, low Urine Na,Inc Osmo..inc IV fluids to 125 cc hrly. daily bmp.  Heme Eval noted. GI leslie noted PROBLEM DIAGNOSES  Problem: Acute hypernatremia  Assessment and Plan: Resolved hypernatremia, with IVF resuscitation. Encourage increase water intake as tolerated, Given signifncant edema, and pulm edema, would d/c IVF now.     Problem: PEDRO (acute kidney injury)  Assessment and Plan: PEDRO may be prerenal azotemia v ATN.   No evidence of obstruction on abd CT.   [place] UA noted. Would dc further IVF, given significatn third spacing.

## 2019-03-16 LAB
-  AMIKACIN: SIGNIFICANT CHANGE UP
-  AMPICILLIN/SULBACTAM: SIGNIFICANT CHANGE UP
-  AMPICILLIN: SIGNIFICANT CHANGE UP
-  AZTREONAM: SIGNIFICANT CHANGE UP
-  CEFAZOLIN: SIGNIFICANT CHANGE UP
-  CEFEPIME: SIGNIFICANT CHANGE UP
-  CEFOXITIN: SIGNIFICANT CHANGE UP
-  CEFTAZIDIME: SIGNIFICANT CHANGE UP
-  CEFTRIAXONE: SIGNIFICANT CHANGE UP
-  CIPROFLOXACIN: SIGNIFICANT CHANGE UP
-  ERTAPENEM: SIGNIFICANT CHANGE UP
-  GENTAMICIN: SIGNIFICANT CHANGE UP
-  IMIPENEM: SIGNIFICANT CHANGE UP
-  LEVOFLOXACIN: SIGNIFICANT CHANGE UP
-  MEROPENEM: SIGNIFICANT CHANGE UP
-  NITROFURANTOIN: SIGNIFICANT CHANGE UP
-  PIPERACILLIN/TAZOBACTAM: SIGNIFICANT CHANGE UP
-  TIGECYCLINE: SIGNIFICANT CHANGE UP
-  TOBRAMYCIN: SIGNIFICANT CHANGE UP
-  TRIMETHOPRIM/SULFAMETHOXAZOLE: SIGNIFICANT CHANGE UP
ALBUMIN SERPL ELPH-MCNC: 1.6 G/DL — LOW (ref 3.3–5)
ALP SERPL-CCNC: 250 U/L — HIGH (ref 40–120)
ALT FLD-CCNC: 16 U/L — SIGNIFICANT CHANGE UP (ref 4–41)
ANION GAP SERPL CALC-SCNC: 15 MMO/L — HIGH (ref 7–14)
AST SERPL-CCNC: 21 U/L — SIGNIFICANT CHANGE UP (ref 4–40)
BACTERIA UR CULT: SIGNIFICANT CHANGE UP
BASOPHILS # BLD AUTO: 0.05 K/UL — SIGNIFICANT CHANGE UP (ref 0–0.2)
BASOPHILS NFR BLD AUTO: 0.2 % — SIGNIFICANT CHANGE UP (ref 0–2)
BILIRUB SERPL-MCNC: 0.4 MG/DL — SIGNIFICANT CHANGE UP (ref 0.2–1.2)
BUN SERPL-MCNC: 22 MG/DL — SIGNIFICANT CHANGE UP (ref 7–23)
CALCIUM SERPL-MCNC: 6.9 MG/DL — LOW (ref 8.4–10.5)
CHLORIDE SERPL-SCNC: 119 MMOL/L — HIGH (ref 98–107)
CO2 SERPL-SCNC: 13 MMOL/L — LOW (ref 22–31)
CREAT SERPL-MCNC: 1.45 MG/DL — HIGH (ref 0.5–1.3)
EOSINOPHIL # BLD AUTO: 0.86 K/UL — HIGH (ref 0–0.5)
EOSINOPHIL NFR BLD AUTO: 3.6 % — SIGNIFICANT CHANGE UP (ref 0–6)
GLUCOSE SERPL-MCNC: 69 MG/DL — LOW (ref 70–99)
HCT VFR BLD CALC: 29.8 % — LOW (ref 39–50)
HCT VFR BLD CALC: 29.8 % — LOW (ref 39–50)
HGB BLD-MCNC: 9.1 G/DL — LOW (ref 13–17)
HGB BLD-MCNC: 9.1 G/DL — LOW (ref 13–17)
HIV COMBO RESULT: SIGNIFICANT CHANGE UP
HIV1+2 AB SPEC QL: SIGNIFICANT CHANGE UP
IMM GRANULOCYTES NFR BLD AUTO: 2.1 % — HIGH (ref 0–1.5)
INR BLD: 1.82 — HIGH (ref 0.88–1.17)
L PNEUMO AG UR QL: NEGATIVE — SIGNIFICANT CHANGE UP
LYMPHOCYTES # BLD AUTO: 1.69 K/UL — SIGNIFICANT CHANGE UP (ref 1–3.3)
LYMPHOCYTES # BLD AUTO: 7 % — LOW (ref 13–44)
MCHC RBC-ENTMCNC: 28.3 PG — SIGNIFICANT CHANGE UP (ref 27–34)
MCHC RBC-ENTMCNC: 28.3 PG — SIGNIFICANT CHANGE UP (ref 27–34)
MCHC RBC-ENTMCNC: 30.5 % — LOW (ref 32–36)
MCHC RBC-ENTMCNC: 30.5 % — LOW (ref 32–36)
MCV RBC AUTO: 92.5 FL — SIGNIFICANT CHANGE UP (ref 80–100)
MCV RBC AUTO: 92.5 FL — SIGNIFICANT CHANGE UP (ref 80–100)
METHOD TYPE: SIGNIFICANT CHANGE UP
MONOCYTES # BLD AUTO: 5.04 K/UL — HIGH (ref 0–0.9)
MONOCYTES NFR BLD AUTO: 20.9 % — HIGH (ref 2–14)
NEUTROPHILS # BLD AUTO: 15.91 K/UL — HIGH (ref 1.8–7.4)
NEUTROPHILS NFR BLD AUTO: 66.2 % — SIGNIFICANT CHANGE UP (ref 43–77)
NRBC # FLD: 0.1 K/UL — LOW (ref 25–125)
NRBC # FLD: 0.1 K/UL — LOW (ref 25–125)
ORGANISM # SPEC MICROSCOPIC CNT: SIGNIFICANT CHANGE UP
PLATELET # BLD AUTO: 49 K/UL — LOW (ref 150–400)
PLATELET # BLD AUTO: 49 K/UL — LOW (ref 150–400)
PMV BLD: SIGNIFICANT CHANGE UP FL (ref 7–13)
PMV BLD: SIGNIFICANT CHANGE UP FL (ref 7–13)
POTASSIUM SERPL-MCNC: 3.5 MMOL/L — SIGNIFICANT CHANGE UP (ref 3.5–5.3)
POTASSIUM SERPL-SCNC: 3.5 MMOL/L — SIGNIFICANT CHANGE UP (ref 3.5–5.3)
PROT SERPL-MCNC: 5.5 G/DL — LOW (ref 6–8.3)
PROTHROM AB SERPL-ACNC: 20.6 SEC — HIGH (ref 9.8–13.1)
RBC # BLD: 3.22 M/UL — LOW (ref 4.2–5.8)
RBC # BLD: 3.22 M/UL — LOW (ref 4.2–5.8)
RBC # FLD: 20.9 % — HIGH (ref 10.3–14.5)
RBC # FLD: 20.9 % — HIGH (ref 10.3–14.5)
SODIUM SERPL-SCNC: 147 MMOL/L — HIGH (ref 135–145)
WBC # BLD: 24.06 K/UL — HIGH (ref 3.8–10.5)
WBC # BLD: 24.06 K/UL — HIGH (ref 3.8–10.5)
WBC # FLD AUTO: 24.06 K/UL — HIGH (ref 3.8–10.5)
WBC # FLD AUTO: 24.06 K/UL — HIGH (ref 3.8–10.5)

## 2019-03-16 PROCEDURE — 99232 SBSQ HOSP IP/OBS MODERATE 35: CPT | Mod: GC

## 2019-03-16 RX ADMIN — Medication 25 MILLIGRAM(S): at 05:23

## 2019-03-16 RX ADMIN — Medication 800 MILLIGRAM(S): at 18:23

## 2019-03-16 RX ADMIN — Medication 800 MILLIGRAM(S): at 12:09

## 2019-03-16 RX ADMIN — Medication 25 MILLIGRAM(S): at 18:24

## 2019-03-16 RX ADMIN — AZITHROMYCIN 250 MILLIGRAM(S): 500 TABLET, FILM COATED ORAL at 12:23

## 2019-03-16 RX ADMIN — PANTOPRAZOLE SODIUM 10 MG/HR: 20 TABLET, DELAYED RELEASE ORAL at 19:34

## 2019-03-16 RX ADMIN — PANTOPRAZOLE SODIUM 10 MG/HR: 20 TABLET, DELAYED RELEASE ORAL at 05:23

## 2019-03-16 RX ADMIN — Medication 800 MILLIGRAM(S): at 22:34

## 2019-03-16 RX ADMIN — Medication 102 MILLIGRAM(S): at 15:47

## 2019-03-16 RX ADMIN — PANTOPRAZOLE SODIUM 10 MG/HR: 20 TABLET, DELAYED RELEASE ORAL at 10:08

## 2019-03-16 RX ADMIN — CEFTRIAXONE 100 GRAM(S): 500 INJECTION, POWDER, FOR SOLUTION INTRAMUSCULAR; INTRAVENOUS at 18:22

## 2019-03-16 NOTE — PROGRESS NOTE ADULT - SUBJECTIVE AND OBJECTIVE BOX
Patient is a 73y old  Male who presents with a chief complaint of Diarrhea (16 Mar 2019 14:21)      SUBJECTIVE / OVERNIGHT EVENTS:    Events noted.  CONSTITUTIONAL: No fever,  or fatigue  RESPIRATORY: No cough, wheezing,  No shortness of breath  CARDIOVASCULAR: No chest pain, palpitations,   GASTROINTESTINAL: No abdominal or epigastric pain.  NEUROLOGICAL: No headaches,     MEDICATIONS  (STANDING):  azithromycin  IVPB      azithromycin  IVPB 500 milliGRAM(s) IV Intermittent every 24 hours  cefTRIAXone   IVPB      cefTRIAXone   IVPB 1 Gram(s) IV Intermittent every 24 hours  lidocaine 1% Injectable 10 milliLiter(s) Local Injection once  mesalamine DR Capsule 800 milliGRAM(s) Oral three times a day  metoprolol tartrate 25 milliGRAM(s) Oral two times a day  pantoprazole Infusion 8 mG/Hr (10 mL/Hr) IV Continuous <Continuous>  phytonadione  IVPB 10 milliGRAM(s) IV Intermittent every 24 hours    MEDICATIONS  (PRN):  acetaminophen   Tablet .. 650 milliGRAM(s) Oral every 6 hours PRN Moderate Pain (4 - 6)  aluminum hydroxide/magnesium hydroxide/simethicone Suspension 30 milliLiter(s) Oral every 6 hours PRN Dyspepsia        CAPILLARY BLOOD GLUCOSE        I&O's Summary    15 Mar 2019 07:01  -  16 Mar 2019 07:00  --------------------------------------------------------  IN: 120 mL / OUT: 300 mL / NET: -180 mL        PHYSICAL EXAM:  GENERAL: NAD  NECK: Supple, No JVD  CHEST/LUNG: Clear to auscultation bilaterally; No wheezing.  HEART: Regular rate and rhythm; No murmurs, rubs, or gallops  ABDOMEN: Soft, Nontender, Nondistended; Bowel sounds present  EXTREMITIES:   No edema  NEUROLOGY: AAO X 3      LABS:                        9.1    24.06 )-----------( 49       ( 16 Mar 2019 06:39 )             29.8     03-16    147<H>  |  119<H>  |  22  ----------------------------<  69<L>  3.5   |  13<L>  |  1.45<H>    Ca    6.9<L>      16 Mar 2019 06:39  Phos  3.0     03-15  Mg     1.6     03-15    TPro  5.5<L>  /  Alb  1.6<L>  /  TBili  0.4  /  DBili  x   /  AST  21  /  ALT  16  /  AlkPhos  250<H>  03-16    PT/INR - ( 16 Mar 2019 06:39 )   PT: 20.6 SEC;   INR: 1.82          PTT - ( 14 Mar 2019 15:40 )  PTT:42.5 SEC        CAPILLARY BLOOD GLUCOSE        03-15 @ 13:42  Culture-urine --  Culture results --  method type --  Organism --  Organism Identification --  Specimen source FECES  03-14 @ 07:34  Culture-urine --  Culture results --  method type --  Organism --  Organism Identification --  Specimen source BLOOD  03-14 @ 05:02  Culture-urine --  Culture results --  method type --  Organism --  Organism Identification --  Specimen source BLOOD PERIPHERAL  03-13 @ 23:42  Culture-urine   COLONY COUNT: > = 100,000 CFU/ML  Culture results --  method type NEGATIVE FERNANDO 43  Organism Staphylococcus sp.,coag neg  COLONY COUNT: LESS THAN 10,000 CFU/ML  Organism Identification E.COLI ESBL POSITIVE  Staphylococcus sp.,coag neg  Specimen source URINE MIDSTREAM           03-15 @ 13:42  Culture blood --  Culture results --  Gram stain --  Gram stain blood --  Method type --  Organism --  Organism identification --  Specimen source FECES   03-14 @ 07:34  Culture blood   NO ORGANISMS ISOLATED  NO ORGANISMS ISOLATED AT 48 HRS.  Culture results --  Gram stain --  Gram stain blood --  Method type --  Organism --  Organism identification --  Specimen source BLOOD   03-14 @ 05:02  Culture blood   NO ORGANISMS ISOLATED  NO ORGANISMS ISOLATED AT 48 HRS.  Culture results --  Gram stain --  Gram stain blood --  Method type --  Organism --  Organism identification --  Specimen source BLOOD PERIPHERAL   03-13 @ 23:42  Culture blood --  Culture results --  Gram stain --  Gram stain blood --  Method type NEGATIVE FERNANDO 43  Organism Staphylococcus sp.,coag neg  COLONY COUNT: LESS THAN 10,000 CFU/ML  Organism identification E.COLI ESBL POSITIVE  Staphylococcus sp.,coag neg  Specimen source URINE MIDSTREAM      RADIOLOGY & ADDITIONAL TESTS:    Imaging Personally Reviewed:    Consultant(s) Notes Reviewed:      Care Discussed with Consultants/Other Providers:

## 2019-03-16 NOTE — PROGRESS NOTE ADULT - SUBJECTIVE AND OBJECTIVE BOX
[facility] - [place] / Benitez LERMA /Teresita/ FLORENTIN Morales/ FLORENTIN [place]/ Giacomo Marcum / JOLIE Jeongu  ---------------------------------------------------------------------------------------------------------------    Patient seen and examined bedside    Subjective and Objective: No overnight events, denied sob. reports 1-2 loose BMs so far today, better    Allergies: No Known Allergies      Hospital Medications:   MEDICATIONS  (STANDING):  azithromycin  IVPB      azithromycin  IVPB 500 milliGRAM(s) IV Intermittent every 24 hours  cefTRIAXone   IVPB      cefTRIAXone   IVPB 1 Gram(s) IV Intermittent every 24 hours  lidocaine 1% Injectable 10 milliLiter(s) Local Injection once  mesalamine DR Capsule 800 milliGRAM(s) Oral three times a day  metoprolol tartrate 25 milliGRAM(s) Oral two times a day  pantoprazole Infusion 8 mG/Hr (10 mL/Hr) IV Continuous <Continuous>  phytonadione  IVPB 10 milliGRAM(s) IV Intermittent every 24 hours      VITALS:  T(F): 97.2 (19 @ 09:22), Max: 98.6 (19 @ 05:18)  HR: 78 (19 @ 09:22)  BP: 131/63 (19 @ 09:22)  RR: 18 (19 @ 09:22)  SpO2: 100% (19 @ 09:22)  Wt(kg): --    03-15 @ 07:01  -   @ 07:00  --------------------------------------------------------  IN: 120 mL / OUT: 300 mL / NET: -180 mL      PHYSICAL EXAM:  Constitutional: NAD  HEENT: anicteric sclera, oropharynx clear  Neck: No JVD  Respiratory: CTAB, no wheezes, rales or rhonchi  Cardiovascular: S1, S2, RRR  Gastrointestinal: BS+, soft, NT, distended  Extremities: No cyanosis or clubbing. trace pedal edema b/l  Neurological: A/O x 3, no focal deficits  Psychiatric: Normal mood, normal affect  : No CVA tenderness. No mata.   Skin: No rashes      LABS:      147<H>  |  119<H>  |  22  ----------------------------<  69<L>  3.5   |  13<L>  |  1.45<H>    Ca    6.9<L>      16 Mar 2019 06:39  Phos  3.0     03-15  Mg     1.6     03-15    TPro  5.5<L>  /  Alb  1.6<L>  /  TBili  0.4  /  DBili      /  AST  21  /  ALT  16  /  AlkPhos  250<H>      Creatinine Trend: 1.45 <--, 1.59 <--, 1.47 <--, 1.68 <--, 1.39 <--, 1.32 <--, 1.36 <--, 1.25 <--, 1.29 <--, 1.34 <--                        9.1    24.06 )-----------( 49       ( 16 Mar 2019 06:39 )             29.8     Urine Studies:  Urinalysis Basic - ( 13 Mar 2019 16:50 )    Color: YELLOW / Appearance: Lt TURBID / S.017 / pH: 5.5  Gluc: NEGATIVE / Ketone: NEGATIVE  / Bili: NEGATIVE / Urobili: NORMAL   Blood: TRACE / Protein: 30 / Nitrite: NEGATIVE   Leuk Esterase: LARGE / RBC: 3-5 / WBC >50   Sq Epi: OCC / Non Sq Epi:  / Bacteria: MODERATE      Sodium, Random Urine: < 20 mmol/L ( @ 16:50)  Potassium, Random Urine: 47.6 mmol/L ( @ 16:50)  Chloride, Random Urine: 33 mmol/L ( @ 16:50)  Osmolality, Random Urine: 445 mosmo/kg ( @ 16:50)      RADIOLOGY & ADDITIONAL STUDIES:

## 2019-03-16 NOTE — PROGRESS NOTE ADULT - ASSESSMENT
73M with a diagnosis of CLL for a year, never needed treatment, here with chronic diarrhea causing weight loss, has anemia, thrombocytopenia and acute increase in WBC, mainly PMNs and left shift (suggesting infection or inflammatory response and not CLL), will recommend:  -  no current evidence of CLL on PB flow cytometry  - f/u ID and GI recommendations  - on Abx  - obtain FISH on PB - sent out  - anemia w/u shows no evidence of iron def, b12 or folate def or hemolysis - anemia of chronic disease, renal insufficiency and from acute blood loss - keep hgb ~ 8 with transfusions  - vit K for elevated INR and aPTT, FFPs as needed, monitor INR, aPTT.  - keep plts ~ 50K when bleeding  - all other supportive rx  - all other supportive Rx as per medicine and GI.

## 2019-03-16 NOTE — PROGRESS NOTE ADULT - ASSESSMENT
Impression:  1) GIB - 2/2 ulcerations seen on push enteroscopy and EGD, with last scope on 3/16 with healing non-bleeding ulcerations.  C  2) Elevated alkaline phosphatase and lipase- GGTP is normal thus elevation of alk phos not from liver, US and MRCP are negative for gallstones. Other differential dx includes DILI (patient had no new meds), ischemic cholangiopathy, infiltrative disease, congestive cholangiopathy, cholestasis of sepsis  3) history of colonic inflammation found on colonoscopy in [place], no other results known, possible colitis although loose stools could be due to gastrinoma      Recommendations:  -please obtain Q6H CBC while patient is still having active GI bleed  -f/u gastrin level  -PPI IV for 72 hours then PO  -Continue with LR hydration   -Lactose free diet  -Monitor and chart frequency of BMs  -Continue with Delzicol 800mg TID     [place], PGY-4  Gastroenterology Fellow  Pager x [phone] or [phone]  (After 5 pm or on weekends please page GI on call) Impression:  1) GIB - 2/2 ulcerations seen on push enteroscopy and EGD, with last scope on 3/16 with healing non-bleeding ulcerations.  C  2) Elevated alkaline phosphatase and lipase- GGTP is normal thus elevation of alk phos not from liver, US and MRCP are negative for gallstones. Other differential dx includes DILI (patient had no new meds), ischemic cholangiopathy, infiltrative disease, congestive cholangiopathy, cholestasis of sepsis  3) history of colonic inflammation found on colonoscopy in [place], no other results known, possible colitis although loose stools could be due to gastrinoma      Recommendations:  -please obtain Q6H CBC while patient is still having active GI bleed  -f/u gastrin level  -PPI IV for 72 hours then PO  -Continue with LR hydration   -Lactose free diet  -Monitor and chart frequency of BMs  -Continue with Delzicol 800mg TID   - please call GI back with any further questions    [place], PGY-4  Gastroenterology Fellow  Pager x [phone] or [phone]  (After 5 pm or on weekends please page GI on call) Impression:  1) GIB - 2/2 ulcerations seen on push enteroscopy and EGD, with last scope on 3/16 with healing non-bleeding ulcerations.  C  2) Elevated alkaline phosphatase and lipase- GGTP is normal thus elevation of alk phos not from liver, US and MRCP are negative for gallstones. Other differential dx includes DILI (patient had no new meds), ischemic cholangiopathy, infiltrative disease, congestive cholangiopathy, cholestasis of sepsis  3) history of colonic inflammation found on colonoscopy in [place], no other results known, possible colitis       Recommendations:  -monitor CBC  -Gastrin level is normal  -PPI IV for 72 hours then PO  -Continue with LR hydration   -Lactose free diet  -Monitor and chart frequency of BMs; if diarrhea continues, also obtain C diff, stool electrolytes, elastase, fecal calprotectin  -Continue with Delzicol 800mg TID; unclear if patient actually has IBD - would benefit from eventual repeat endsocopic evaluation  -Check HBsAg, HBsAb, HCV, alpha-1-antitrypsin to complete liver workup  - please call GI back with any further questions    [place], PGY-4  Gastroenterology Fellow  Pager x [phone] or [phone]  (After 5 pm or on weekends please page GI on call)

## 2019-03-16 NOTE — PROGRESS NOTE ADULT - ASSESSMENT
Assessment	  This is a 73M with history as above who presents to the hospital with c/o intermittent diarrhea for the past 6 months.    Active upper GI bleed:  Sec to PUD  IV PPI      Leukocytosis:  CBC  Hx of CLL  Ct chest reviewed  IV Ceftriaxone/Zithromax

## 2019-03-16 NOTE — PROGRESS NOTE ADULT - SUBJECTIVE AND OBJECTIVE BOX
Pt has been doing OK, No clinical or visible bleeding, no pain, no diarrhea and ROS is otherwise unremarkable.      Meds:  acetaminophen   Tablet .. 650 milliGRAM(s) Oral every 6 hours PRN  aluminum hydroxide/magnesium hydroxide/simethicone Suspension 30 milliLiter(s) Oral every 6 hours PRN  azithromycin  IVPB      azithromycin  IVPB 500 milliGRAM(s) IV Intermittent every 24 hours  cefTRIAXone   IVPB      cefTRIAXone   IVPB 1 Gram(s) IV Intermittent every 24 hours  lidocaine 1% Injectable 10 milliLiter(s) Local Injection once  mesalamine DR Capsule 800 milliGRAM(s) Oral three times a day  metoprolol tartrate 25 milliGRAM(s) Oral two times a day  pantoprazole Infusion 8 mG/Hr IV Continuous <Continuous>  phytonadione  IVPB 10 milliGRAM(s) IV Intermittent every 24 hours      Vital Signs Last 24 Hrs  T(C): 36.2 (16 Mar 2019 09:22), Max: 37 (16 Mar 2019 05:18)  T(F): 97.2 (16 Mar 2019 09:22), Max: 98.6 (16 Mar 2019 05:18)  HR: 78 (16 Mar 2019 09:22) (78 - 99)  BP: 131/63 (16 Mar 2019 09:22) (112/50 - 132/64)  BP(mean): --  RR: 18 (16 Mar 2019 09:22) (16 - 18)  SpO2: 100% (16 Mar 2019 09:22) (96% - 100%)                          9.1    24.06 )-----------( 49       ( 16 Mar 2019 06:39 )             29.8       03-16    147<H>  |  119<H>  |  22  ----------------------------<  69<L>  3.5   |  13<L>  |  1.45<H>    Ca    6.9<L>      16 Mar 2019 06:39  Phos  3.0     03-15  Mg     1.6     03-15    TPro  5.5<L>  /  Alb  1.6<L>  /  TBili  0.4  /  DBili  x   /  AST  21  /  ALT  16  /  AlkPhos  250<H>  03-16              PT/INR - ( 16 Mar 2019 06:39 )   PT: 20.6 SEC;   INR: 1.82          PTT - ( 14 Mar 2019 15:40 )  PTT:42.5 SEC    Flow cytometry: TM Interpretation:   Flow Cytometry Final Report  ________________________________________________________________________  Specimen: Peripheral Blood  Collected: 03/13/2019 18:39  Received: 03/13/2019 18:39  Processed:03/13/2019 19:30  Reported: 03/15/2019 19:10  Accession #: [id number]  Surgical Pathology Number:  ________________________________________________________________________  CLINICAL DATA:  _______________________________________________________________________  DIAGNOSIS:  Peripheral blood:   - The immunophenotypic findings show no diagnostic abnormalities and increased monocytes.  Please see interpretation.    INTERPRETATION:  MORPHOLOGY:  SMEAR: Neutrophils and increased monocytes    IMMUNOPHENOTYPE: Lymphocytes (5% of cells): Heterogeneous population of T-cells (with normal CD4 to  CD8 ratio and rare/absent B-cells.  There is no increase CD34/ positive cells. Monocytes are  increased (7% of cells) with no aberrancy. Correlation with clinical and therapeutic history is  recommended.    _____________________________________________________________________  Viability ................. 98 %    Values reported are based on the lymphocyte gate. (Bright CD45 positive; low side scatter, low  forward scatter).  5 % of cells.    CD45 .......... 100 %  CD2 ........... 95 %  CD3 ........... 70 %  CD5 ........... 69 %  CD7 ........... 85 %  CD4 ........... 52 %  CD8 ........... 17 %  CD16 .......... 12 %  CD56........... 19 %  CD57 .......... 26 %  CD10 .......... 1 %  CD19 .......... 2 %  CD20 .......... 1 %  CD23 .......... 1 %  FMC-7 ......... 2 %  CD19/kappa ......... 1 %  CD19/lambda ........ 1 %  HLA-DR ........ 11 %  CD38 .......... 11 %    Results reported are based on an open gate.    CD45 .......... 100 %  CD14 .......... 7 %  HLA-DR ........ 66 %  CD34 .......... < 1 %   ......... < 1 %  CD11b ......... 94 %  CD13 .......... 90 %  CD15 .......... 78 %  CD33 .......... 78 %        Verified By: Paula Winters M.D., M.D.  (Electronic Signature)    This test was developed and its performance characteristics determined by the Flow Cytometry  Laboratory at [facility]. It has not been cleared or approved by the U.S.  Food and Drug Administration.  The FDA has determined that such clearance or approval is not necessary. This test is used for  clinical purposes. It should not be regarded as investigational or for research. This laboratory is  certified under the Clinical Laboratory Improvement Amendment of 1988 ("CLIA") as qualified to  perform high complexity clinical testing. (03.13.19 @ 18:39)    CT chest: IMPRESSION:     *  Tracheal wall thickening, centrilobular and tree-in-bud micronodules   predominantly within the upper lungs suggestive of   bronchitis/bronchiolitis.    *  Interlobular septal thickening and patchy groundglass opacities, trace   bilateral pleural effusions and anasarca suggestive of superimposed   pulmonary edema.    *  Ascites new from 3/4/2019.    *  Narrowing of bilateral main bronchi which may represent bronchomalacia.              CHRISTIANO ARAYA M.D., RADIOLOGY RESIDENT  This document has been electronically signed.  YAMILE SÁNCHEZ M.D., ATTENDING RADIOLOGIST  This document has been electronically signed. Mar 15 2019 10:39AM      EGD: Impression:          - Normal esophagus.                       - No specimens collected.  Recommendation:      - Return [facility] for [facility].                       - Monitor hemoglobin.                       - Transfusae as needed.                       - Await pathology results.                                                                                   Attending Participation:       I was present and participated during the entire procedure, including        non-key portions.                                                                                     _____________________  TIMOTHY RUBIO MD  3/15/2019 2:20:32 PM  This report has been signed electronically.  Number of Addenda: 0    Note Initiated On: 3/15/2019 1:13 PM

## 2019-03-16 NOTE — PROGRESS NOTE ADULT - ASSESSMENT
73 Male w CLL (not on therapy), Anemia, and HTN a/w  persistent diarrhea, and suspected GIB, Renal following for PEDRO and hypernatremia       PEDRO (acute kidney injury)  PEDRO may be prerenal azotemia v ATN.   No evidence of obstruction on abd CT.   [place] UA noted.  off IVF, given significant third spacing.  Hypernatremia- sr Na improved, trending up again   s/p IVF resuscitation. Given signifncant edema, and pulm edema,off IVF    Anemia, GIB- on PPI drip.    labs, chart reviewed  Encourage increase water intake as tolerated  low Na in diet   monitor  BMP daily and u/o    avoid ACEi/ARB/NSAIDs/Nephrotoxics.    f/u w/GI, hem

## 2019-03-16 NOTE — PROGRESS NOTE ADULT - SUBJECTIVE AND OBJECTIVE BOX
Chief Complaint:  Patient is a 73y old  Male who presents with a chief complaint of Diarrhea (15 Mar 2019 17:20)      Interval Events:   Patient had upper endoscopy for GIB yesterday.  Non-bleeding ulcers seen.  No repeat hemoglobin since procedure.      Allergies:  No Known Allergies      Hospital Medications:  acetaminophen   Tablet .. 650 milliGRAM(s) Oral every 6 hours PRN  aluminum hydroxide/magnesium hydroxide/simethicone Suspension 30 milliLiter(s) Oral every 6 hours PRN  azithromycin  IVPB      azithromycin  IVPB 500 milliGRAM(s) IV Intermittent every 24 hours  cefTRIAXone   IVPB      cefTRIAXone   IVPB 1 Gram(s) IV Intermittent every 24 hours  lidocaine 1% Injectable 10 milliLiter(s) Local Injection once  mesalamine DR Capsule 800 milliGRAM(s) Oral three times a day  metoprolol tartrate 25 milliGRAM(s) Oral two times a day  pantoprazole Infusion 8 mG/Hr IV Continuous <Continuous>  phytonadione  IVPB 10 milliGRAM(s) IV Intermittent every 24 hours      PMHX/PSHX:  Anemia, unspecified type  Essential hypertension  CLL (chronic lymphocytic leukemia)  No significant past surgical history      Family history:  Family history of myocardial infarction (Father, Mother, Sibling)          PHYSICAL EXAM:     GENERAL:  Appears stated age, well-groomed, well-nourished, no distress  HEENT:  NC/AT,  conjunctivae clear, sclera -anicteric  CHEST:  Full & symmetric excursion, no increased  HEART:  Regular rhythm  ABDOMEN:  Soft, non-tender, non-distended, normoactive bowel sounds,  no masses ,no hepato-splenomegaly,   EXTREMITIES:  no cyanosis,clubbing or edema  SKIN:  No rash/erythema/ecchymoses/petechiae/wounds/abscess/warm/dry  NEURO:  Alert, oriented    Vital Signs:  Vital Signs Last 24 Hrs  T(C): 37 (16 Mar 2019 05:18), Max: 37 (16 Mar 2019 05:18)  T(F): 98.6 (16 Mar 2019 05:18), Max: 98.6 (16 Mar 2019 05:18)  HR: 87 (16 Mar 2019 05:18) (85 - 99)  BP: 132/64 (16 Mar 2019 05:18) (112/50 - 141/75)  BP(mean): --  RR: 16 (16 Mar 2019 05:18) (16 - 18)  SpO2: 100% (16 Mar 2019 05:18) (96% - 100%)  Daily Height in cm: 160.02 (15 Mar 2019 10:58)    Daily     LABS:                        10.2   29.09 )-----------( 67       ( 15 Mar 2019 05:30 )             32.8     03-15    145  |  117<H>  |  26<H>  ----------------------------<  71  3.9   |  14<L>  |  1.59<H>    Ca    7.8<L>      15 Mar 2019 05:30  Phos  3.0     03-15  Mg     1.6     03-15    TPro  5.0<L>  /  Alb  1.7<L>  /  TBili  0.4  /  DBili  x   /  AST  15  /  ALT  21  /  AlkPhos  259<H>  03-14    LIVER FUNCTIONS - ( 14 Mar 2019 15:40 )  Alb: 1.7 g/dL / Pro: 5.0 g/dL / ALK PHOS: 259 u/L / ALT: 21 u/L / AST: 15 u/L / GGT: x           PT/INR - ( 15 Mar 2019 05:30 )   PT: 19.4 SEC;   INR: 1.67          PTT - ( 14 Mar 2019 15:40 )  PTT:42.5 SEC        Imaging: Chief Complaint:  Patient is a 73y old  Male who presents with a chief complaint of Diarrhea (15 Mar 2019 17:20)      Interval Events:   Patient had upper endoscopy for GIB yesterday.  Non-bleeding ulcers seen.  No repeat hemoglobin since procedure.  No bleeding reported since last night by nursing.    Allergies:  No Known Allergies      Hospital Medications:  acetaminophen   Tablet .. 650 milliGRAM(s) Oral every 6 hours PRN  aluminum hydroxide/magnesium hydroxide/simethicone Suspension 30 milliLiter(s) Oral every 6 hours PRN  azithromycin  IVPB      azithromycin  IVPB 500 milliGRAM(s) IV Intermittent every 24 hours  cefTRIAXone   IVPB      cefTRIAXone   IVPB 1 Gram(s) IV Intermittent every 24 hours  lidocaine 1% Injectable 10 milliLiter(s) Local Injection once  mesalamine DR Capsule 800 milliGRAM(s) Oral three times a day  metoprolol tartrate 25 milliGRAM(s) Oral two times a day  pantoprazole Infusion 8 mG/Hr IV Continuous <Continuous>  phytonadione  IVPB 10 milliGRAM(s) IV Intermittent every 24 hours      PMHX/PSHX:  Anemia, unspecified type  Essential hypertension  CLL (chronic lymphocytic leukemia)  No significant past surgical history      Family history:  Family history of myocardial infarction (Father, Mother, Sibling)          PHYSICAL EXAM:     GENERAL:  Appears stated age, well-groomed, well-nourished, no distress  HEENT:  NC/AT,  conjunctivae clear, sclera -anicteric  CHEST:  Full & symmetric excursion, no increased  HEART:  Regular rhythm  ABDOMEN:  Soft, non-tender, non-distended, normoactive bowel sounds,  no masses ,no hepato-splenomegaly,   EXTREMITIES:  no cyanosis,clubbing or edema  SKIN:  No rash/erythema/ecchymoses/petechiae/wounds/abscess/warm/dry  NEURO:  Alert, oriented    Vital Signs:  Vital Signs Last 24 Hrs  T(C): 37 (16 Mar 2019 05:18), Max: 37 (16 Mar 2019 05:18)  T(F): 98.6 (16 Mar 2019 05:18), Max: 98.6 (16 Mar 2019 05:18)  HR: 87 (16 Mar 2019 05:18) (85 - 99)  BP: 132/64 (16 Mar 2019 05:18) (112/50 - 141/75)  BP(mean): --  RR: 16 (16 Mar 2019 05:18) (16 - 18)  SpO2: 100% (16 Mar 2019 05:18) (96% - 100%)  Daily Height in cm: 160.02 (15 Mar 2019 10:58)    Daily     LABS:                        10.2   29.09 )-----------( 67       ( 15 Mar 2019 05:30 )             32.8     03-15    145  |  117<H>  |  26<H>  ----------------------------<  71  3.9   |  14<L>  |  1.59<H>    Ca    7.8<L>      15 Mar 2019 05:30  Phos  3.0     03-15  Mg     1.6     03-15    TPro  5.0<L>  /  Alb  1.7<L>  /  TBili  0.4  /  DBili  x   /  AST  15  /  ALT  21  /  AlkPhos  259<H>  03-14    LIVER FUNCTIONS - ( 14 Mar 2019 15:40 )  Alb: 1.7 g/dL / Pro: 5.0 g/dL / ALK PHOS: 259 u/L / ALT: 21 u/L / AST: 15 u/L / GGT: x           PT/INR - ( 15 Mar 2019 05:30 )   PT: 19.4 SEC;   INR: 1.67          PTT - ( 14 Mar 2019 15:40 )  PTT:42.5 SEC

## 2019-03-17 LAB
ALBUMIN SERPL ELPH-MCNC: 1.6 G/DL — LOW (ref 3.3–5)
ALBUMIN SERPL ELPH-MCNC: 1.9 G/DL — LOW (ref 3.3–5)
ALP SERPL-CCNC: 261 U/L — HIGH (ref 40–120)
ALP SERPL-CCNC: 290 U/L — HIGH (ref 40–120)
ALT FLD-CCNC: 19 U/L — SIGNIFICANT CHANGE UP (ref 4–41)
ALT FLD-CCNC: 22 U/L — SIGNIFICANT CHANGE UP (ref 4–41)
ANION GAP SERPL CALC-SCNC: 11 MMO/L — SIGNIFICANT CHANGE UP (ref 7–14)
ANION GAP SERPL CALC-SCNC: 11 MMO/L — SIGNIFICANT CHANGE UP (ref 7–14)
AST SERPL-CCNC: 24 U/L — SIGNIFICANT CHANGE UP (ref 4–40)
AST SERPL-CCNC: 61 U/L — HIGH (ref 4–40)
BASOPHILS # BLD AUTO: 0.04 K/UL — SIGNIFICANT CHANGE UP (ref 0–0.2)
BASOPHILS NFR BLD AUTO: 0.3 % — SIGNIFICANT CHANGE UP (ref 0–2)
BILIRUB SERPL-MCNC: 0.5 MG/DL — SIGNIFICANT CHANGE UP (ref 0.2–1.2)
BILIRUB SERPL-MCNC: 0.5 MG/DL — SIGNIFICANT CHANGE UP (ref 0.2–1.2)
BUN SERPL-MCNC: 21 MG/DL — SIGNIFICANT CHANGE UP (ref 7–23)
BUN SERPL-MCNC: 21 MG/DL — SIGNIFICANT CHANGE UP (ref 7–23)
CALCIUM SERPL-MCNC: 7.3 MG/DL — LOW (ref 8.4–10.5)
CALCIUM SERPL-MCNC: 7.7 MG/DL — LOW (ref 8.4–10.5)
CHLORIDE SERPL-SCNC: 115 MMOL/L — HIGH (ref 98–107)
CHLORIDE SERPL-SCNC: 119 MMOL/L — HIGH (ref 98–107)
CO2 SERPL-SCNC: 14 MMOL/L — LOW (ref 22–31)
CO2 SERPL-SCNC: 16 MMOL/L — LOW (ref 22–31)
CREAT SERPL-MCNC: 1.4 MG/DL — HIGH (ref 0.5–1.3)
CREAT SERPL-MCNC: 1.5 MG/DL — HIGH (ref 0.5–1.3)
EOSINOPHIL # BLD AUTO: 0.92 K/UL — HIGH (ref 0–0.5)
EOSINOPHIL NFR BLD AUTO: 6.1 % — HIGH (ref 0–6)
GLUCOSE SERPL-MCNC: 106 MG/DL — HIGH (ref 70–99)
GLUCOSE SERPL-MCNC: 84 MG/DL — SIGNIFICANT CHANGE UP (ref 70–99)
HCT VFR BLD CALC: 35.6 % — LOW (ref 39–50)
HGB BLD-MCNC: 11 G/DL — LOW (ref 13–17)
IMM GRANULOCYTES NFR BLD AUTO: 3.5 % — HIGH (ref 0–1.5)
INR BLD: 1.72 — HIGH (ref 0.88–1.17)
LYMPHOCYTES # BLD AUTO: 1.54 K/UL — SIGNIFICANT CHANGE UP (ref 1–3.3)
LYMPHOCYTES # BLD AUTO: 10.2 % — LOW (ref 13–44)
MCHC RBC-ENTMCNC: 28.7 PG — SIGNIFICANT CHANGE UP (ref 27–34)
MCHC RBC-ENTMCNC: 30.9 % — LOW (ref 32–36)
MCV RBC AUTO: 93 FL — SIGNIFICANT CHANGE UP (ref 80–100)
MONOCYTES # BLD AUTO: 2.94 K/UL — HIGH (ref 0–0.9)
MONOCYTES NFR BLD AUTO: 19.5 % — HIGH (ref 2–14)
NEUTROPHILS # BLD AUTO: 9.07 K/UL — HIGH (ref 1.8–7.4)
NEUTROPHILS NFR BLD AUTO: 60.4 % — SIGNIFICANT CHANGE UP (ref 43–77)
NRBC # FLD: 0.09 K/UL — LOW (ref 25–125)
PLATELET # BLD AUTO: 57 K/UL — LOW (ref 150–400)
PMV BLD: SIGNIFICANT CHANGE UP FL (ref 7–13)
POTASSIUM SERPL-MCNC: 3.5 MMOL/L — SIGNIFICANT CHANGE UP (ref 3.5–5.3)
POTASSIUM SERPL-MCNC: 6.1 MMOL/L — HIGH (ref 3.5–5.3)
POTASSIUM SERPL-SCNC: 3.5 MMOL/L — SIGNIFICANT CHANGE UP (ref 3.5–5.3)
POTASSIUM SERPL-SCNC: 6.1 MMOL/L — HIGH (ref 3.5–5.3)
PROT SERPL-MCNC: 6 G/DL — SIGNIFICANT CHANGE UP (ref 6–8.3)
PROT SERPL-MCNC: 6.1 G/DL — SIGNIFICANT CHANGE UP (ref 6–8.3)
PROTHROM AB SERPL-ACNC: 19.4 SEC — HIGH (ref 9.8–13.1)
RBC # BLD: 3.83 M/UL — LOW (ref 4.2–5.8)
RBC # FLD: 21.3 % — HIGH (ref 10.3–14.5)
SODIUM SERPL-SCNC: 140 MMOL/L — SIGNIFICANT CHANGE UP (ref 135–145)
SODIUM SERPL-SCNC: 146 MMOL/L — HIGH (ref 135–145)
WBC # BLD: 15.04 K/UL — HIGH (ref 3.8–10.5)
WBC # FLD AUTO: 15.04 K/UL — HIGH (ref 3.8–10.5)

## 2019-03-17 RX ADMIN — Medication 800 MILLIGRAM(S): at 05:47

## 2019-03-17 RX ADMIN — CEFTRIAXONE 100 GRAM(S): 500 INJECTION, POWDER, FOR SOLUTION INTRAMUSCULAR; INTRAVENOUS at 17:47

## 2019-03-17 RX ADMIN — Medication 800 MILLIGRAM(S): at 21:27

## 2019-03-17 RX ADMIN — Medication 25 MILLIGRAM(S): at 05:47

## 2019-03-17 RX ADMIN — PANTOPRAZOLE SODIUM 10 MG/HR: 20 TABLET, DELAYED RELEASE ORAL at 04:20

## 2019-03-17 RX ADMIN — Medication 800 MILLIGRAM(S): at 12:43

## 2019-03-17 RX ADMIN — Medication 25 MILLIGRAM(S): at 17:47

## 2019-03-17 RX ADMIN — AZITHROMYCIN 250 MILLIGRAM(S): 500 TABLET, FILM COATED ORAL at 10:35

## 2019-03-17 NOTE — PROGRESS NOTE ADULT - SUBJECTIVE AND OBJECTIVE BOX
Pt is feeling better, still has slight diarrhea, no pain, no visible bleeding, no fevers and rest of the ROS unremarkable. 3 family members by the bed side.      Meds:  acetaminophen   Tablet .. 650 milliGRAM(s) Oral every 6 hours PRN  aluminum hydroxide/magnesium hydroxide/simethicone Suspension 30 milliLiter(s) Oral every 6 hours PRN  azithromycin  IVPB      azithromycin  IVPB 500 milliGRAM(s) IV Intermittent every 24 hours  cefTRIAXone   IVPB      cefTRIAXone   IVPB 1 Gram(s) IV Intermittent every 24 hours  lidocaine 1% Injectable 10 milliLiter(s) Local Injection once  mesalamine DR Capsule 800 milliGRAM(s) Oral three times a day  metoprolol tartrate 25 milliGRAM(s) Oral two times a day  pantoprazole Infusion 8 mG/Hr IV Continuous <Continuous>      Vital Signs Last 24 Hrs  T(C): 36.3 (17 Mar 2019 08:54), Max: 36.9 (16 Mar 2019 17:47)  T(F): 97.3 (17 Mar 2019 08:54), Max: 98.5 (16 Mar 2019 17:47)  HR: 69 (17 Mar 2019 08:54) (69 - 86)  BP: 128/59 (17 Mar 2019 08:54) (94/66 - 135/64)  BP(mean): --  RR: 18 (17 Mar 2019 08:54) (17 - 18)  SpO2: 99% (17 Mar 2019 08:54) (99% - 100%)                          11.0   15.04 )-----------( 57       ( 17 Mar 2019 10:10 )             35.6       03-17    146<H>  |  119<H>  |  21  ----------------------------<  106<H>  3.5   |  16<L>  |  1.50<H>    Ca    7.7<L>      17 Mar 2019 10:10    TPro  6.1  /  Alb  1.9<L>  /  TBili  0.5  /  DBili  x   /  AST  24  /  ALT  22  /  AlkPhos  290<H>  03-17              PT/INR - ( 17 Mar 2019 10:10 )   PT: 19.4 SEC;   INR: 1.72

## 2019-03-17 NOTE — PROGRESS NOTE ADULT - SUBJECTIVE AND OBJECTIVE BOX
Patient is a 73y old  Male who presents with a chief complaint of Diarrhea (17 Mar 2019 16:58)      SUBJECTIVE / OVERNIGHT EVENTS:    Events noted.  CONSTITUTIONAL: No fever,  or fatigue  RESPIRATORY: No cough, wheezing,  No shortness of breath  CARDIOVASCULAR: No chest pain, palpitations, dizziness, or leg swelling  GASTROINTESTINAL: No abdominal or epigastric pain. No nausea, vomiting.  NEUROLOGICAL: No headaches,     MEDICATIONS  (STANDING):  azithromycin  IVPB      azithromycin  IVPB 500 milliGRAM(s) IV Intermittent every 24 hours  cefTRIAXone   IVPB      cefTRIAXone   IVPB 1 Gram(s) IV Intermittent every 24 hours  lidocaine 1% Injectable 10 milliLiter(s) Local Injection once  mesalamine DR Capsule 800 milliGRAM(s) Oral three times a day  metoprolol tartrate 25 milliGRAM(s) Oral two times a day  pantoprazole Infusion 8 mG/Hr (10 mL/Hr) IV Continuous <Continuous>    MEDICATIONS  (PRN):  acetaminophen   Tablet .. 650 milliGRAM(s) Oral every 6 hours PRN Moderate Pain (4 - 6)  aluminum hydroxide/magnesium hydroxide/simethicone Suspension 30 milliLiter(s) Oral every 6 hours PRN Dyspepsia        CAPILLARY BLOOD GLUCOSE        I&O's Summary    16 Mar 2019 07:01  -  17 Mar 2019 07:00  --------------------------------------------------------  IN: 0 mL / OUT: 500 mL / NET: -500 mL        PHYSICAL EXAM:  GENERAL: NAD  NECK: Supple, No JVD  CHEST/LUNG: Clear to auscultation bilaterally; No wheezing.  HEART: Regular rate and rhythm; No murmurs, rubs, or gallops  ABDOMEN: Soft, Nontender, Nondistended; Bowel sounds present  EXTREMITIES:   No edema  NEUROLOGY: AAO X 3      LABS:                        11.0   15.04 )-----------( 57       ( 17 Mar 2019 10:10 )             35.6     03-17    146<H>  |  119<H>  |  21  ----------------------------<  106<H>  3.5   |  16<L>  |  1.50<H>    Ca    7.7<L>      17 Mar 2019 10:10    TPro  6.1  /  Alb  1.9<L>  /  TBili  0.5  /  DBili  x   /  AST  24  /  ALT  22  /  AlkPhos  290<H>  03-17    PT/INR - ( 17 Mar 2019 10:10 )   PT: 19.4 SEC;   INR: 1.72                  CAPILLARY BLOOD GLUCOSE        03-15 @ 13:42  Culture-urine --  Culture results --  method type --  Organism --  Organism Identification --  Specimen source FECES  03-14 @ 07:34  Culture-urine --  Culture results --  method type --  Organism --  Organism Identification --  Specimen source BLOOD  03-14 @ 05:02  Culture-urine --  Culture results --  method type --  Organism --  Organism Identification --  Specimen source BLOOD PERIPHERAL  03-13 @ 23:42  Culture-urine   COLONY COUNT: > = 100,000 CFU/ML  Culture results --  method type NEGATIVE FERNANDO 43  Organism Staphylococcus sp.,coag neg  COLONY COUNT: LESS THAN 10,000 CFU/ML  Organism Identification E.COLI ESBL POSITIVE  Staphylococcus sp.,coag neg  Specimen source URINE MIDSTREAM           03-15 @ 13:42  Culture blood --  Culture results --  Gram stain --  Gram stain blood --  Method type --  Organism --  Organism identification --  Specimen source FECES   03-14 @ 07:34  Culture blood   NO ORGANISMS ISOLATED  NO ORGANISMS ISOLATED AT 72 HRS.  Culture results --  Gram stain --  Gram stain blood --  Method type --  Organism --  Organism identification --  Specimen source BLOOD   03-14 @ 05:02  Culture blood   NO ORGANISMS ISOLATED  NO ORGANISMS ISOLATED AT 72 HRS.  Culture results --  Gram stain --  Gram stain blood --  Method type --  Organism --  Organism identification --  Specimen source BLOOD PERIPHERAL   03-13 @ 23:42  Culture blood --  Culture results --  Gram stain --  Gram stain blood --  Method type NEGATIVE FERNANDO 43  Organism Staphylococcus sp.,coag neg  COLONY COUNT: LESS THAN 10,000 CFU/ML  Organism identification E.COLI ESBL POSITIVE  Staphylococcus sp.,coag neg  Specimen source URINE MIDSTREAM      RADIOLOGY & ADDITIONAL TESTS:    Imaging Personally Reviewed:    Consultant(s) Notes Reviewed:      Care Discussed with Consultants/Other Providers:

## 2019-03-17 NOTE — PROGRESS NOTE ADULT - ASSESSMENT
Assessment	  This is a 73M with history as above who presents to the hospital with c/o intermittent diarrhea for the past 6 months.    Active upper GI bleed:  Sec to PUD  IV PPI      Leukocytosis:  WBC improving  Hx of CLL  IV Ceftriaxone/Zithromax

## 2019-03-17 NOTE — PROGRESS NOTE ADULT - ASSESSMENT
73M with a diagnosis of CLL for a year, never needed treatment, here with chronic diarrhea causing weight loss, has anemia, thrombocytopenia and acute increase in WBC, mainly PMNs and left shift (suggesting infection or inflammatory response and not CLL), will recommend:  -  no current evidence of CLL on PB flow cytometry - discussed the results of PB and peritoneal fluid flow with the family members and the relevance of these findings detailed, all questions answered.   - monitor CBC/diff  - f/u ID and GI recommendations  - on Abx, zithromax added by ID  - obtain FISH on PB - sent out  - anemia w/u shows no evidence of iron def, b12 or folate def or hemolysis - anemia of chronic disease, renal insufficiency and from acute blood loss - keep hgb ~ 8 with transfusions  - vit K for elevated INR and aPTT, FFPs as needed, monitor INR, aPTT.  - keep plts ~ 50K when bleeding  - all other supportive rx  - all other supportive Rx as per medicine, GI and ID.

## 2019-03-18 LAB
ALBUMIN SERPL ELPH-MCNC: 1.6 G/DL — LOW (ref 3.3–5)
ALP SERPL-CCNC: 250 U/L — HIGH (ref 40–120)
ALT FLD-CCNC: 22 U/L — SIGNIFICANT CHANGE UP (ref 4–41)
ANION GAP SERPL CALC-SCNC: 10 MMO/L — SIGNIFICANT CHANGE UP (ref 7–14)
AST SERPL-CCNC: 25 U/L — SIGNIFICANT CHANGE UP (ref 4–40)
BASOPHILS # BLD AUTO: 0.02 K/UL — SIGNIFICANT CHANGE UP (ref 0–0.2)
BASOPHILS NFR BLD AUTO: 0.1 % — SIGNIFICANT CHANGE UP (ref 0–2)
BILIRUB SERPL-MCNC: 0.4 MG/DL — SIGNIFICANT CHANGE UP (ref 0.2–1.2)
BUN SERPL-MCNC: 19 MG/DL — SIGNIFICANT CHANGE UP (ref 7–23)
CALCIUM SERPL-MCNC: 7.5 MG/DL — LOW (ref 8.4–10.5)
CHLORIDE SERPL-SCNC: 119 MMOL/L — HIGH (ref 98–107)
CO2 SERPL-SCNC: 16 MMOL/L — LOW (ref 22–31)
CREAT SERPL-MCNC: 1.42 MG/DL — HIGH (ref 0.5–1.3)
EOSINOPHIL # BLD AUTO: 0.84 K/UL — HIGH (ref 0–0.5)
EOSINOPHIL NFR BLD AUTO: 5.9 % — SIGNIFICANT CHANGE UP (ref 0–6)
GLUCOSE SERPL-MCNC: 86 MG/DL — SIGNIFICANT CHANGE UP (ref 70–99)
HCT VFR BLD CALC: 29.3 % — LOW (ref 39–50)
HGB BLD-MCNC: 9.3 G/DL — LOW (ref 13–17)
IMM GRANULOCYTES NFR BLD AUTO: 2.5 % — HIGH (ref 0–1.5)
INR BLD: 1.78 — HIGH (ref 0.88–1.17)
LYMPHOCYTES # BLD AUTO: 1.87 K/UL — SIGNIFICANT CHANGE UP (ref 1–3.3)
LYMPHOCYTES # BLD AUTO: 13.1 % — SIGNIFICANT CHANGE UP (ref 13–44)
MANUAL SMEAR VERIFICATION: SIGNIFICANT CHANGE UP
MCHC RBC-ENTMCNC: 28.7 PG — SIGNIFICANT CHANGE UP (ref 27–34)
MCHC RBC-ENTMCNC: 31.7 % — LOW (ref 32–36)
MCV RBC AUTO: 90.4 FL — SIGNIFICANT CHANGE UP (ref 80–100)
MONOCYTES # BLD AUTO: 4.17 K/UL — HIGH (ref 0–0.9)
MONOCYTES NFR BLD AUTO: 29.2 % — HIGH (ref 2–14)
NEUTROPHILS # BLD AUTO: 7.04 K/UL — SIGNIFICANT CHANGE UP (ref 1.8–7.4)
NEUTROPHILS NFR BLD AUTO: 49.2 % — SIGNIFICANT CHANGE UP (ref 43–77)
NRBC # FLD: 0.04 K/UL — LOW (ref 25–125)
PLATELET # BLD AUTO: 58 K/UL — LOW (ref 150–400)
PMV BLD: SIGNIFICANT CHANGE UP FL (ref 7–13)
POTASSIUM SERPL-MCNC: 3.2 MMOL/L — LOW (ref 3.5–5.3)
POTASSIUM SERPL-SCNC: 3.2 MMOL/L — LOW (ref 3.5–5.3)
PROT SERPL-MCNC: 5.4 G/DL — LOW (ref 6–8.3)
PROTHROM AB SERPL-ACNC: 20.1 SEC — HIGH (ref 9.8–13.1)
RBC # BLD: 3.24 M/UL — LOW (ref 4.2–5.8)
RBC # FLD: 21.2 % — HIGH (ref 10.3–14.5)
SODIUM SERPL-SCNC: 145 MMOL/L — SIGNIFICANT CHANGE UP (ref 135–145)
SURGICAL PATHOLOGY STUDY: SIGNIFICANT CHANGE UP
WBC # BLD: 14.3 K/UL — HIGH (ref 3.8–10.5)
WBC # FLD AUTO: 14.3 K/UL — HIGH (ref 3.8–10.5)

## 2019-03-18 PROCEDURE — 99232 SBSQ HOSP IP/OBS MODERATE 35: CPT | Mod: GC

## 2019-03-18 RX ORDER — POTASSIUM CHLORIDE 20 MEQ
20 PACKET (EA) ORAL ONCE
Qty: 0 | Refills: 0 | Status: COMPLETED | OUTPATIENT
Start: 2019-03-18 | End: 2019-03-18

## 2019-03-18 RX ORDER — ERTAPENEM SODIUM 1 G/1
1000 INJECTION, POWDER, LYOPHILIZED, FOR SOLUTION INTRAMUSCULAR; INTRAVENOUS EVERY 24 HOURS
Qty: 0 | Refills: 0 | Status: DISCONTINUED | OUTPATIENT
Start: 2019-03-19 | End: 2019-03-21

## 2019-03-18 RX ORDER — ERTAPENEM SODIUM 1 G/1
1000 INJECTION, POWDER, LYOPHILIZED, FOR SOLUTION INTRAMUSCULAR; INTRAVENOUS ONCE
Qty: 0 | Refills: 0 | Status: COMPLETED | OUTPATIENT
Start: 2019-03-18 | End: 2019-03-18

## 2019-03-18 RX ORDER — ERTAPENEM SODIUM 1 G/1
INJECTION, POWDER, LYOPHILIZED, FOR SOLUTION INTRAMUSCULAR; INTRAVENOUS
Qty: 0 | Refills: 0 | Status: DISCONTINUED | OUTPATIENT
Start: 2019-03-18 | End: 2019-03-21

## 2019-03-18 RX ADMIN — Medication 25 MILLIGRAM(S): at 18:09

## 2019-03-18 RX ADMIN — Medication 800 MILLIGRAM(S): at 05:48

## 2019-03-18 RX ADMIN — AZITHROMYCIN 250 MILLIGRAM(S): 500 TABLET, FILM COATED ORAL at 11:29

## 2019-03-18 RX ADMIN — PANTOPRAZOLE SODIUM 10 MG/HR: 20 TABLET, DELAYED RELEASE ORAL at 00:33

## 2019-03-18 RX ADMIN — Medication 25 MILLIGRAM(S): at 05:48

## 2019-03-18 RX ADMIN — Medication 20 MILLIEQUIVALENT(S): at 16:44

## 2019-03-18 RX ADMIN — Medication 800 MILLIGRAM(S): at 13:02

## 2019-03-18 RX ADMIN — Medication 20 MILLIEQUIVALENT(S): at 10:57

## 2019-03-18 RX ADMIN — ERTAPENEM SODIUM 120 MILLIGRAM(S): 1 INJECTION, POWDER, LYOPHILIZED, FOR SOLUTION INTRAMUSCULAR; INTRAVENOUS at 12:30

## 2019-03-18 RX ADMIN — PANTOPRAZOLE SODIUM 10 MG/HR: 20 TABLET, DELAYED RELEASE ORAL at 16:45

## 2019-03-18 NOTE — CHART NOTE - NSCHARTNOTESELECT_GEN_ALL_CORE
pt complaining of pain/Event Note
Event Note
Event Note
Event Note/ADS NP
Event Note/Hgb 7.6
Event Note/procedure team consulted
Event Note/push EGD canceled
MALNUTRITION ALERT/Nutrition Services
Nutrition Services/Follow-Up
cards consulted/Event Note
s/w ID Dr. Marques/Event Note

## 2019-03-18 NOTE — PROVIDER CONTACT NOTE (OTHER) - DATE AND TIME:
07-Mar-2019 23:00
05-Mar-2019 06:47
07-Mar-2019 22:30
08-Mar-2019 07:35
11-Mar-2019 15:16
13-Mar-2019 09:42
18-Mar-2019 08:25

## 2019-03-18 NOTE — PROGRESS NOTE ADULT - ASSESSMENT
73 Male w CLL (not on therapy), Anemia, and HTN a/w  persistent diarrhea, and suspected GIB, Renal following for PEDRO and hypernatremia       PEDRO (acute kidney injury)- prerenal azotemia v ATN.   No evidence of obstruction on abd CT.   [place] UA noted.  off IVF, given significant third spacing.  Cr stable  M acidosis likely 2/2 PEDRO  Hypernatremia- sr Na improving   s/p IVF resuscitation. Given signifncant edema, and pulm edema, off IVF  Hypokalemia sr magnesium was 1.6 3/15  Anemia, GIB- on PPI drip.    labs, chart reviewed  agree w/repleting w/kcl 20meq po x1, give additional dose   Encourage increase water intake as tolerated  low Na in diet, inc K intake  monitor  BMP daily and u/o    rpt sr magnesium, goal around 2.0. replete prn ivpb   avoid ACEi/ARB/NSAIDs/Nephrotoxics.    f/u w/GI, hem

## 2019-03-18 NOTE — PROGRESS NOTE ADULT - ATTENDING COMMENTS
Thank you for the courtesy of the consultation,I would be available for any further discussion if needed.  Johny Lemus MD,FACC.  [address] [place], [place] [id number] [phone]
As above.    MRI/MRCP with and without contrast to assess pancreas and bile ducts  Other impression/recommendations as above.
I have seen and evaluated the patient with the GI Fellow and GI Team.  I agree with the findings, formulation and plan of care as documented in the GI fellow's note, except as noted.
As above    Impression:    #1.  Chronic diarrhea x more than 6 weeks, Mild proctocolitis on colonoscopy done in [place] recently, now also with CT/MRI duodenitis and proximal jejunitis on MRI    #2.  Elevated lipase and alk phos without evidence of choledocholithiasis on MRCP    #3.  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  Will review images with radiologist    #2.  Consider enteroscopy and biopsy    #3.  Await remaining stool studies.
I have seen and evaluated the patient with the GI Fellow and GI Team.  I agree with the findings, formulation and plan of care as documented in the GI fellow's note, except as noted.
As above, with following modifications:    CT and MRI abdomen demonstrate duodenitis of 3rd/4th portion and proximal jejunitis  Stenosis of the celiac artery on MRI reviewed with Dr. Torres (radiologist), thought not to be clinically significant.  May decrease rate of LR
I have seen and examined this patient with the GI fellow. Agree with above.    Josette Mendez MD  GI Attending, [facility]  Office: [phone]
I have seen and evaluated the patient with the GI Fellow and GI Team.  I agree with the findings, formulation and plan of care as documented in the GI fellow's note, except as noted.

## 2019-03-18 NOTE — PROGRESS NOTE ADULT - SUBJECTIVE AND OBJECTIVE BOX
[facility] - [place] / Benitez S /Teresita/ S Morales/ S [place]/ Giacomo Wallfeez / JOLIE Njeru  ---------------------------------------------------------------------------------------------------------------    Patient seen and examined bedside    Subjective and Objective: No overnight events, denied sob/D/V/urinary sxs. No complaints today. feeling better    Allergies: No Known Allergies      Hospital Medications:   MEDICATIONS  (STANDING):  azithromycin  IVPB      azithromycin  IVPB 500 milliGRAM(s) IV Intermittent every 24 hours  ertapenem  IVPB      lidocaine 1% Injectable 10 milliLiter(s) Local Injection once  mesalamine DR Capsule 800 milliGRAM(s) Oral three times a day  metoprolol tartrate 25 milliGRAM(s) Oral two times a day  pantoprazole Infusion 8 mG/Hr (10 mL/Hr) IV Continuous <Continuous>      VITALS:  T(F): 97.4 (19 @ 10:16), Max: 98.5 (19 @ 21:25)  HR: 80 (19 @ 10:16)  BP: 126/69 (19 @ 10:16)  RR: 17 (19 @ 10:16)  SpO2: 100% (19 @ 10:16)  Wt(kg): --    PHYSICAL EXAM:  Constitutional: NAD  HEENT: anicteric sclera, oropharynx clear  Neck: No JVD  Respiratory: CTAB, no wheezes, rales or rhonchi  Cardiovascular: S1, S2, RRR  Gastrointestinal: BS+, soft, NT/ND  Extremities: No cyanosis or clubbing. No peripheral edema  Neurological: A/O x 3, no focal deficits  Psychiatric: Normal mood, normal affect  : No CVA tenderness. No mata.   Skin: No rashes      LABS:      145  |  119<H>  |  19  ----------------------------<  86  3.2<L>   |  16<L>  |  1.42<H>    Ca    7.5<L>      18 Mar 2019 05:40    TPro  5.4<L>  /  Alb  1.6<L>  /  TBili  0.4  /  DBili      /  AST  25  /  ALT  22  /  AlkPhos  250<H>      Creatinine Trend: 1.42 <--, 1.50 <--, 1.40 <--, 1.45 <--, 1.59 <--, 1.47 <--, 1.68 <--, 1.39 <--, 1.32 <--, 1.36 <--, 1.25 <--                        9.3    14.30 )-----------( 58       ( 18 Mar 2019 05:40 )             29.3     Urine Studies:  Urinalysis Basic - ( 13 Mar 2019 16:50 )    Color: YELLOW / Appearance: Lt TURBID / S.017 / pH: 5.5  Gluc: NEGATIVE / Ketone: NEGATIVE  / Bili: NEGATIVE / Urobili: NORMAL   Blood: TRACE / Protein: 30 / Nitrite: NEGATIVE   Leuk Esterase: LARGE / RBC: 3-5 / WBC >50   Sq Epi: OCC / Non Sq Epi:  / Bacteria: MODERATE      Sodium, Random Urine: < 20 mmol/L ( @ 16:50)  Potassium, Random Urine: 47.6 mmol/L ( @ 16:50)  Chloride, Random Urine: 33 mmol/L ( @ 16:50)  Osmolality, Random Urine: 445 mosmo/kg ( @ 16:50)      RADIOLOGY & ADDITIONAL STUDIES:

## 2019-03-18 NOTE — PROGRESS NOTE ADULT - ASSESSMENT
Assessment	  This is a 73M with history as above who presents to the hospital with c/o intermittent diarrhea for the past 6 months.    Active upper GI bleed:  Sec to PUD  IV PPI    ESBL UTI:  IV Invanz  IV Zithromax Assessment	  This is a 73M with history as above who presents to the hospital with c/o intermittent diarrhea for the past 6 months.    Active upper GI bleed:  Sec to PUD  IV PPI    ESBL UTI:  IV Invanz  IV Zithromax    Dw family.

## 2019-03-18 NOTE — CHART NOTE - NSCHARTNOTEFT_GEN_A_CORE
Called by RN - pt was SOB with rest. 02 sat 100% on room air. Spoke to patient and family. Pt appears comfortable.     ROS - pt states feels SOB.     PE:  Neuro: A&O, follows commands  Lungs - clear to auscultation  CV: - edema, +distal pulses  GI: abd soft, non-tender,  non-distended      Vital Signs Last 24 Hrs  T(C): 36.9 (05 Mar 2019 12:56), Max: 36.9 (05 Mar 2019 12:56)  T(F): 98.4 (05 Mar 2019 12:56), Max: 98.4 (05 Mar 2019 12:56)  HR: 113 (05 Mar 2019 13:44) (94 - 113)  BP: 133/72 (05 Mar 2019 13:44) (125/71 - 141/68)  BP(mean): --  RR: 20 (05 Mar 2019 13:44) (15 - 20)  SpO2: 100% (05 Mar 2019 13:44) (100% - 100%)    -Pt tachycardic 120's, - K+3.3 - supplemented - follow up in am repeat K+  -STAT EKG  -will continue to monitor pt
NUTRITION SERVICES                                                                                  MALNUTRITION ALERT     Attention Health Care Provider: Upon nutritional assessment by the Registered Dietitian your patient was determined to meet criteria / has evidence of the following diagnosis/diagnoses:    [ ] Mild Protein Calorie Malnutrition   [ ] Moderate Protein Calorie Malnutrition   [x] Severe Protein Calorie Malnutrition   [ ] Unspecified Protein Calorie Malnutrition   [ ] Underweight / BMI <19  [ ] Morbid Obesity / BMI >40      By signing this assessment you are acknowledging the diagnosis/diagnoses.       PLAN OF CARE: Refer to Initial Dietitian Evaluation or Nutrition Follow-Up Documentation for Nutritional Recommendations.
Patient HR tachy 110-120, cards consulted- metoprolol 25mg BID started. echo ordered since yesterday, called to expedite today, did not get done, anesthesia will not do case till echo performed, notified GI. Push EGD canceled today, will re-eval for EGD as per GI.
Pt has been tachycardic, cards consulted Dr. Lemus, will f/u recs
Pt has moderate ascites seen on MRCP. procedure team consulted to evaluate for diagnostic paracentesis. coags ordered. will f/u with procedure team- d/w Patient
Source: Patient [x] Patient declined  services when offered and preferred to conduct interview in English. Medical record reviewed. Patient seen for length of stay nutrition follow-up.     Patient being followed by GI; "push enteroscopy and EGD, with last scope on 3/16 with healing non-bleeding ulcerations." Patient with diarrhea while in-house, + diarrhea on 3/17. Patient denies having diarrhea today. No nausea/vomiting. Eating small amounts of foods, appetite low. Encouraged PO intake as tolerated. Patient reports he will drink the Lactaid milk. Patient amenable to trying Ensure Enlive nutrition supplement to optimize PO intake.     Diet : Regular, Consistent Carbohydrate (no snacks), DASH/TLC (cholesterol and sodium restricted), Lactose Restricted   Patient reports [ ] nausea  [ ] vomiting [x] diarrhea [ ] constipation  [ ]chewing problems [ ] swallowing issues  [ ] other:   PO intake:  < 50% [ ] 50-75% [x]   % [ ]  other :    Current Weight: 52.8kg (3/15), 52.8kg (3/04)  % Weight Change: weight remains stable at this time    Pertinent Medications: MEDICATIONS  (STANDING):  azithromycin  IVPB      azithromycin  IVPB 500 milliGRAM(s) IV Intermittent every 24 hours  ertapenem  IVPB      ertapenem  IVPB 1000 milliGRAM(s) IV Intermittent once  lidocaine 1% Injectable 10 milliLiter(s) Local Injection once  mesalamine DR Capsule 800 milliGRAM(s) Oral three times a day  metoprolol tartrate 25 milliGRAM(s) Oral two times a day  pantoprazole Infusion 8 mG/Hr (10 mL/Hr) IV Continuous <Continuous>    MEDICATIONS  (PRN):  acetaminophen   Tablet .. 650 milliGRAM(s) Oral every 6 hours PRN Moderate Pain (4 - 6)  aluminum hydroxide/magnesium hydroxide/simethicone Suspension 30 milliLiter(s) Oral every 6 hours PRN Dyspepsia    Pertinent Labs:  03-18 Na145 mmol/L Glu 86 mg/dL K+ 3.2 mmol/L<L> Cr  1.42 mg/dL<H> BUN 19 mg/dL 03-15 Phos 3.0 mg/dL 03-18 Alb 1.6 g/dL<L>    Skin: No pressure ulcers/DTI noted per flowsheets.   No edema noted.    Estimated Needs:   [x] no change since previous assessment  [ ] recalculated:     Previous Nutrition Diagnosis:   [x] Malnutrition. severe  Nutrition Diagnosis is [x] ongoing  [ ] resolved [ ] not applicable     Interventions:   1. Recommend liberalizing diet to Regular, Low Sodium, Lactose Restricted given ongoing poor PO intake and diagnosis of severe malnutrition.   2. Recommend Ensure Enlive 240mls 2x daily (700kcal, 40g protein).  3. Please Encourage po intake, assist with meals and menu selections, provide alternatives PRN.      Monitoring and Evaluation:   1. Monitor weights, labs, BM's, skin integrity, p.o. intake.   2. Patient to meet > 75% estimated pro/kcal needs.   3. Tolerance to diet.   RD to remain available, Diane Justin RD, CDN Pager #[phone]
Spoke with Dr. Marques to order urine legionella and cover with azithromycin for now, ordered, will f/u urine legionella.
d/w GI ok to start clears
ok to start metoprolol d/w Dr. Lemus
patient repeat CBC showing Hgb 7.6, plan is for EGD with GI tomorrow, ordered 1 unit PRBC- discussed with heme Dr. Garcia who agreed with giving 1 unit PRBC. INR 2.1, Dr. Garcia ordered vitamin K x 3 days IV. Will f/u repeat labs in the am and keep T&S active, discussed plan with TERRIE Drake.
Notified by RN that patient HR has been 110-115 throughout the night. Pt complaining of pain, nurse unable to obtain further assessment due to language barrier. I saw patient at bedside, I had to wake patient up when I entered the room and used  phone for his primary language of sandovallam  ID # [id number]. Patient was not having chest pain, he stated he was having gas pain, I informed him that I would order mediation for his gas pain and also that we would perform an EKG to monitor his HR since it has been elevated. ordered CXR, EKG, CK and trops, will follow up and continue to monitor patient.     EGD called because pt scheduled for push endo today with possible biopsy, but since pt HR is tachycardic, they are pushing it off this later this afternoon to see if HR comes down.

## 2019-03-18 NOTE — PROVIDER CONTACT NOTE (OTHER) - SITUATION
Retaken 
Bright red bleeding per rectum observed by PCA
Elevated Heart rate
Note placed in chart from infectious disease practitioner stating patient is + for ESBL in the urine and needs to be on contact precautions. It appears that urine was sent on 3/13.
Patient 
Patient  this morning, patient  retaken, now complaining of new onset chest pain when reassessed patient
pt c/o chest pain

## 2019-03-18 NOTE — PROGRESS NOTE ADULT - ASSESSMENT
This is a 73M with history of CLL (not on therapy), Anemia, and HTN who presents to the hospital with complaints of persistent intermittent diarrhea since October of last year. Said that about a week prior to his trip to [facility] he started having significant diarrhea (5-7 episodes of loose stools, ?black stools as per patient at that time) and went to see a doctor. Was told that he likely had a viral illness and that it would improve over time. His diarrhea did improve and he went to [facility] but on his second day there he started having diarrhea again (states that he ate sweets from the [email or web address] that others in his family did not prior to the onset of his diarrhea in [place]). He started having intermittent diarrhea and went to a doctor in [facility] where he had a colonoscopy done which he states was negative for abnormal findings (pt not sure if he had a biopsy done during the colonoscopy). He was then given some  medications with minimal improvement in his symptoms. He continued to have intermittent diarrhea (said that he has 4-5 days of diarrhea a week) and was unable to get any relief in [facility]. Upon arriving back from [facility] he continued to have intermittent diarrhea and therefore presented to the ED for evaluation. Said that currently he had about 5 episodes of diarrhea today, describes it as yellowish in color, associated with generalized abdominal pain. Denies any hematochezia, melena, or BRBPR currently. No fevers/chills. He denies any other complaints at present.    On arrival to the ED, his vitals were T 98.8, P 97, /62, R 16, O2 sat 100% RA. His lab work was significant for leukocytosis (unknown baseline), normocytic anemia (unknown baseline), mild hyponatremia/hypokalemia/AG gap, elevated BUN/Cr, elevated alk phos, and elevated lipase. He was also noted to have a lactate of 2.8. His CXR showed a possible R basilar opacity but the patient was not complaining of any PNA type symptoms. His US abd was negative for any acute findings. He was given LR 1L. He was admitted to medicine. (03 Mar 2019 23:55)      HOSP COURSE:    CTap showed mild wall thickening of the duodenum extending of the adjacent mesenteric fat, suggesting underlying duodenitis.  EGD showed normal esophagus, non bleeding erosive gastropathy - biopsied (s/o gastrinoma), duodenitis and one oozing duodenal ulcer with no bleeding, Multiple non-bleeding duodenal ulcers,                    MRCP- No evidence of cholelithiasis or biliary obstruction.Hepatic steatosis.Mural thickening proximal small bowel consistent with enteritis. Moderate ascites.  GI is following, s/p push enteroscopy with biopsy  3/8 showing erosive gastritis and oozing duodenal ulcer.        Thus far  stool studies neg, including stool cx, O&P, GI pcr, C.diff, Giardia.   Malaria scrn (-). On 3/7 had diagnostic paracentesis.  WBC trending upwards.  s/p push enteroscopy with bx : erosive gastritis, oozing duodenal ulcer.  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    MICU consulted for  tachy/SOB.      ID consult called to evaluate leukocytosis.  Pt still with diarrhea.  Denies abd pain or epigastric pain.  No rash.  (+) nonproductive cough.  (+) wt loss.  No difficulty swallowing, no N/V.  c/o sore throat.  No thrush.  No f/c/r.  Pt has outside Heme/Onc, does not know baseline WBC.   Pt denies recent use of abx, no sick contacts, change in diet.          Problem/Plan - 1:    ·	Leukocytosis    - Pt with h/o CLL, likely immunocompromised, baseline cbc u/a.  Trend CBC with differential.  Now improving after starting abx for uti      - Check blood cultures, UA, Ucx.   UA (+) LE/pyuria.  UCx growing E.coli >100K and CNS <10K.  (+) ESBL, change to ertapenem (3/18).  Recommend 1 week course.  WBC improving.  Pt with recent h/o E.coli UTI and treated in [place].  Pt was hospitalized multiple times between Nov through Feb.  f/u Ucx    - Pt also with cough and phlegm production since EGD. CT chest with TIB pattern in upper lungs, and pulm edema.  Azithromycin added for atypical coverage.  Legionella Ag neg.  Complete 5 day course of azithro through 3/19    - Stool studies negative.  Send repeat stool O&P x 3 negative.  (test for Microsporidium, cyclospora, blastomyces. E.histolytica, mycobacterium)    - Check CMV pcr (-), Cmv IGg/IGM (prior exposure), histoplasma (-), HIV (neg), strongyloides (neg)    - Pt had repeat EGD - f/u pathology    Adeline [facility]  [phone]

## 2019-03-18 NOTE — PROGRESS NOTE ADULT - SUBJECTIVE AND OBJECTIVE BOX
Patient is a 73y old  Male who presents with a chief complaint of Diarrhea (18 Mar 2019 14:29)      SUBJECTIVE / OVERNIGHT EVENTS:    Events noted.  CONSTITUTIONAL: No fever,  or fatigue  RESPIRATORY: No cough, wheezing,  No shortness of breath  CARDIOVASCULAR: No chest pain, palpitations, dizziness, or leg swelling  GASTROINTESTINAL: No abdominal or epigastric pain. No nausea, vomiting.  NEUROLOGICAL: No headaches,     MEDICATIONS  (STANDING):  azithromycin  IVPB      azithromycin  IVPB 500 milliGRAM(s) IV Intermittent every 24 hours  ertapenem  IVPB      lidocaine 1% Injectable 10 milliLiter(s) Local Injection once  mesalamine DR Capsule 800 milliGRAM(s) Oral three times a day  metoprolol tartrate 25 milliGRAM(s) Oral two times a day  pantoprazole Infusion 8 mG/Hr (10 mL/Hr) IV Continuous <Continuous>    MEDICATIONS  (PRN):  acetaminophen   Tablet .. 650 milliGRAM(s) Oral every 6 hours PRN Moderate Pain (4 - 6)  aluminum hydroxide/magnesium hydroxide/simethicone Suspension 30 milliLiter(s) Oral every 6 hours PRN Dyspepsia        CAPILLARY BLOOD GLUCOSE        I&O's Summary      PHYSICAL EXAM:  GENERAL: NAD  NECK: Supple, No JVD  CHEST/LUNG: Clear to auscultation bilaterally; No wheezing.  HEART: Regular rate and rhythm; No murmurs, rubs, or gallops  ABDOMEN: Soft, Nontender, Nondistended; Bowel sounds present  EXTREMITIES:   No edema  NEUROLOGY: AAO X 3      LABS:                        9.3    14.30 )-----------( 58       ( 18 Mar 2019 05:40 )             29.3     03-18    145  |  119<H>  |  19  ----------------------------<  86  3.2<L>   |  16<L>  |  1.42<H>    Ca    7.5<L>      18 Mar 2019 05:40    TPro  5.4<L>  /  Alb  1.6<L>  /  TBili  0.4  /  DBili  x   /  AST  25  /  ALT  22  /  AlkPhos  250<H>  03-18    PT/INR - ( 18 Mar 2019 05:40 )   PT: 20.1 SEC;   INR: 1.78                  CAPILLARY BLOOD GLUCOSE        03-15 @ 13:42  Culture-urine --  Culture results --  method type --  Organism --  Organism Identification --  Specimen source FECES  03-14 @ 07:34  Culture-urine --  Culture results --  method type --  Organism --  Organism Identification --  Specimen source BLOOD  03-14 @ 05:02  Culture-urine --  Culture results --  method type --  Organism --  Organism Identification --  Specimen source BLOOD PERIPHERAL  03-13 @ 23:42  Culture-urine   COLONY COUNT: > = 100,000 CFU/ML  Culture results --  method type NEGATIVE FERNANDO 43  Organism Staphylococcus sp.,coag neg  COLONY COUNT: LESS THAN 10,000 CFU/ML  Organism Identification E.COLI ESBL POSITIVE  Staphylococcus sp.,coag neg  Specimen source URINE MIDSTREAM           03-15 @ 13:42  Culture blood --  Culture results --  Gram stain --  Gram stain blood --  Method type --  Organism --  Organism identification --  Specimen source FECES   03-14 @ 07:34  Culture blood   NO ORGANISMS ISOLATED  NO ORGANISMS ISOLATED AT 96 HOURS  Culture results --  Gram stain --  Gram stain blood --  Method type --  Organism --  Organism identification --  Specimen source BLOOD   03-14 @ 05:02  Culture blood   NO ORGANISMS ISOLATED  NO ORGANISMS ISOLATED AT 96 HOURS  Culture results --  Gram stain --  Gram stain blood --  Method type --  Organism --  Organism identification --  Specimen source BLOOD PERIPHERAL   03-13 @ 23:42  Culture blood --  Culture results --  Gram stain --  Gram stain blood --  Method type NEGATIVE FERNANDO 43  Organism Staphylococcus sp.,coag neg  COLONY COUNT: LESS THAN 10,000 CFU/ML  Organism identification E.COLI ESBL POSITIVE  Staphylococcus sp.,coag neg  Specimen source URINE MIDSTREAM      RADIOLOGY & ADDITIONAL TESTS:    Imaging Personally Reviewed:    Consultant(s) Notes Reviewed:      Care Discussed with Consultants/Other Providers:

## 2019-03-18 NOTE — PROGRESS NOTE ADULT - ASSESSMENT
Impression:  1) GIB - 2/2 ulcerations seen on push enteroscopy and EGD, with last scope on 3/16 with healing non-bleeding ulcerations.    2) Elevated alkaline phosphatase and lipase- GGTP is normal thus elevation of alk phos not from liver, US and MRCP are negative for gallstones. Other differential dx includes DILI (patient had no new meds), ischemic cholangiopathy, infiltrative disease, congestive cholangiopathy, cholestasis of sepsis  3) history of colonic inflammation found on colonoscopy in [place], no other results known, possible colitis       Recommendations:  -monitor CBC  -Gastrin level is normal  -PPI IV for 72 hours then PO  -Continue with LR hydration   -Lactose free diet  -Monitor and chart frequency of BMs; if diarrhea returns, also obtain C diff, stool electrolytes, elastase, fecal calprotectin  -Continue with Delzicol 800mg TID; unclear if patient actually has IBD - would benefit from eventual repeat endsocopic evaluation  -Check HBsAg, HBsAb, HCV, alpha-1-antitrypsin to complete liver workup  - please call GI back with any further questions    [place], PGY-4  Gastroenterology Fellow  Pager x [phone] or [phone]  (After 5 pm or on weekends please page GI on call) Impression:  1) GIB - 2/2 ulcerations seen on push enteroscopy and EGD, with last scope on 3/16 with healing non-bleeding ulcerations.    2) Elevated alkaline phosphatase and lipase- GGTP is normal thus elevation of alk phos not from liver, US and MRCP are negative for gallstones. Other differential dx includes DILI (patient had no new meds), ischemic cholangiopathy, infiltrative disease, congestive cholangiopathy, cholestasis of sepsis  3) history of colonic inflammation found on colonoscopy in [place], no other results known, possible colitis       Recommendations:  -monitor CBC  -Gastrin level is normal  -PPI IV for 72 hours then PO  -Continue with LR hydration   -Lactose free diet  -Monitor and chart frequency of BMs; IF diarrhea returns, also obtain C diff, stool electrolytes, elastase, fecal calprotectin and call us again  -Continue with Delzicol 800mg TID; unclear if patient actually has IBD - would benefit from eventual repeat endsocopic evaluation  -Check HBsAg, HBsAb, HCV, alpha-1-antitrypsin to complete liver workup  - please call GI back with any further questions    [place], PGY-4  Gastroenterology Fellow  Pager x [phone] or [phone]  (After 5 pm or on weekends please page GI on call)

## 2019-03-18 NOTE — PROVIDER CONTACT NOTE (OTHER) - NAME OF MD/NP/PA/DO NOTIFIED:
ZULAY [id number] Gui
ADS [id number] Catrina
ADS Kerri Tsang
ADS edgar Zarate NP
Kerri Vasquez,NP
Rocio Gant np
ZULAY [id number] Gui

## 2019-03-18 NOTE — PROGRESS NOTE ADULT - SUBJECTIVE AND OBJECTIVE BOX
Infectious Diseases progress note:    Subjective:  Events noted.  Ucx (+) ESBL e.coli, abx changed from rocephin --> erta today.  No new fevers.  WBC downtrending.  s/p EGD on friday.  Cough resolved.  States diarrhea improved.      ROS:  CONSTITUTIONAL:  No fever, chills, rigors  CARDIOVASCULAR:  No chest pain or palpitations  RESPIRATORY:   No SOB, cough, dyspnea on exertion.  No wheezing  GASTROINTESTINAL:  No abd pain, N/V, diarrhea/constipation  EXTREMITIES:  No swelling or joint pain  GENITOURINARY:  No burning on urination, increased frequency or urgency.  No flank pain  NEUROLOGIC:  No HA, visual disturbances  SKIN: No rashes    Allergies    No Known Allergies    Intolerances        ANTIBIOTICS/RELEVANT:  antimicrobials  azithromycin  IVPB      azithromycin  IVPB 500 milliGRAM(s) IV Intermittent every 24 hours  ertapenem  IVPB        immunologic:    OTHER:  acetaminophen   Tablet .. 650 milliGRAM(s) Oral every 6 hours PRN  aluminum hydroxide/magnesium hydroxide/simethicone Suspension 30 milliLiter(s) Oral every 6 hours PRN  lidocaine 1% Injectable 10 milliLiter(s) Local Injection once  mesalamine DR Capsule 800 milliGRAM(s) Oral three times a day  metoprolol tartrate 25 milliGRAM(s) Oral two times a day  pantoprazole Infusion 8 mG/Hr IV Continuous <Continuous>  potassium chloride    Tablet ER 20 milliEquivalent(s) Oral once      Objective:  Vital Signs Last 24 Hrs  T(C): 36.4 (18 Mar 2019 14:18), Max: 36.9 (17 Mar 2019 21:25)  T(F): 97.5 (18 Mar 2019 14:18), Max: 98.5 (17 Mar 2019 21:25)  HR: 75 (18 Mar 2019 14:18) (75 - 88)  BP: 110/52 (18 Mar 2019 14:18) (110/52 - 126/69)  BP(mean): --  RR: 17 (18 Mar 2019 14:18) (17 - 18)  SpO2: 100% (18 Mar 2019 14:18) (98% - 100%)    PHYSICAL EXAM:  Constitutional:NAD  Eyes:TODD, EOMI  Ear/Nose/Throat: no thrush, mucositis.  Moist mucous membranes	  Neck:no JVD, no lymphadenopathy, supple  Respiratory: CTA ruth  Cardiovascular: S1S2 RRR, no murmurs  Gastrointestinal:soft, nontender,  nondistended (+) BS  Extremities:no e/e/c  Skin:  no rashes, open wounds or ulcerations        LABS:                        9.3    14.30 )-----------( 58       ( 18 Mar 2019 05:40 )             29.3     03-18    145  |  119<H>  |  19  ----------------------------<  86  3.2<L>   |  16<L>  |  1.42<H>    Ca    7.5<L>      18 Mar 2019 05:40    TPro  5.4<L>  /  Alb  1.6<L>  /  TBili  0.4  /  DBili  x   /  AST  25  /  ALT  22  /  AlkPhos  250<H>  03-18    PT/INR - ( 18 Mar 2019 05:40 )   PT: 20.1 SEC;   INR: 1.78                    MICROBIOLOGY:    HIV-1/2 Ag/Ab Combo (03.16.19 @ 06:39)    HIV Combo Result: NonReact: If patient is suspected to be at risk and/or in the  diagnostic window period, then consider subsequent HIV  retesting with new sample.    Legionella pneumophila Antigen, Urine (03.15.19 @ 19:30)    Legionella pneumophila Antigen, Urine: Negative    Culture - Blood (03.14.19 @ 07:34)    Culture - Blood:   NO ORGANISMS ISOLATED  NO ORGANISMS ISOLATED AT 96 HOURS    Specimen Source: BLOOD    Culture - Blood in AM (03.14.19 @ 05:02)    Culture - Blood:   NO ORGANISMS ISOLATED  NO ORGANISMS ISOLATED AT 96 HOURS    Specimen Source: BLOOD PERIPHERAL    Culture - Urine (03.13.19 @ 23:42)    Culture - Urine:   COLONY COUNT: > = 100,000 CFU/ML    Culture - Urine:   ***** CRITICAL RESULT *****  PERSON CALLED / READ-BACK: TERRIE LOVE  DATE / TIME CALLED: 03/16/19 1153  CALLED BY: ANDERSON FRIAS    -  Amikacin: S <=8 FERNANDO    -  Ampicillin: R >16 FERNANDO    -  Ampicillin/Sulbactam: R <=4/2 FERNANDO    -  Aztreonam: R 16 FERNANDO    -  Cefazolin: R >=32 FERNANDO    -  Cefepime: R >16 FERNANDO    -  Cefoxitin: S <=4 FERNANDO    -  Ceftazidime: R 16 FERNANDO    -  Ceftriaxone: R >32 FERNANDO    -  Ciprofloxacin: R >2 FERNANDO    -  Ertapenem: S <=0.5 FERNANDO    -  Gentamicin: S <=1 FERNANDO    -  Imipenem: S <=1 FERNANDO    -  Levofloxacin: R >4 FERNANDO    -  Meropenem: S <=1 FERNANDO    -  Nitrofurantoin: S <=32 FERNANDO    -  Piperacillin/Tazobactam: R <=8 FERNANDO    -  Tigecycline: S <=1 FERNANDO    -  Tobramycin: S <=2 FERNANDO    -  Trimethoprim/Sulfamethoxazole: R >2/38 FERNANDO    Specimen Source: URINE MIDSTREAM    Organism Identification: E.COLI ESBL POSITIVE  Staphylococcus sp.,coag neg    Organism: Staphylococcus sp.,coag neg  COLONY COUNT: LESS THAN 10,000 CFU/ML    Organism: E.COLI ESBL POSITIVE  COLONY COUNT: > = 100,000 CFU/ML    Method Type: NEGATIVE FERNANDO 43          RADIOLOGY & ADDITIONAL STUDIES:    < from: CT Chest No Cont (03.15.19 @ 09:22) >  IMPRESSION:     *  Tracheal wall thickening, centrilobular and tree-in-bud micronodules   predominantly within the upper lungs suggestive of   bronchitis/bronchiolitis.    *  Interlobular septal thickening and patchy groundglass opacities, trace   bilateral pleural effusions and anasarca suggestive of superimposed   pulmonary edema.    *  Ascites new from 3/4/2019.    *  Narrowing of bilateral main bronchi which may represent bronchomalacia.    < end of copied text >

## 2019-03-18 NOTE — PROGRESS NOTE ADULT - SUBJECTIVE AND OBJECTIVE BOX
Chief Complaint:  Patient is a 73y old  Male who presents with a chief complaint of Diarrhea (17 Mar 2019 16:58)      Interval Events:   Patients hemoglobin trend has been 9 > 9 > 11 (without transfusion) > 9.  This is likely stable.  No overt GI bleeding.      Allergies:  No Known Allergies      Hospital Medications:  acetaminophen   Tablet .. 650 milliGRAM(s) Oral every 6 hours PRN  aluminum hydroxide/magnesium hydroxide/simethicone Suspension 30 milliLiter(s) Oral every 6 hours PRN  azithromycin  IVPB      azithromycin  IVPB 500 milliGRAM(s) IV Intermittent every 24 hours  cefTRIAXone   IVPB      cefTRIAXone   IVPB 1 Gram(s) IV Intermittent every 24 hours  lidocaine 1% Injectable 10 milliLiter(s) Local Injection once  mesalamine DR Capsule 800 milliGRAM(s) Oral three times a day  metoprolol tartrate 25 milliGRAM(s) Oral two times a day  pantoprazole Infusion 8 mG/Hr IV Continuous <Continuous>      PMHX/PSHX:  Anemia, unspecified type  Essential hypertension  CLL (chronic lymphocytic leukemia)  No significant past surgical history      Family history:  Family history of myocardial infarction (Father, Mother, Sibling)          PHYSICAL EXAM:     GENERAL:  Appears stated age, well-groomed, well-nourished, no distress  HEENT:  NC/AT,  conjunctivae clear, sclera -anicteric  CHEST:  Full & symmetric excursion, no increased  HEART:  Regular rhythm  ABDOMEN:  Soft, non-tender, non-distended, normoactive bowel sounds,  no masses ,no hepato-splenomegaly,   EXTREMITIES:  no cyanosis,clubbing or edema  SKIN:  No rash/erythema/ecchymoses/petechiae/wounds/abscess/warm/dry  NEURO:  Alert, oriented    Vital Signs:  Vital Signs Last 24 Hrs  T(C): 36.3 (18 Mar 2019 05:45), Max: 36.9 (17 Mar 2019 21:25)  T(F): 97.3 (18 Mar 2019 05:45), Max: 98.5 (17 Mar 2019 21:25)  HR: 87 (18 Mar 2019 05:45) (69 - 88)  BP: 124/62 (18 Mar 2019 05:45) (111/60 - 128/59)  BP(mean): --  RR: 17 (18 Mar 2019 05:45) (17 - 18)  SpO2: 100% (18 Mar 2019 05:45) (98% - 100%)  Daily     Daily     LABS:                        9.3    14.30 )-----------( 58       ( 18 Mar 2019 05:40 )             29.3     03-17    146<H>  |  119<H>  |  21  ----------------------------<  106<H>  3.5   |  16<L>  |  1.50<H>    Ca    7.7<L>      17 Mar 2019 10:10    TPro  6.1  /  Alb  1.9<L>  /  TBili  0.5  /  DBili  x   /  AST  24  /  ALT  22  /  AlkPhos  290<H>  03-17    LIVER FUNCTIONS - ( 17 Mar 2019 10:10 )  Alb: 1.9 g/dL / Pro: 6.1 g/dL / ALK PHOS: 290 u/L / ALT: 22 u/L / AST: 24 u/L / GGT: x           PT/INR - ( 18 Mar 2019 05:40 )   PT: 20.1 SEC;   INR: 1.78                  Imaging: Chief Complaint:  Patient is a 73y old  Male who presents with a chief complaint of Diarrhea (17 Mar 2019 16:58)      Interval Events:   Patients hemoglobin trend has been 9 > 9 > 11 (without transfusion) > 9.  No overt GI bleeding.      Allergies:  No Known Allergies      Hospital Medications:  acetaminophen   Tablet .. 650 milliGRAM(s) Oral every 6 hours PRN  aluminum hydroxide/magnesium hydroxide/simethicone Suspension 30 milliLiter(s) Oral every 6 hours PRN  azithromycin  IVPB      azithromycin  IVPB 500 milliGRAM(s) IV Intermittent every 24 hours  cefTRIAXone   IVPB      cefTRIAXone   IVPB 1 Gram(s) IV Intermittent every 24 hours  lidocaine 1% Injectable 10 milliLiter(s) Local Injection once  mesalamine DR Capsule 800 milliGRAM(s) Oral three times a day  metoprolol tartrate 25 milliGRAM(s) Oral two times a day  pantoprazole Infusion 8 mG/Hr IV Continuous <Continuous>      PMHX/PSHX:  Anemia, unspecified type  Essential hypertension  CLL (chronic lymphocytic leukemia)  No significant past surgical history      Family history:  Family history of myocardial infarction (Father, Mother, Sibling)          PHYSICAL EXAM:     GENERAL:  Appears stated age, well-groomed, well-nourished, no distress  HEENT:  NC/AT,  conjunctivae clear, sclera -anicteric  CHEST:  Full & symmetric excursion, no increased  HEART:  Regular rhythm  ABDOMEN:  Soft, non-tender, non-distended, normoactive bowel sounds,  no masses ,no hepato-splenomegaly,   EXTREMITIES:  no cyanosis,clubbing or edema  SKIN:  No rash/erythema/ecchymoses/petechiae/wounds/abscess/warm/dry  NEURO:  Alert, oriented    Vital Signs:  Vital Signs Last 24 Hrs  T(C): 36.3 (18 Mar 2019 05:45), Max: 36.9 (17 Mar 2019 21:25)  T(F): 97.3 (18 Mar 2019 05:45), Max: 98.5 (17 Mar 2019 21:25)  HR: 87 (18 Mar 2019 05:45) (69 - 88)  BP: 124/62 (18 Mar 2019 05:45) (111/60 - 128/59)  BP(mean): --  RR: 17 (18 Mar 2019 05:45) (17 - 18)  SpO2: 100% (18 Mar 2019 05:45) (98% - 100%)  Daily     Daily     LABS:                        9.3    14.30 )-----------( 58       ( 18 Mar 2019 05:40 )             29.3     03-17    146<H>  |  119<H>  |  21  ----------------------------<  106<H>  3.5   |  16<L>  |  1.50<H>    Ca    7.7<L>      17 Mar 2019 10:10    TPro  6.1  /  Alb  1.9<L>  /  TBili  0.5  /  DBili  x   /  AST  24  /  ALT  22  /  AlkPhos  290<H>  03-17    LIVER FUNCTIONS - ( 17 Mar 2019 10:10 )  Alb: 1.9 g/dL / Pro: 6.1 g/dL / ALK PHOS: 290 u/L / ALT: 22 u/L / AST: 24 u/L / GGT: x           PT/INR - ( 18 Mar 2019 05:40 )   PT: 20.1 SEC;   INR: 1.78                  Imaging:

## 2019-03-19 LAB
ALBUMIN SERPL ELPH-MCNC: 1.7 G/DL — LOW (ref 3.3–5)
ALP SERPL-CCNC: 245 U/L — HIGH (ref 40–120)
ALT FLD-CCNC: 19 U/L — SIGNIFICANT CHANGE UP (ref 4–41)
ANION GAP SERPL CALC-SCNC: 11 MMO/L — SIGNIFICANT CHANGE UP (ref 7–14)
APTT BLD: 45.3 SEC — HIGH (ref 27.5–36.3)
AST SERPL-CCNC: 25 U/L — SIGNIFICANT CHANGE UP (ref 4–40)
BACTERIA BLD CULT: SIGNIFICANT CHANGE UP
BACTERIA BLD CULT: SIGNIFICANT CHANGE UP
BILIRUB SERPL-MCNC: 0.5 MG/DL — SIGNIFICANT CHANGE UP (ref 0.2–1.2)
BUN SERPL-MCNC: 19 MG/DL — SIGNIFICANT CHANGE UP (ref 7–23)
CALCIUM SERPL-MCNC: 7.3 MG/DL — LOW (ref 8.4–10.5)
CHLORIDE SERPL-SCNC: 118 MMOL/L — HIGH (ref 98–107)
CO2 SERPL-SCNC: 18 MMOL/L — LOW (ref 22–31)
CREAT SERPL-MCNC: 1.42 MG/DL — HIGH (ref 0.5–1.3)
GLUCOSE SERPL-MCNC: 87 MG/DL — SIGNIFICANT CHANGE UP (ref 70–99)
HCT VFR BLD CALC: 30.7 % — LOW (ref 39–50)
HGB BLD-MCNC: 9.3 G/DL — LOW (ref 13–17)
INR BLD: 1.77 — HIGH (ref 0.88–1.17)
MAGNESIUM SERPL-MCNC: 1.4 MG/DL — LOW (ref 1.6–2.6)
MCHC RBC-ENTMCNC: 28.3 PG — SIGNIFICANT CHANGE UP (ref 27–34)
MCHC RBC-ENTMCNC: 30.3 % — LOW (ref 32–36)
MCV RBC AUTO: 93.3 FL — SIGNIFICANT CHANGE UP (ref 80–100)
NRBC # FLD: 0.03 K/UL — LOW (ref 25–125)
PHOSPHATE SERPL-MCNC: 2.7 MG/DL — SIGNIFICANT CHANGE UP (ref 2.5–4.5)
PLATELET # BLD AUTO: 62 K/UL — LOW (ref 150–400)
PMV BLD: SIGNIFICANT CHANGE UP FL (ref 7–13)
POTASSIUM SERPL-MCNC: 3.5 MMOL/L — SIGNIFICANT CHANGE UP (ref 3.5–5.3)
POTASSIUM SERPL-SCNC: 3.5 MMOL/L — SIGNIFICANT CHANGE UP (ref 3.5–5.3)
PROT SERPL-MCNC: 5.8 G/DL — LOW (ref 6–8.3)
PROTHROM AB SERPL-ACNC: 20.5 SEC — HIGH (ref 9.8–13.1)
RBC # BLD: 3.29 M/UL — LOW (ref 4.2–5.8)
RBC # FLD: 21.2 % — HIGH (ref 10.3–14.5)
SODIUM SERPL-SCNC: 147 MMOL/L — HIGH (ref 135–145)
WBC # BLD: 12.44 K/UL — HIGH (ref 3.8–10.5)
WBC # FLD AUTO: 12.44 K/UL — HIGH (ref 3.8–10.5)

## 2019-03-19 RX ORDER — PANTOPRAZOLE SODIUM 20 MG/1
40 TABLET, DELAYED RELEASE ORAL
Qty: 0 | Refills: 0 | Status: DISCONTINUED | OUTPATIENT
Start: 2019-03-19 | End: 2019-03-21

## 2019-03-19 RX ORDER — POTASSIUM CHLORIDE 20 MEQ
20 PACKET (EA) ORAL
Qty: 0 | Refills: 0 | Status: COMPLETED | OUTPATIENT
Start: 2019-03-19 | End: 2019-03-19

## 2019-03-19 RX ORDER — MAGNESIUM SULFATE 500 MG/ML
2 VIAL (ML) INJECTION ONCE
Qty: 0 | Refills: 0 | Status: COMPLETED | OUTPATIENT
Start: 2019-03-19 | End: 2019-03-19

## 2019-03-19 RX ADMIN — Medication 25 MILLIGRAM(S): at 05:44

## 2019-03-19 RX ADMIN — Medication 50 GRAM(S): at 09:13

## 2019-03-19 RX ADMIN — Medication 800 MILLIGRAM(S): at 05:43

## 2019-03-19 RX ADMIN — PANTOPRAZOLE SODIUM 40 MILLIGRAM(S): 20 TABLET, DELAYED RELEASE ORAL at 18:10

## 2019-03-19 RX ADMIN — PANTOPRAZOLE SODIUM 10 MG/HR: 20 TABLET, DELAYED RELEASE ORAL at 05:44

## 2019-03-19 RX ADMIN — Medication 20 MILLIEQUIVALENT(S): at 13:11

## 2019-03-19 RX ADMIN — Medication 20 MILLIEQUIVALENT(S): at 18:56

## 2019-03-19 RX ADMIN — Medication 800 MILLIGRAM(S): at 22:10

## 2019-03-19 RX ADMIN — AZITHROMYCIN 250 MILLIGRAM(S): 500 TABLET, FILM COATED ORAL at 10:44

## 2019-03-19 RX ADMIN — ERTAPENEM SODIUM 120 MILLIGRAM(S): 1 INJECTION, POWDER, LYOPHILIZED, FOR SOLUTION INTRAMUSCULAR; INTRAVENOUS at 10:41

## 2019-03-19 RX ADMIN — Medication 25 MILLIGRAM(S): at 18:10

## 2019-03-19 RX ADMIN — Medication 800 MILLIGRAM(S): at 13:11

## 2019-03-19 RX ADMIN — Medication 800 MILLIGRAM(S): at 00:47

## 2019-03-19 NOTE — PROGRESS NOTE ADULT - NSICDXPROBLEM_GEN_ALL_CORE_FT
PROBLEM DIAGNOSES  Problem: Acute hypernatremia  Assessment and Plan: slight hypernatremia. encouraged inc po fluids. no tonya, vomiting    Problem: Electrolyte abnormality  Assessment and Plan: slight hyperchloremic metabilic acidosis, related to Previous diarrea.no need for Nahco3.should improve with oral hydaration. low K, add KCL 40 MEQ today    Problem: PEDRO (acute kidney injury)  Assessment and Plan: Cr stable at 1.42

## 2019-03-19 NOTE — PROGRESS NOTE ADULT - SUBJECTIVE AND OBJECTIVE BOX
Infectious Diseases progress note:    Subjective: NAD, awake, alert.  No dysuria.      ROS:  CONSTITUTIONAL:  No fever, chills, rigors  CARDIOVASCULAR:  No chest pain or palpitations  RESPIRATORY:   No SOB, cough, dyspnea on exertion.  No wheezing  GASTROINTESTINAL:  No abd pain, N/V, diarrhea/constipation  EXTREMITIES:  No swelling or joint pain  GENITOURINARY:  No burning on urination, increased frequency or urgency.  No flank pain  NEUROLOGIC:  No HA, visual disturbances  SKIN: No rashes    Allergies    No Known Allergies    Intolerances        ANTIBIOTICS/RELEVANT:  antimicrobials  azithromycin  IVPB      azithromycin  IVPB 500 milliGRAM(s) IV Intermittent every 24 hours  ertapenem  IVPB      ertapenem  IVPB 1000 milliGRAM(s) IV Intermittent every 24 hours    immunologic:    OTHER:  acetaminophen   Tablet .. 650 milliGRAM(s) Oral every 6 hours PRN  aluminum hydroxide/magnesium hydroxide/simethicone Suspension 30 milliLiter(s) Oral every 6 hours PRN  lidocaine 1% Injectable 10 milliLiter(s) Local Injection once  mesalamine DR Capsule 800 milliGRAM(s) Oral three times a day  metoprolol tartrate 25 milliGRAM(s) Oral two times a day  pantoprazole    Tablet 40 milliGRAM(s) Oral two times a day  potassium chloride    Tablet ER 20 milliEquivalent(s) Oral every 2 hours      Objective:  Vital Signs Last 24 Hrs  T(C): 35.8 (19 Mar 2019 12:39), Max: 36.6 (18 Mar 2019 22:05)  T(F): 96.5 (19 Mar 2019 12:39), Max: 97.8 (18 Mar 2019 22:05)  HR: 79 (19 Mar 2019 12:39) (76 - 100)  BP: 126/69 (19 Mar 2019 12:39) (115/52 - 126/69)  BP(mean): --  RR: 17 (19 Mar 2019 12:39) (16 - 17)  SpO2: 100% (19 Mar 2019 12:39) (97% - 100%)    PHYSICAL EXAM:  Constitutional:NAD  Eyes:TODD, EOMI  Ear/Nose/Throat: no thrush, mucositis.  Moist mucous membranes	  Neck:no JVD, no lymphadenopathy, supple  Respiratory: CTA ruth  Cardiovascular: S1S2 RRR, no murmurs  Gastrointestinal:soft, nontender,  nondistended (+) BS  Extremities:no e/e/c  Skin:  no rashes, open wounds or ulcerations        LABS:                        9.3    12.44 )-----------( 62       ( 19 Mar 2019 05:40 )             30.7     03-19    147<H>  |  118<H>  |  19  ----------------------------<  87  3.5   |  18<L>  |  1.42<H>    Ca    7.3<L>      19 Mar 2019 05:40  Phos  2.7     03-19  Mg     1.4     03-19    TPro  5.8<L>  /  Alb  1.7<L>  /  TBili  0.5  /  DBili  x   /  AST  25  /  ALT  19  /  AlkPhos  245<H>  03-19    PT/INR - ( 19 Mar 2019 05:40 )   PT: 20.5 SEC;   INR: 1.77          PTT - ( 19 Mar 2019 05:40 )  PTT:45.3 SEC                        MICROBIOLOGY:      Legionella pneumophila Antigen, Urine (03.15.19 @ 19:30)    Legionella pneumophila Antigen, Urine: Negative    Culture - Urine (03.13.19 @ 23:42)    Culture - Urine:   COLONY COUNT: > = 100,000 CFU/ML    Culture - Urine:   ***** CRITICAL RESULT *****  PERSON CALLED / READ-BACK: TERRIE LOVE  DATE / TIME CALLED: 03/16/19 1153  CALLED BY: ANDERSON FRIAS    -  Amikacin: S <=8 FERNANDO    -  Ampicillin: R >16 FERNANDO    -  Ampicillin/Sulbactam: R <=4/2 FERNANDO    -  Aztreonam: R 16 FERNANDO    -  Cefazolin: R >=32 FERNANDO    -  Cefepime: R >16 FERNANDO    -  Cefoxitin: S <=4 FERNANDO    -  Ceftazidime: R 16 FERNANDO    -  Ceftriaxone: R >32 FERNANDO    -  Ciprofloxacin: R >2 FERNANDO    -  Ertapenem: S <=0.5 FERNANDO    -  Gentamicin: S <=1 FERNANDO    -  Imipenem: S <=1 FERNANDO    -  Levofloxacin: R >4 FERNANDO    -  Meropenem: S <=1 FERNANDO    -  Nitrofurantoin: S <=32 FERNANDO    -  Piperacillin/Tazobactam: R <=8 FERNANDO    -  Tigecycline: S <=1 FERNANDO    -  Tobramycin: S <=2 FERNANDO    -  Trimethoprim/Sulfamethoxazole: R >2/38 FERNANDO    Specimen Source: URINE MIDSTREAM    Organism Identification: E.COLI ESBL POSITIVE  Staphylococcus sp.,coag neg    Organism: Staphylococcus sp.,coag neg  COLONY COUNT: LESS THAN 10,000 CFU/ML    Organism: E.COLI ESBL POSITIVE  COLONY COUNT: > = 100,000 CFU/ML    Method Type: NEGATIVE FERNANDO 43        RADIOLOGY & ADDITIONAL STUDIES:

## 2019-03-19 NOTE — PROGRESS NOTE ADULT - SUBJECTIVE AND OBJECTIVE BOX
Patient is a 73y old  Male who presents with a chief complaint of Diarrhea (19 Mar 2019 16:46)      SUBJECTIVE / OVERNIGHT EVENTS:    Events noted.  CONSTITUTIONAL: No fever,  or fatigue  RESPIRATORY: No cough, wheezing,  No shortness of breath  CARDIOVASCULAR: No chest pain, palpitations, dizziness, or leg swelling  GASTROINTESTINAL: No abdominal or epigastric pain. No nausea, vomiting.  NEUROLOGICAL: No headaches,     MEDICATIONS  (STANDING):  azithromycin  IVPB      azithromycin  IVPB 500 milliGRAM(s) IV Intermittent every 24 hours  ertapenem  IVPB      ertapenem  IVPB 1000 milliGRAM(s) IV Intermittent every 24 hours  lidocaine 1% Injectable 10 milliLiter(s) Local Injection once  mesalamine DR Capsule 800 milliGRAM(s) Oral three times a day  metoprolol tartrate 25 milliGRAM(s) Oral two times a day  pantoprazole    Tablet 40 milliGRAM(s) Oral two times a day    MEDICATIONS  (PRN):  acetaminophen   Tablet .. 650 milliGRAM(s) Oral every 6 hours PRN Moderate Pain (4 - 6)  aluminum hydroxide/magnesium hydroxide/simethicone Suspension 30 milliLiter(s) Oral every 6 hours PRN Dyspepsia        CAPILLARY BLOOD GLUCOSE        I&O's Summary      PHYSICAL EXAM:  GENERAL: NAD  NECK: Supple, No JVD  CHEST/LUNG: Clear to auscultation bilaterally; No wheezing.  HEART: Regular rate and rhythm; No murmurs, rubs, or gallops  ABDOMEN: Soft, Nontender, Nondistended; Bowel sounds present  EXTREMITIES:   No edema  NEUROLOGY: AAO X 3      LABS:                        9.3    12.44 )-----------( 62       ( 19 Mar 2019 05:40 )             30.7     03-19    147<H>  |  118<H>  |  19  ----------------------------<  87  3.5   |  18<L>  |  1.42<H>    Ca    7.3<L>      19 Mar 2019 05:40  Phos  2.7     03-19  Mg     1.4     03-19    TPro  5.8<L>  /  Alb  1.7<L>  /  TBili  0.5  /  DBili  x   /  AST  25  /  ALT  19  /  AlkPhos  245<H>  03-19    PT/INR - ( 19 Mar 2019 05:40 )   PT: 20.5 SEC;   INR: 1.77          PTT - ( 19 Mar 2019 05:40 )  PTT:45.3 SEC        CAPILLARY BLOOD GLUCOSE        03-15 @ 13:42  Culture-urine --  Culture results --  method type --  Organism --  Organism Identification --  Specimen source FECES  03-14 @ 07:34  Culture-urine --  Culture results --  method type --  Organism --  Organism Identification --  Specimen source BLOOD  03-14 @ 05:02  Culture-urine --  Culture results --  method type --  Organism --  Organism Identification --  Specimen source BLOOD PERIPHERAL  03-13 @ 23:42  Culture-urine   COLONY COUNT: > = 100,000 CFU/ML  Culture results --  method type NEGATIVE FERNANDO 43  Organism Staphylococcus sp.,coag neg  COLONY COUNT: LESS THAN 10,000 CFU/ML  Organism Identification E.COLI ESBL POSITIVE  Staphylococcus sp.,coag neg  Specimen source URINE MIDSTREAM           03-15 @ 13:42  Culture blood --  Culture results --  Gram stain --  Gram stain blood --  Method type --  Organism --  Organism identification --  Specimen source FECES   03-14 @ 07:34  Culture blood   NO ORGANISMS ISOLATED  Culture results --  Gram stain --  Gram stain blood --  Method type --  Organism --  Organism identification --  Specimen source BLOOD   03-14 @ 05:02  Culture blood   NO ORGANISMS ISOLATED  Culture results --  Gram stain --  Gram stain blood --  Method type --  Organism --  Organism identification --  Specimen source BLOOD PERIPHERAL   03-13 @ 23:42  Culture blood --  Culture results --  Gram stain --  Gram stain blood --  Method type NEGATIVE FERNANDO 43  Organism Staphylococcus sp.,coag neg  COLONY COUNT: LESS THAN 10,000 CFU/ML  Organism identification E.COLI ESBL POSITIVE  Staphylococcus sp.,coag neg  Specimen source URINE MIDSTREAM      RADIOLOGY & ADDITIONAL TESTS:    Imaging Personally Reviewed:    Consultant(s) Notes Reviewed:      Care Discussed with Consultants/Other Providers:

## 2019-03-19 NOTE — PROGRESS NOTE ADULT - ASSESSMENT
73 Male w CLL (not on therapy), Anemia, and HTN a/w  persistent diarrhea, and suspected GIB, Renal following for PEDRO and hypernatremia       PEDRO (acute kidney injury)- prerenal azotemia v ATN.   No evidence of obstruction on abd CT.   Ingalls UA noted.  off IVF, given significant third spacing.  Cr stable  M acidosis likely 2/2 PEDRO  Hypernatremia- sr Na improving   s/p IVF resuscitation. Given signifncant edema, and pulm edema, off IVF  Hypokalemia sr magnesium was 1.6 3/15  Anemia, GIB- on PPI drip.    labs, chart reviewed  agree w/repleting w/kcl 20meq po x1, give additional dose   Encourage increase water intake as tolerated  low Na in diet, inc K intake  monitor  BMP daily and u/o    rpt sr magnesium, goal around 2.0. replete prn ivpb   avoid ACEi/ARB/NSAIDs/Nephrotoxics.    f/u w/GI, hem

## 2019-03-19 NOTE — PHYSICAL THERAPY INITIAL EVALUATION ADULT - DISCHARGE DISPOSITION, PT EVAL
home w/ home PT/anticipated discharge to home with home PT services to address current functional limitations to optimize safety within the home environment with the use of a rolling walker.

## 2019-03-19 NOTE — PROGRESS NOTE ADULT - ASSESSMENT
Assessment	  This is a 73M with history as above who presents to the hospital with c/o intermittent diarrhea for the past 6 months.    Active upper GI bleed:  Sec to PUD   PPI    ESBL UTI:  IV Invanz  IV Zithromax  ID f/up noted.    Dw family.

## 2019-03-19 NOTE — PROGRESS NOTE ADULT - SUBJECTIVE AND OBJECTIVE BOX
Pt seen and examined at bedside  feels fairly well  denies n.v.d  no dyspnea, ,abd pain  says, he is eating ok  no new events      Allergies:  No Known Allergies    Hospital Medications:   MEDICATIONS  (STANDING):  azithromycin  IVPB      azithromycin  IVPB 500 milliGRAM(s) IV Intermittent every 24 hours  ertapenem  IVPB      ertapenem  IVPB 1000 milliGRAM(s) IV Intermittent every 24 hours  lidocaine 1% Injectable 10 milliLiter(s) Local Injection once  mesalamine DR Capsule 800 milliGRAM(s) Oral three times a day  metoprolol tartrate 25 milliGRAM(s) Oral two times a day  pantoprazole    Tablet 40 milliGRAM(s) Oral two times a day         VITALS:  T(F): 97.6 (19 @ 05:40), Max: 97.8 (19 @ 22:05)  HR: 89 (19 @ 05:40)  BP: 115/52 (19 @ 05:40)  RR: 16 (19 @ 05:40)  SpO2: 100% (19 @ 05:40)  Wt(kg): --      PHYSICAL EXAM:  Constitutional: NAD, sitting in chair comfortably  HEENT: anicteric sclera, oropharynx clear, MMM  Neck: No JVD  Respiratory: CTAB, no wheezes, rales or rhonchi  Cardiovascular: S1, S2, RRR  Gastrointestinal: BS+, soft, NT/ND  Extremities: No cyanosis or clubbing. 1+  peripheral edema  Neurological: A/O x 3, no focal deficits  Psychiatric: Normal mood, normal affect  : No CVA tenderness. No mata.   Skin: No rashes       LABS:      147<H>  |  118<H>  |  19  ----------------------------<  87  3.5   |  18<L>  |  1.42<H>    Ca    7.3<L>      19 Mar 2019 05:40  Phos  2.7       Mg     1.4         TPro  5.8<L>  /  Alb  1.7<L>  /  TBili  0.5  /  DBili      /  AST  25  /  ALT  19  /  AlkPhos  245<H>      Creatinine Trend: 1.42 <--, 1.42 <--, 1.50 <--, 1.40 <--, 1.45 <--, 1.59 <--, 1.47 <--, 1.68 <--, 1.39 <--                        9.3    12.44 )-----------( 62       ( 19 Mar 2019 05:40 )             30.7     Urine Studies:  Urinalysis Basic - ( 13 Mar 2019 16:50 )    Color: YELLOW / Appearance: Lt TURBID / S.017 / pH: 5.5  Gluc: NEGATIVE / Ketone: NEGATIVE  / Bili: NEGATIVE / Urobili: NORMAL   Blood: TRACE / Protein: 30 / Nitrite: NEGATIVE   Leuk Esterase: LARGE / RBC: 3-5 / WBC >50   Sq Epi: OCC / Non Sq Epi:  / Bacteria: MODERATE      Sodium, Random Urine: < 20 mmol/L ( @ 16:50)  Potassium, Random Urine: 47.6 mmol/L ( @ 16:50)  Chloride, Random Urine: 33 mmol/L ( @ 16:50)  Osmolality, Random Urine: 445 mosmo/kg ( @ 16:50)    RADIOLOGY & ADDITIONAL STUDIES:

## 2019-03-19 NOTE — PROGRESS NOTE ADULT - ASSESSMENT
This is a 73M with history of CLL (not on therapy), Anemia, and HTN who presents to the hospital with complaints of persistent intermittent diarrhea since October of last year. Said that about a week prior to his trip to [facility] he started having significant diarrhea (5-7 episodes of loose stools, ?black stools as per patient at that time) and went to see a doctor. Was told that he likely had a viral illness and that it would improve over time. His diarrhea did improve and he went to [facility] but on his second day there he started having diarrhea again (states that he ate sweets from the [employer] that others in his family did not prior to the onset of his diarrhea in [place]). He started having intermittent diarrhea and went to a doctor in [facility] where he had a colonoscopy done which he states was negative for abnormal findings (pt not sure if he had a biopsy done during the colonoscopy). He was then given some  medications with minimal improvement in his symptoms. He continued to have intermittent diarrhea (said that he has 4-5 days of diarrhea a week) and was unable to get any relief in [facility]. Upon arriving back from [facility] he continued to have intermittent diarrhea and therefore presented to the ED for evaluation. Said that currently he had about 5 episodes of diarrhea today, describes it as yellowish in color, associated with generalized abdominal pain. Denies any hematochezia, melena, or BRBPR currently. No fevers/chills. He denies any other complaints at present.    On arrival to the ED, his vitals were T 98.8, P 97, /62, R 16, O2 sat 100% RA. His lab work was significant for leukocytosis (unknown baseline), normocytic anemia (unknown baseline), mild hyponatremia/hypokalemia/AG gap, elevated BUN/Cr, elevated alk phos, and elevated lipase. He was also noted to have a lactate of 2.8. His CXR showed a possible R basilar opacity but the patient was not complaining of any PNA type symptoms. His US abd was negative for any acute findings. He was given LR 1L. He was admitted to medicine. (03 Mar 2019 23:55)      HOSP COURSE:    CTap showed mild wall thickening of the duodenum extending of the adjacent mesenteric fat, suggesting underlying duodenitis.  EGD showed normal esophagus, non bleeding erosive gastropathy - biopsied (s/o gastrinoma), duodenitis and one oozing duodenal ulcer with no bleeding, Multiple non-bleeding duodenal ulcers,                    MRCP- No evidence of cholelithiasis or biliary obstruction.Hepatic steatosis.Mural thickening proximal small bowel consistent with enteritis. Moderate ascites.  GI is following, s/p push enteroscopy with biopsy  3/8 showing erosive gastritis and oozing duodenal ulcer.        Thus far  stool studies neg, including stool cx, O&P, GI pcr, C.diff, Giardia.   Malaria scrn (-). On 3/7 had diagnostic paracentesis.  WBC trending upwards.  s/p push enteroscopy with bx : erosive gastritis, oozing duodenal ulcer.  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    MICU consulted for  tachy/SOB.      ID consult called to evaluate leukocytosis.  Pt still with diarrhea.  Denies abd pain or epigastric pain.  No rash.  (+) nonproductive cough.  (+) wt loss.  No difficulty swallowing, no N/V.  c/o sore throat.  No thrush.  No f/c/r.  Pt has outside Heme/Onc, does not know baseline WBC.   Pt denies recent use of abx, no sick contacts, change in diet.          Problem/Plan - 1:    ·	Leukocytosis    - Pt with h/o CLL, likely immunocompromised, baseline cbc u/a.  Trend CBC with differential.  Now improving after starting abx for uti      - Check blood cultures, UA, Ucx.   UA (+) LE/pyuria.  UCx growing E.coli >100K and CNS <10K.  (+) ESBL, change to ertapenem (3/18).  Recommend 1 week course.  WBC improving.  Pt with recent h/o E.coli UTI and treated in [place].  Pt was hospitalized multiple times between Nov through Feb.  f/u Ucx    - Pt also with cough and phlegm production since EGD. CT chest with TIB pattern in upper lungs, and pulm edema.  Azithromycin added for atypical coverage.  Legionella Ag neg.  Complete 5 day course of azithro through 3/19    - Stool studies negative.  Send repeat stool O&P x 3 negative.  (test for Microsporidium, cyclospora, isospora, cryptosporidium - spoke to micro )    - Check CMV pcr (-), Cmv IGg/IGM (prior exposure), histoplasma (-), HIV (neg), strongyloides (neg)    - Pt had repeat EGD - pathology noted.      Adeline Marques  [phone]

## 2019-03-19 NOTE — PHYSICAL THERAPY INITIAL EVALUATION ADULT - PERTINENT HX OF CURRENT PROBLEM, REHAB EVAL
Pt. is a 73 year old male admitted to [facility] secondary to ARF. PMH: anemia, HTN, CLL.

## 2019-03-20 LAB
A1AT SERPL-MCNC: 148 MG/DL — SIGNIFICANT CHANGE UP (ref 90–200)
ANION GAP SERPL CALC-SCNC: 11 MMO/L — SIGNIFICANT CHANGE UP (ref 7–14)
APTT BLD: 44.6 SEC — HIGH (ref 27.5–36.3)
BUN SERPL-MCNC: 18 MG/DL — SIGNIFICANT CHANGE UP (ref 7–23)
CALCIUM SERPL-MCNC: 7.7 MG/DL — LOW (ref 8.4–10.5)
CHLORIDE SERPL-SCNC: 118 MMOL/L — HIGH (ref 98–107)
CO2 SERPL-SCNC: 18 MMOL/L — LOW (ref 22–31)
CREAT SERPL-MCNC: 1.4 MG/DL — HIGH (ref 0.5–1.3)
GLUCOSE SERPL-MCNC: 87 MG/DL — SIGNIFICANT CHANGE UP (ref 70–99)
HBV SURFACE AB SER-ACNC: NONREACTIVE — SIGNIFICANT CHANGE UP
HBV SURFACE AG SER-ACNC: NEGATIVE — SIGNIFICANT CHANGE UP
HCT VFR BLD CALC: 33.2 % — LOW (ref 39–50)
HGB BLD-MCNC: 9.9 G/DL — LOW (ref 13–17)
INR BLD: 1.74 — HIGH (ref 0.88–1.17)
MAGNESIUM SERPL-MCNC: 1.8 MG/DL — SIGNIFICANT CHANGE UP (ref 1.6–2.6)
MCHC RBC-ENTMCNC: 28.5 PG — SIGNIFICANT CHANGE UP (ref 27–34)
MCHC RBC-ENTMCNC: 29.8 % — LOW (ref 32–36)
MCV RBC AUTO: 95.7 FL — SIGNIFICANT CHANGE UP (ref 80–100)
NRBC # FLD: 0.05 K/UL — LOW (ref 25–125)
PHOSPHATE SERPL-MCNC: 2.6 MG/DL — SIGNIFICANT CHANGE UP (ref 2.5–4.5)
PLATELET # BLD AUTO: 69 K/UL — LOW (ref 150–400)
PMV BLD: SIGNIFICANT CHANGE UP FL (ref 7–13)
POTASSIUM SERPL-MCNC: 4 MMOL/L — SIGNIFICANT CHANGE UP (ref 3.5–5.3)
POTASSIUM SERPL-SCNC: 4 MMOL/L — SIGNIFICANT CHANGE UP (ref 3.5–5.3)
PROTHROM AB SERPL-ACNC: 19.7 SEC — HIGH (ref 9.8–13.1)
RBC # BLD: 3.47 M/UL — LOW (ref 4.2–5.8)
RBC # FLD: 21.9 % — HIGH (ref 10.3–14.5)
SODIUM SERPL-SCNC: 147 MMOL/L — HIGH (ref 135–145)
WBC # BLD: 12.67 K/UL — HIGH (ref 3.8–10.5)
WBC # FLD AUTO: 12.67 K/UL — HIGH (ref 3.8–10.5)

## 2019-03-20 RX ADMIN — Medication 800 MILLIGRAM(S): at 13:28

## 2019-03-20 RX ADMIN — PANTOPRAZOLE SODIUM 40 MILLIGRAM(S): 20 TABLET, DELAYED RELEASE ORAL at 18:00

## 2019-03-20 RX ADMIN — AZITHROMYCIN 250 MILLIGRAM(S): 500 TABLET, FILM COATED ORAL at 13:31

## 2019-03-20 RX ADMIN — Medication 25 MILLIGRAM(S): at 05:49

## 2019-03-20 RX ADMIN — Medication 25 MILLIGRAM(S): at 18:00

## 2019-03-20 RX ADMIN — Medication 800 MILLIGRAM(S): at 05:49

## 2019-03-20 RX ADMIN — PANTOPRAZOLE SODIUM 40 MILLIGRAM(S): 20 TABLET, DELAYED RELEASE ORAL at 05:49

## 2019-03-20 RX ADMIN — ERTAPENEM SODIUM 120 MILLIGRAM(S): 1 INJECTION, POWDER, LYOPHILIZED, FOR SOLUTION INTRAMUSCULAR; INTRAVENOUS at 12:27

## 2019-03-20 RX ADMIN — Medication 800 MILLIGRAM(S): at 21:45

## 2019-03-20 NOTE — PROGRESS NOTE ADULT - ASSESSMENT
Assessment	  This is a 73M with history as above who presents to the hospital with c/o intermittent diarrhea for the past 6 months.    Active upper GI bleed:  Sec to PUD   PPI    ESBL UTI:  IV Invanz  ID f/up noted.    Dw family.

## 2019-03-20 NOTE — PROGRESS NOTE ADULT - NEGATIVE GASTROINTESTINAL SYMPTOMS
no nausea/no constipation/no diarrhea/no vomiting
no vomiting/no abdominal pain/no nausea
no vomiting/no constipation/no nausea
no vomiting/no diarrhea/no abdominal pain/no nausea

## 2019-03-20 NOTE — PROGRESS NOTE ADULT - SUBJECTIVE AND OBJECTIVE BOX
Pt has been doing better, denies any pain, chest or any GI symptoms. No visible bleeding. ROS unremarkable otherwise.      Meds:  acetaminophen   Tablet .. 650 milliGRAM(s) Oral every 6 hours PRN  aluminum hydroxide/magnesium hydroxide/simethicone Suspension 30 milliLiter(s) Oral every 6 hours PRN  azithromycin  IVPB      azithromycin  IVPB 500 milliGRAM(s) IV Intermittent every 24 hours  ertapenem  IVPB      ertapenem  IVPB 1000 milliGRAM(s) IV Intermittent every 24 hours  lidocaine 1% Injectable 10 milliLiter(s) Local Injection once  mesalamine DR Capsule 800 milliGRAM(s) Oral three times a day  metoprolol tartrate 25 milliGRAM(s) Oral two times a day  pantoprazole    Tablet 40 milliGRAM(s) Oral two times a day      Vital Signs Last 24 Hrs  T(C): 36.7 (20 Mar 2019 05:47), Max: 36.7 (20 Mar 2019 05:47)  T(F): 98 (20 Mar 2019 05:47), Max: 98 (20 Mar 2019 05:47)  HR: 80 (20 Mar 2019 05:47) (74 - 80)  BP: 137/71 (20 Mar 2019 05:47) (126/69 - 137/71)  BP(mean): --  RR: 16 (20 Mar 2019 05:47) (16 - 17)  SpO2: 100% (20 Mar 2019 05:47) (100% - 100%)                          9.9    12.67 )-----------( 69       ( 20 Mar 2019 05:35 )             33.2       03-20    147<H>  |  118<H>  |  18  ----------------------------<  87  4.0   |  18<L>  |  1.40<H>    Ca    7.7<L>      20 Mar 2019 05:35  Phos  2.6     03-20  Mg     1.8     03-20    TPro  5.8<L>  /  Alb  1.7<L>  /  TBili  0.5  /  DBili  x   /  AST  25  /  ALT  19  /  AlkPhos  245<H>  03-19              PT/INR - ( 20 Mar 2019 05:35 )   PT: 19.7 SEC;   INR: 1.74          PTT - ( 20 Mar 2019 05:35 )  PTT:44.6 SEC

## 2019-03-20 NOTE — PROGRESS NOTE ADULT - SUBJECTIVE AND OBJECTIVE BOX
[facility] [place] [facility] - Byron / Benitez S /Teresita/ FLORENTIN Morales/ S [place]/ Giacomo Marcum / JOLIE Njeru  ---------------------------------------------------------------------------------------------------------------    Patient seen and examined bedside    Subjective and Objective: No overnight events, sob resolved. No complaints today. feeling better    Allergies: No Known Allergies      Hospital Medications:   MEDICATIONS  (STANDING):  azithromycin  IVPB      azithromycin  IVPB 500 milliGRAM(s) IV Intermittent every 24 hours  ertapenem  IVPB      ertapenem  IVPB 1000 milliGRAM(s) IV Intermittent every 24 hours  lidocaine 1% Injectable 10 milliLiter(s) Local Injection once  mesalamine DR Capsule 800 milliGRAM(s) Oral three times a day  metoprolol tartrate 25 milliGRAM(s) Oral two times a day  pantoprazole    Tablet 40 milliGRAM(s) Oral two times a day      REVIEW OF SYSTEMS:  CONSTITUTIONAL: No weakness, fevers or chills  EYES/ENT: No visual changes;  No vertigo or throat pain   NECK: No pain or stiffness  RESPIRATORY: No cough, wheezing, hemoptysis; No shortness of breath  CARDIOVASCULAR: No chest pain or palpitations.  GASTROINTESTINAL: No abdominal or epigastric pain. No nausea, vomiting, or hematemesis; No diarrhea or constipation. No melena or hematochezia.  GENITOURINARY: No dysuria, frequency, foamy urine, urinary urgency, incontinence or hematuria  NEUROLOGICAL: No numbness or weakness  SKIN: No itching, burning, rashes, or lesions   VASCULAR: No bilateral lower extremity edema.   All other review of systems is negative unless indicated above.    VITALS:  T(F): 98 (19 @ 05:47), Max: 98 (19 @ 05:47)  HR: 80 (19 @ 05:47)  BP: 137/71 (19 @ 05:47)  RR: 16 (19 @ 05:47)  SpO2: 100% (19 @ 05:47)  Wt(kg): --      PHYSICAL EXAM:  Constitutional: NAD  HEENT: anicteric sclera, oropharynx clear  Neck: No JVD  Respiratory: CTAB, no wheezes, rales or rhonchi  Cardiovascular: S1, S2, RRR  Gastrointestinal: BS+, soft, NT/ND  Extremities: No cyanosis or clubbing. No peripheral edema  Neurological: A/O x 3, no focal deficits  Psychiatric: Normal mood, normal affect  : No CVA tenderness. No mata.   Skin: No rashes      LABS:      147<H>  |  118<H>  |  18  ----------------------------<  87  4.0   |  18<L>  |  1.40<H>    Ca    7.7<L>      20 Mar 2019 05:35  Phos  2.6       Mg     1.8         TPro  5.8<L>  /  Alb  1.7<L>  /  TBili  0.5  /  DBili      /  AST  25  /  ALT  19  /  AlkPhos  245<H>      Creatinine Trend: 1.40 <--, 1.42 <--, 1.42 <--, 1.50 <--, 1.40 <--, 1.45 <--, 1.59 <--, 1.47 <--                        9.9    12.67 )-----------( 69       ( 20 Mar 2019 05:35 )             33.2     Urine Studies:  Urinalysis Basic - ( 13 Mar 2019 16:50 )    Color: YELLOW / Appearance: Lt TURBID / S.017 / pH: 5.5  Gluc: NEGATIVE / Ketone: NEGATIVE  / Bili: NEGATIVE / Urobili: NORMAL   Blood: TRACE / Protein: 30 / Nitrite: NEGATIVE   Leuk Esterase: LARGE / RBC: 3-5 / WBC >50   Sq Epi: OCC / Non Sq Epi:  / Bacteria: MODERATE      Sodium, Random Urine: < 20 mmol/L ( @ 16:50)  Potassium, Random Urine: 47.6 mmol/L ( @ 16:50)  Chloride, Random Urine: 33 mmol/L ( @ 16:50)  Osmolality, Random Urine: 445 mosmo/kg ( @ 16:50)      RADIOLOGY & ADDITIONAL STUDIES: [facility] [place] [facility] - Byron / Benitez S /Teresita/ FLORENTIN Morales/ FLORENTIN [place]/ Giacomo Marcum / JOLIE Njeru  ---------------------------------------------------------------------------------------------------------------    Patient seen and examined bedside    Subjective and Objective: No overnight events, denied sob. No complaints today. feeling better    Allergies: No Known Allergies      Hospital Medications:   MEDICATIONS  (STANDING):  azithromycin  IVPB      azithromycin  IVPB 500 milliGRAM(s) IV Intermittent every 24 hours  ertapenem  IVPB      ertapenem  IVPB 1000 milliGRAM(s) IV Intermittent every 24 hours  lidocaine 1% Injectable 10 milliLiter(s) Local Injection once  mesalamine DR Capsule 800 milliGRAM(s) Oral three times a day  metoprolol tartrate 25 milliGRAM(s) Oral two times a day  pantoprazole    Tablet 40 milliGRAM(s) Oral two times a day      VITALS:  T(F): 98 (19 @ 05:47), Max: 98 (19 @ 05:47)  HR: 80 (19 @ 05:47)  BP: 137/71 (19 @ 05:47)  RR: 16 (19 @ 05:47)  SpO2: 100% (19 @ 05:47)  Wt(kg): --      PHYSICAL EXAM:  Constitutional: NAD  HEENT: anicteric sclera, oropharynx clear  Neck: No JVD  Respiratory: CTAB, no wheezes, rales or rhonchi  Cardiovascular: S1, S2, RRR  Gastrointestinal: BS+, soft, NT/ND  Extremities: No cyanosis or clubbing. 1+peripheral edema  Neurological: A/O x 3, no focal deficits  Psychiatric: Normal mood, normal affect  : No CVA tenderness. No mata.   Skin: No rashes      LABS:      147<H>  |  118<H>  |  18  ----------------------------<  87  4.0   |  18<L>  |  1.40<H>    Ca    7.7<L>      20 Mar 2019 05:35  Phos  2.6       Mg     1.8         TPro  5.8<L>  /  Alb  1.7<L>  /  TBili  0.5  /  DBili      /  AST  25  /  ALT  19  /  AlkPhos  245<H>      Creatinine Trend: 1.40 <--, 1.42 <--, 1.42 <--, 1.50 <--, 1.40 <--, 1.45 <--, 1.59 <--, 1.47 <--                        9.9    12.67 )-----------( 69       ( 20 Mar 2019 05:35 )             33.2     Urine Studies:  Urinalysis Basic - ( 13 Mar 2019 16:50 )    Color: YELLOW / Appearance: Lt TURBID / S.017 / pH: 5.5  Gluc: NEGATIVE / Ketone: NEGATIVE  / Bili: NEGATIVE / Urobili: NORMAL   Blood: TRACE / Protein: 30 / Nitrite: NEGATIVE   Leuk Esterase: LARGE / RBC: 3-5 / WBC >50   Sq Epi: OCC / Non Sq Epi:  / Bacteria: MODERATE      Sodium, Random Urine: < 20 mmol/L ( @ 16:50)  Potassium, Random Urine: 47.6 mmol/L ( @ 16:50)  Chloride, Random Urine: 33 mmol/L ( @ 16:50)  Osmolality, Random Urine: 445 mosmo/kg ( @ 16:50)      RADIOLOGY & ADDITIONAL STUDIES:

## 2019-03-20 NOTE — PROGRESS NOTE ADULT - RS GEN PE MLT RESP DETAILS PC
no rhonchi/no rales/breath sounds equal
no rales/breath sounds equal/no rhonchi
no rales/rhonchi/breath sounds equal
no rhonchi/no rales/breath sounds equal

## 2019-03-20 NOTE — PROGRESS NOTE ADULT - SUBJECTIVE AND OBJECTIVE BOX
Infectious Diseases progress note:    Subjective:  No new somatic complaints.  Afebrile.  No abd pain/dysuria    ROS:  CONSTITUTIONAL:  No fever, chills, rigors  CARDIOVASCULAR:  No chest pain or palpitations  RESPIRATORY:   No SOB, cough, dyspnea on exertion.  No wheezing  GASTROINTESTINAL:  No abd pain, N/V, diarrhea/constipation  EXTREMITIES:  No swelling or joint pain  GENITOURINARY:  No burning on urination, increased frequency or urgency.  No flank pain  NEUROLOGIC:  No HA, visual disturbances  SKIN: No rashes    Allergies    No Known Allergies    Intolerances        ANTIBIOTICS/RELEVANT:  antimicrobials  azithromycin  IVPB      azithromycin  IVPB 500 milliGRAM(s) IV Intermittent every 24 hours  ertapenem  IVPB      ertapenem  IVPB 1000 milliGRAM(s) IV Intermittent every 24 hours    immunologic:    OTHER:  acetaminophen   Tablet .. 650 milliGRAM(s) Oral every 6 hours PRN  aluminum hydroxide/magnesium hydroxide/simethicone Suspension 30 milliLiter(s) Oral every 6 hours PRN  lidocaine 1% Injectable 10 milliLiter(s) Local Injection once  mesalamine DR Capsule 800 milliGRAM(s) Oral three times a day  metoprolol tartrate 25 milliGRAM(s) Oral two times a day  pantoprazole    Tablet 40 milliGRAM(s) Oral two times a day      Objective:  Vital Signs Last 24 Hrs  T(C): 36.5 (20 Mar 2019 15:40), Max: 36.7 (20 Mar 2019 05:47)  T(F): 97.7 (20 Mar 2019 15:40), Max: 98 (20 Mar 2019 05:47)  HR: 82 (20 Mar 2019 15:40) (74 - 82)  BP: 124/54 (20 Mar 2019 15:40) (124/54 - 137/71)  BP(mean): --  RR: 16 (20 Mar 2019 15:40) (16 - 16)  SpO2: 100% (20 Mar 2019 15:40) (100% - 100%)    PHYSICAL EXAM:  Constitutional:NAD  Eyes:TODD, EOMI  Ear/Nose/Throat: no thrush, mucositis.  Moist mucous membranes	  Neck:no JVD, no lymphadenopathy, supple  Respiratory: CTA ruth  Cardiovascular: S1S2 RRR, no murmurs  Gastrointestinal:soft, nontender,  nondistended (+) BS  Extremities:no e/e/c  Skin:  no rashes, open wounds or ulcerations        LABS:                        9.9    12.67 )-----------( 69       ( 20 Mar 2019 05:35 )             33.2     03-20    147<H>  |  118<H>  |  18  ----------------------------<  87  4.0   |  18<L>  |  1.40<H>    Ca    7.7<L>      20 Mar 2019 05:35  Phos  2.6     03-20  Mg     1.8     03-20    TPro  5.8<L>  /  Alb  1.7<L>  /  TBili  0.5  /  DBili  x   /  AST  25  /  ALT  19  /  AlkPhos  245<H>  03-19    PT/INR - ( 20 Mar 2019 05:35 )   PT: 19.7 SEC;   INR: 1.74          PTT - ( 20 Mar 2019 05:35 )  PTT:44.6 SEC                        MICROBIOLOGY:    Culture - Blood (03.14.19 @ 07:34)    Culture - Blood:   NO ORGANISMS ISOLATED    Specimen Source: BLOOD    Culture - Urine (03.13.19 @ 23:42)    Culture - Urine:   COLONY COUNT: > = 100,000 CFU/ML    Culture - Urine:   ***** CRITICAL RESULT *****  PERSON CALLED / READ-BACK: TERRIE LOVE  DATE / TIME CALLED: 03/16/19 1153  CALLED BY: ANDERSON FRIAS    -  Amikacin: S <=8 FERNANDO    -  Ampicillin: R >16 FERNANDO    -  Ampicillin/Sulbactam: R <=4/2 FERNANDO    -  Aztreonam: R 16 FERNANDO    -  Cefazolin: R >=32 FERNANDO    -  Cefepime: R >16 FERNANDO    -  Cefoxitin: S <=4 FERNANDO    -  Ceftazidime: R 16 FERNANDO    -  Ceftriaxone: R >32 FERNANDO    -  Ciprofloxacin: R >2 FERNANDO    -  Ertapenem: S <=0.5 FERNANDO    -  Gentamicin: S <=1 FERNANDO    -  Imipenem: S <=1 FERNANDO    -  Levofloxacin: R >4 FERNANDO    -  Meropenem: S <=1 FERNANDO    -  Nitrofurantoin: S <=32 FERNANDO    -  Piperacillin/Tazobactam: R <=8 FERNANDO    -  Tigecycline: S <=1 FERNANDO    -  Tobramycin: S <=2 FERNANDO    -  Trimethoprim/Sulfamethoxazole: R >2/38 FERNANDO    Specimen Source: URINE MIDSTREAM    Organism Identification: E.COLI ESBL POSITIVE  Staphylococcus sp.,coag neg    Organism: Staphylococcus sp.,coag neg  COLONY COUNT: LESS THAN 10,000 CFU/ML    Organism: E.COLI ESBL POSITIVE  COLONY COUNT: > = 100,000 CFU/ML    Method Type: NEGATIVE FERNANDO 43    Legionella pneumophila Antigen, Urine (03.15.19 @ 19:30)    Legionella pneumophila Antigen, Urine: Negative          RADIOLOGY & ADDITIONAL STUDIES:    < from: CT Chest No Cont (03.15.19 @ 09:22) >  IMPRESSION:     *  Tracheal wall thickening, centrilobular and tree-in-bud micronodules   predominantly within the upper lungs suggestive of   bronchitis/bronchiolitis.    *  Interlobular septal thickening and patchy groundglass opacities, trace   bilateral pleural effusions and anasarca suggestive of superimposed   pulmonary edema.    *  Ascites new from 3/4/2019.    *  Narrowing of bilateral main bronchi which may represent bronchomalacia.    < end of copied text >

## 2019-03-20 NOTE — PROGRESS NOTE ADULT - ASSESSMENT
This is a 73M with history of CLL (not on therapy), Anemia, and HTN who presents to the hospital with complaints of persistent intermittent diarrhea since October of last year. Said that about a week prior to his trip to [facility] he started having significant diarrhea (5-7 episodes of loose stools, ?black stools as per patient at that time) and went to see a doctor. Was told that he likely had a viral illness and that it would improve over time. His diarrhea did improve and he went to [facility] but on his second day there he started having diarrhea again (states that he ate sweets from the [employer] that others in his family did not prior to the onset of his diarrhea in [place]). He started having intermittent diarrhea and went to a doctor in [facility] where he had a colonoscopy done which he states was negative for abnormal findings (pt not sure if he had a biopsy done during the colonoscopy). He was then given some  medications with minimal improvement in his symptoms. He continued to have intermittent diarrhea (said that he has 4-5 days of diarrhea a week) and was unable to get any relief in [facility]. Upon arriving back from [facility] he continued to have intermittent diarrhea and therefore presented to the ED for evaluation. Said that currently he had about 5 episodes of diarrhea today, describes it as yellowish in color, associated with generalized abdominal pain. Denies any hematochezia, melena, or BRBPR currently. No fevers/chills. He denies any other complaints at present.    On arrival to the ED, his vitals were T 98.8, P 97, /62, R 16, O2 sat 100% RA. His lab work was significant for leukocytosis (unknown baseline), normocytic anemia (unknown baseline), mild hyponatremia/hypokalemia/AG gap, elevated BUN/Cr, elevated alk phos, and elevated lipase. He was also noted to have a lactate of 2.8. His CXR showed a possible R basilar opacity but the patient was not complaining of any PNA type symptoms. His US abd was negative for any acute findings. He was given LR 1L. He was admitted to medicine. (03 Mar 2019 23:55)      HOSP COURSE:    CTap showed mild wall thickening of the duodenum extending of the adjacent mesenteric fat, suggesting underlying duodenitis.  EGD showed normal esophagus, non bleeding erosive gastropathy - biopsied (s/o gastrinoma), duodenitis and one oozing duodenal ulcer with no bleeding, Multiple non-bleeding duodenal ulcers,                    MRCP- No evidence of cholelithiasis or biliary obstruction.Hepatic steatosis.Mural thickening proximal small bowel consistent with enteritis. Moderate ascites.  GI is following, s/p push enteroscopy with biopsy  3/8 showing erosive gastritis and oozing duodenal ulcer.        Thus far  stool studies neg, including stool cx, O&P, GI pcr, C.diff, Giardia.   Malaria scrn (-). On 3/7 had diagnostic paracentesis.  WBC trending upwards.  s/p push enteroscopy with bx : erosive gastritis, oozing duodenal ulcer.  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    MICU consulted for  tachy/SOB.      ID consult called to evaluate leukocytosis.  Pt still with diarrhea.  Denies abd pain or epigastric pain.  No rash.  (+) nonproductive cough.  (+) wt loss.  No difficulty swallowing, no N/V.  c/o sore throat.  No thrush.  No f/c/r.  Pt has outside Heme/Onc, does not know baseline WBC.   Pt denies recent use of abx, no sick contacts, change in diet.          Problem/Plan - 1:    ·	Leukocytosis    - Pt with h/o CLL, likely immunocompromised, baseline cbc u/a.  Trend CBC with differential.  Now improving after starting abx for uti      - Check blood cultures, UA, Ucx.   UA (+) LE/pyuria.  UCx growing E.coli >100K and CNS <10K.  (+) ESBL, change to ertapenem (3/18).  Recommend 1 week course through 3/24.  WBC improving.  Pt with recent h/o E.coli UTI and treated in [place].  Pt was hospitalized multiple times between Nov through Feb.  f/u Ucx    - Pt also with cough and phlegm production since EGD. CT chest with TIB pattern in upper lungs, and pulm edema.  Azithromycin added for atypical coverage.  Legionella Ag neg.  Complete 5 day course of azithro through 3/19    - Stool studies negative.  Send repeat stool O&P x 3 negative.  (test for Microsporidium, cyclospora, isospora, cryptosporidium - spoke to micro )    - Check CMV pcr (-), Cmv IGg/IGM (prior exposure), histoplasma (-), HIV (neg), strongyloides (neg)    - Pt had repeat EGD - pathology noted.      Adeline Marques  [phone]

## 2019-03-20 NOTE — PROGRESS NOTE ADULT - GASTROINTESTINAL DETAILS
no distention/soft/nontender
nontender/soft/no distention
nontender/no distention/soft
soft/nontender/no distention

## 2019-03-20 NOTE — PROGRESS NOTE ADULT - ASSESSMENT
73M with a diagnosis of CLL for a year, never needed treatment, here with chronic diarrhea causing weight loss, has anemia, thrombocytopenia and acute increase in WBC, mainly PMNs and left shift (suggesting infection or inflammatory response and not CLL), will recommend:  -  no current evidence of CLL on PB flow cytometry - discussed the results of PB and peritoneal fluid flow with the family members and the relevance of these findings detailed, all questions answered.   - monitor CBC/diff  - f/u ID and GI recommendations  - on Abx, f/u on ID recommnendations  - obtain FISH on PB - sent out  - anemia w/u shows no evidence of iron def, b12 or folate def or hemolysis - anemia of chronic disease, renal insufficiency and from acute blood loss - keep hgb ~ 8 with transfusions  - vit K for elevated INR and aPTT, FFPs as needed, monitor INR, aPTT.  - keep plts ~ 50K when bleeding  - all other supportive rx  - all other supportive Rx as per medicine, GI and ID.  - to f/u with heme as outpt after discharge

## 2019-03-20 NOTE — PROGRESS NOTE ADULT - ASSESSMENT
73 Male w CLL (not on therapy), Anemia, and HTN a/w  persistent diarrhea, and suspected GIB, Renal following for PEDRO and hypernatremia       PEDRO (acute kidney injury)- prerenal azotemia v ATN.   No evidence of obstruction on abd CT.   [place] UA noted.  off IVF, given significant third spacing.  Cr stable at 1.4    M acidosis likely 2/2 PEDRO  Hypernatremia 2/2 free water deficit   s/p IVF resuscitation. s/p pulm edema, off IVF  Hypokalemia- repleted. K wnl today  Anemia, GIB- on PPI.    labs, chart reviewed  Encourage increase water intake as tolerated  low Na in diet, inc K intake  monitor BMP daily  sr magnesium, goal around 2.0. K around 4- replete prn   avoid ACEi/ARB/NSAIDs/Nephrotoxics.    f/u w/GI, hem    will f/u Melolabial Transposition Flap Text: The defect edges were debeveled with a #15 scalpel blade.  Given the location of the defect and the proximity to free margins a melolabial flap was deemed most appropriate.  Using a sterile surgical marker, an appropriate melolabial transposition flap was drawn incorporating the defect.    The area thus outlined was incised deep to adipose tissue with a #15 scalpel blade.  The skin margins were undermined to an appropriate distance in all directions utilizing iris scissors.

## 2019-03-20 NOTE — PROGRESS NOTE ADULT - SUBJECTIVE AND OBJECTIVE BOX
Patient is a 73y old  Male who presents with a chief complaint of Diarrhea (20 Mar 2019 16:56)      SUBJECTIVE / OVERNIGHT EVENTS:    Events noted.  CONSTITUTIONAL: No fever,  or fatigue  RESPIRATORY: No cough, wheezing,  No shortness of breath  CARDIOVASCULAR: No chest pain, palpitations, dizziness, or leg swelling  GASTROINTESTINAL: No abdominal pain.  NEUROLOGICAL: No headaches,     MEDICATIONS  (STANDING):  ertapenem  IVPB      ertapenem  IVPB 1000 milliGRAM(s) IV Intermittent every 24 hours  lidocaine 1% Injectable 10 milliLiter(s) Local Injection once  mesalamine DR Capsule 800 milliGRAM(s) Oral three times a day  metoprolol tartrate 25 milliGRAM(s) Oral two times a day  pantoprazole    Tablet 40 milliGRAM(s) Oral two times a day    MEDICATIONS  (PRN):  acetaminophen   Tablet .. 650 milliGRAM(s) Oral every 6 hours PRN Moderate Pain (4 - 6)  aluminum hydroxide/magnesium hydroxide/simethicone Suspension 30 milliLiter(s) Oral every 6 hours PRN Dyspepsia        CAPILLARY BLOOD GLUCOSE        I&O's Summary      PHYSICAL EXAM:  GENERAL: NAD  NECK: Supple, No JVD  CHEST/LUNG: Clear to auscultation bilaterally; No wheezing.  HEART: Regular rate and rhythm; No murmurs, rubs, or gallops  ABDOMEN: Soft, Nontender, Nondistended; Bowel sounds present  EXTREMITIES:   No edema  NEUROLOGY: AAO X 3      LABS:                        9.9    12.67 )-----------( 69       ( 20 Mar 2019 05:35 )             33.2     03-20    147<H>  |  118<H>  |  18  ----------------------------<  87  4.0   |  18<L>  |  1.40<H>    Ca    7.7<L>      20 Mar 2019 05:35  Phos  2.6     03-20  Mg     1.8     03-20    TPro  5.8<L>  /  Alb  1.7<L>  /  TBili  0.5  /  DBili  x   /  AST  25  /  ALT  19  /  AlkPhos  245<H>  03-19    PT/INR - ( 20 Mar 2019 05:35 )   PT: 19.7 SEC;   INR: 1.74          PTT - ( 20 Mar 2019 05:35 )  PTT:44.6 SEC        CAPILLARY BLOOD GLUCOSE        03-15 @ 13:42  Culture-urine --  Culture results --  method type --  Organism --  Organism Identification --  Specimen source FECES  03-14 @ 07:34  Culture-urine --  Culture results --  method type --  Organism --  Organism Identification --  Specimen source BLOOD  03-14 @ 05:02  Culture-urine --  Culture results --  method type --  Organism --  Organism Identification --  Specimen source BLOOD PERIPHERAL  03-13 @ 23:42  Culture-urine   COLONY COUNT: > = 100,000 CFU/ML  Culture results --  method type NEGATIVE FERNANDO 43  Organism Staphylococcus sp.,coag neg  COLONY COUNT: LESS THAN 10,000 CFU/ML  Organism Identification E.COLI ESBL POSITIVE  Staphylococcus sp.,coag neg  Specimen source URINE MIDSTREAM           03-15 @ 13:42  Culture blood --  Culture results --  Gram stain --  Gram stain blood --  Method type --  Organism --  Organism identification --  Specimen source FECES   03-14 @ 07:34  Culture blood   NO ORGANISMS ISOLATED  Culture results --  Gram stain --  Gram stain blood --  Method type --  Organism --  Organism identification --  Specimen source BLOOD   03-14 @ 05:02  Culture blood   NO ORGANISMS ISOLATED  Culture results --  Gram stain --  Gram stain blood --  Method type --  Organism --  Organism identification --  Specimen source BLOOD PERIPHERAL   03-13 @ 23:42  Culture blood --  Culture results --  Gram stain --  Gram stain blood --  Method type NEGATIVE FERNANDO 43  Organism Staphylococcus sp.,coag neg  COLONY COUNT: LESS THAN 10,000 CFU/ML  Organism identification E.COLI ESBL POSITIVE  Staphylococcus sp.,coag neg  Specimen source URINE MIDSTREAM      RADIOLOGY & ADDITIONAL TESTS:    Imaging Personally Reviewed:    Consultant(s) Notes Reviewed:      Care Discussed with Consultants/Other Providers:

## 2019-03-21 VITALS
RESPIRATION RATE: 16 BRPM | TEMPERATURE: 96 F | HEART RATE: 79 BPM | OXYGEN SATURATION: 100 % | SYSTOLIC BLOOD PRESSURE: 115 MMHG | DIASTOLIC BLOOD PRESSURE: 69 MMHG

## 2019-03-21 LAB
ANION GAP SERPL CALC-SCNC: 9 MMO/L — SIGNIFICANT CHANGE UP (ref 7–14)
BUN SERPL-MCNC: 18 MG/DL — SIGNIFICANT CHANGE UP (ref 7–23)
CALCIUM SERPL-MCNC: 7.6 MG/DL — LOW (ref 8.4–10.5)
CHLORIDE SERPL-SCNC: 118 MMOL/L — HIGH (ref 98–107)
CO2 SERPL-SCNC: 20 MMOL/L — LOW (ref 22–31)
CREAT SERPL-MCNC: 1.37 MG/DL — HIGH (ref 0.5–1.3)
GLUCOSE SERPL-MCNC: 79 MG/DL — SIGNIFICANT CHANGE UP (ref 70–99)
HCT VFR BLD CALC: 32.5 % — LOW (ref 39–50)
HCV RNA SERPL NAA DL=5-ACNC: NOT DETECTED IU/ML — SIGNIFICANT CHANGE UP
HCV RNA SPEC NAA+PROBE-LOG IU: SIGNIFICANT CHANGE UP LOGIU/ML
HGB BLD-MCNC: 10.1 G/DL — LOW (ref 13–17)
MAGNESIUM SERPL-MCNC: 1.6 MG/DL — SIGNIFICANT CHANGE UP (ref 1.6–2.6)
MCHC RBC-ENTMCNC: 29.4 PG — SIGNIFICANT CHANGE UP (ref 27–34)
MCHC RBC-ENTMCNC: 31.1 % — LOW (ref 32–36)
MCV RBC AUTO: 94.5 FL — SIGNIFICANT CHANGE UP (ref 80–100)
NRBC # FLD: 0.03 K/UL — LOW (ref 25–125)
PHOSPHATE SERPL-MCNC: 2.9 MG/DL — SIGNIFICANT CHANGE UP (ref 2.5–4.5)
PLATELET # BLD AUTO: 71 K/UL — LOW (ref 150–400)
PMV BLD: 14.2 FL — HIGH (ref 7–13)
POTASSIUM SERPL-MCNC: 3.6 MMOL/L — SIGNIFICANT CHANGE UP (ref 3.5–5.3)
POTASSIUM SERPL-SCNC: 3.6 MMOL/L — SIGNIFICANT CHANGE UP (ref 3.5–5.3)
RBC # BLD: 3.44 M/UL — LOW (ref 4.2–5.8)
RBC # FLD: 22.4 % — HIGH (ref 10.3–14.5)
SODIUM SERPL-SCNC: 147 MMOL/L — HIGH (ref 135–145)
WBC # BLD: 10.52 K/UL — HIGH (ref 3.8–10.5)
WBC # FLD AUTO: 10.52 K/UL — HIGH (ref 3.8–10.5)

## 2019-03-21 PROCEDURE — 93970 EXTREMITY STUDY: CPT | Mod: 26

## 2019-03-21 RX ORDER — MESALAMINE 400 MG
2 TABLET, DELAYED RELEASE (ENTERIC COATED) ORAL
Qty: 180 | Refills: 0 | OUTPATIENT
Start: 2019-03-21 | End: 2019-04-19

## 2019-03-21 RX ORDER — LOSARTAN/HYDROCHLOROTHIAZIDE 100MG-25MG
1 TABLET ORAL
Qty: 0 | Refills: 0 | COMMUNITY

## 2019-03-21 RX ORDER — ACETAMINOPHEN 500 MG
2 TABLET ORAL
Qty: 0 | Refills: 0 | COMMUNITY
Start: 2019-03-21

## 2019-03-21 RX ORDER — ERTAPENEM SODIUM 1 G/1
1 INJECTION, POWDER, LYOPHILIZED, FOR SOLUTION INTRAMUSCULAR; INTRAVENOUS
Qty: 5 | Refills: 0 | OUTPATIENT
Start: 2019-03-21 | End: 2019-03-25

## 2019-03-21 RX ORDER — DEXTROSE MONOHYDRATE, SODIUM CHLORIDE, AND POTASSIUM CHLORIDE 50; .745; 4.5 G/1000ML; G/1000ML; G/1000ML
1000 INJECTION, SOLUTION INTRAVENOUS
Qty: 0 | Refills: 0 | Status: DISCONTINUED | OUTPATIENT
Start: 2019-03-21 | End: 2019-03-21

## 2019-03-21 RX ORDER — AMLODIPINE BESYLATE 2.5 MG/1
1 TABLET ORAL
Qty: 0 | Refills: 0 | COMMUNITY

## 2019-03-21 RX ORDER — PANTOPRAZOLE SODIUM 20 MG/1
1 TABLET, DELAYED RELEASE ORAL
Qty: 30 | Refills: 0 | OUTPATIENT
Start: 2019-03-21 | End: 2019-04-19

## 2019-03-21 RX ORDER — PANTOPRAZOLE SODIUM 20 MG/1
1 TABLET, DELAYED RELEASE ORAL
Qty: 60 | Refills: 0 | OUTPATIENT
Start: 2019-03-21 | End: 2019-04-19

## 2019-03-21 RX ADMIN — Medication 25 MILLIGRAM(S): at 06:01

## 2019-03-21 RX ADMIN — DEXTROSE MONOHYDRATE, SODIUM CHLORIDE, AND POTASSIUM CHLORIDE 75 MILLILITER(S): 50; .745; 4.5 INJECTION, SOLUTION INTRAVENOUS at 14:40

## 2019-03-21 RX ADMIN — Medication 25 MILLIGRAM(S): at 18:16

## 2019-03-21 RX ADMIN — ERTAPENEM SODIUM 120 MILLIGRAM(S): 1 INJECTION, POWDER, LYOPHILIZED, FOR SOLUTION INTRAMUSCULAR; INTRAVENOUS at 11:08

## 2019-03-21 RX ADMIN — PANTOPRAZOLE SODIUM 40 MILLIGRAM(S): 20 TABLET, DELAYED RELEASE ORAL at 18:16

## 2019-03-21 RX ADMIN — Medication 800 MILLIGRAM(S): at 14:40

## 2019-03-21 RX ADMIN — PANTOPRAZOLE SODIUM 40 MILLIGRAM(S): 20 TABLET, DELAYED RELEASE ORAL at 06:01

## 2019-03-21 RX ADMIN — Medication 800 MILLIGRAM(S): at 06:01

## 2019-03-21 NOTE — PROGRESS NOTE ADULT - ASSESSMENT
73 Male w CLL (not on therapy), Anemia, and HTN a/w  persistent diarrhea, and suspected GIB, Renal following for PEDRO and hypernatremia       PEDRO (acute kidney injury)- prerenal azotemia v ATN.   No evidence of obstruction on abd CT.   [place] UA noted.  off IVF, given significant third spacing.  Cr stable at 1.4    M acidosis likely 2/2 PEDRO  Hypernatremia 2/2 free water deficit   s/p IVF resuscitation. s/p pulm edema, off IVF  Hypokalemia- repleted. K wnl today  Anemia, GIB- on PPI.    labs, chart reviewed  Encourage increase water intake as tolerated  low Na in diet, inc K intake  monitor BMP daily  sr magnesium, goal around 2.0. K around 4- replete prn   avoid ACEi/ARB/NSAIDs/Nephrotoxics.    f/u w/GI, hem    will f/u

## 2019-03-21 NOTE — PROGRESS NOTE ADULT - ASSESSMENT
This is a 73M with history of CLL (not on therapy), Anemia, and HTN who presents to the hospital with complaints of persistent intermittent diarrhea since October of last year. Said that about a week prior to his trip to [facility] he started having significant diarrhea (5-7 episodes of loose stools, ?black stools as per patient at that time) and went to see a doctor. Was told that he likely had a viral illness and that it would improve over time. His diarrhea did improve and he went to [facility] but on his second day there he started having diarrhea again (states that he ate sweets from the [employer] that others in his family did not prior to the onset of his diarrhea in [place]). He started having intermittent diarrhea and went to a doctor in [facility] where he had a colonoscopy done which he states was negative for abnormal findings (pt not sure if he had a biopsy done during the colonoscopy). He was then given some  medications with minimal improvement in his symptoms. He continued to have intermittent diarrhea (said that he has 4-5 days of diarrhea a week) and was unable to get any relief in [facility]. Upon arriving back from [facility] he continued to have intermittent diarrhea and therefore presented to the ED for evaluation. Said that currently he had about 5 episodes of diarrhea today, describes it as yellowish in color, associated with generalized abdominal pain. Denies any hematochezia, melena, or BRBPR currently. No fevers/chills. He denies any other complaints at present.    On arrival to the ED, his vitals were T 98.8, P 97, /62, R 16, O2 sat 100% RA. His lab work was significant for leukocytosis (unknown baseline), normocytic anemia (unknown baseline), mild hyponatremia/hypokalemia/AG gap, elevated BUN/Cr, elevated alk phos, and elevated lipase. He was also noted to have a lactate of 2.8. His CXR showed a possible R basilar opacity but the patient was not complaining of any PNA type symptoms. His US abd was negative for any acute findings. He was given LR 1L. He was admitted to medicine. (03 Mar 2019 23:55)      HOSP COURSE:    CTap showed mild wall thickening of the duodenum extending of the adjacent mesenteric fat, suggesting underlying duodenitis.  EGD showed normal esophagus, non bleeding erosive gastropathy - biopsied (s/o gastrinoma), duodenitis and one oozing duodenal ulcer with no bleeding, Multiple non-bleeding duodenal ulcers,                    MRCP- No evidence of cholelithiasis or biliary obstruction.Hepatic steatosis.Mural thickening proximal small bowel consistent with enteritis. Moderate ascites.  GI is following, s/p push enteroscopy with biopsy  3/8 showing erosive gastritis and oozing duodenal ulcer.        Thus far  stool studies neg, including stool cx, O&P, GI pcr, C.diff, Giardia.   Malaria scrn (-). On 3/7 had diagnostic paracentesis.  WBC trending upwards.  s/p push enteroscopy with bx : erosive gastritis, oozing duodenal ulcer.  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    MICU consulted for  tachy/SOB.      ID consult called to evaluate leukocytosis.  Pt still with diarrhea.  Denies abd pain or epigastric pain.  No rash.  (+) nonproductive cough.  (+) wt loss.  No difficulty swallowing, no N/V.  c/o sore throat.  No thrush.  No f/c/r.  Pt has outside Heme/Onc, does not know baseline WBC.   Pt denies recent use of abx, no sick contacts, change in diet.          Problem/Plan - 1:    ·	Leukocytosis    - Pt with h/o CLL, likely immunocompromised, baseline cbc u/a.  Trend CBC with differential.  Now improving after starting abx for uti      - Check blood cultures, UA, Ucx.   UA (+) LE/pyuria.  UCx growing E.coli >100K and CNS <10K.  (+) ESBL, change to ertapenem (3/18).  Recommend 1 week course through 3/24.  WBC improving.  Pt with recent h/o E.coli UTI and treated in [place].  Pt was hospitalized multiple times between Nov through Feb.  f/u Ucx    - Pt also with cough and phlegm production since EGD. CT chest with TIB pattern in upper lungs, and pulm edema.  Azithromycin added for atypical coverage.  Legionella Ag neg.  Completed 5 day course of azithro through 3/19    - Stool studies negative.  Send repeat stool O&P x 3 negative.  (test for Microsporidium (pending), cyclospora (-), isospora (-), cryptosporidium (-) - spoke to micro )    - Check CMV pcr (-), Cmv IGg/IGM (prior exposure), histoplasma (-), HIV (neg), strongyloides (neg), Giardia (-)    - Pt had repeat EGD - pathology noted.  f/u with BRENDAN Lr Rai  [phone]

## 2019-03-21 NOTE — PROGRESS NOTE ADULT - NSHPATTENDINGPLANDISCUSS_GEN_ALL_CORE
Medicine NP
patient
pt
pt
Patient
pt
GI Fellow Dr. Geronimo

## 2019-03-21 NOTE — PROGRESS NOTE ADULT - PROVIDER SPECIALTY LIST ADULT
Cardiology
Gastroenterology
Heme/Onc
Infectious Disease
Internal Medicine
Nephrology
Gastroenterology
Internal Medicine
Infectious Disease
Internal Medicine
Heme/Onc

## 2019-03-21 NOTE — DISCHARGE NOTE NURSING/CASE MANAGEMENT/SOCIAL WORK - NSDCDPATPORTLINK_GEN_ALL_CORE
You can access the [facility] Patient Portal, offered by [facility], by registering with the following website: [email or web address]

## 2019-03-21 NOTE — PROGRESS NOTE ADULT - SUBJECTIVE AND OBJECTIVE BOX
Infectious Diseases progress note:    Subjective: PT for UE doppler.  No acute o/n events.  Afebrile.  Leukocytosis continues to improve    ROS:  u/a    Allergies    No Known Allergies    Intolerances        ANTIBIOTICS/RELEVANT:  antimicrobials  ertapenem  IVPB      ertapenem  IVPB 1000 milliGRAM(s) IV Intermittent every 24 hours    immunologic:    OTHER:  acetaminophen   Tablet .. 650 milliGRAM(s) Oral every 6 hours PRN  aluminum hydroxide/magnesium hydroxide/simethicone Suspension 30 milliLiter(s) Oral every 6 hours PRN  lidocaine 1% Injectable 10 milliLiter(s) Local Injection once  mesalamine DR Capsule 800 milliGRAM(s) Oral three times a day  metoprolol tartrate 25 milliGRAM(s) Oral two times a day  pantoprazole    Tablet 40 milliGRAM(s) Oral two times a day  sodium chloride 0.45% with potassium chloride 20 mEq/L 1000 milliLiter(s) IV Continuous <Continuous>      Objective:  Vital Signs Last 24 Hrs  T(C): 35.8 (21 Mar 2019 14:33), Max: 37 (21 Mar 2019 05:58)  T(F): 96.4 (21 Mar 2019 14:33), Max: 98.6 (21 Mar 2019 05:58)  HR: 79 (21 Mar 2019 14:33) (79 - 90)  BP: 115/69 (21 Mar 2019 14:33) (104/58 - 115/69)  BP(mean): --  RR: 16 (21 Mar 2019 14:33) (16 - 19)  SpO2: 100% (21 Mar 2019 14:33) (100% - 100%)    PHYSICAL EXAM:  u/a        LABS:                        10.1   10.52 )-----------( 71       ( 21 Mar 2019 07:27 )             32.5     03-21    147<H>  |  118<H>  |  18  ----------------------------<  79  3.6   |  20<L>  |  1.37<H>    Ca    7.6<L>      21 Mar 2019 07:27  Phos  2.9     03-21  Mg     1.6     03-21      PT/INR - ( 20 Mar 2019 05:35 )   PT: 19.7 SEC;   INR: 1.74          PTT - ( 20 Mar 2019 05:35 )  PTT:44.6 SEC          MICROBIOLOGY:    Culture - Blood (03.14.19 @ 07:34)    Culture - Blood:   NO ORGANISMS ISOLATED    Specimen Source: BLOOD    Culture - Blood in AM (03.14.19 @ 05:02)    Culture - Blood:   NO ORGANISMS ISOLATED    Specimen Source: BLOOD PERIPHERAL    Culture - Urine (03.13.19 @ 23:42)    Culture - Urine:   COLONY COUNT: > = 100,000 CFU/ML    Culture - Urine:   ***** CRITICAL RESULT *****  PERSON CALLED / READ-BACK: TERRIE LOVE  DATE / TIME CALLED: 03/16/19 1153  CALLED BY: ANDERSON FRIAS    -  Amikacin: S <=8 FERNANDO    -  Ampicillin: R >16 FERNANDO    -  Ampicillin/Sulbactam: R <=4/2 FERNANDO    -  Aztreonam: R 16 FERNANDO    -  Cefazolin: R >=32 FERNANDO    -  Cefepime: R >16 FERNANDO    -  Cefoxitin: S <=4 FERNANDO    -  Ceftazidime: R 16 FERNANDO    -  Ceftriaxone: R >32 FERNANDO    -  Ciprofloxacin: R >2 FERNANDO    -  Ertapenem: S <=0.5 FERNANDO    -  Gentamicin: S <=1 FERNANDO    -  Imipenem: S <=1 FERNANDO    -  Levofloxacin: R >4 FERNANDO    -  Meropenem: S <=1 FERNANDO    -  Nitrofurantoin: S <=32 FERNANDO    -  Piperacillin/Tazobactam: R <=8 FERNANDO    -  Tigecycline: S <=1 FERNANDO    -  Tobramycin: S <=2 FERNANDO    -  Trimethoprim/Sulfamethoxazole: R >2/38 FERNANDO    Specimen Source: URINE MIDSTREAM    Organism Identification: E.COLI ESBL POSITIVE  Staphylococcus sp.,coag neg    Organism: Staphylococcus sp.,coag neg  COLONY COUNT: LESS THAN 10,000 CFU/ML    Organism: E.COLI ESBL POSITIVE  COLONY COUNT: > = 100,000 CFU/ML    Method Type: NEGATIVE FERNANDO 43      Giardia lamblia Antigen, Stool: --: Giardia antigen negative performed by rapid immunoassay  (non-enzymatic).  (It is recommended that all specimens tested by  an immunochromatographic card test be  confirmed by another method.)    Test Performed at       [facility]  [address] [place] [place] [address] (03.04.19 @ 20:46)        RADIOLOGY & ADDITIONAL STUDIES:

## 2019-03-21 NOTE — PROGRESS NOTE ADULT - PROBLEM SELECTOR PLAN 2
persistent hypernatremia.  oral intake nt reliable  still having low grade diarrea  will give iv 1/2 ns for 24 hrs

## 2019-03-21 NOTE — PROGRESS NOTE ADULT - SUBJECTIVE AND OBJECTIVE BOX
Pt seen and examined at bedside  feels fairly well  still having low grade diarrea. no vomiting.  no fever,chills.  no abd pain.  no dyspnea,dizziness      Allergies:  No Known Allergies    Hospital Medications:   MEDICATIONS  (STANDING):  ertapenem  IVPB      ertapenem  IVPB 1000 milliGRAM(s) IV Intermittent every 24 hours  lidocaine 1% Injectable 10 milliLiter(s) Local Injection once  mesalamine DR Capsule 800 milliGRAM(s) Oral three times a day  metoprolol tartrate 25 milliGRAM(s) Oral two times a day  pantoprazole    Tablet 40 milliGRAM(s) Oral two times a day         VITALS:  T(F): 98.6 (03-21-19 @ 05:58), Max: 98.6 (03-21-19 @ 05:58)  HR: 90 (03-21-19 @ 05:58)  BP: 107/58 (03-21-19 @ 05:58)  RR: 19 (03-21-19 @ 05:58)  SpO2: 100% (03-21-19 @ 05:58)  Wt(kg): --      PHYSICAL EXAM:  Constitutional: NAD. Lying comfortably in bed  HEENT: anicteric sclera, oropharynx clear, MMM  Neck: No JVD  Respiratory: CTAB, no wheezes, rales or rhonchi  Cardiovascular: S1, S2, RRR  Gastrointestinal: BS+, soft, NT/ND  Extremities: No cyanosis or clubbing. No peripheral edema  Neurological: A/O x 3, no focal deficits  Psychiatric: Normal mood, normal affect  : No CVA tenderness. No mata.   Skin: No rashes       LABS:  03-21    147<H>  |  118<H>  |  18  ----------------------------<  79  3.6   |  20<L>  |  1.37<H>    Ca    7.6<L>      21 Mar 2019 07:27  Phos  2.9     03-21  Mg     1.6     03-21      Creatinine Trend: 1.37 <--, 1.40 <--, 1.42 <--, 1.42 <--, 1.50 <--, 1.40 <--, 1.45 <--, 1.59 <--, 1.47 <--                        10.1   10.52 )-----------( 71       ( 21 Mar 2019 07:27 )             32.5     Urine Studies:      RADIOLOGY & ADDITIONAL STUDIES:

## 2019-03-21 NOTE — PROGRESS NOTE ADULT - REASON FOR ADMISSION
Diarrhea

## 2019-03-22 ENCOUNTER — INPATIENT (INPATIENT)
Facility: HOSPITAL | Age: 74
LOS: 20 days | Discharge: NURSING, REHAB OR LONG-TERM CARE | End: 2019-04-12
Attending: INTERNAL MEDICINE | Admitting: INTERNAL MEDICINE
Payer: MEDICARE

## 2019-03-22 VITALS
OXYGEN SATURATION: 100 % | HEART RATE: 85 BPM | TEMPERATURE: 98 F | DIASTOLIC BLOOD PRESSURE: 60 MMHG | SYSTOLIC BLOOD PRESSURE: 114 MMHG | RESPIRATION RATE: 18 BRPM

## 2019-03-22 DIAGNOSIS — R19.7 DIARRHEA, UNSPECIFIED: ICD-10-CM

## 2019-03-22 LAB
ALBUMIN SERPL ELPH-MCNC: 1.9 G/DL — LOW (ref 3.3–5)
ALP SERPL-CCNC: 293 U/L — HIGH (ref 40–120)
ALT FLD-CCNC: 26 U/L — SIGNIFICANT CHANGE UP (ref 4–41)
ANION GAP SERPL CALC-SCNC: 11 MMO/L — SIGNIFICANT CHANGE UP (ref 7–14)
AST SERPL-CCNC: 32 U/L — SIGNIFICANT CHANGE UP (ref 4–40)
BASOPHILS # BLD AUTO: 0.02 K/UL — SIGNIFICANT CHANGE UP (ref 0–0.2)
BASOPHILS NFR BLD AUTO: 0.2 % — SIGNIFICANT CHANGE UP (ref 0–2)
BILIRUB SERPL-MCNC: 0.6 MG/DL — SIGNIFICANT CHANGE UP (ref 0.2–1.2)
BUN SERPL-MCNC: 23 MG/DL — SIGNIFICANT CHANGE UP (ref 7–23)
CALCIUM SERPL-MCNC: 8.2 MG/DL — LOW (ref 8.4–10.5)
CHLORIDE SERPL-SCNC: 118 MMOL/L — HIGH (ref 98–107)
CO2 SERPL-SCNC: 19 MMOL/L — LOW (ref 22–31)
CREAT SERPL-MCNC: 1.67 MG/DL — HIGH (ref 0.5–1.3)
EOSINOPHIL # BLD AUTO: 0.61 K/UL — HIGH (ref 0–0.5)
EOSINOPHIL NFR BLD AUTO: 6.6 % — HIGH (ref 0–6)
GLUCOSE SERPL-MCNC: 84 MG/DL — SIGNIFICANT CHANGE UP (ref 70–99)
HCT VFR BLD CALC: 36.9 % — LOW (ref 39–50)
HGB BLD-MCNC: 10.9 G/DL — LOW (ref 13–17)
IMM GRANULOCYTES NFR BLD AUTO: 2.1 % — HIGH (ref 0–1.5)
LYMPHOCYTES # BLD AUTO: 1.35 K/UL — SIGNIFICANT CHANGE UP (ref 1–3.3)
LYMPHOCYTES # BLD AUTO: 14.6 % — SIGNIFICANT CHANGE UP (ref 13–44)
MAGNESIUM SERPL-MCNC: 1.7 MG/DL — SIGNIFICANT CHANGE UP (ref 1.6–2.6)
MCHC RBC-ENTMCNC: 28.6 PG — SIGNIFICANT CHANGE UP (ref 27–34)
MCHC RBC-ENTMCNC: 29.5 % — LOW (ref 32–36)
MCV RBC AUTO: 96.9 FL — SIGNIFICANT CHANGE UP (ref 80–100)
MONOCYTES # BLD AUTO: 2.65 K/UL — HIGH (ref 0–0.9)
MONOCYTES NFR BLD AUTO: 28.6 % — HIGH (ref 2–14)
NEUTROPHILS # BLD AUTO: 4.44 K/UL — SIGNIFICANT CHANGE UP (ref 1.8–7.4)
NEUTROPHILS NFR BLD AUTO: 47.9 % — SIGNIFICANT CHANGE UP (ref 43–77)
NRBC # FLD: 0.03 K/UL — LOW (ref 25–125)
PHOSPHATE SERPL-MCNC: 3.9 MG/DL — SIGNIFICANT CHANGE UP (ref 2.5–4.5)
PLATELET # BLD AUTO: 85 K/UL — LOW (ref 150–400)
PMV BLD: SIGNIFICANT CHANGE UP FL (ref 7–13)
POTASSIUM SERPL-MCNC: 4 MMOL/L — SIGNIFICANT CHANGE UP (ref 3.5–5.3)
POTASSIUM SERPL-SCNC: 4 MMOL/L — SIGNIFICANT CHANGE UP (ref 3.5–5.3)
PROT SERPL-MCNC: 6.7 G/DL — SIGNIFICANT CHANGE UP (ref 6–8.3)
RBC # BLD: 3.81 M/UL — LOW (ref 4.2–5.8)
RBC # FLD: 22.5 % — HIGH (ref 10.3–14.5)
SODIUM SERPL-SCNC: 148 MMOL/L — HIGH (ref 135–145)
WBC # BLD: 9.26 K/UL — SIGNIFICANT CHANGE UP (ref 3.8–10.5)
WBC # FLD AUTO: 9.26 K/UL — SIGNIFICANT CHANGE UP (ref 3.8–10.5)

## 2019-03-22 PROCEDURE — 71045 X-RAY EXAM CHEST 1 VIEW: CPT | Mod: 26

## 2019-03-22 RX ORDER — ERTAPENEM SODIUM 1 G/1
1 INJECTION, POWDER, LYOPHILIZED, FOR SOLUTION INTRAMUSCULAR; INTRAVENOUS
Qty: 3 | Refills: 0 | OUTPATIENT
Start: 2019-03-22 | End: 2019-03-24

## 2019-03-22 NOTE — ED PROVIDER NOTE - CLINICAL SUMMARY MEDICAL DECISION MAKING FREE TEXT BOX
pt with persistent diarrhea, pt unable to care for self, recent discharge; will admit for placement.

## 2019-03-22 NOTE — ED PROVIDER NOTE - OBJECTIVE STATEMENT
74yo m pmh CLL (not on therapy), Anemia, and HTN recent admission for diarrhea, presents for persistent diarrhea, family unable to care for pt at home. Pt was discharged yesterday continued symptoms. No chest pain, abdominal pain, or urinary symptoms.

## 2019-03-22 NOTE — ED PROVIDER NOTE - ATTENDING CONTRIBUTION TO CARE
Dr. Chang:  I have personally performed a face to face bedside history and physical examination of this patient. I have discussed the history, examination, review of systems, assessment and plan of management with the resident. I have reviewed the electronic medical record and amended it to reflect my history, review of systems, physical exam, assessment and plan.    73M h/o CLL (not on therapy), anemia, HTN, s/p recent prolonged diarrhea, presents for persistent diarrhea and generalized weakness.  Family unable to care for patient at home.      Exam:  - nad  - rrr  - ctab  - abd soft ntnd    A/P  - weakness, diarrhea  - check electrolytes  - IVF

## 2019-03-22 NOTE — ED ADULT NURSE NOTE - OBJECTIVE STATEMENT
Pt discharged yesterday on abx via PICC line to right biceps set up with N care for weakness causing Pt to be bedbound and diarrhea.  Per Pt's daughter at bedside Pt today was weak had diarrhea and could not get out of bed, Pt called MD who suggested home physical therapy could be set up.  pt refused and brought PT back to ED.  Pt p/w NAD breaths even unlabored skin warm dry intact NSR on monitor

## 2019-03-22 NOTE — ED ADULT TRIAGE NOTE - AS TEMP SITE
Occupational Therapy      Patient Name:  Hema Kuo   MRN:  [id number]    Patient not seen today secondary to Patient fatigue. Writing therapist attempted pt in AM, but pt receiving blood and attempting to sleep. Writing therapist unable to return in PM. Will follow-up as scheduled.    Michael Baugh OT  5/20/2018   axillary

## 2019-03-22 NOTE — ED ADULT NURSE NOTE - NSIMPLEMENTINTERV_GEN_ALL_ED
Implemented All Fall with Harm Risk Interventions:  [place] to call system. Call bell, personal items and telephone within reach. Instruct patient to call for assistance. Room bathroom lighting operational. Non-slip footwear when patient is off stretcher. Physically safe environment: no spills, clutter or unnecessary equipment. Stretcher in lowest position, wheels locked, appropriate side rails in place. Provide visual cue, wrist band, yellow gown, etc. Monitor gait and stability. Monitor for mental status changes and reorient to person, place, and time. Review medications for side effects contributing to fall risk. Reinforce activity limits and safety measures with patient and family. Provide visual clues: red socks.

## 2019-03-23 DIAGNOSIS — D64.9 ANEMIA, UNSPECIFIED: ICD-10-CM

## 2019-03-23 DIAGNOSIS — E87.0 HYPEROSMOLALITY AND HYPERNATREMIA: ICD-10-CM

## 2019-03-23 DIAGNOSIS — K27.9 PEPTIC ULCER, SITE UNSPECIFIED, UNSPECIFIED AS ACUTE OR CHRONIC, WITHOUT HEMORRHAGE OR PERFORATION: ICD-10-CM

## 2019-03-23 DIAGNOSIS — N39.0 URINARY TRACT INFECTION, SITE NOT SPECIFIED: ICD-10-CM

## 2019-03-23 DIAGNOSIS — I10 ESSENTIAL (PRIMARY) HYPERTENSION: ICD-10-CM

## 2019-03-23 DIAGNOSIS — E43 UNSPECIFIED SEVERE PROTEIN-CALORIE MALNUTRITION: ICD-10-CM

## 2019-03-23 DIAGNOSIS — K52.9 NONINFECTIVE GASTROENTERITIS AND COLITIS, UNSPECIFIED: ICD-10-CM

## 2019-03-23 LAB
ALBUMIN SERPL ELPH-MCNC: 1.8 G/DL — LOW (ref 3.3–5)
ALP SERPL-CCNC: 287 U/L — HIGH (ref 40–120)
ALT FLD-CCNC: 27 U/L — SIGNIFICANT CHANGE UP (ref 4–41)
ANION GAP SERPL CALC-SCNC: 14 MMO/L — SIGNIFICANT CHANGE UP (ref 7–14)
AST SERPL-CCNC: 36 U/L — SIGNIFICANT CHANGE UP (ref 4–40)
BILIRUB SERPL-MCNC: 0.5 MG/DL — SIGNIFICANT CHANGE UP (ref 0.2–1.2)
BUN SERPL-MCNC: 24 MG/DL — HIGH (ref 7–23)
C DIFF TOX GENS STL QL NAA+PROBE: SIGNIFICANT CHANGE UP
CALCIUM SERPL-MCNC: 8 MG/DL — LOW (ref 8.4–10.5)
CHLORIDE SERPL-SCNC: 121 MMOL/L — HIGH (ref 98–107)
CO2 SERPL-SCNC: 18 MMOL/L — LOW (ref 22–31)
CREAT SERPL-MCNC: 1.8 MG/DL — HIGH (ref 0.5–1.3)
GLUCOSE SERPL-MCNC: 108 MG/DL — HIGH (ref 70–99)
HCT VFR BLD CALC: 31.9 % — LOW (ref 39–50)
HGB BLD-MCNC: 9.8 G/DL — LOW (ref 13–17)
MCHC RBC-ENTMCNC: 28.6 PG — SIGNIFICANT CHANGE UP (ref 27–34)
MCHC RBC-ENTMCNC: 30.7 % — LOW (ref 32–36)
MCV RBC AUTO: 93 FL — SIGNIFICANT CHANGE UP (ref 80–100)
NRBC # FLD: 0.02 K/UL — LOW (ref 25–125)
PLATELET # BLD AUTO: 87 K/UL — LOW (ref 150–400)
PMV BLD: SIGNIFICANT CHANGE UP FL (ref 7–13)
POTASSIUM SERPL-MCNC: 3.6 MMOL/L — SIGNIFICANT CHANGE UP (ref 3.5–5.3)
POTASSIUM SERPL-SCNC: 3.6 MMOL/L — SIGNIFICANT CHANGE UP (ref 3.5–5.3)
PROT SERPL-MCNC: 6.1 G/DL — SIGNIFICANT CHANGE UP (ref 6–8.3)
RBC # BLD: 3.43 M/UL — LOW (ref 4.2–5.8)
RBC # FLD: 22.7 % — HIGH (ref 10.3–14.5)
SODIUM SERPL-SCNC: 153 MMOL/L — HIGH (ref 135–145)
VIT B12 SERPL-MCNC: > 2000 PG/ML — HIGH (ref 200–900)
WBC # BLD: 10.6 K/UL — HIGH (ref 3.8–10.5)
WBC # FLD AUTO: 10.6 K/UL — HIGH (ref 3.8–10.5)

## 2019-03-23 PROCEDURE — 99223 1ST HOSP IP/OBS HIGH 75: CPT

## 2019-03-23 RX ORDER — METOPROLOL TARTRATE 50 MG
25 TABLET ORAL DAILY
Qty: 0 | Refills: 0 | Status: DISCONTINUED | OUTPATIENT
Start: 2019-03-23 | End: 2019-04-12

## 2019-03-23 RX ORDER — LACTOBACILLUS ACIDOPHILUS 100MM CELL
1 CAPSULE ORAL DAILY
Qty: 0 | Refills: 0 | Status: DISCONTINUED | OUTPATIENT
Start: 2019-03-23 | End: 2019-04-03

## 2019-03-23 RX ORDER — PANTOPRAZOLE SODIUM 20 MG/1
40 TABLET, DELAYED RELEASE ORAL
Qty: 0 | Refills: 0 | Status: DISCONTINUED | OUTPATIENT
Start: 2019-03-23 | End: 2019-04-12

## 2019-03-23 RX ORDER — CHLORHEXIDINE GLUCONATE 213 G/1000ML
1 SOLUTION TOPICAL
Qty: 0 | Refills: 0 | Status: DISCONTINUED | OUTPATIENT
Start: 2019-03-23 | End: 2019-03-25

## 2019-03-23 RX ORDER — ALTEPLASE 100 MG
2 KIT INTRAVENOUS ONCE
Qty: 0 | Refills: 0 | Status: COMPLETED | OUTPATIENT
Start: 2019-03-23 | End: 2019-03-23

## 2019-03-23 RX ORDER — ERTAPENEM SODIUM 1 G/1
1000 INJECTION, POWDER, LYOPHILIZED, FOR SOLUTION INTRAMUSCULAR; INTRAVENOUS EVERY 24 HOURS
Qty: 0 | Refills: 0 | Status: DISCONTINUED | OUTPATIENT
Start: 2019-03-23 | End: 2019-03-26

## 2019-03-23 RX ORDER — MESALAMINE 400 MG
800 TABLET, DELAYED RELEASE (ENTERIC COATED) ORAL THREE TIMES A DAY
Qty: 0 | Refills: 0 | Status: DISCONTINUED | OUTPATIENT
Start: 2019-03-23 | End: 2019-04-12

## 2019-03-23 RX ADMIN — Medication 800 MILLIGRAM(S): at 22:25

## 2019-03-23 RX ADMIN — Medication 800 MILLIGRAM(S): at 13:47

## 2019-03-23 RX ADMIN — Medication 25 MILLIGRAM(S): at 11:39

## 2019-03-23 RX ADMIN — ERTAPENEM SODIUM 120 MILLIGRAM(S): 1 INJECTION, POWDER, LYOPHILIZED, FOR SOLUTION INTRAMUSCULAR; INTRAVENOUS at 12:00

## 2019-03-23 RX ADMIN — PANTOPRAZOLE SODIUM 40 MILLIGRAM(S): 20 TABLET, DELAYED RELEASE ORAL at 11:39

## 2019-03-23 RX ADMIN — CHLORHEXIDINE GLUCONATE 1 APPLICATION(S): 213 SOLUTION TOPICAL at 10:10

## 2019-03-23 RX ADMIN — ALTEPLASE 2 MILLIGRAM(S): KIT at 14:53

## 2019-03-23 NOTE — H&P ADULT - PROBLEM SELECTOR PROBLEM 3
Essential hypertension UTI due to extended-spectrum beta lactamase (ESBL) producing Escherichia coli

## 2019-03-23 NOTE — PHYSICAL THERAPY INITIAL EVALUATION ADULT - PLANNED THERAPY INTERVENTIONS, PT EVAL
bed mobility training/strengthening/transfer training/Patient left supine in bed in NAD, call bell in reach, all lines intact. +bed check/gait training/balance training

## 2019-03-23 NOTE — H&P ADULT - HISTORY OF PRESENT ILLNESS
Mr. Miranda is a 72 yo man with history of CLL (not on therapy), Anemia, HTN, chronic diarrhea, and recent hospitalization at [facility] from 3/3/19-3/21/19, now presenting from home     During his hospitalization 3/3/19-3/21/19, patient was started on ertapenem for ESBL E. coli UTI via PICC. Mr. Miranda is a 72 yo man with history of Anemia, HTN, chronic diarrhea, and recent hospitalization at [facility] from 3/3/19-3/21/19, now presenting from home     During his hospitalization 3/3/19-3/21/19, patient was started on ertapenem for ESBL E. coli UTI via PICC. Mr. Miranda is a 72 yo man with history of Anemia, HTN, chronic diarrhea, and recent hospitalization at [facility] from 3/3/19-3/21/19 brought in by family for generalized weakness and inability to walk.    Patient was discharged home with home PT services on 3/21/19 after being hospitalized since 3/3/19. During his hospitalization, he was managed for chronic diarrhea presumed to be 2/2 IBD since infectious and autoimmune w/u was negative, anemia due to blood loss from GI tract 2/2 PUD, requiring blood transfusion, and ESBL E. coli UTI with ertapenem via PICC. He was also determined not to have the diagnosis of CLL after flow cytometry results were available.     HPI was obtained primarily from patient's daughter and caregiver, Bee. As per daughter, when patient came home he was very weak. She wanted to bathe him but patient was unable to stand or walk to the bathroom. Due to his severe weakness, his daughter brought him back to the hospital.

## 2019-03-23 NOTE — H&P ADULT - MOTOR
Generalized weakness of legs bilaterally - component of poor participation. Pt able to lift legs a little against gravity but unable to hold or keep up with resistance.

## 2019-03-23 NOTE — PHYSICAL THERAPY INITIAL EVALUATION ADULT - PERTINENT HX OF CURRENT PROBLEM, REHAB EVAL
73 year old man, recent hospitalization at [facility] from 3/3/19-3/21/19 treated for chronic diarrhea presumed to be 2/2 IBD since infectious and autoimmune w/u was negative, anemia due to blood loss from GI tract 2/2 PUD, requiring blood transfusion, and ESBL E. coli UTI.  Brought in by family for generalized weakness and inability to walk.  Bilateral LE dopplers negative for DVT.

## 2019-03-23 NOTE — H&P ADULT - NSICDXPASTMEDICALHX_GEN_ALL_CORE_FT
PAST MEDICAL HISTORY:  Anemia, unspecified type     Chronic kidney disease (CKD)     Essential hypertension

## 2019-03-23 NOTE — H&P ADULT - PROBLEM SELECTOR PLAN 2
- continue ertapenem 1g daily IV until 3/24 Pt with hypoalbuminemia and significant peripheral edema.  - regular lactose diet  - check Vit B12 and Vit D 25-OH levels. Pt with hypoalbuminemia and significant peripheral edema c/w with severe protein-calorie malnutrition. Swelling in feet b/l may be contributing to inability to walk compared to prior.   - regular lactose-free diet  - check Vit B12 and Vit D 25-OH levels.  - assistance with meals  - PT evaluation  - compression stockings if available

## 2019-03-23 NOTE — H&P ADULT - ASSESSMENT
Mr. Miranda is a 72 yo man with history of Anemia, HTN, PUD, chronic diarrhea, and recent hospitalization at [facility] from 3/3/19-3/21/19 brought in by family for generalized weakness and inability to walk admitted for severe malnutrition, mild hypernatremia and severe deconditioning. Exam relatively benign aside for significant LE edema and weakness of legs. Anemia at baseline.

## 2019-03-23 NOTE — H&P ADULT - PROBLEM SELECTOR PLAN 7
Unclear etiology. Low suspicion for infectious etiology. Prior w/u all negative. C diff PCR negative.   - continue mesalamine 800mg TID for presumed underlying IBD  - obtain anti-gliadin Ab and anti-tissue transglutaminase antibodies to assess for Celiac disease (no prior results noted)  - replete electrolytes if necessary  - monitor stool output

## 2019-03-23 NOTE — H&P ADULT - PROBLEM SELECTOR PLAN 6
Patient s/p EGD and push enteroscopy earlier this month. Pt found to have gastritis with non-bleeding ulcers and chronic active duodenitis with gastric foveolar metaplasia on pathology. Negative for H. pylori  - continue pantoprazole 40mg daily

## 2019-03-23 NOTE — PROVIDER CONTACT NOTE (OTHER) - RECOMMENDATIONS
due to antibiotic treatment ending tomorrow and patient having a working peripheral IV midline seems ready to be discontinued

## 2019-03-23 NOTE — H&P ADULT - NSHPLABSRESULTS_GEN_ALL_CORE
LABS:                        10.9   9.26  )-----------( 85       ( 22 Mar 2019 22:50 )             36.9     03-22    148<H>  |  118<H>  |  23  ----------------------------<  84  4.0   |  19<L>  |  1.67<H>    Ca    8.2<L>      22 Mar 2019 22:50  Phos  3.9     03-22  Mg     1.7     03-22    TPro  6.7  /  Alb  1.9<L>  /  TBili  0.6  /  DBili  x   /  AST  32  /  ALT  26  /  AlkPhos  293<H>  03-22

## 2019-03-23 NOTE — H&P ADULT - PROBLEM SELECTOR PLAN 5
Hb/Hct stable. Suspect due to anemia of chronic disease, especially in the setting of underlying CKD. Patient not iron deficient based on prior testing. No signs of current/active bleeding  - no further w/u  - routine blood count checks.

## 2019-03-23 NOTE — H&P ADULT - PROBLEM SELECTOR PLAN 1
Patient with hx of inconsistent and poor oral intake.   - trend sodium levels  - encourage oral hydration Patient with hx of inconsistent and poor oral intake in addition to chronic fluid loss from chronic diarrhea, which is likely cause of elevated sodium levels.  - trend sodium levels  - encourage oral hydration. If inadequate, will consider starting IV fluids hydration  - monitor I&Os

## 2019-03-23 NOTE — H&P ADULT - EXTREMITIES COMMENTS
3+ pitting edema of lower leg and dorsum of feet bilaterally 3+ pitting edema of lower leg and dorsum of feet bilaterally. RUE PICC in place, site nontender and without erythema

## 2019-03-23 NOTE — H&P ADULT - NSICDXFAMILYHX_GEN_ALL_CORE_FT
FAMILY HISTORY:  Sibling  Still living? Unknown  Family history of myocardial infarction, Age at diagnosis: Age Unknown

## 2019-03-24 PROBLEM — C91.90 LYMPHOID LEUKEMIA, UNSPECIFIED NOT HAVING ACHIEVED REMISSION: Chronic | Status: INACTIVE | Noted: 2019-03-04 | Resolved: 2019-03-23

## 2019-03-24 LAB
ANION GAP SERPL CALC-SCNC: 13 MMO/L — SIGNIFICANT CHANGE UP (ref 7–14)
BUN SERPL-MCNC: 26 MG/DL — HIGH (ref 7–23)
CALCIUM SERPL-MCNC: 8.1 MG/DL — LOW (ref 8.4–10.5)
CHLORIDE SERPL-SCNC: 119 MMOL/L — HIGH (ref 98–107)
CO2 SERPL-SCNC: 19 MMOL/L — LOW (ref 22–31)
CREAT SERPL-MCNC: 1.91 MG/DL — HIGH (ref 0.5–1.3)
GLUCOSE SERPL-MCNC: 73 MG/DL — SIGNIFICANT CHANGE UP (ref 70–99)
HCT VFR BLD CALC: 34.7 % — LOW (ref 39–50)
HGB BLD-MCNC: 10.2 G/DL — LOW (ref 13–17)
INR BLD: 1.87 — HIGH (ref 0.88–1.17)
MAGNESIUM SERPL-MCNC: 1.6 MG/DL — SIGNIFICANT CHANGE UP (ref 1.6–2.6)
MCHC RBC-ENTMCNC: 28.5 PG — SIGNIFICANT CHANGE UP (ref 27–34)
MCHC RBC-ENTMCNC: 29.4 % — LOW (ref 32–36)
MCV RBC AUTO: 96.9 FL — SIGNIFICANT CHANGE UP (ref 80–100)
NRBC # FLD: 0.03 K/UL — LOW (ref 25–125)
PHOSPHATE SERPL-MCNC: 4 MG/DL — SIGNIFICANT CHANGE UP (ref 2.5–4.5)
PLATELET # BLD AUTO: 88 K/UL — LOW (ref 150–400)
PMV BLD: SIGNIFICANT CHANGE UP FL (ref 7–13)
POTASSIUM SERPL-MCNC: 3.8 MMOL/L — SIGNIFICANT CHANGE UP (ref 3.5–5.3)
POTASSIUM SERPL-SCNC: 3.8 MMOL/L — SIGNIFICANT CHANGE UP (ref 3.5–5.3)
PROTHROM AB SERPL-ACNC: 21.2 SEC — HIGH (ref 9.8–13.1)
RBC # BLD: 3.58 M/UL — LOW (ref 4.2–5.8)
RBC # FLD: 22.5 % — HIGH (ref 10.3–14.5)
SODIUM SERPL-SCNC: 151 MMOL/L — HIGH (ref 135–145)
WBC # BLD: 9.83 K/UL — SIGNIFICANT CHANGE UP (ref 3.8–10.5)
WBC # FLD AUTO: 9.83 K/UL — SIGNIFICANT CHANGE UP (ref 3.8–10.5)

## 2019-03-24 RX ORDER — SODIUM CHLORIDE 9 MG/ML
1000 INJECTION, SOLUTION INTRAVENOUS
Qty: 0 | Refills: 0 | Status: DISCONTINUED | OUTPATIENT
Start: 2019-03-24 | End: 2019-03-25

## 2019-03-24 RX ADMIN — Medication 800 MILLIGRAM(S): at 11:49

## 2019-03-24 RX ADMIN — PANTOPRAZOLE SODIUM 40 MILLIGRAM(S): 20 TABLET, DELAYED RELEASE ORAL at 06:59

## 2019-03-24 RX ADMIN — Medication 800 MILLIGRAM(S): at 06:59

## 2019-03-24 RX ADMIN — Medication 800 MILLIGRAM(S): at 21:57

## 2019-03-24 RX ADMIN — Medication 25 MILLIGRAM(S): at 06:59

## 2019-03-24 RX ADMIN — SODIUM CHLORIDE 65 MILLILITER(S): 9 INJECTION, SOLUTION INTRAVENOUS at 19:48

## 2019-03-24 RX ADMIN — Medication 1 TABLET(S): at 11:49

## 2019-03-24 RX ADMIN — ERTAPENEM SODIUM 120 MILLIGRAM(S): 1 INJECTION, POWDER, LYOPHILIZED, FOR SOLUTION INTRAMUSCULAR; INTRAVENOUS at 11:48

## 2019-03-24 NOTE — DISCHARGE NOTE PROVIDER - HOSPITAL COURSE
72 yo man with history of Anemia, HTN, PUD, chronic diarrhea, and recent hospitalization at [facility] from 3/3/19-3/21/19 brought in by family for generalized weakness and inability to walk admitted for severe malnutrition, mild hypernatremia and severe deconditioning. Patient with hx of inconsistent and poor oral intake in addition to chronic fluid loss from chronic diarrhea, which is likely cause of elevated sodium levels. Pt started on regular lactose-free diet    Previously dx UTI was on ertapenem 1g daily IV, last day 3/24, mid line removed          Essential hypertension, normotensive- continue metoprolol succinate 25mg daily.         Anemia, Hb/Hct stable.  No signs of current/active bleeding, no further w/u    PUD s/p EGD and push enteroscopy earlier this month. Pt found to have gastritis with non-bleeding ulcers and chronic active duodenitis with gastric foveolar metaplasia on pathology. Negative for H. pylori continue pantoprazole 40mg daily.    Chronic diarrhea, C diff PCR negative. On mesalamine 800mg TID for presumed underlying IBD 74 yo man with history of Anemia, HTN, PUD, chronic diarrhea, and recent hospitalization at [facility] from 3/3/19-3/21/19 brought in by family for generalized weakness and inability to walk admitted for severe malnutrition, mild hypernatremia and severe deconditioning. Patient with hx of inconsistent and poor oral intake in addition to chronic fluid loss from chronic diarrhea, which is likely cause of elevated sodium levels. Pt started on regular lactose-free diet    Previously dx UTI was on ertapenem 1g daily IV, last day 3/24, mid line removed         Anemia, Hb/Hct stable.  No signs of current/active bleeding, no further w/u    PUD s/p EGD and push enteroscopy earlier this month. Pt found to have gastritis with non-bleeding ulcers and chronic active duodenitis with gastric foveolar metaplasia on pathology. Negative for H. pylori continue pantoprazole 40mg daily.    Chronic diarrhea, C diff PCR negative. On mesalamine 800mg TID for presumed underlying IBD.             Uptrending wbc    4/7 vanco random 10.4, Vanco 750mg QD started     4/10 wbc uptrending    - As per ID send: trichinella, toxocara, schistosomiasis (special tests, sent by rn)    -- As per ID can be discharged on macrobid 100mg BID until 4/14         Diarrhea    -GI Cx     -DDX include infection vs inflammatory vs malabsorptive vs colitis    -Celiac panel weakly POSITIVE     -GI PCR, C. diff, stool Cx, O+P negative    -Alpha-1 antitrypsin negative     -S/P colonoscopy 4/1 - internal hemorrhoids, 1  4 mm polyp transverse colon resected. Pathology with transverse, descending, sigmoid with focal hyperplastic changes    - F/U anti-trysin (stool): wnl        Cirrhosis    -Hepatology Cx     -CT C/A/P 3/28 - minimal GGO. Moderate ascites. ?Cirrhosis.     -S/P paracentesis 4/3 c/w cirrhosis, cultures NTD ****     s/p IV albumin TID x3 days        Hypothermia, AMS    -ID Cx     -Neuro Cx     -CTH 3/27 negative, ammonia WNL. EEG with mild to moderate non-specific diffuse or multifocal cerebral dysfunction     -UCx E. faecium and BCx 4/1 negative. CXR unchanged pleural effusion.     4/7 vanco random 10.4, Vanco 750mg QD started     -Thiamine        Respiratory alkosis with metabolic acidosis on ABG (2/2 diarrhea)    -Pulm Cx    -V/Q unremarkable         Hypernatremia    -IVF        PEDRO     -Nephro Cx     -US abdomen 3/26 w/o hydronephrosis, moderate ascites, L pleural effusion     -Bicarbonate gtt         UTI    - s/p ertapenem    - ID following. Vanco for 14 days (4/1-4/14), held on 4/5, vanco random on 4/7 10.4. vanco 750mg QD started .     -Will discharge on Macrobid 100 BID until 4/14 74 yo man with history of Anemia, HTN, PUD, chronic diarrhea, and recent hospitalization at [facility] from 3/3/19-3/21/19 brought in by family for generalized weakness and inability to walk admitted for severe malnutrition, mild hypernatremia and severe deconditioning. Patient with hx of inconsistent and poor oral intake in addition to chronic fluid loss from chronic diarrhea, which is likely cause of elevated sodium levels. Pt started on regular lactose-free diet    Previously dx UTI was on ertapenem 1g daily IV, last day 3/24, mid line removed                     Uptrending wbc    4/7 vanco random 10.4, Vanco 750mg QD started     4/10 wbc uptrending    - As per ID send: trichinella, toxocara, schistosomiasis (special tests, sent by rn)    -- As per ID can be discharged on macrobid 100mg BID until 4/14         Diarrhea    -GI Cx     -DDX include infection vs inflammatory vs malabsorptive vs colitis    -Celiac panel weakly POSITIVE     -infectious w/u negative including stool cx, O&P (including microsporidium, cyclospora, isospora, cryptosporidium), gi pcr, cdiff, strongyloides neg, giardia neg, cmv pcr (-), cmv igG (+), IgM (-), HIV (-).  Stool for AFB (-)    -Alpha-1 antitrypsin negative     -S/P colonoscopy 4/1 - internal hemorrhoids, 1  4 mm polyp transverse colon resected. Pathology with transverse, descending, sigmoid with focal hyperplastic changes    -anti-trypsin (stool): wnl    -        Cirrhosis    -Hepatology Cx     -CT C/A/P 3/28 - minimal GGO. Moderate ascites. ?Cirrhosis.     -S/P paracentesis 4/3 c/w cirrhosis, cultures NTD     s/p IV albumin TID x3 days        Hypothermia, AMS    -ID Cx     -Neuro Cx     -CTH 3/27 negative, ammonia WNL. EEG with mild to moderate non-specific diffuse or multifocal cerebral dysfunction     -UCx E. faecium and BCx 4/1 negative. CXR unchanged pleural effusion.     4/7 vanco random 10.4, Vanco 750mg QD started     -Thiamine        Respiratory alkosis with metabolic acidosis on ABG (2/2 diarrhea)    -Pulm Cx    -V/Q unremarkable         Hypernatremia    -IVF        PEDRO     -Nephro Cx     -US abdomen 3/26 w/o hydronephrosis, moderate ascites, L pleural effusion     -Bicarbonate gtt         UTI    - s/p ertapenem    - ID following. Vanco for 14 days (4/1-4/14), held on 4/5, vanco random on 4/7 10.4. vanco 750mg QD started .     -Will discharge on Macrobid 100 BID until 4/14 73 M PMHx anemia, HTN, chronic diarrhea p/w generalized weakness, inability to walk. Of note, hospitalized 3/3-3/21, chronic diarrhea presumed 2/2 IBD since infectious and autoimmune w/u was negative, anemia due to blood loss from GI tract 2/2 PUD, requiring blood transfusion, ESBL E. coli UTI with Ertapenem via PICC (completed 3/25). He was also determined not to have the diagnosis of CLL after flow cytometry results were available.         HOSPITAL COURSE:     Diarrhea    -GI Cx     -DDX include infection vs inflammatory vs malabsorptive vs colitis    -Celiac panel weakly POSITIVE     -infectious w/u negative including stool cx, O&P (including microsporidium, cyclospora, isospora, cryptosporidium), gi pcr, cdiff, strongyloides neg, giardia neg, cmv pcr (-), cmv igG (+), IgM (-), HIV (-).  Stool for AFB (-)    -Alpha-1 antitrypsin negative     -CMV IgG (+), recommend biopsy to r/o cmv colitis - pt s/p colonoscopy on 4/1.  path shows areas of focal active colitis, findings are nonspecific and differential remains broad.    -S/P colonoscopy 4/1 - internal hemorrhoids, 1  4 mm polyp transverse colon resected. Pathology with transverse, descending, sigmoid with focal hyperplastic changes    -anti-trypsin (stool): wnl    -ID recommended to sent trichinella, toxocara, schistosomiasis - can be followed up as outpatient per ID     -Diarrhea improved         Cirrhosis    -Hepatology Cx     -CT C/A/P 3/28 - minimal GGO. Moderate ascites. ?Cirrhosis.     -S/P paracentesis 4/3 c/w cirrhosis, cultures NTD     -s/p IV albumin TID x3 days        Hypothermia, AMS    -ID Cx     -Neuro Cx     -CTH 3/27 negative, ammonia WNL. EEG with mild to moderate non-specific diffuse or multifocal cerebral dysfunction     -UCx E. faecium and BCx 4/1 negative. CXR unchanged pleural effusion.     -Thiamine        Respiratory alkosis with metabolic acidosis on ABG (2/2 diarrhea)    -Pulm Cx    -V/Q unremarkable         Hypernatremia    -IVF        PEDRO     -Nephro Cx     -US abdomen 3/26 w/o hydronephrosis, moderate ascites, L pleural effusion     -Bicarbonate gtt         UTI    - s/p ertapenem    - ID following. Vanco for 14 days (4/1-4/14), held on 4/5, vanco random on 4/7 10.4. vanco 750mg QD started .     - Will discharge on Macrobid 100 BID until 4/14        Dispo: rehab; pt's hospital course was discussed with attending and patient is medically stable for discharge

## 2019-03-24 NOTE — PROGRESS NOTE ADULT - SUBJECTIVE AND OBJECTIVE BOX
CESARBONIFACIO  73y  Male      Patient is a 73y old  Male who presents with a chief complaint of Generalized weakness and inability to walk (24 Mar 2019 10:52)  Patient seen and examined.chart reviewed.covering for .Admitted for diarrhoea,weakness.poor po intake?    REVIEW OF SYSTEMS:  no cp,no sob,no fever,no cough    INTERVAL HPI/OVERNIGHT EVENTS:  T(C): 36.2 (03-24-19 @ 13:49), Max: 36.5 (03-23-19 @ 23:45)  HR: 78 (03-24-19 @ 13:49) (78 - 86)  BP: 117/57 (03-24-19 @ 13:49) (97/56 - 118/59)  RR: 17 (03-24-19 @ 13:49) (17 - 18)  SpO2: 100% (03-24-19 @ 13:49) (100% - 100%)  Wt(kg): --  I&O's Summary    T(C): 36.2 (03-24-19 @ 13:49), Max: 36.5 (03-23-19 @ 23:45)  HR: 78 (03-24-19 @ 13:49) (78 - 86)  BP: 117/57 (03-24-19 @ 13:49) (97/56 - 118/59)  RR: 17 (03-24-19 @ 13:49) (17 - 18)  SpO2: 100% (03-24-19 @ 13:49) (100% - 100%)  Wt(kg): --Vital Signs Last 24 Hrs  T(C): 36.2 (24 Mar 2019 13:49), Max: 36.5 (23 Mar 2019 23:45)  T(F): 97.2 (24 Mar 2019 13:49), Max: 97.7 (23 Mar 2019 23:45)  HR: 78 (24 Mar 2019 13:49) (78 - 86)  BP: 117/57 (24 Mar 2019 13:49) (97/56 - 118/59)  BP(mean): --  RR: 17 (24 Mar 2019 13:49) (17 - 18)  SpO2: 100% (24 Mar 2019 13:49) (100% - 100%)    LABS:                        10.2   9.83  )-----------( 88       ( 24 Mar 2019 05:40 )             34.7     03-24    151<H>  |  119<H>  |  26<H>  ----------------------------<  73  3.8   |  19<L>  |  1.91<H>    Ca    8.1<L>      24 Mar 2019 05:40  Phos  4.0     03-24  Mg     1.6     03-24    TPro  6.1  /  Alb  1.8<L>  /  TBili  0.5  /  DBili  x   /  AST  36  /  ALT  27  /  AlkPhos  287<H>  03-23    PT/INR - ( 24 Mar 2019 05:40 )   PT: 21.2 SEC;   INR: 1.87              CAPILLARY BLOOD GLUCOSE                PAST MEDICAL & SURGICAL HISTORY:  Chronic kidney disease (CKD)  Anemia, unspecified type  Essential hypertension  No significant past surgical history      MEDICATIONS  (STANDING):  chlorhexidine 4% Liquid 1 Application(s) Topical <User Schedule>  ertapenem  IVPB 1000 milliGRAM(s) IV Intermittent every 24 hours  lactobacillus acidophilus 1 Tablet(s) Oral daily  mesalamine DR Capsule 800 milliGRAM(s) Oral three times a day  metoprolol succinate ER 25 milliGRAM(s) Oral daily  pantoprazole    Tablet 40 milliGRAM(s) Oral before breakfast    MEDICATIONS  (PRN):        RADIOLOGY & ADDITIONAL TESTS:    Imaging Personally Reviewed:  [ ] YES  [ ] NO    Consultant(s) Notes Reviewed:  [ ] YES  [ ] NO    PHYSICAL EXAM:  GENERAL: NAD, well-groomed, well-developed  HEAD:  Atraumatic, Normocephalic  EYES: EOMI, PERRLA, conjunctiva and sclera clear  ENMT: No tonsillar erythema, exudates, or enlargement; Moist mucous membranes, Good dentition, No lesions  NECK: Supple, No JVD, Normal thyroid  NERVOUS SYSTEM:  Alert & Oriented X3,   CHEST/LUNG: Clear to percussion bilaterally; No rales, rhonchi, wheezing, or rubs  HEART: Regular rate and rhythm; No murmurs, rubs, or gallops  ABDOMEN: Soft, Nontender, Nondistended; Bowel sounds present  EXTREMITIES:  2+ Peripheral Pulses, No clubbing, cyanosis, or edema  LYMPH: No lymphadenopathy noted  SKIN: No rashes or lesions    Care Discussed with Consultants/Other Providers [ ] YES  [ ] NO      Code Status: [] Full Code [] DNR [] DNI [] Goals of Care:   Disposition: [] ICU [] Stroke Unit [] RCU []PCU []Floor [] Discharge Home         LIBIA [email or web address]

## 2019-03-24 NOTE — DISCHARGE NOTE PROVIDER - NSDCCPCAREPLAN_GEN_ALL_CORE_FT
PRINCIPAL DISCHARGE DIAGNOSIS  Diagnosis: Diarrhea  Assessment and Plan of Treatment: Chronic diarrhea, C diff PCR negative.   Continue mesalamine 800mg TID for presumed underlying IBD      SECONDARY DISCHARGE DIAGNOSES  Diagnosis: PUD (peptic ulcer disease)  Assessment and Plan of Treatment: recent EGD and push enteroscopy  found to have gastritis with non-bleeding ulcers and chronic active duodenitis with gastric foveolar metaplasia on pathology. Negative for H. pylori   continue pantoprazole 40mg daily.      Diagnosis: UTI due to extended-spectrum beta lactamase (ESBL) producing Escherichia coli  Assessment and Plan of Treatment: UTI due to extended-spectrum beta lactamase (ESBL) producing Escherichia coli, last day of Metopenem 3/24/2019    Diagnosis: Essential hypertension  Assessment and Plan of Treatment: Low salt diet, continue metoprolol PRINCIPAL DISCHARGE DIAGNOSIS  Diagnosis: Diarrhea  Assessment and Plan of Treatment: You were evalauted by gastroenterology and infectious disease. Infectious work up was negative. You were found to have positive CMV IgG, and underwent colonscopy on 4/1. Biopsies were taken which showed colitis. Please continue with immodium as directed.  Please continue mesalamine 800mg TID for presumed underlying IBD.      SECONDARY DISCHARGE DIAGNOSES  Diagnosis: Essential hypertension  Assessment and Plan of Treatment: Continue blood pressure medication regimen as directed. Monitor for any visual changes, headaches or dizziness.  Monitor blood pressure regularly.  Follow up with your PCP for further management for high blood pressure.    Diagnosis: UTI due to extended-spectrum beta lactamase (ESBL) producing Escherichia coli  Assessment and Plan of Treatment: UTI due to extended-spectrum beta lactamase (ESBL) producing Escherichia coli, last day of Metopenem 3/24/2019    Diagnosis: PUD (peptic ulcer disease)  Assessment and Plan of Treatment: recent EGD and push enteroscopy  found to have gastritis with non-bleeding ulcers and chronic active duodenitis with gastric foveolar metaplasia on pathology. Negative for H. pylori   continue pantoprazole 40mg daily. PRINCIPAL DISCHARGE DIAGNOSIS  Diagnosis: Diarrhea  Assessment and Plan of Treatment: You were evalauted by gastroenterology and infectious disease. Infectious work up was negative. Please follow up remaining tests - trichinella, toxocara and shistosomiasis as outpatient. Celiac panel was mildly positive. You were found to have positive CMV IgG, and underwent colonscopy on 4/1. Biopsies were taken which showed colitis. Please continue with immodium as directed.  Please continue mesalamine 800mg TID for presumed underlying IBD.      SECONDARY DISCHARGE DIAGNOSES  Diagnosis: Essential hypertension  Assessment and Plan of Treatment: Continue blood pressure medication regimen as directed. Monitor for any visual changes, headaches or dizziness.  Monitor blood pressure regularly.  Follow up with your PCP for further management for high blood pressure.    Diagnosis: UTI due to extended-spectrum beta lactamase (ESBL) producing Escherichia coli  Assessment and Plan of Treatment: Continue antibiotics as directed and monitor for signs/symptoms of infection, such as, fever/chills, burning/pain with urination, urinary frequency/hesitancy, cloudy urine, or blood in urine.    Diagnosis: PUD (peptic ulcer disease)  Assessment and Plan of Treatment: You had a recent EGD and push enteroscopy and were found to have gastritis with non-bleeding ulcers and chronic active duodenitis with gastric foveolar metaplasia on pathology. Negative for H. pylori   Please continue pantoprazole 40mg daily.      Diagnosis: Anemia  Assessment and Plan of Treatment: Follow-up with your outpatient provider for further care/recommendations. Monitor for signs/symptoms indicating worsening of disease, such as, easy bleeding/bruising, pale skin, fatigue, dizziness, increased heart rate, or chest pain.    Diagnosis: Acute encephalopathy  Assessment and Plan of Treatment: You were found to have altered mental status, likely metabolic encephalopathy 2/2 diarrhea. Neurology evaluated you. You were given thiamine. Your mental status has been improving during hospital course. PRINCIPAL DISCHARGE DIAGNOSIS  Diagnosis: Diarrhea  Assessment and Plan of Treatment: You were evalauted by gastroenterology and infectious disease. Infectious work up was negative. Please follow up remaining tests - trichinella, toxocara and shistosomiasis as outpatient. Celiac panel was mildly positive. You were found to have positive CMV IgG, and underwent colonscopy on 4/1. Biopsies were taken which showed colitis. Please continue with immodium as directed.  Please continue mesalamine 800mg TID for presumed underlying IBD. Please follow up with GI and infectious disease as outpatient.      SECONDARY DISCHARGE DIAGNOSES  Diagnosis: Essential hypertension  Assessment and Plan of Treatment: Continue blood pressure medication regimen as directed. Monitor for any visual changes, headaches or dizziness.  Monitor blood pressure regularly.  Follow up with your PCP for further management for high blood pressure.    Diagnosis: UTI due to extended-spectrum beta lactamase (ESBL) producing Escherichia coli  Assessment and Plan of Treatment: Continue antibiotics as directed and monitor for signs/symptoms of infection, such as, fever/chills, burning/pain with urination, urinary frequency/hesitancy, cloudy urine, or blood in urine. Please continue with macrobid as directed till 4/14/18.    Diagnosis: PUD (peptic ulcer disease)  Assessment and Plan of Treatment: You had a recent EGD and push enteroscopy and were found to have gastritis with non-bleeding ulcers and chronic active duodenitis with gastric foveolar metaplasia on pathology. Negative for H. pylori   Please continue pantoprazole 40mg daily.      Diagnosis: Anemia  Assessment and Plan of Treatment: Follow-up with your outpatient provider for further care/recommendations. Monitor for signs/symptoms indicating worsening of disease, such as, easy bleeding/bruising, pale skin, fatigue, dizziness, increased heart rate, or chest pain.    Diagnosis: Acute encephalopathy  Assessment and Plan of Treatment: You were found to have altered mental status, likely metabolic encephalopathy 2/2 diarrhea. Neurology evaluated you. You were given thiamine. Your mental status has been improving during hospital course.    Diagnosis: Cirrhosis  Assessment and Plan of Treatment: You were found to have cirrhosis. You are s/p paracentesis. Your cultures were negative. You recieved albumin and vitamin K. Please follow up with hepatology as outpatient for further management. Please obtain a fibroscan/MR elastography of the liver to further assess degree of cirrhosis.

## 2019-03-24 NOTE — PROGRESS NOTE ADULT - ASSESSMENT
Mr. Miranda is a 72 yo man with history of Anemia, HTN, PUD, chronic diarrhea, and recent hospitalization at [facility] from 3/3/19-3/21/19 brought in by family for generalized weakness and inability to walk admitted for severe malnutrition, mild hypernatremia and severe deconditioning. Exam relatively benign aside for significant LE edema and weakness of legs. Anemia at baseline.

## 2019-03-24 NOTE — DISCHARGE NOTE PROVIDER - PROVIDER TOKENS
FREE:[LAST:[To be seen by a medical provider at rehab in 1 week],PHONE:[(   )    -],FAX:[(   )    -]] PROVIDER:[TOKEN:[[id number]]],PROVIDER:[TOKEN:[[id number]]],PROVIDER:[TOKEN:[[id number]]],FREE:[LAST:[To be seen by a medical provider at rehab in 1 week],PHONE:[(   )    -],FAX:[(   )    -]],PROVIDER:[TOKEN:[[id number]]]

## 2019-03-24 NOTE — DISCHARGE NOTE PROVIDER - CARE PROVIDER_API CALL
To be seen by a medical provider at rehab in 1 week,   Phone: (   )    -  Fax: (   )    -  Follow Up Time: Adeline Marques)  Infectious Disease; Internal Medicine  [address]  [place] [address]  Phone: [phone]  Fax: [phone]  Follow Up Time:     Rogelio Baron)  Gastroenterology; Transplant Hepatology  [address] [place] [address]  Phone: [phone]  Fax: [phone]  Follow Up Time:     Ron Pineda)  Gastroenterology; Internal Medicine  [address] [place] [address] [address]  [place] [address]  Phone: [phone]  Fax: [phone]  Follow Up Time:     To be seen by a medical provider at rehab in 1 week,   Phone: (   )    -  Fax: (   )    -  Follow Up Time:     Conor Garcia)  Hematology; Medical Oncology  [address]  [place] [address]  Phone: [phone]  Fax: [phone]  Follow Up Time:

## 2019-03-24 NOTE — DISCHARGE NOTE PROVIDER - CARE PROVIDERS DIRECT ADDRESSES
,DirectAddress_Unknown ,DirectAddress_Unknown,DirectAddress_Unknown,[email or web address],DirectAddress_Unknown,DirectAddress_Unknown

## 2019-03-24 NOTE — PROGRESS NOTE ADULT - ATTENDING COMMENTS
-d/w Family at bedside  -IVF  -Consider Renal consult -d/w Family at bedside  -IVF-d51/2 ns for now,monitor BMP

## 2019-03-24 NOTE — PROGRESS NOTE ADULT - PROBLEM SELECTOR PLAN 1
-poor po intake,ivf  -PEDRO/CKD-May need renal consult -poor po intake,ivf  -PEDRO/CKD-renal consult called-

## 2019-03-25 DIAGNOSIS — E83.51 HYPOCALCEMIA: ICD-10-CM

## 2019-03-25 DIAGNOSIS — N18.3 CHRONIC KIDNEY DISEASE, STAGE 3 (MODERATE): ICD-10-CM

## 2019-03-25 DIAGNOSIS — N17.9 ACUTE KIDNEY FAILURE, UNSPECIFIED: ICD-10-CM

## 2019-03-25 LAB
24R-OH-CALCIDIOL SERPL-MCNC: 25.6 NG/ML — LOW (ref 30–80)
APPEARANCE UR: CLEAR — SIGNIFICANT CHANGE UP
BACTERIA # UR AUTO: NEGATIVE — SIGNIFICANT CHANGE UP
BILIRUB UR-MCNC: NEGATIVE — SIGNIFICANT CHANGE UP
BLOOD UR QL VISUAL: SIGNIFICANT CHANGE UP
COLOR SPEC: YELLOW — SIGNIFICANT CHANGE UP
CREAT ?TM UR-MCNC: 145.4 MG/DL — SIGNIFICANT CHANGE UP
GLUCOSE UR-MCNC: NEGATIVE — SIGNIFICANT CHANGE UP
KETONES UR-MCNC: NEGATIVE — SIGNIFICANT CHANGE UP
LEUKOCYTE ESTERASE UR-ACNC: SIGNIFICANT CHANGE UP
NITRITE UR-MCNC: NEGATIVE — SIGNIFICANT CHANGE UP
PH UR: 5.5 — SIGNIFICANT CHANGE UP (ref 5–8)
PROT UR-MCNC: 30 — SIGNIFICANT CHANGE UP
RBC CASTS # UR COMP ASSIST: HIGH (ref 0–?)
SODIUM UR-SCNC: 21 MMOL/L — SIGNIFICANT CHANGE UP
SP GR SPEC: 1.02 — SIGNIFICANT CHANGE UP (ref 1–1.04)
SQUAMOUS # UR AUTO: SIGNIFICANT CHANGE UP
UROBILINOGEN FLD QL: SIGNIFICANT CHANGE UP
WBC UR QL: HIGH (ref 0–?)

## 2019-03-25 RX ORDER — CHOLECALCIFEROL (VITAMIN D3) 125 MCG
1000 CAPSULE ORAL DAILY
Qty: 0 | Refills: 0 | Status: DISCONTINUED | OUTPATIENT
Start: 2019-03-25 | End: 2019-04-12

## 2019-03-25 RX ORDER — SODIUM CHLORIDE 9 MG/ML
1000 INJECTION, SOLUTION INTRAVENOUS
Qty: 0 | Refills: 0 | Status: DISCONTINUED | OUTPATIENT
Start: 2019-03-25 | End: 2019-03-25

## 2019-03-25 RX ORDER — SODIUM CHLORIDE 9 MG/ML
1000 INJECTION, SOLUTION INTRAVENOUS
Qty: 0 | Refills: 0 | Status: DISCONTINUED | OUTPATIENT
Start: 2019-03-25 | End: 2019-03-26

## 2019-03-25 RX ADMIN — SODIUM CHLORIDE 75 MILLILITER(S): 9 INJECTION, SOLUTION INTRAVENOUS at 23:00

## 2019-03-25 RX ADMIN — Medication 1 TABLET(S): at 11:52

## 2019-03-25 RX ADMIN — Medication 800 MILLIGRAM(S): at 22:55

## 2019-03-25 RX ADMIN — Medication 800 MILLIGRAM(S): at 13:32

## 2019-03-25 RX ADMIN — SODIUM CHLORIDE 65 MILLILITER(S): 9 INJECTION, SOLUTION INTRAVENOUS at 11:24

## 2019-03-25 RX ADMIN — PANTOPRAZOLE SODIUM 40 MILLIGRAM(S): 20 TABLET, DELAYED RELEASE ORAL at 05:33

## 2019-03-25 RX ADMIN — ERTAPENEM SODIUM 120 MILLIGRAM(S): 1 INJECTION, POWDER, LYOPHILIZED, FOR SOLUTION INTRAMUSCULAR; INTRAVENOUS at 12:24

## 2019-03-25 RX ADMIN — Medication 800 MILLIGRAM(S): at 05:34

## 2019-03-25 RX ADMIN — Medication 25 MILLIGRAM(S): at 05:34

## 2019-03-25 RX ADMIN — Medication 1000 UNIT(S): at 11:52

## 2019-03-25 NOTE — CONSULT NOTE ADULT - PROBLEM SELECTOR RECOMMENDATION 9
renal function worsening. currently on IVF started 3/24.   was getting ertapenem via PICC at home for ESBL UTI  ? etiology of PEDRO, ? ATN sec to persistent prerenal vs AIN  check urine cr, na, UA, renal US, CBC with diff  avoid nephrotoxic agents  monitor bmp renal function worsening. currently on IVF started 3/24.   was getting ertapenem via PICC at home for ESBL UTI, however eosinophil is improving compare to last admission   ? etiology of PEDRO, ? ATN sec to persistent prerenal   r/o obstruction   check urine cr, na, UA, renal US  avoid nephrotoxic agents  monitor bmp renal function worsening. currently on IVF started 3/24.   was getting ertapenem via PICC at home for ESBL UTI, however eosinophil is improving compare to last admission   ? etiology of PEDRO, ? ATN sec to persistent prerenal state  r/o obstruction   check urine cr, na, UA, renal US  avoid nephrotoxic agents  monitor bmp

## 2019-03-25 NOTE — CONSULT NOTE ADULT - SUBJECTIVE AND OBJECTIVE BOX
[facility] [place] [facility]OLOGY PRACTICE   MD MAGALY MORAN MD ANGELA WONG, PA    TEL:  OFFICE: [phone]  DR ANDERSON CELL: [phone]  DR. MONAE CELL: [phone]  HUBERT WELLINGTON CELL: [phone]      HPI:  History mostly from chart, patient poor historian  72 yo man with history of Anemia, HTN, chronic diarrhea, and recent hospitalization at [facility] from 3/3/19-3/21/19 brought in by family for generalized weakness and inability to walk.    Patient was discharged home with home PT services on 3/21/19 after being hospitalized since 3/3/19. During his hospitalization, he was managed for chronic diarrhea presumed to be 2/2 IBD since infectious and autoimmune w/u was negative, anemia due to blood loss from GI tract 2/2 PUD, requiring blood transfusion, and ESBL E. coli UTI with ertapenem via PICC. He was also determined not to have the diagnosis of CLL after flow cytometry results were available.     Patient seen and examined at bedside, A+O x2, denied symptoms.     Allergies:  No Known Allergies      PAST MEDICAL & SURGICAL HISTORY:  Chronic kidney disease (CKD)  Anemia, unspecified type  Essential hypertension  No significant past surgical history      Home Medications Reviewed    Hospital Medications:   MEDICATIONS  (STANDING):  cholecalciferol 1000 Unit(s) Oral daily  dextrose 5%. 1000 milliLiter(s) (75 mL/Hr) IV Continuous <Continuous>  ertapenem  IVPB 1000 milliGRAM(s) IV Intermittent every 24 hours  lactobacillus acidophilus 1 Tablet(s) Oral daily  mesalamine DR Capsule 800 milliGRAM(s) Oral three times a day  metoprolol succinate ER 25 milliGRAM(s) Oral daily  pantoprazole    Tablet 40 milliGRAM(s) Oral before breakfast      SOCIAL HISTORY:  Denies ETOh, Smoking,     FAMILY HISTORY:  Family history of myocardial infarction (Sibling): Father, mother, brother      REVIEW OF SYSTEMS:  CONSTITUTIONAL: No weakness, fevers or chills  EYES/ENT: No visual changes;  No vertigo or throat pain   NECK: No pain or stiffness  RESPIRATORY: No cough, wheezing, hemoptysis; No shortness of breath  CARDIOVASCULAR: No chest pain or palpitations.  GASTROINTESTINAL: No abdominal or epigastric pain. No nausea, vomiting, or hematemesis; No diarrhea or constipation. No melena or hematochezia.  GENITOURINARY: No dysuria, frequency, foamy urine, urinary urgency, incontinence or hematuria  NEUROLOGICAL: No numbness or weakness  SKIN: No itching, burning, rashes, or lesions   VASCULAR: No bilateral lower extremity edema.   All other review of systems is negative unless indicated above.    VITALS:  T(F): 97.8 (03-25-19 @ 05:01), Max: 97.8 (03-25-19 @ 05:01)  HR: 74 (03-25-19 @ 15:00)  BP: 119/66 (03-25-19 @ 15:00)  RR: 18 (03-25-19 @ 15:00)  SpO2: 100% (03-25-19 @ 15:00)  Wt(kg): --        PHYSICAL EXAM:  Constitutional: NAD  HEENT: anicteric sclera, oropharynx clear, MMM  Neck: No JVD  Respiratory: CTAB, no wheezes, rales or rhonchi  Cardiovascular: S1, S2, RRR  Gastrointestinal: BS+, soft, NT/ND  Extremities: 2+ peripheral edema  Neurological: A/O x 2  Psychiatric: Normal mood, normal affect  : No CVA tenderness. No mata.   Skin: No rashes      LABS:  03-24    151<H>  |  119<H>  |  26<H>  ----------------------------<  73  3.8   |  19<L>  |  1.91<H>    Ca    8.1<L>      24 Mar 2019 05:40  Phos  4.0     03-24  Mg     1.6     03-24    TPro  6.1  /  Alb  1.8<L>  /  TBili  0.5  /  DBili      /  AST  36  /  ALT  27  /  AlkPhos  287<H>  03-23    Creatinine Trend: 1.91 <--, 1.80 <--, 1.67 <--, 1.37 <--, 1.40 <--, 1.42 <--                        10.2   9.83  )-----------( 88       ( 24 Mar 2019 05:40 )             34.7     Urine Studies:        RADIOLOGY & ADDITIONAL STUDIES:

## 2019-03-25 NOTE — CONSULT NOTE ADULT - SUBJECTIVE AND OBJECTIVE BOX
Patient is a 73y old  Male who presents with a chief complaint of Generalized weakness and inability to walk (25 Mar 2019 16:39)      HPI:  Mr. Miranda is a 74 yo man with history of Anemia, HTN, chronic diarrhea, and recent hospitalization at [facility] from 3/3/19-3/21/19 brought in by family for generalized weakness and inability to walk.    Patient was discharged home with home PT services on 3/21/19 after being hospitalized since 3/3/19. During his hospitalization, he was managed for chronic diarrhea presumed to be 2/2 IBD since infectious and autoimmune w/u was negative, anemia due to blood loss from GI tract 2/2 PUD, requiring blood transfusion, and ESBL E. coli UTI with ertapenem via PICC. He was also determined not to have the diagnosis of CLL after flow cytometry results were available.     HPI was obtained primarily from patient's daughter and caregiver, Bee. As per daughter, when patient came home he was very weak. She wanted to bathe him but patient was unable to stand or walk to the bathroom. Due to his severe weakness, his daughter brought him back to the hospital. (23 Mar 2019 08:22)        REVIEW OF SYSTEMS:    CONSTITUTIONAL: No fever, weight loss, or fatigue  EYES: No eye pain, visual disturbances, or discharge  ENMT:  No sore throat  NECK: No pain or stiffness  RESPIRATORY: No cough, wheezing, chills or hemoptysis; No shortness of breath  CARDIOVASCULAR: No chest pain, palpitations, dizziness, or leg swelling  GASTROINTESTINAL: No abdominal or epigastric pain. No nausea, vomiting, or hematemesis; No diarrhea or constipation. No melena or hematochezia.  GENITOURINARY: No dysuria, frequency, hematuria, or incontinence  NEUROLOGICAL: No headaches, memory loss, loss of strength, numbness, or tremors  SKIN: No itching, burning, rashes, or lesions   LYMPH NODES: No enlarged glands  MUSCULOSKELETAL: No joint pain or swelling; No muscle, back, or extremity pain      PAST MEDICAL & SURGICAL HISTORY:  Chronic kidney disease (CKD)  Anemia, unspecified type  Essential hypertension  No significant past surgical history      Allergies    No Known Allergies    Intolerances        FAMILY HISTORY:  Family history of myocardial infarction (Sibling): Father, mother, brother      SOCIAL HISTORY:        MEDICATIONS  (STANDING):  cholecalciferol 1000 Unit(s) Oral daily  dextrose 5%. 1000 milliLiter(s) (75 mL/Hr) IV Continuous <Continuous>  ertapenem  IVPB 1000 milliGRAM(s) IV Intermittent every 24 hours  lactobacillus acidophilus 1 Tablet(s) Oral daily  mesalamine DR Capsule 800 milliGRAM(s) Oral three times a day  metoprolol succinate ER 25 milliGRAM(s) Oral daily  pantoprazole    Tablet 40 milliGRAM(s) Oral before breakfast    MEDICATIONS  (PRN):      Vital Signs Last 24 Hrs  T(C): 36.6 (25 Mar 2019 22:31), Max: 36.6 (25 Mar 2019 05:01)  T(F): 97.9 (25 Mar 2019 22:31), Max: 97.9 (25 Mar 2019 22:31)  HR: 81 (25 Mar 2019 22:31) (74 - 98)  BP: 121/64 (25 Mar 2019 22:31) (113/57 - 121/64)  BP(mean): --  RR: 17 (25 Mar 2019 22:31) (17 - 18)  SpO2: 100% (25 Mar 2019 22:31) (100% - 100%)    PHYSICAL EXAM:    GENERAL: NAD, well-groomed  HEAD:  Atraumatic, Normocephalic  EYES: EOMI, PERRLA, conjunctiva and sclera clear  ENMT: No tonsillar erythema, exudates, or enlargement; Moist mucous membranes  NECK: Supple, No JVD  CHEST/LUNG: Clear to percussion bilaterally; No rales, rhonchi, wheezing, or rubs  HEART: Regular rate and rhythm; No murmurs, rubs, or gallops  ABDOMEN: Soft, Nontender, Nondistended; Bowel sounds present  EXTREMITIES:  2+ Peripheral Pulses, No clubbing, cyanosis, or edema  LYMPH: No lymphadenopathy noted  SKIN: No rashes or lesions    LABS:  CBC Full  -  ( 24 Mar 2019 05:40 )  WBC Count : 9.83 K/uL  Hemoglobin : 10.2 g/dL  Hematocrit : 34.7 %  Platelet Count - Automated : 88 K/uL  Mean Cell Volume : 96.9 fL  Mean Cell Hemoglobin : 28.5 pg  Mean Cell Hemoglobin Concentration : 29.4 %  Auto Neutrophil # : x  Auto Lymphocyte # : x  Auto Monocyte # : x  Auto Eosinophil # : x  Auto Basophil # : x  Auto Neutrophil % : x  Auto Lymphocyte % : x  Auto Monocyte % : x  Auto Eosinophil % : x  Auto Basophil % : x      03-24    151<H>  |  119<H>  |  26<H>  ----------------------------<  73  3.8   |  19<L>  |  1.91<H>    Ca    8.1<L>      24 Mar 2019 05:40  Phos  4.0       Mg     1.6                                   MICROBIOLOGY:        Urinalysis Basic - ( 25 Mar 2019 20:45 )    Color: YELLOW / Appearance: CLEAR / S.020 / pH: 5.5  Gluc: NEGATIVE / Ketone: NEGATIVE  / Bili: NEGATIVE / Urobili: TRACE   Blood: TRACE / Protein: 30 / Nitrite: NEGATIVE   Leuk Esterase: SMALL / RBC: 6-10 / WBC 11-25   Sq Epi: FEW / Non Sq Epi: x / Bacteria: NEGATIVE                RADIOLOGY:

## 2019-03-25 NOTE — CONSULT NOTE ADULT - ASSESSMENT
Mr. Miranda is a 74 yo man with history of Anemia, HTN, chronic diarrhea, and recent hospitalization at [facility] from 3/3/19-3/21/19 brought in by family for generalized weakness and inability to walk.    Patient was discharged home with home PT services on 3/21/19 after being hospitalized since 3/3/19. During his hospitalization, he was managed for chronic diarrhea presumed to be 2/2 IBD since infectious and autoimmune w/u was negative, anemia due to blood loss from GI tract 2/2 PUD, requiring blood transfusion, and ESBL E. coli UTI with ertapenem via PICC. He was also determined not to have the diagnosis of CLL after flow cytometry results were available.     HPI was obtained primarily from patient's daughter and caregiver, Bee. As per daughter, when patient came home he was very weak. She wanted to bathe him but patient was unable to stand or walk to the bathroom. Due to his severe weakness, his daughter brought him back to the hospital. (23 Mar 2019 08:22)      Recommend:    - Complete ertapenem 1 week course for ESBL e.coli UTI today.      - Pt with diarrhea, thus far infectious w/u negative including stool cx, O&P, gi pcr, cdiff.  f/u with GI - ?need for colonoscopy.  Pt had colonoscopy recently while in [facility] with negative biopsy results.      - Cont probiotics    - PT - pt recommended for rehab.      d/w daughter and family at bedside.       Adeline Marques  [phone]

## 2019-03-25 NOTE — PROGRESS NOTE ADULT - SUBJECTIVE AND OBJECTIVE BOX
Patient is a 73y old  Male who presents with a chief complaint of Generalized weakness and inability to walk (25 Mar 2019 16:39)      SUBJECTIVE / OVERNIGHT EVENTS:    Events noted.  C/o weakness  No N/V  Diarrhea      MEDICATIONS  (STANDING):  cholecalciferol 1000 Unit(s) Oral daily  dextrose 5%. 1000 milliLiter(s) (75 mL/Hr) IV Continuous <Continuous>  ertapenem  IVPB 1000 milliGRAM(s) IV Intermittent every 24 hours  lactobacillus acidophilus 1 Tablet(s) Oral daily  mesalamine DR Capsule 800 milliGRAM(s) Oral three times a day  metoprolol succinate ER 25 milliGRAM(s) Oral daily  pantoprazole    Tablet 40 milliGRAM(s) Oral before breakfast    MEDICATIONS  (PRN):        CAPILLARY BLOOD GLUCOSE        I&O's Summary      PHYSICAL EXAM:  GENERAL: NAD  NECK: Supple, No JVD  CHEST/LUNG: Clear to auscultation bilaterally; No wheezing.  HEART: Regular rate and rhythm; No murmurs, rubs, or gallops  ABDOMEN: Soft, Nontender, Nondistended; Bowel sounds present  EXTREMITIES:   No edema  NEUROLOGY: AAO       LABS:                        10.2   9.83  )-----------( 88       ( 24 Mar 2019 05:40 )             34.7     03-24    151<H>  |  119<H>  |  26<H>  ----------------------------<  73  3.8   |  19<L>  |  1.91<H>    Ca    8.1<L>      24 Mar 2019 05:40  Phos  4.0     03-24  Mg     1.6     03-24      PT/INR - ( 24 Mar 2019 05:40 )   PT: 21.2 SEC;   INR: 1.87                Urinalysis Basic - ( 25 Mar 2019 20:45 )    Color: YELLOW / Appearance: CLEAR / S.020 / pH: 5.5  Gluc: NEGATIVE / Ketone: NEGATIVE  / Bili: NEGATIVE / Urobili: TRACE   Blood: TRACE / Protein: 30 / Nitrite: NEGATIVE   Leuk Esterase: SMALL / RBC: 6-10 / WBC 11-25   Sq Epi: FEW / Non Sq Epi: x / Bacteria: NEGATIVE      CAPILLARY BLOOD GLUCOSE                    RADIOLOGY & ADDITIONAL TESTS:    Imaging Personally Reviewed:    Consultant(s) Notes Reviewed:      Care Discussed with Consultants/Other Providers:

## 2019-03-25 NOTE — PROGRESS NOTE ADULT - ASSESSMENT
Mr. Miranda is a 72 yo man with history of Anemia, HTN, PUD, chronic diarrhea, and recent hospitalization at [facility] from 3/3/19-3/21/19 brought in by family for generalized weakness and inability to walk admitted for severe malnutrition, mild hypernatremia and severe deconditioning. Exam relatively benign aside for significant LE edema and weakness of legs. Anemia at baseline.

## 2019-03-25 NOTE — CONSULT NOTE ADULT - PROBLEM SELECTOR RECOMMENDATION 4
encourage PO free water  on D5W @ 75cc/hr  monitor bmp  avoid over correction encourage PO free water  on D5W @ 75cc/hr  monitor bmp  avoid over correction  Get Urine Na, Osmolality

## 2019-03-25 NOTE — CONSULT NOTE ADULT - ASSESSMENT
72 yo man with history of Anemia, HTN, chronic diarrhea, and recent hospitalization at [facility] from 3/3/19-3/21/19 brought in by family for generalized weakness and inability to walk found to have james and hypernatremia

## 2019-03-26 DIAGNOSIS — E83.42 HYPOMAGNESEMIA: ICD-10-CM

## 2019-03-26 DIAGNOSIS — E87.6 HYPOKALEMIA: ICD-10-CM

## 2019-03-26 LAB
ALBUMIN SERPL ELPH-MCNC: 1.7 G/DL — LOW (ref 3.3–5)
ALP SERPL-CCNC: 255 U/L — HIGH (ref 40–120)
ALT FLD-CCNC: 21 U/L — SIGNIFICANT CHANGE UP (ref 4–41)
ANION GAP SERPL CALC-SCNC: 11 MMO/L — SIGNIFICANT CHANGE UP (ref 7–14)
AST SERPL-CCNC: 24 U/L — SIGNIFICANT CHANGE UP (ref 4–40)
BILIRUB SERPL-MCNC: 0.5 MG/DL — SIGNIFICANT CHANGE UP (ref 0.2–1.2)
BUN SERPL-MCNC: 24 MG/DL — HIGH (ref 7–23)
C DIFF TOX GENS STL QL NAA+PROBE: SIGNIFICANT CHANGE UP
CALCIUM SERPL-MCNC: 7.8 MG/DL — LOW (ref 8.4–10.5)
CHLORIDE SERPL-SCNC: 117 MMOL/L — HIGH (ref 98–107)
CO2 SERPL-SCNC: 18 MMOL/L — LOW (ref 22–31)
CREAT SERPL-MCNC: 1.67 MG/DL — HIGH (ref 0.5–1.3)
GLUCOSE SERPL-MCNC: 112 MG/DL — HIGH (ref 70–99)
HCT VFR BLD CALC: 32.6 % — LOW (ref 39–50)
HGB BLD-MCNC: 9.8 G/DL — LOW (ref 13–17)
MAGNESIUM SERPL-MCNC: 1.4 MG/DL — LOW (ref 1.6–2.6)
MCHC RBC-ENTMCNC: 28.9 PG — SIGNIFICANT CHANGE UP (ref 27–34)
MCHC RBC-ENTMCNC: 30.1 % — LOW (ref 32–36)
MCV RBC AUTO: 96.2 FL — SIGNIFICANT CHANGE UP (ref 80–100)
NRBC # FLD: 0.03 K/UL — SIGNIFICANT CHANGE UP (ref 0–0)
O+P SPEC CONC: SIGNIFICANT CHANGE UP
PHOSPHATE SERPL-MCNC: 2.9 MG/DL — SIGNIFICANT CHANGE UP (ref 2.5–4.5)
PLATELET # BLD AUTO: 76 K/UL — LOW (ref 150–400)
PMV BLD: SIGNIFICANT CHANGE UP FL (ref 7–13)
POTASSIUM SERPL-MCNC: 3.2 MMOL/L — LOW (ref 3.5–5.3)
POTASSIUM SERPL-SCNC: 3.2 MMOL/L — LOW (ref 3.5–5.3)
PROT SERPL-MCNC: 5.9 G/DL — LOW (ref 6–8.3)
RBC # BLD: 3.39 M/UL — LOW (ref 4.2–5.8)
RBC # FLD: 22.3 % — HIGH (ref 10.3–14.5)
SODIUM SERPL-SCNC: 146 MMOL/L — HIGH (ref 135–145)
TTG IGA SER-ACNC: 5.4 U/ML — HIGH
WBC # BLD: 9.73 K/UL — SIGNIFICANT CHANGE UP (ref 3.8–10.5)
WBC # FLD AUTO: 9.73 K/UL — SIGNIFICANT CHANGE UP (ref 3.8–10.5)

## 2019-03-26 PROCEDURE — 99222 1ST HOSP IP/OBS MODERATE 55: CPT | Mod: GC

## 2019-03-26 PROCEDURE — 76770 US EXAM ABDO BACK WALL COMP: CPT | Mod: 26

## 2019-03-26 RX ORDER — MAGNESIUM SULFATE 500 MG/ML
2 VIAL (ML) INJECTION ONCE
Qty: 0 | Refills: 0 | Status: COMPLETED | OUTPATIENT
Start: 2019-03-26 | End: 2019-03-26

## 2019-03-26 RX ORDER — POTASSIUM CHLORIDE 20 MEQ
40 PACKET (EA) ORAL ONCE
Qty: 0 | Refills: 0 | Status: COMPLETED | OUTPATIENT
Start: 2019-03-26 | End: 2019-03-26

## 2019-03-26 RX ORDER — SODIUM CHLORIDE 9 MG/ML
1000 INJECTION, SOLUTION INTRAVENOUS
Qty: 0 | Refills: 0 | Status: DISCONTINUED | OUTPATIENT
Start: 2019-03-26 | End: 2019-03-31

## 2019-03-26 RX ADMIN — Medication 800 MILLIGRAM(S): at 13:54

## 2019-03-26 RX ADMIN — Medication 25 MILLIGRAM(S): at 06:50

## 2019-03-26 RX ADMIN — Medication 1 TABLET(S): at 13:54

## 2019-03-26 RX ADMIN — Medication 800 MILLIGRAM(S): at 06:50

## 2019-03-26 RX ADMIN — PANTOPRAZOLE SODIUM 40 MILLIGRAM(S): 20 TABLET, DELAYED RELEASE ORAL at 06:50

## 2019-03-26 RX ADMIN — SODIUM CHLORIDE 60 MILLILITER(S): 9 INJECTION, SOLUTION INTRAVENOUS at 13:54

## 2019-03-26 RX ADMIN — Medication 40 MILLIEQUIVALENT(S): at 21:04

## 2019-03-26 RX ADMIN — SODIUM CHLORIDE 60 MILLILITER(S): 9 INJECTION, SOLUTION INTRAVENOUS at 21:04

## 2019-03-26 RX ADMIN — Medication 50 GRAM(S): at 13:54

## 2019-03-26 RX ADMIN — Medication 800 MILLIGRAM(S): at 21:05

## 2019-03-26 RX ADMIN — Medication 1000 UNIT(S): at 13:54

## 2019-03-26 NOTE — PROGRESS NOTE ADULT - ASSESSMENT
Mr. Miranda is a 74 yo man with history of Anemia, HTN, PUD, chronic diarrhea, and recent hospitalization at [facility] from 3/3/19-3/21/19 brought in by family for generalized weakness and inability to walk admitted for severe malnutrition, mild hypernatremia and severe deconditioning. Exam relatively benign aside for significant LE edema and weakness of legs. Anemia at baseline.

## 2019-03-26 NOTE — PROGRESS NOTE ADULT - SUBJECTIVE AND OBJECTIVE BOX
Infectious Diseases progress note:    Subjective:  Resting, nad.  Afebrile.  No leukocytosis.      ROS:  CONSTITUTIONAL:  weak  CARDIOVASCULAR:  No chest pain or palpitations  RESPIRATORY:   No SOB, cough, dyspnea on exertion.  No wheezing  GASTROINTESTINAL:  No abd pain, N/V, diarrhea/constipation  EXTREMITIES:  No swelling or joint pain  GENITOURINARY:  No burning on urination, increased frequency or urgency.  No flank pain  NEUROLOGIC:  No HA, visual disturbances  SKIN: No rashes    Allergies    No Known Allergies    Intolerances        ANTIBIOTICS/RELEVANT:  antimicrobials    immunologic:    OTHER:  cholecalciferol 1000 Unit(s) Oral daily  dextrose 5%. 1000 milliLiter(s) IV Continuous <Continuous>  lactobacillus acidophilus 1 Tablet(s) Oral daily  mesalamine DR Capsule 800 milliGRAM(s) Oral three times a day  metoprolol succinate ER 25 milliGRAM(s) Oral daily  pantoprazole    Tablet 40 milliGRAM(s) Oral before breakfast  potassium chloride   Powder 40 milliEquivalent(s) Oral once      Objective:  Vital Signs Last 24 Hrs  T(C): 36.2 (26 Mar 2019 15:58), Max: 36.6 (25 Mar 2019 22:31)  T(F): 97.2 (26 Mar 2019 15:58), Max: 97.9 (25 Mar 2019 22:31)  HR: 76 (26 Mar 2019 15:58) (76 - 90)  BP: 109/65 (26 Mar 2019 15:58) (109/65 - 123/66)  BP(mean): --  RR: 18 (26 Mar 2019 15:58) (17 - 18)  SpO2: 100% (26 Mar 2019 15:58) (100% - 100%)    PHYSICAL EXAM:  Constitutional:NAD  Eyes:TODD, EOMI  Ear/Nose/Throat: no thrush, mucositis.  Moist mucous membranes	  Neck:no JVD, no lymphadenopathy, supple  Respiratory: CTA ruth  Cardiovascular: S1S2 RRR, no murmurs  Gastrointestinal:soft, nontender,  nondistended (+) BS  Extremities:no e/e/c  Skin:  no rashes, open wounds or ulcerations        LABS:                        9.8    9.73  )-----------( 76       ( 26 Mar 2019 06:40 )             32.6     03-26    146<H>  |  117<H>  |  24<H>  ----------------------------<  112<H>  3.2<L>   |  18<L>  |  1.67<H>    Ca    7.8<L>      26 Mar 2019 06:40  Phos  2.9       Mg     1.4         TPro  5.9<L>  /  Alb  1.7<L>  /  TBili  0.5  /  DBili  x   /  AST  24  /  ALT  21  /  AlkPhos  255<H>        Urinalysis Basic - ( 25 Mar 2019 20:45 )    Color: YELLOW / Appearance: CLEAR / S.020 / pH: 5.5  Gluc: NEGATIVE / Ketone: NEGATIVE  / Bili: NEGATIVE / Urobili: TRACE   Blood: TRACE / Protein: 30 / Nitrite: NEGATIVE   Leuk Esterase: SMALL / RBC: 6-10 / WBC 11-25   Sq Epi: FEW / Non Sq Epi: x / Bacteria: NEGATIVE      MICROBIOLOGY:          RADIOLOGY & ADDITIONAL STUDIES:    < from: US Kidney and Bladder (19 @ 07:38) >  IMPRESSION:     No hydronephrosis.  Moderate ascites increased since 2019.  Left pleural effusion.    < end of copied text >

## 2019-03-26 NOTE — PROGRESS NOTE ADULT - PROBLEM SELECTOR PLAN 1
renal function improving, FEna<1 likely prerenal. continue current IVF  no hydro on US  avoid nephrotoxic agents  monitor bmp.

## 2019-03-26 NOTE — PROGRESS NOTE ADULT - SUBJECTIVE AND OBJECTIVE BOX
[facility]OLOGY [facility]ACT[facility]   MD MAGALY MORAN MD ANGELA WONG, PA    TEL:  OFFICE: [phone]  DR ANDERSON CELL: [phone]  DR. MONAE CELL: [phone]  HUBERT WELLINGTON CELL: [phone]        Patient is a 73y old  Male who presents with a chief complaint of Generalized weakness and inability to walk (26 Mar 2019 11:26)      Patient seen and examined at bedside. No chest pain/sob    VITALS:  T(F): 97.4 (03-26-19 @ 06:46), Max: 97.9 (03-25-19 @ 22:31)  HR: 90 (03-26-19 @ 06:46)  BP: 123/66 (03-26-19 @ 06:46)  RR: 18 (03-26-19 @ 06:46)  SpO2: 100% (03-26-19 @ 06:46)  Wt(kg): --        PHYSICAL EXAM:  Constitutional: NAD  Neck: No JVD  Respiratory: CTAB, no wheezes, rales or rhonchi  Cardiovascular: S1, S2, RRR  Gastrointestinal: BS+, soft, NT/ND  Extremities: 1+ peripheral edema    Hospital Medications:   MEDICATIONS  (STANDING):  cholecalciferol 1000 Unit(s) Oral daily  dextrose 5%. 1000 milliLiter(s) (75 mL/Hr) IV Continuous <Continuous>  lactobacillus acidophilus 1 Tablet(s) Oral daily  magnesium sulfate  IVPB 2 Gram(s) IV Intermittent once  mesalamine DR Capsule 800 milliGRAM(s) Oral three times a day  metoprolol succinate ER 25 milliGRAM(s) Oral daily  pantoprazole    Tablet 40 milliGRAM(s) Oral before breakfast  potassium chloride    Tablet ER 40 milliEquivalent(s) Oral once      LABS:  03-26    146<H>  |  117<H>  |  24<H>  ----------------------------<  112<H>  3.2<L>   |  18<L>  |  1.67<H>    Ca    7.8<L>      26 Mar 2019 06:40  Phos  2.9     03-26  Mg     1.4     03-26    TPro  5.9<L>  /  Alb  1.7<L>  /  TBili  0.5  /  DBili      /  AST  24  /  ALT  21  /  AlkPhos  255<H>  03-26    Creatinine Trend: 1.67 <--, 1.91 <--, 1.80 <--, 1.67 <--, 1.37 <--, 1.40 <--    Phosphorus Level, Serum: 2.9 mg/dL (03-26 @ 06:40)  Albumin, Serum: 1.7 g/dL (03-26 @ 06:40)                              9.8    9.73  )-----------( 76       ( 26 Mar 2019 06:40 )             32.6     Urine Studies:  Urinalysis - [03-25-19 @ 20:45]      Color YELLOW / Appearance CLEAR / SG 1.020 / pH 5.5      Gluc NEGATIVE / Ketone NEGATIVE  / Bili NEGATIVE / Urobili TRACE       Blood TRACE / Protein 30 / Leuk Est SMALL / Nitrite NEGATIVE      RBC 6-10 / WBC 11-25 / Hyaline  / Gran  / Sq Epi FEW / Non Sq Epi  / Bacteria NEGATIVE    Urine Creatinine 145.40      [03-25-19 @ 20:45]  Urine Sodium 21      [03-25-19 @ 20:45]    Iron 38, TIBC 156, %sat --      [03-05-19 @ 06:45]  Ferritin 355.7      [03-05-19 @ 06:45]  Vitamin D (25OH) 25.6      [03-23-19 @ 18:15]  TSH 1.98      [03-05-19 @ 06:45]    HBsAb Nonreactive      [03-20-19 @ 05:35]  HBsAg NEGATIVE      [03-20-19 @ 05:35]    TODD: titer Negative, pattern --      [03-08-19 @ 06:30]    RADIOLOGY & ADDITIONAL STUDIES:

## 2019-03-26 NOTE — CONSULT NOTE ADULT - SUBJECTIVE AND OBJECTIVE BOX
Chief Complaint:  Patient is a 73y old  Male who presents with a chief complaint of Generalized weakness and inability to walk (25 Mar 2019 17:54)      HPI:  The patient is a     Allergies:  No Known Allergies      Home Medications:    Hospital Medications:  cholecalciferol 1000 Unit(s) Oral daily  dextrose 5%. 1000 milliLiter(s) IV Continuous <Continuous>  lactobacillus acidophilus 1 Tablet(s) Oral daily  magnesium sulfate  IVPB 2 Gram(s) IV Intermittent once  mesalamine DR Capsule 800 milliGRAM(s) Oral three times a day  metoprolol succinate ER 25 milliGRAM(s) Oral daily  pantoprazole    Tablet 40 milliGRAM(s) Oral before breakfast  potassium chloride    Tablet ER 40 milliEquivalent(s) Oral once      PMHX/PSHX:  Chronic kidney disease (CKD)  Anemia, unspecified type  Essential hypertension  CLL (chronic lymphocytic leukemia)  No significant past surgical history  No significant past surgical history      Family history:  Family history of myocardial infarction (Sibling)      Social History:     ROS:     General:  No wt loss, fevers, chills, night sweats, fatigue,   Eyes:  Good vision, no reported pain  ENT:  No sore throat, pain, runny nose, dysphagia  CV:  No pain, palpitations, hypo/hypertension  Resp:  No dyspnea, cough, tachypnea, wheezing  GI:  See HPI  :  No pain, bleeding, incontinence, nocturia  Muscle:  No pain, weakness  Neuro:  No weakness, tingling, memory problems  Psych:  No fatigue, insomnia, mood problems, depression  Endocrine:  No polyuria, polydipsia, cold/heat intolerance  Heme:  No petechiae, ecchymosis, easy bruisability  Skin:  No rash, edema      PHYSICAL EXAM:     GENERAL:  NAD  CHEST:  Full & symmetric excursion  HEART:  Regular rhythm, no abdominal bruit, no edema  ABDOMEN:  Soft, non-tender, + distended, normoactive bowel sounds,  no masses , no hepatosplenomegaly  EXTREMITIES:  no cyanosis,clubbing or edema  SKIN:  No rash/erythema/ecchymoses/petechiae/wounds/abscess/warm/dry  NEURO:  Alert, oriented    Vital Signs:  Vital Signs Last 24 Hrs  T(C): 36.3 (26 Mar 2019 06:46), Max: 36.6 (25 Mar 2019 22:31)  T(F): 97.4 (26 Mar 2019 06:46), Max: 97.9 (25 Mar 2019 22:31)  HR: 90 (26 Mar 2019 06:46) (74 - 90)  BP: 123/66 (26 Mar 2019 06:46) (119/66 - 123/66)  BP(mean): --  RR: 18 (26 Mar 2019 06:46) (17 - 18)  SpO2: 100% (26 Mar 2019 06:46) (100% - 100%)  Daily     Daily     LABS:                        9.8    9.73  )-----------( 76       ( 26 Mar 2019 06:40 )             32.6         146<H>  |  117<H>  |  24<H>  ----------------------------<  112<H>  3.2<L>   |  18<L>  |  1.67<H>    Ca    7.8<L>      26 Mar 2019 06:40  Phos  2.9       Mg     1.4         TPro  5.9<L>  /  Alb  1.7<L>  /  TBili  0.5  /  DBili  x   /  AST  24  /  ALT  21  /  AlkPhos  255<H>      LIVER FUNCTIONS - ( 26 Mar 2019 06:40 )  Alb: 1.7 g/dL / Pro: 5.9 g/dL / ALK PHOS: 255 u/L / ALT: 21 u/L / AST: 24 u/L / GGT: x             Urinalysis Basic - ( 25 Mar 2019 20:45 )    Color: YELLOW / Appearance: CLEAR / S.020 / pH: 5.5  Gluc: NEGATIVE / Ketone: NEGATIVE  / Bili: NEGATIVE / Urobili: TRACE   Blood: TRACE / Protein: 30 / Nitrite: NEGATIVE   Leuk Esterase: SMALL / RBC: 6-10 / WBC 11-25   Sq Epi: FEW / Non Sq Epi: x / Bacteria: NEGATIVE          Imaging: Chief Complaint:  Patient is a 73y old  Male who presents with a chief complaint of Generalized weakness and inability to walk (25 Mar 2019 17:54)      HPI:  The patient is a 72 yo man with history of GIB, HTN, chronic diarrhea, and recent hospitalization at [facility] from 3/3/19-3/21/19 brought in by family for generalized weakness and inability to walk.  Of note the patient was recently discharged from a prolonged hospitalization for weakness and during this hospitalization had an infectious work up for diarrhea which was negative.  Patient was planned for a flexible sigmoidoscopy but this was deferred due to GIB and diarrhea initially  resolved and the patient was discharged kellie with PT services on 3/21/19 after being hospitalized.  The patient went home only for one day but  was unable to stand or walk to the bathroom. Due to his severe weakness, his daughter brought him back to the hospital. The patient denied diarrhea during my interview but per NP he has 4 episodes of incontinence overnight.      Allergies:  No Known Allergies      Home Medications:    Hospital Medications:  cholecalciferol 1000 Unit(s) Oral daily  dextrose 5%. 1000 milliLiter(s) IV Continuous <Continuous>  lactobacillus acidophilus 1 Tablet(s) Oral daily  magnesium sulfate  IVPB 2 Gram(s) IV Intermittent once  mesalamine DR Capsule 800 milliGRAM(s) Oral three times a day  metoprolol succinate ER 25 milliGRAM(s) Oral daily  pantoprazole    Tablet 40 milliGRAM(s) Oral before breakfast  potassium chloride    Tablet ER 40 milliEquivalent(s) Oral once      PMHX/PSHX:  Chronic kidney disease (CKD)  Anemia, unspecified type  Essential hypertension  CLL (chronic lymphocytic leukemia)  No significant past surgical history  No significant past surgical history      Family history:  Family history of myocardial infarction (Sibling)      Social History:     ROS:     General:  No wt loss, fevers, chills, night sweats, fatigue,   Eyes:  Good vision, no reported pain  ENT:  No sore throat, pain, runny nose, dysphagia  CV:  No pain, palpitations, hypo/hypertension  Resp:  No dyspnea, cough, tachypnea, wheezing  GI:  See HPI  :  No pain, bleeding, incontinence, nocturia  Muscle:  No pain, weakness  Neuro:  No weakness, tingling, memory problems  Psych:  No fatigue, insomnia, mood problems, depression  Endocrine:  No polyuria, polydipsia, cold/heat intolerance  Heme:  No petechiae, ecchymosis, easy bruisability  Skin:  No rash, edema      PHYSICAL EXAM:     GENERAL:  NAD  CHEST:  Full & symmetric excursion  HEART:  Regular rhythm, no abdominal bruit, no edema  ABDOMEN:  Soft, non-tender, + distended, normoactive bowel sounds,  no masses , no hepatosplenomegaly  EXTREMITIES:  no cyanosis,clubbing or edema  SKIN:  No rash/erythema/ecchymoses/petechiae/wounds/abscess/warm/dry  NEURO:  Alert, oriented    Vital Signs:  Vital Signs Last 24 Hrs  T(C): 36.3 (26 Mar 2019 06:46), Max: 36.6 (25 Mar 2019 22:31)  T(F): 97.4 (26 Mar 2019 06:46), Max: 97.9 (25 Mar 2019 22:31)  HR: 90 (26 Mar 2019 06:46) (74 - 90)  BP: 123/66 (26 Mar 2019 06:46) (119/66 - 123/66)  BP(mean): --  RR: 18 (26 Mar 2019 06:46) (17 - 18)  SpO2: 100% (26 Mar 2019 06:46) (100% - 100%)  Daily     Daily     LABS:                        9.8    9.73  )-----------( 76       ( 26 Mar 2019 06:40 )             32.6         146<H>  |  117<H>  |  24<H>  ----------------------------<  112<H>  3.2<L>   |  18<L>  |  1.67<H>    Ca    7.8<L>      26 Mar 2019 06:40  Phos  2.9       Mg     1.4         TPro  5.9<L>  /  Alb  1.7<L>  /  TBili  0.5  /  DBili  x   /  AST  24  /  ALT  21  /  AlkPhos  255<H>      LIVER FUNCTIONS - ( 26 Mar 2019 06:40 )  Alb: 1.7 g/dL / Pro: 5.9 g/dL / ALK PHOS: 255 u/L / ALT: 21 u/L / AST: 24 u/L / GGT: x             Urinalysis Basic - ( 25 Mar 2019 20:45 )    Color: YELLOW / Appearance: CLEAR / S.020 / pH: 5.5  Gluc: NEGATIVE / Ketone: NEGATIVE  / Bili: NEGATIVE / Urobili: TRACE   Blood: TRACE / Protein: 30 / Nitrite: NEGATIVE   Leuk Esterase: SMALL / RBC: 6-10 / WBC 11-25   Sq Epi: FEW / Non Sq Epi: x / Bacteria: NEGATIVE          Imaging: Chief Complaint:  Patient is a 73y old  Male who presents with a chief complaint of Generalized weakness and inability to walk (25 Mar 2019 17:54)      HPI:  The patient is a 74 yo man with history of GIB, HTN, chronic diarrhea, and recent hospitalization at [facility] from 3/3/19-3/21/19 brought in by family for generalized weakness and inability to walk.  Of note the patient was recently discharged from a prolonged hospitalization for weakness and during this hospitalization had an infectious work up for diarrhea which was negative.  Patient was planned for a flexible sigmoidoscopy but this was deferred due to GIB and diarrhea initially  resolved and the patient was discharged kellie with PT services on 3/21/19 after being hospitalized.  The patient went home only for one day but  was unable to stand or walk to the bathroom. Due to his severe weakness, his daughter brought him back to the hospital. The patient denied diarrhea during my interview but per NP he has 4 episodes of incontinence overnight.      Allergies:  No Known Allergies      Home Medications:    Hospital Medications:  cholecalciferol 1000 Unit(s) Oral daily  dextrose 5%. 1000 milliLiter(s) IV Continuous <Continuous>  lactobacillus acidophilus 1 Tablet(s) Oral daily  magnesium sulfate  IVPB 2 Gram(s) IV Intermittent once  mesalamine DR Capsule 800 milliGRAM(s) Oral three times a day  metoprolol succinate ER 25 milliGRAM(s) Oral daily  pantoprazole    Tablet 40 milliGRAM(s) Oral before breakfast  potassium chloride    Tablet ER 40 milliEquivalent(s) Oral once      PMHX/PSHX:  Chronic kidney disease (CKD)  Anemia, unspecified type  Essential hypertension  CLL (chronic lymphocytic leukemia)  No significant past surgical history  No significant past surgical history      Family history:  Family history of myocardial infarction (Sibling)      Social History:     ROS:     General:  No wt loss, fevers, chills, night sweats, fatigue,   Eyes:  Good vision, no reported pain  ENT:  No sore throat, pain, runny nose, dysphagia  CV:  No pain, palpitations, hypo/hypertension  Resp:  No dyspnea, cough, tachypnea, wheezing  GI:  See HPI  :  No pain, bleeding, incontinence, nocturia  Muscle:  No pain, weakness  Neuro:  No weakness, tingling, memory problems  Psych:  No fatigue, insomnia, mood problems, depression  Endocrine:  No polyuria, polydipsia, cold/heat intolerance  Heme:  No petechiae, ecchymosis, easy bruisability  Skin:  No rash, edema      PHYSICAL EXAM:     GENERAL:  NAD/ Chronically ill appearing  CHEST:  Full & symmetric excursion  HEART:  Regular rhythm, no abdominal bruit, no edema  ABDOMEN:  Soft, non-tender, + distended, normoactive bowel sounds,  no masses , no hepatosplenomegaly  RECTAL: No mass. Loose mucoid diarrheal stool. Not impacted. No blood.  EXTREMITIES:  no cyanosis,clubbing or edema  SKIN:  No rash/erythema/ecchymoses/petechiae/wounds/abscess/warm/dry  NEURO:  Alert, oriented    Vital Signs:  Vital Signs Last 24 Hrs  T(C): 36.3 (26 Mar 2019 06:46), Max: 36.6 (25 Mar 2019 22:31)  T(F): 97.4 (26 Mar 2019 06:46), Max: 97.9 (25 Mar 2019 22:31)  HR: 90 (26 Mar 2019 06:46) (74 - 90)  BP: 123/66 (26 Mar 2019 06:46) (119/66 - 123/66)  BP(mean): --  RR: 18 (26 Mar 2019 06:46) (17 - 18)  SpO2: 100% (26 Mar 2019 06:46) (100% - 100%)  Daily     Daily     LABS:                        9.8    9.73  )-----------( 76       ( 26 Mar 2019 06:40 )             32.6     -    146<H>  |  117<H>  |  24<H>  ----------------------------<  112<H>  3.2<L>   |  18<L>  |  1.67<H>    Ca    7.8<L>      26 Mar 2019 06:40  Phos  2.9     -  Mg     1.4     -    TPro  5.9<L>  /  Alb  1.7<L>  /  TBili  0.5  /  DBili  x   /  AST  24  /  ALT  21  /  AlkPhos  255<H>  -26    LIVER FUNCTIONS - ( 26 Mar 2019 06:40 )  Alb: 1.7 g/dL / Pro: 5.9 g/dL / ALK PHOS: 255 u/L / ALT: 21 u/L / AST: 24 u/L / GGT: x             Urinalysis Basic - ( 25 Mar 2019 20:45 )    Color: YELLOW / Appearance: CLEAR / S.020 / pH: 5.5  Gluc: NEGATIVE / Ketone: NEGATIVE  / Bili: NEGATIVE / Urobili: TRACE   Blood: TRACE / Protein: 30 / Nitrite: NEGATIVE   Leuk Esterase: SMALL / RBC: 6-10 / WBC 11-25   Sq Epi: FEW / Non Sq Epi: x / Bacteria: NEGATIVE          Imaging:

## 2019-03-26 NOTE — PROGRESS NOTE ADULT - ASSESSMENT
Mr. Miranda is a 72 yo man with history of Anemia, HTN, chronic diarrhea, and recent hospitalization at [facility] from 3/3/19-3/21/19 brought in by family for generalized weakness and inability to walk.    Patient was discharged home with home PT services on 3/21/19 after being hospitalized since 3/3/19. During his hospitalization, he was managed for chronic diarrhea presumed to be 2/2 IBD since infectious and autoimmune w/u was negative, anemia due to blood loss from GI tract 2/2 PUD, requiring blood transfusion, and ESBL E. coli UTI with ertapenem via PICC. He was also determined not to have the diagnosis of CLL after flow cytometry results were available.     HPI was obtained primarily from patient's daughter and caregiver, Bee. As per daughter, when patient came home he was very weak. She wanted to bathe him but patient was unable to stand or walk to the bathroom. Due to his severe weakness, his daughter brought him back to the hospital. (23 Mar 2019 08:22)      Recommend:    - s/p completion of  ertapenem 1 week course for ESBL e.coli UTI on 3/25.  Monitor off abx.     - Pt with diarrhea, thus far infectious w/u negative including stool cx, O&P (including microsporidium, cyclospora, isospora, cryptosporidium), gi pcr, cdiff, strongyloides neg, giardia neg, cmv pcr (-), cmv igG (+), IgM (-), HIV (-)    - Send stool for AFB    -GI eval noted, repeat stool studies ordered, r/o autoimmune and celiac disease.       - ?need for colonoscopy.  Pt had colonoscopy recently while in [facility] with negative biopsy results.      - Cont probiotics    - PT - pt recommended for rehab.      Will follow       Adeline Marques  [phone]

## 2019-03-26 NOTE — CONSULT NOTE ADULT - ASSESSMENT
Impression:  1) Diarrhea - with previous negative work up inpatient.  Etiologies include infectious vs inflammatory vs  malabsorptive vs microscopic colitis.  Patient will likely ultimately need endoscopic evaluation but would first repeat stool work up prior to proceeding.    Recommendations:   - send C. Diff and GI-PCR   - send stool O and P, send giardia   - send stool electrolytes, stool osms   - send stool elastase   - send fecal fat   - send a-1 antitrypsin   - send celiac panel including tissue trans glutaminase and IgA    - monitor stool output and volume    [place], PGY-4  Gastroenterology Fellow  Pager x [phone] or [phone]  (After 5 pm or on weekends please page GI on call) Impression:  1) Diarrhea - with previous negative work up inpatient.  Etiologies include infectious vs inflammatory vs  malabsorptive vs microscopic colitis.  Patient will likely ultimately need endoscopic evaluation but would first repeat stool work up prior to proceeding.    Recommendations:   - send C. Diff and GI-PCR   - send stool O and P, send giardia   - send stool electrolytes (Na+, K+), pH   - send stool elastase   - send qualitative fecal fat   - send a-1 antitrypsin    -follow-up CBC; Fe++/TIBC. B12, foalte   - send celiac panel including tissue trans glutaminase IgA and IgA    - monitor stool output and volume   -lactose free LRD   -colonoscopy pending above    [place], PGY-4  Gastroenterology Fellow  Pager x [phone] or [phone]  (After 5 pm or on weekends please page GI on call)

## 2019-03-26 NOTE — PROGRESS NOTE ADULT - ASSESSMENT
72 yo man with history of Anemia, HTN, chronic diarrhea, and recent hospitalization at [facility] from 3/3/19-3/21/19 brought in by family for generalized weakness and inability to walk found to have james and hypernatremia

## 2019-03-27 DIAGNOSIS — R79.81 ABNORMAL BLOOD-GAS LEVEL: ICD-10-CM

## 2019-03-27 LAB
AMMONIA BLD-MCNC: 32 UMOL/L — SIGNIFICANT CHANGE UP (ref 11–55)
ANION GAP SERPL CALC-SCNC: 8 MMO/L — SIGNIFICANT CHANGE UP (ref 7–14)
APPEARANCE UR: CLEAR — SIGNIFICANT CHANGE UP
BACTERIA # UR AUTO: HIGH
BASE EXCESS BLDV CALC-SCNC: -5.7 MMOL/L — SIGNIFICANT CHANGE UP
BILIRUB UR-MCNC: NEGATIVE — SIGNIFICANT CHANGE UP
BLOOD UR QL VISUAL: SIGNIFICANT CHANGE UP
BUN SERPL-MCNC: 24 MG/DL — HIGH (ref 7–23)
CALCIUM SERPL-MCNC: 7.9 MG/DL — LOW (ref 8.4–10.5)
CHLORIDE SERPL-SCNC: 118 MMOL/L — HIGH (ref 98–107)
CO2 SERPL-SCNC: 20 MMOL/L — LOW (ref 22–31)
COLOR SPEC: YELLOW — SIGNIFICANT CHANGE UP
CREAT SERPL-MCNC: 1.58 MG/DL — HIGH (ref 0.5–1.3)
CULTURE - ACID FAST SMEAR CONCENTRATED: SIGNIFICANT CHANGE UP
GAS PNL BLDV: 142 MMOL/L — SIGNIFICANT CHANGE UP (ref 136–146)
GLUCOSE BLDV-MCNC: 102 — HIGH (ref 70–99)
GLUCOSE SERPL-MCNC: 104 MG/DL — HIGH (ref 70–99)
GLUCOSE UR-MCNC: NEGATIVE — SIGNIFICANT CHANGE UP
HCO3 BLDV-SCNC: 20 MMOL/L — SIGNIFICANT CHANGE UP (ref 20–27)
HCT VFR BLD CALC: 33.1 % — LOW (ref 39–50)
HCT VFR BLDV CALC: 30.7 % — LOW (ref 39–51)
HGB BLD-MCNC: 10 G/DL — LOW (ref 13–17)
HGB BLDV-MCNC: 9.9 G/DL — LOW (ref 13–17)
HYALINE CASTS # UR AUTO: SIGNIFICANT CHANGE UP
KETONES UR-MCNC: NEGATIVE — SIGNIFICANT CHANGE UP
LACTATE BLDV-MCNC: 1.7 MMOL/L — SIGNIFICANT CHANGE UP (ref 0.5–2)
LEUKOCYTE ESTERASE UR-ACNC: SIGNIFICANT CHANGE UP
MAGNESIUM SERPL-MCNC: 1.9 MG/DL — SIGNIFICANT CHANGE UP (ref 1.6–2.6)
MCHC RBC-ENTMCNC: 28.3 PG — SIGNIFICANT CHANGE UP (ref 27–34)
MCHC RBC-ENTMCNC: 30.2 % — LOW (ref 32–36)
MCV RBC AUTO: 93.8 FL — SIGNIFICANT CHANGE UP (ref 80–100)
NITRITE UR-MCNC: NEGATIVE — SIGNIFICANT CHANGE UP
NRBC # FLD: 0.02 K/UL — SIGNIFICANT CHANGE UP (ref 0–0)
OSMOLALITY UR: 495 MOSMO/KG — SIGNIFICANT CHANGE UP (ref 50–1200)
PCO2 BLDV: 27 MMHG — LOW (ref 41–51)
PH BLDV: 7.44 PH — HIGH (ref 7.32–7.43)
PH STL: 8 — SIGNIFICANT CHANGE UP (ref 5.6–14)
PH UR: 6 — SIGNIFICANT CHANGE UP (ref 5–8)
PHOSPHATE SERPL-MCNC: 3 MG/DL — SIGNIFICANT CHANGE UP (ref 2.5–4.5)
PLATELET # BLD AUTO: 67 K/UL — LOW (ref 150–400)
PMV BLD: SIGNIFICANT CHANGE UP FL (ref 7–13)
PO2 BLDV: 81 MMHG — HIGH (ref 35–40)
POTASSIUM BLDV-SCNC: 3.2 MMOL/L — LOW (ref 3.4–4.5)
POTASSIUM SERPL-MCNC: 3.6 MMOL/L — SIGNIFICANT CHANGE UP (ref 3.5–5.3)
POTASSIUM SERPL-SCNC: 3.6 MMOL/L — SIGNIFICANT CHANGE UP (ref 3.5–5.3)
PROT UR-MCNC: 50 — SIGNIFICANT CHANGE UP
RBC # BLD: 3.53 M/UL — LOW (ref 4.2–5.8)
RBC # FLD: 22.4 % — HIGH (ref 10.3–14.5)
RBC CASTS # UR COMP ASSIST: SIGNIFICANT CHANGE UP (ref 0–?)
SAO2 % BLDV: 96.7 % — HIGH (ref 60–85)
SODIUM SERPL-SCNC: 146 MMOL/L — HIGH (ref 135–145)
SP GR SPEC: 1.02 — SIGNIFICANT CHANGE UP (ref 1–1.04)
SPECIMEN SOURCE: SIGNIFICANT CHANGE UP
SQUAMOUS # UR AUTO: SIGNIFICANT CHANGE UP
TTG IGG SER-ACNC: 6.2 U/ML — HIGH
UROBILINOGEN FLD QL: SIGNIFICANT CHANGE UP
WBC # BLD: 9.18 K/UL — SIGNIFICANT CHANGE UP (ref 3.8–10.5)
WBC # FLD AUTO: 9.18 K/UL — SIGNIFICANT CHANGE UP (ref 3.8–10.5)
WBC UR QL: HIGH (ref 0–?)

## 2019-03-27 PROCEDURE — 70450 CT HEAD/BRAIN W/O DYE: CPT | Mod: 26

## 2019-03-27 PROCEDURE — 99232 SBSQ HOSP IP/OBS MODERATE 35: CPT | Mod: GC

## 2019-03-27 PROCEDURE — 71045 X-RAY EXAM CHEST 1 VIEW: CPT | Mod: 26

## 2019-03-27 RX ADMIN — PANTOPRAZOLE SODIUM 40 MILLIGRAM(S): 20 TABLET, DELAYED RELEASE ORAL at 05:46

## 2019-03-27 RX ADMIN — Medication 800 MILLIGRAM(S): at 21:36

## 2019-03-27 RX ADMIN — Medication 1 TABLET(S): at 13:25

## 2019-03-27 RX ADMIN — Medication 25 MILLIGRAM(S): at 05:46

## 2019-03-27 RX ADMIN — Medication 800 MILLIGRAM(S): at 05:46

## 2019-03-27 RX ADMIN — Medication 1000 UNIT(S): at 13:25

## 2019-03-27 RX ADMIN — Medication 800 MILLIGRAM(S): at 13:25

## 2019-03-27 NOTE — PROGRESS NOTE ADULT - SUBJECTIVE AND OBJECTIVE BOX
Infectious Diseases progress note:    Subjective:    ROS:  CONSTITUTIONAL:  No fever, chills, rigors  CARDIOVASCULAR:  No chest pain or palpitations  RESPIRATORY:   No SOB, cough, dyspnea on exertion.  No wheezing  GASTROINTESTINAL:  No abd pain, N/V, diarrhea/constipation  EXTREMITIES:  No swelling or joint pain  GENITOURINARY:  No burning on urination, increased frequency or urgency.  No flank pain  NEUROLOGIC:  No HA, visual disturbances  SKIN: No rashes    Allergies    No Known Allergies    Intolerances        ANTIBIOTICS/RELEVANT:  antimicrobials    immunologic:    OTHER:  cholecalciferol 1000 Unit(s) Oral daily  dextrose 5%. 1000 milliLiter(s) IV Continuous <Continuous>  lactobacillus acidophilus 1 Tablet(s) Oral daily  mesalamine DR Capsule 800 milliGRAM(s) Oral three times a day  metoprolol succinate ER 25 milliGRAM(s) Oral daily  pantoprazole    Tablet 40 milliGRAM(s) Oral before breakfast      Objective:  Vital Signs Last 24 Hrs  T(C): 36.8 (27 Mar 2019 16:45), Max: 36.8 (27 Mar 2019 16:45)  T(F): 98.2 (27 Mar 2019 16:45), Max: 98.2 (27 Mar 2019 16:45)  HR: 98 (27 Mar 2019 16:45) (77 - 98)  BP: 120/56 (27 Mar 2019 16:45) (103/52 - 122/91)  BP(mean): --  RR: 18 (27 Mar 2019 16:45) (16 - 18)  SpO2: 100% (27 Mar 2019 16:45) (100% - 100%)    PHYSICAL EXAM:  Constitutional:NAD  Eyes:TODD, EOMI  Ear/Nose/Throat: no thrush, mucositis.  Moist mucous membranes	  Neck:no JVD, no lymphadenopathy, supple  Respiratory: CTA ruth  Cardiovascular: S1S2 RRR, no murmurs  Gastrointestinal:soft, nontender,  nondistended (+) BS  Extremities:no e/e/c  Skin:  no rashes, open wounds or ulcerations        LABS:                        10.0   9.18  )-----------( 67       ( 27 Mar 2019 05:30 )             33.1     03-    146<H>  |  118<H>  |  24<H>  ----------------------------<  104<H>  3.6   |  20<L>  |  1.58<H>    Ca    7.9<L>      27 Mar 2019 05:30  Phos  3.0       Mg     1.9         TPro  5.9<L>  /  Alb  1.7<L>  /  TBili  0.5  /  DBili  x   /  AST  24  /  ALT  21  /  AlkPhos  255<H>        Urinalysis Basic - ( 27 Mar 2019 11:20 )    Color: YELLOW / Appearance: CLEAR / S.020 / pH: 6.0  Gluc: NEGATIVE / Ketone: NEGATIVE  / Bili: NEGATIVE / Urobili: TRACE   Blood: TRACE / Protein: 50 / Nitrite: NEGATIVE   Leuk Esterase: TRACE / RBC: 0-2 / WBC 11-25   Sq Epi: FEW / Non Sq Epi: x / Bacteria: MODERATE              MICROBIOLOGY:    Culture - Acid Fast - Other w/Smear (19 @ 15:16)    Culture - Acid Fast Smear Concentrated:   AFB SMEAR= NO ACID FAST BACILLI SEEN    Specimen Source: STOOL    Culture - Blood (19 @ 07:34)    Culture - Blood:   NO ORGANISMS ISOLATED    Specimen Source: BLOOD    Culture - Blood in AM (19 @ 05:02)    Culture - Blood:   NO ORGANISMS ISOLATED    Specimen Source: BLOOD PERIPHERAL          RADIOLOGY & ADDITIONAL STUDIES:    < from: Xray Chest 1 View- PORTABLE-Routine (19 @ 11:27) >  FINDINGS:    Lines and tubes: None    Lungs: No focal pulmonary consolidation.    Pleura: Unchanged trace right pleural effusion.    Mediastinum and heart: Cardiac silhouette is normal.    Skeleton: Bilateral shoulder osteoarthrosis and degenerative spinal   disease.    IMPRESSION:     Unchanged trace right pleural effusion.    < end of copied text >          < from: CT Head No Cont (19 @ 10:16) >  FINDINGS:    VENTRICLES AND SULCI:  Prominent in size compatible with age-appropriate   volume loss    INTRA-AXIAL:  Areas of white matter hypodensity are seen within the   cerebral hemispheres bilaterally compatible with mild microvascular-type   changes. No mass, blood or abnormal attenuation is otherwise seen.    EXTRA-AXIAL:  No mass or collection is seen.    VISUALIZED SINUSES:  Clear.    VISUALIZED MASTOIDS:  Clear.    CALVARIUM:  Normal.    MISCELLANEOUS:  None.      IMPRESSION:    No mass effect, hemorrhage or evidence of acute intracranial pathology.    < end of copied text >

## 2019-03-27 NOTE — PROGRESS NOTE ADULT - SUBJECTIVE AND OBJECTIVE BOX
Patient is a 73y old  Male who presents with a chief complaint of Generalized weakness and inability to walk (27 Mar 2019 08:16)      HPI:  Mr. Miranda is a 74 yo man with history of Anemia, HTN, chronic diarrhea, and recent hospitalization at [facility] from 3/3/19-3/21/19 brought in by family for generalized weakness and inability to walk.    Patient was discharged home with home PT services on 3/21/19 after being hospitalized since 3/3/19. During his hospitalization, he was managed for chronic diarrhea presumed to be 2/2 IBD since infectious and autoimmune w/u was negative, anemia due to blood loss from GI tract 2/2 PUD, requiring blood transfusion, and ESBL E. coli UTI with ertapenem via PICC. He was also determined not to have the diagnosis of CLL after flow cytometry results were available.     HPI was obtained primarily from patient's daughter and caregiver, Bee. As per daughter, when patient came home he was very weak. She wanted to bathe him but patient was unable to stand or walk to the bathroom. Due to his severe weakness, his daughter brought him back to the hospital. (23 Mar 2019 08:22)    he had ABG done today and hence pulmonary called:   spoke to her daughter: he has noo COPD and no asthma , and he smoked : for ? many years : the daughter has no idea: but did smoke for many years: He was brought to hospital secondary to diarrhea in october: He went to [facility] and he cont to have diarrhea: He went to [place] [facility] : he was suspected to have ? colitis ? and she was brought here with diarrhea:     ?FOLLOWING PRESENT  x[ ] Hx of PE/DVT, [x ] Hx COPD, [ x] Hx of Asthma, [y ] Hx of Hospitalization, x[ ]  Hx of BiPAP/CPAP use, [x ] Hx of SANDRA    Allergies    No Known Allergies    Intolerances        PAST MEDICAL & SURGICAL HISTORY:  Chronic kidney disease (CKD)  Anemia, unspecified type  Essential hypertension  No significant past surgical history      FAMILY HISTORY:  Family history of myocardial infarction (Sibling): Father, mother, brother      Social History: [ smoked for many years ] TOBACCO                  [ x ] ETOH                                 [ x ] IVDA/DRUGS    REVIEW OF SYSTEMS      General:	weakness    Skin/Breast:x  	  Ophthalmologic:x  	  ENMT:	x    Respiratory and Thorax: o sob, no cough   	  Cardiovascular:	x    Gastrointestinal:	diarrnea    Genitourinary:	x    Musculoskeletal:	x    Neurological:	x    Psychiatric:	x    Hematology/Lymphatics:	x    Endocrine:	x    Allergic/Immunologic:	x    MEDICATIONS  (STANDING):  cholecalciferol 1000 Unit(s) Oral daily  dextrose 5%. 1000 milliLiter(s) (60 mL/Hr) IV Continuous <Continuous>  lactobacillus acidophilus 1 Tablet(s) Oral daily  mesalamine DR Capsule 800 milliGRAM(s) Oral three times a day  metoprolol succinate ER 25 milliGRAM(s) Oral daily  pantoprazole    Tablet 40 milliGRAM(s) Oral before breakfast    MEDICATIONS  (PRN):       Vital Signs Last 24 Hrs  T(C): 36.2 (27 Mar 2019 12:02), Max: 36.3 (27 Mar 2019 05:44)  T(F): 97.1 (27 Mar 2019 12:02), Max: 97.4 (27 Mar 2019 10:45)  HR: 95 (27 Mar 2019 12:02) (76 - 95)  BP: 122/91 (27 Mar 2019 12:02) (103/52 - 122/91)  BP(mean): --  RR: 18 (27 Mar 2019 12:02) (16 - 18)  SpO2: 100% (27 Mar 2019 12:02) (100% - 100%)        I&O's Summary      Physical Exam:   GENERAL: cachectic:   HEENT: TODD/   Atraumatic, Normocephalic  ENMT: No tonsillar erythema, exudates, or enlargement; Moist mucous membranes, Good dentition, No lesions  NECK: Supple, No JVD, Normal thyroid  CHEST/LUNG: No Wheezing  CVS: Regular rate and rhythm; No murmurs, rubs, or gallops  GI: : Soft, Nontender, Nondistended; Bowel sounds present  NERVOUS SYSTEM:  Alert & Oriented X3  EXTREMITIES:  - edema  LYMPH: No lymphadenopathy noted  SKIN: No rashes or lesions  ENDOCRINOLOGY: No Thyromegaly  PSYCH: Appropriate    Labs:  -5.7<27<4>>81<<7.445>>-5.7<<3><<4><<5<<819>>                            10.0   9.18  )-----------( 67       ( 27 Mar 2019 05:30 )             33.1                         9.8    9.73  )-----------( 76       ( 26 Mar 2019 06:40 )             32.6                         10.2   9.83  )-----------( 88       ( 24 Mar 2019 05:40 )             34.7                         9.8    10.60 )-----------( 87       ( 23 Mar 2019 18:15 )             31.9     03-27    146<H>  |  118<H>  |  24<H>  ----------------------------<  104<H>  3.6   |  20<L>  |  1.58<H>  03-26    146<H>  |  117<H>  |  24<H>  ----------------------------<  112<H>  3.2<L>   |  18<L>  |  1.67<H>  03-24    151<H>  |  119<H>  |  26<H>  ----------------------------<  73  3.8   |  19<L>  |  1.91<H>  03-23    153<H>  |  121<H>  |  24<H>  ----------------------------<  108<H>  3.6   |  18<L>  |  1.80<H>    Ca    7.9<L>      27 Mar 2019 05:30  Ca    7.8<L>      26 Mar 2019 06:40  Phos  3.0     -  Phos  2.9     -  Mg     1.9       Mg     1.4     -    TPro  5.9<L>  /  Alb  1.7<L>  /  TBili  0.5  /  DBili  x   /  AST  24  /  ALT  21  /  AlkPhos  255<H>    TPro  6.1  /  Alb  1.8<L>  /  TBili  0.5  /  DBili  x   /  AST  36  /  ALT  27  /  AlkPhos  287<H>  0323    CAPILLARY BLOOD GLUCOSE        LIVER FUNCTIONS - ( 26 Mar 2019 06:40 )  Alb: 1.7 g/dL / Pro: 5.9 g/dL / ALK PHOS: 255 u/L / ALT: 21 u/L / AST: 24 u/L / GGT: x             Urinalysis Basic - ( 27 Mar 2019 11:20 )    Color: YELLOW / Appearance: CLEAR / S.020 / pH: 6.0  Gluc: NEGATIVE / Ketone: NEGATIVE  / Bili: NEGATIVE / Urobili: TRACE   Blood: TRACE / Protein: 50 / Nitrite: NEGATIVE   Leuk Esterase: TRACE / RBC: 0-2 / WBC 11-25   Sq Epi: FEW / Non Sq Epi: x / Bacteria: MODERATE      D DImer      Studies  Chest X-RAY  CT SCAN Chest   CT Abdomen  Venous Dopplers: LE:   Others        < from: CT Head No Cont (19 @ 10:16) >  RETATION:  INDICATIONS:  Altered Mental Status  .    TECHNIQUE:  Serial axial images were obtained from the skull base to the   vertex without intravenous contrast. Coronal and sagittal reformatted   images were obtained.    COMPARISON EXAMINATION: None.    FINDINGS:    VENTRICLES AND SULCI:  Prominent in size compatible with age-appropriate   volume loss    INTRA-AXIAL:  Areas of white matter hypodensity are seen within the   cerebral hemispheres bilaterally compatible with mild microvascular-type   changes. No mass, blood or abnormal attenuation is otherwise seen.    EXTRA-AXIAL:  No mass or collection is seen.    VISUALIZED SINUSES:  Clear.    VISUALIZED MASTOIDS:  Clear.    CALVARIUM:  Normal.    MISCELLANEOUS:  None.      IMPRESSION:    No mass effect, hemorrhage or evidence of acute intracranial pathology.        < from: CT Chest No Cont (03.15.19 @ 09:22) >    IMPRESSION:     *  Tracheal wall thickening, centrilobular and tree-in-bud micronodules   predominantly within the upper lungs suggestive of   bronchitis/bronchiolitis.    *  Interlobular septal thickening and patchy groundglass opacities, trace   bilateral pleural effusions and anasarca suggestive of superimposed   pulmonary edema.    *  Ascites new from 3/4/2019.    *  Narrowing of bilateral main bronchi which may represent bronchomalacia.    < end of copied text >              NORA JULES M.D., ATTENDING RADIOLOGIST  This document has been electronically signed. Mar 27 2019 10:55AM        < end of copied text >

## 2019-03-27 NOTE — PROGRESS NOTE ADULT - PROBLEM SELECTOR PLAN 1
He has resp alkalosis as well as some metabolic acidosis: He has had lot of diarrhea: before: His chest x-ray with poor inspiration with bibasilar atelectasis other wise lung are clear: I DOUBT HE H IS HAVING  PE: But would do vq scan as wellas dopplers: Could it be related to diarrhea: He is off antibiotics at this time. I don't seem much of pneumonia on chest x-ray: His last ct scan is reviewed too: had some TIB opacities in upper lobes suggestive of bronchiolitis: CHF and bronchomalacia:

## 2019-03-27 NOTE — PROGRESS NOTE ADULT - ASSESSMENT
Mr. Miranda is a 74 yo man with history of Anemia, HTN, PUD, chronic diarrhea, and recent hospitalization at [facility] from 3/3/19-3/21/19 brought in by family for generalized weakness and inability to walk admitted for severe malnutrition, mild hypernatremia and severe deconditioning. Exam relatively benign aside for significant LE edema and weakness of legs. Anemia at baseline.

## 2019-03-27 NOTE — PROGRESS NOTE ADULT - ASSESSMENT
Mr. Miranda is a 74 yo man with history of Anemia, HTN, PUD, chronic diarrhea, and recent hospitalization at [facility] from 3/3/19-3/21/19 brought in by family for generalized weakness and inability to walk admitted for severe malnutrition, mild hypernatremia and severe deconditioning. Exam relatively benign aside for significant LE edema and weakness of legs. Anemia at baseline.

## 2019-03-27 NOTE — PROGRESS NOTE ADULT - SUBJECTIVE AND OBJECTIVE BOX
CHACHA GUERREROJESENIADAMIAN  73y  Male      Patient is a 73y old  Male who presents with a chief complaint of Generalized weakness and inability to walk (27 Mar 2019 17:37)  Patient seen and examined.chart reviewed..covering .Reported AMS-CT Head-neg,Hypotheric  nad,,no sob,no cp,no cough,reported diarrhoea continues per Family at bedside    REVIEW OF SYSTEMS:  as above    INTERVAL HPI/OVERNIGHT EVENTS:  T(C): 36.5 (19 @ 20:47), Max: 36.8 (19 @ 16:45)  HR: 99 (19 @ 20:47) (77 - 99)  BP: 103/52 (19 @ 20:47) ( - )  RR: 18 (19 @ 20:47) (16 - 18)  SpO2: 100% (19 @ 20:47) (100% - 100%)  Wt(kg): --  I&O's Summary    27 Mar 2019 07:01  -  27 Mar 2019 21:08  --------------------------------------------------------  IN: 540 mL / OUT: 500 mL / NET: 40 mL      T(C): 36.5 (19 @ 20:47), Max: 36.8 (19 @ 16:45)  HR: 99 (19 @ 20:47) (77 - 99)  BP: 103/52 (19 @ 20:47) ( - )  RR: 18 (19 @ 20:47) (16 - 18)  SpO2: 100% (19 @ 20:47) (100% - 100%)  Wt(kg): --Vital Signs Last 24 Hrs  T(C): 36.5 (27 Mar 2019 20:47), Max: 36.8 (27 Mar 2019 16:45)  T(F): 97.7 (27 Mar 2019 20:47), Max: 98.2 (27 Mar 2019 16:45)  HR: 99 (27 Mar 2019 20:47) (77 - 99)  BP: 103/52 (27 Mar 2019 20:47) (103/52 - 122/91)  BP(mean): --  RR: 18 (27 Mar 2019 20:47) (16 - 18)  SpO2: 100% (27 Mar 2019 20:47) (100% - 100%)    LABS:                        10.0   9.18  )-----------( 67       ( 27 Mar 2019 05:30 )             33.1         146<H>  |  118<H>  |  24<H>  ----------------------------<  104<H>  3.6   |  20<L>  |  1.58<H>    Ca    7.9<L>      27 Mar 2019 05:30  Phos  3.0       Mg     1.9         TPro  5.9<L>  /  Alb  1.7<L>  /  TBili  0.5  /  DBili  x   /  AST  24  /  ALT  21  /  AlkPhos  255<H>  03-26      Urinalysis Basic - ( 27 Mar 2019 11:20 )    Color: YELLOW / Appearance: CLEAR / S.020 / pH: 6.0  Gluc: NEGATIVE / Ketone: NEGATIVE  / Bili: NEGATIVE / Urobili: TRACE   Blood: TRACE / Protein: 50 / Nitrite: NEGATIVE   Leuk Esterase: TRACE / RBC: 0-2 / WBC 11-25   Sq Epi: FEW / Non Sq Epi: x / Bacteria: MODERATE      CAPILLARY BLOOD GLUCOSE            Urinalysis Basic - ( 27 Mar 2019 11:20 )    Color: YELLOW / Appearance: CLEAR / S.020 / pH: 6.0  Gluc: NEGATIVE / Ketone: NEGATIVE  / Bili: NEGATIVE / Urobili: TRACE   Blood: TRACE / Protein: 50 / Nitrite: NEGATIVE   Leuk Esterase: TRACE / RBC: 0-2 / WBC 11-25   Sq Epi: FEW / Non Sq Epi: x / Bacteria: MODERATE        PAST MEDICAL & SURGICAL HISTORY:  Chronic kidney disease (CKD)  Anemia, unspecified type  Essential hypertension  No significant past surgical history      MEDICATIONS  (STANDING):  cholecalciferol 1000 Unit(s) Oral daily  dextrose 5%. 1000 milliLiter(s) (60 mL/Hr) IV Continuous <Continuous>  lactobacillus acidophilus 1 Tablet(s) Oral daily  mesalamine DR Capsule 800 milliGRAM(s) Oral three times a day  metoprolol succinate ER 25 milliGRAM(s) Oral daily  pantoprazole    Tablet 40 milliGRAM(s) Oral before breakfast    MEDICATIONS  (PRN):        RADIOLOGY & ADDITIONAL TESTS:    Imaging Personally Reviewed:  [ ] YES  [ ] NO    Consultant(s) Notes Reviewed:  x[ ] YES  [ ] NO    PHYSICAL EXAM:  GENERAL: NAD,awake,responsive  HEAD:  Atraumatic, Normocephalic  EYES: EOMI, PERRLA, conjunctiva and sclera clear  ENMT: No tonsillar erythema, exudates, or enlargement; Moist mucous membranes, Good dentition, No lesions  NECK: Supple, No JVD, Normal thyroid  NERVOUS SYSTEM:  Alert & Oriented X2,   CHEST/LUNG: Clear to percussion bilaterally; No rales, rhonchi, wheezing, or rubs  HEART: Regular rate and rhythm; No murmurs, rubs, or gallops  ABDOMEN: Soft, Nontender, Nondistended; Bowel sounds present  EXTREMITIES:  2+ Peripheral Pulses, No clubbing, cyanosis, or edema  LYMPH: No lymphadenopathy noted  SKIN: No rashes or lesions    Care Discussed with Consultants/Other Providers [ ] YES  [ ] NO      Code Status: [] Full Code [] DNR [] DNI [] Goals of Care:   Disposition: [] ICU [] Stroke Unit [] RCU []PCU []Floor [] Discharge Home         DEANA AquinoP

## 2019-03-27 NOTE — PROGRESS NOTE ADULT - ASSESSMENT
Mr. Miranda is a 72 yo man with history of Anemia, HTN, chronic diarrhea, and recent hospitalization at [facility] from 3/3/19-3/21/19 brought in by family for generalized weakness and inability to walk.    Patient was discharged home with home PT services on 3/21/19 after being hospitalized since 3/3/19. During his hospitalization, he was managed for chronic diarrhea presumed to be 2/2 IBD since infectious and autoimmune w/u was negative, anemia due to blood loss from GI tract 2/2 PUD, requiring blood transfusion, and ESBL E. coli UTI with ertapenem via PICC. He was also determined not to have the diagnosis of CLL after flow cytometry results were available.     HPI was obtained primarily from patient's daughter and caregiver, Bee. As per daughter, when patient came home he was very weak. She wanted to bathe him but patient was unable to stand or walk to the bathroom. Due to his severe weakness, his daughter brought him back to the hospital. (23 Mar 2019 08:22)      Recommend:    - s/p completion of  ertapenem 1 week course for ESBL e.coli UTI on 3/25.  Monitor off abx.     - Pt with diarrhea, thus far infectious w/u negative including stool cx, O&P (including microsporidium, cyclospora, isospora, cryptosporidium), gi pcr, cdiff, strongyloides neg, giardia neg, cmv pcr (-), cmv igG (+), IgM (-), HIV (-)    - stool for AFB negative    -GI eval noted, repeat stool studies ordered, r/o autoimmune and celiac disease.       - ?need for colonoscopy.  Pt had colonoscopy recently while in [place] with negative biopsy results.      - Cont probiotics    - Pt with new hypothermia and change in mental status/disoriented.  Chck bcx x 2, repeat UA, urine cx.  Cxr no consolidations seen.  CTH no acute hemorrhage or stroke.       d/w daughter at bedside.       Will follow       Adeline Marques  [phone]

## 2019-03-27 NOTE — CONSULT NOTE ADULT - SUBJECTIVE AND OBJECTIVE BOX
[facility] [place]([facility]), [facility]        Patient is a 73y old  Male who presents with a chief complaint of Generalized weakness and inability to walk (28 Mar 2019 07:41)      HPI:  Mr. Miranda is a 74 yo man with history of Anemia, HTN, chronic diarrhea, and recent hospitalization at [facility] from 3/3/19-3/21/19 brought in by family for generalized weakness and inability to walk.    Patient was discharged home with home PT services on 3/21/19 after being hospitalized since 3/3/19. During his hospitalization, he was managed for chronic diarrhea presumed to be 2/2 IBD since infectious and autoimmune w/u was negative, anemia due to blood loss from GI tract 2/2 PUD, requiring blood transfusion, and ESBL E. coli UTI with ertapenem via PICC. He was also determined not to have the diagnosis of CLL after flow cytometry results were available.           *****PAST MEDICAL / Surgical  HISTORY:  PAST MEDICAL & SURGICAL HISTORY:  Chronic kidney disease (CKD)  Anemia, unspecified type  Essential hypertension  No significant past surgical history           *****FAMILY HISTORY:  FAMILY HISTORY:  Family history of myocardial infarction (Sibling): Father, mother, brother           *****SOCIAL HISTORY:  Alcohol: None  Smoking: None         *****ALLERGIES:   Allergies    No Known Allergies    Intolerances             *****MEDICATIONS: current medication reviewed and documented.   MEDICATIONS  (STANDING):  cholecalciferol 1000 Unit(s) Oral daily  dextrose 5%. 1000 milliLiter(s) (60 mL/Hr) IV Continuous <Continuous>  lactobacillus acidophilus 1 Tablet(s) Oral daily  mesalamine DR Capsule 800 milliGRAM(s) Oral three times a day  metoprolol succinate ER 25 milliGRAM(s) Oral daily  pantoprazole    Tablet 40 milliGRAM(s) Oral before breakfast    MEDICATIONS  (PRN):           *****REVIEW OF SYSTEM:  GEN: no fever, no chills, no pain  RESP: no SOB, no cough, no sputum  CVS: no chest pain, no palpitations, no edema  GI: no abdominal pain, no nausea, no vomiting, no constipation, no diarrhea  : no dysurea, no frequency, no hematurea  Neuro: no headache, no dizziness  PSYCH: no anxiety, no depression  Derm : no itching, no rash         *****VITAL SIGNS:  T(C): 36.3 (19 @ 04:45), Max: 36.8 (19 @ 16:45)  HR: 99 (19 @ 04:45) (85 - 99)  BP: 115/76 (19 @ 04:45) (103/52 - 125/70)  RR: 17 (19 @ 04:45) (16 - 18)  SpO2: 100% (19 @ 04:45) (100% - 100%)  Wt(kg): --     @ 07:01  -   @ 07:00  --------------------------------------------------------  IN: 540 mL / OUT: 500 mL / NET: 40 mL             *****PHYSICAL EXAM:   Alert oriented x 1   very confused, thinks he is in [place]  not following any commands.      EOMI fundi not visualized  blinks to threat   No facial asymmetry   Tongue is midline    withdraws to pain x4   Gait : not assessed.  B/L down going toes               *****LAB AND IMAGING:                          10.0   9.18  )-----------( 67       ( 27 Mar 2019 05:30 )             33.1                   144  |  117<H>  |  24<H>  ----------------------------<  96  3.9   |  20<L>  |  1.58<H>    Ca    8.1<L>      28 Mar 2019 05:30  Phos  2.8       Mg     1.8         TPro  6.6  /  Alb  1.8<L>  /  TBili  0.7  /  DBili  x   /  AST  23  /  ALT  21  /  AlkPhos  264<H>                            Vitamin B12, Serum: > 2000 pg/mL (19 @ 18:15)      Urinalysis Basic - ( 27 Mar 2019 11:20 )    Color: YELLOW / Appearance: CLEAR / S.020 / pH: 6.0  Gluc: NEGATIVE / Ketone: NEGATIVAmmonia, Serum: 32: Ammonia levels will be falsely elevated if specimen is NOT  collected, processed and maintained at 4-8 C. umol/L (19 @ 08:32)    E  / Bili: NEGATIVE / Urobili: TRACE   Blood: TRACE / Protein: 50 / Nitrite: NEGATIVE   Leuk Esterase: TRACE / RBC: 0-2 / WBC 11-25   Sq Epi: FEW / Non Sq Epi: x / Bacteria: MODERATE      Thyroid Stimulating Hormone, Serum: 1.98 uIU/mL (19 @ 06:45)      [All pertinent recent Imaging reports reviewed]         *****A S S E S S M E N T   A N D   P L A N :     Mr. Miranda is a 74 yo man with history of Anemia, HTN, chronic diarrhea, and recent hospitalization at [facility] from 3/3/19-3/21/19 brought in by family for generalized weakness and inability to walk.    Patient was discharged home with home PT services on 3/21/19 after being hospitalized since 3/3/19. During his hospitalization, he was managed for chronic diarrhea presumed to be 2/2 IBD since infectious and autoimmune w/u was negative, anemia due to blood loss from GI tract 2/2 PUD, requiring blood transfusion, and ESBL E. coli UTI with ertapenem via PICC. He was also determined not to have the diagnosis of CLL after flow cytometry results were available.           Problem/Recommendations 1: multifactorial toxic metabolic encephalopathy due to renal insufficiency and severe nutritional deficiency    b12/ ammonia wnl   would give thiamine 500 iv pb x 3 days  mvi  consider prosource supplements.   calorie count?  gi nutrition consult      Problem/Recommendations 2:Diarrheal illness    defer to gi     +recent travel to [place]  giardia ? given prolonged diarrhea        ___________________________  Will follow with you.  Thank you,  Yessica Benitez MD  Diplomate of the American Board of Neurology and Psychiatry.  Diplomate of the American Board of Vascular Neurology.   [facility]   Ph: [phone]    Differential diagnosis and plan of care discussed with patient after the evaluation.   Advanced care planning options discussed.   Pain assessed and judicious use of narcotics when appropriate was discussed.  Importance of Fall prevention discussed.  Counseling on Smoking and Alcohol cessation was offered when appropriate.  Counseling on Diet, exercise, and medication compliance was done.     62 minutes spent on the total encounter;  more than 50 % of the visit was spent on counseling  and or coordinating care by the attending physician.    Thank you for allowing me to participate in the care of this narciso patient. Please do not hesitate to call me if you have any questions.     This and subsequent notes were partially created using voice recognition software and will  inherently be subject to errors including those of syntax and sound alike substitutions which may escape proofreading. In such instances original meaning may be extrapolated by contextual derivation.

## 2019-03-27 NOTE — PROGRESS NOTE ADULT - SUBJECTIVE AND OBJECTIVE BOX
Chief Complaint:  Patient is a 73y old  Male who presents with a chief complaint of Generalized weakness and inability to walk (26 Mar 2019 20:25)      Interval Events:   The patient is extremely confused this morning which is a change from previously.  Stool C. diff negative, awaiting other studies.      Allergies:  No Known Allergies      Hospital Medications:  cholecalciferol 1000 Unit(s) Oral daily  dextrose 5%. 1000 milliLiter(s) IV Continuous <Continuous>  lactobacillus acidophilus 1 Tablet(s) Oral daily  mesalamine DR Capsule 800 milliGRAM(s) Oral three times a day  metoprolol succinate ER 25 milliGRAM(s) Oral daily  pantoprazole    Tablet 40 milliGRAM(s) Oral before breakfast      PMHX/PSHX:  Chronic kidney disease (CKD)  Anemia, unspecified type  Essential hypertension  CLL (chronic lymphocytic leukemia)  No significant past surgical history  No significant past surgical history      Family history:  Family history of myocardial infarction (Sibling)          PHYSICAL EXAM:     GENERAL:  confused and aggitated  HEENT:  NC/AT,  conjunctivae clear, sclera -anicteric  CHEST:  Full & symmetric excursion, no increased  HEART:  Regular rhythm  ABDOMEN:  Soft, non-tender, non-distended, normoactive bowel sounds,  no masses ,no hepato-splenomegaly,   EXTREMITIES:  no cyanosis,clubbing or edema  SKIN:  No rash/erythema/ecchymoses/petechiae/wounds/abscess/warm/dry    Vital Signs:  Vital Signs Last 24 Hrs  T(C): 36.3 (27 Mar 2019 05:44), Max: 36.3 (27 Mar 2019 05:44)  T(F): 97.3 (27 Mar 2019 05:44), Max: 97.3 (27 Mar 2019 05:44)  HR: 90 (27 Mar 2019 05:44) (76 - 90)  BP: 103/52 (27 Mar 2019 05:44) (103/52 - 116/67)  BP(mean): --  RR: 16 (27 Mar 2019 05:44) (16 - 18)  SpO2: 100% (27 Mar 2019 05:44) (100% - 100%)  Daily     Daily     LABS:                        10.0   9.18  )-----------( 67       ( 27 Mar 2019 05:30 )             33.1     03-    146<H>  |  118<H>  |  24<H>  ----------------------------<  104<H>  3.6   |  20<L>  |  1.58<H>    Ca    7.9<L>      27 Mar 2019 05:30  Phos  3.0       Mg     1.9         TPro  5.9<L>  /  Alb  1.7<L>  /  TBili  0.5  /  DBili  x   /  AST  24  /  ALT  21  /  AlkPhos  255<H>      LIVER FUNCTIONS - ( 26 Mar 2019 06:40 )  Alb: 1.7 g/dL / Pro: 5.9 g/dL / ALK PHOS: 255 u/L / ALT: 21 u/L / AST: 24 u/L / GGT: x             Urinalysis Basic - ( 25 Mar 2019 20:45 )    Color: YELLOW / Appearance: CLEAR / S.020 / pH: 5.5  Gluc: NEGATIVE / Ketone: NEGATIVE  / Bili: NEGATIVE / Urobili: TRACE   Blood: TRACE / Protein: 30 / Nitrite: NEGATIVE   Leuk Esterase: SMALL / RBC: 6-10 / WBC 11-25   Sq Epi: FEW / Non Sq Epi: x / Bacteria: NEGATIVE          Imaging:

## 2019-03-27 NOTE — PROGRESS NOTE ADULT - ASSESSMENT
74 yo man with history of Anemia, HTN, chronic diarrhea, and recent hospitalization at [facility] from 3/3/19-3/21/19 brought in by family for generalized weakness and inability to walk found to have james and hypernatremia

## 2019-03-27 NOTE — PROGRESS NOTE ADULT - SUBJECTIVE AND OBJECTIVE BOX
[facility]   MD MAGALY MORAN MD ANGELA WONG, PA    TEL:  OFFICE: [phone]  DR ANDERSON CELL: [phone]  DR. MONAE CELL: [phone]  HUBERT WELLINGTON CELL: [phone]        Patient is a 73y old  Male who presents with a chief complaint of Generalized weakness and inability to walk (27 Mar 2019 14:16)      Patient seen and examined at bedside. c/o abd pain    VITALS:  T(F): 98.2 (03-27-19 @ 16:45), Max: 98.2 (03-27-19 @ 16:45)  HR: 98 (03-27-19 @ 16:45)  BP: 120/56 (03-27-19 @ 16:45)  RR: 18 (03-27-19 @ 16:45)  SpO2: 100% (03-27-19 @ 16:45)  Wt(kg): --    03-27 @ 07:01  -  03-27 @ 16:59  --------------------------------------------------------  IN: 540 mL / OUT: 200 mL / NET: 340 mL          PHYSICAL EXAM:  Constitutional: NAD  Neck: No JVD  Respiratory: CTAB, no wheezes, rales or rhonchi  Cardiovascular: S1, S2, RRR  Gastrointestinal: BS+, soft, NT/ND  Extremities: No peripheral edema    Hospital Medications:   MEDICATIONS  (STANDING):  cholecalciferol 1000 Unit(s) Oral daily  dextrose 5%. 1000 milliLiter(s) (60 mL/Hr) IV Continuous <Continuous>  lactobacillus acidophilus 1 Tablet(s) Oral daily  mesalamine DR Capsule 800 milliGRAM(s) Oral three times a day  metoprolol succinate ER 25 milliGRAM(s) Oral daily  pantoprazole    Tablet 40 milliGRAM(s) Oral before breakfast      LABS:  03-27    146<H>  |  118<H>  |  24<H>  ----------------------------<  104<H>  3.6   |  20<L>  |  1.58<H>    Ca    7.9<L>      27 Mar 2019 05:30  Phos  3.0     03-27  Mg     1.9     03-27    TPro  5.9<L>  /  Alb  1.7<L>  /  TBili  0.5  /  DBili      /  AST  24  /  ALT  21  /  AlkPhos  255<H>  03-26    Creatinine Trend: 1.58 <--, 1.67 <--, 1.91 <--, 1.80 <--, 1.67 <--, 1.37 <--    Phosphorus Level, Serum: 3.0 mg/dL (03-27 @ 05:30)                              10.0   9.18  )-----------( 67       ( 27 Mar 2019 05:30 )             33.1     Urine Studies:  Urinalysis - [03-27-19 @ 11:20]      Color YELLOW / Appearance CLEAR / SG 1.020 / pH 6.0      Gluc NEGATIVE / Ketone NEGATIVE  / Bili NEGATIVE / Urobili TRACE       Blood TRACE / Protein 50 / Leuk Est TRACE / Nitrite NEGATIVE      RBC 0-2 / WBC 11-25 / Hyaline 2-5 / Gran  / Sq Epi FEW / Non Sq Epi  / Bacteria MODERATE    Urine Creatinine 145.40      [03-25-19 @ 20:45]  Urine Sodium 21      [03-25-19 @ 20:45]  Urine Osmolality 495      [03-27-19 @ 06:16]    Iron 38, TIBC 156, %sat --      [03-05-19 @ 06:45]  Ferritin 355.7      [03-05-19 @ 06:45]  Vitamin D (25OH) 25.6      [03-23-19 @ 18:15]  TSH 1.98      [03-05-19 @ 06:45]    HBsAb Nonreactive      [03-20-19 @ 05:35]  HBsAg NEGATIVE      [03-20-19 @ 05:35]    TODD: titer Negative, pattern --      [03-08-19 @ 06:30]    RADIOLOGY & ADDITIONAL STUDIES:

## 2019-03-27 NOTE — CHART NOTE - NSCHARTNOTEFT_GEN_A_CORE
Informed by GI that patient with AMS this AM. Upon exam, patient lying in hospital bed naked. Patient currently AAOx1 (self) but able to follow commands. No focal neurological deficits. Mild upper extremity tremors noted, patient also with distended abdomen. Noted with flexiseal tube, with watery diarrhea. C diff panel 3/26 negative. ABG, ammonia, BCx x2 sent. UA and CXR also ordered. Stat CTH ordered. Attending notified. Neurology also consulted. Awaiting recommendations.

## 2019-03-27 NOTE — PROGRESS NOTE ADULT - ASSESSMENT
Impression:  1) Diarrhea - with previous negative work up inpatient.  Etiologies include infectious vs inflammatory vs  malabsorptive vs microscopic colitis.  Patient will likely ultimately need endoscopic evaluation but would first repeat stool work up prior to proceeding. Stool C. Diff negative, now with AMS.    Recommendations:   - full Altered mental status work up should be perused   - send GI-PCR   - send stool O and P, send giardia   - send stool electrolytes (Na+, K+), pH   - send stool elastase   - send qualitative fecal fat   - send a-1 antitrypsin    -follow-up CBC; Fe++/TIBC. B12, foalte   - send celiac panel including tissue trans glutaminase IgA and IgA    - monitor stool output and volume   - lactose free LRD   - colonoscopy pending above    [place], PGY-4  Gastroenterology Fellow  Pager x [phone] or [phone]  (After 5 pm or on weekends please page GI on call)

## 2019-03-28 LAB
A1AT SERPL-MCNC: 126 MG/DL — SIGNIFICANT CHANGE UP (ref 90–200)
ALBUMIN SERPL ELPH-MCNC: 1.8 G/DL — LOW (ref 3.3–5)
ALP SERPL-CCNC: 264 U/L — HIGH (ref 40–120)
ALT FLD-CCNC: 21 U/L — SIGNIFICANT CHANGE UP (ref 4–41)
ANION GAP SERPL CALC-SCNC: 7 MMO/L — SIGNIFICANT CHANGE UP (ref 7–14)
AST SERPL-CCNC: 23 U/L — SIGNIFICANT CHANGE UP (ref 4–40)
BASE EXCESS BLDA CALC-SCNC: -4.4 MMOL/L — SIGNIFICANT CHANGE UP
BILIRUB SERPL-MCNC: 0.7 MG/DL — SIGNIFICANT CHANGE UP (ref 0.2–1.2)
BUN SERPL-MCNC: 24 MG/DL — HIGH (ref 7–23)
CALCIUM SERPL-MCNC: 8.1 MG/DL — LOW (ref 8.4–10.5)
CHLORIDE SERPL-SCNC: 117 MMOL/L — HIGH (ref 98–107)
CO2 SERPL-SCNC: 20 MMOL/L — LOW (ref 22–31)
CREAT SERPL-MCNC: 1.58 MG/DL — HIGH (ref 0.5–1.3)
GI PCR PANEL, STOOL: SIGNIFICANT CHANGE UP
GI PCR PANEL, STOOL: SIGNIFICANT CHANGE UP
GLUCOSE BLDA-MCNC: 104 MG/DL — HIGH (ref 70–99)
GLUCOSE SERPL-MCNC: 96 MG/DL — SIGNIFICANT CHANGE UP (ref 70–99)
HAV IGM SER-ACNC: SIGNIFICANT CHANGE UP
HBV CORE IGM SER-ACNC: SIGNIFICANT CHANGE UP
HBV SURFACE AG SER-ACNC: SIGNIFICANT CHANGE UP
HCO3 BLDA-SCNC: 21 MMOL/L — LOW (ref 22–26)
HCT VFR BLD CALC: 28.8 % — LOW (ref 39–50)
HCT VFR BLDA CALC: 22.3 % — LOW (ref 39–51)
HCV AB S/CO SERPL IA: SIGNIFICANT CHANGE UP
HCV AB SERPL-IMP: SIGNIFICANT CHANGE UP
HGB BLD-MCNC: 8.7 G/DL — LOW (ref 13–17)
HGB BLDA-MCNC: 7.1 G/DL — LOW (ref 13–17)
LG PLATELETS BLD QL AUTO: SLIGHT — SIGNIFICANT CHANGE UP
MAGNESIUM SERPL-MCNC: 1.8 MG/DL — SIGNIFICANT CHANGE UP (ref 1.6–2.6)
MANUAL SMEAR VERIFICATION: SIGNIFICANT CHANGE UP
MCHC RBC-ENTMCNC: 28.7 PG — SIGNIFICANT CHANGE UP (ref 27–34)
MCHC RBC-ENTMCNC: 30.2 % — LOW (ref 32–36)
MCV RBC AUTO: 95 FL — SIGNIFICANT CHANGE UP (ref 80–100)
NRBC # FLD: 0.06 K/UL — SIGNIFICANT CHANGE UP (ref 0–0)
PCO2 BLDA: 26 MMHG — LOW (ref 35–48)
PH BLDA: 7.47 PH — HIGH (ref 7.35–7.45)
PHOSPHATE SERPL-MCNC: 2.8 MG/DL — SIGNIFICANT CHANGE UP (ref 2.5–4.5)
PLATELET # BLD AUTO: 70 K/UL — LOW (ref 150–400)
PLATELET COUNT - ESTIMATE: SIGNIFICANT CHANGE UP
PMV BLD: SIGNIFICANT CHANGE UP FL (ref 7–13)
PO2 BLDA: 175 MMHG — HIGH (ref 83–108)
POTASSIUM BLDA-SCNC: 3.3 MMOL/L — LOW (ref 3.4–4.5)
POTASSIUM SERPL-MCNC: 3.9 MMOL/L — SIGNIFICANT CHANGE UP (ref 3.5–5.3)
POTASSIUM SERPL-SCNC: 3.9 MMOL/L — SIGNIFICANT CHANGE UP (ref 3.5–5.3)
PROT SERPL-MCNC: 6.6 G/DL — SIGNIFICANT CHANGE UP (ref 6–8.3)
RBC # BLD: 3.03 M/UL — LOW (ref 4.2–5.8)
RBC # FLD: 22.2 % — HIGH (ref 10.3–14.5)
SAO2 % BLDA: 99.7 % — HIGH (ref 95–99)
SODIUM BLDA-SCNC: 142 MMOL/L — SIGNIFICANT CHANGE UP (ref 136–146)
SODIUM SERPL-SCNC: 144 MMOL/L — SIGNIFICANT CHANGE UP (ref 135–145)
SPECIMEN SOURCE: SIGNIFICANT CHANGE UP
WBC # BLD: 12.73 K/UL — HIGH (ref 3.8–10.5)
WBC # FLD AUTO: 12.73 K/UL — HIGH (ref 3.8–10.5)

## 2019-03-28 PROCEDURE — 74176 CT ABD & PELVIS W/O CONTRAST: CPT | Mod: 26

## 2019-03-28 PROCEDURE — 99232 SBSQ HOSP IP/OBS MODERATE 35: CPT | Mod: GC

## 2019-03-28 PROCEDURE — 78582 LUNG VENTILAT&PERFUS IMAGING: CPT | Mod: 26,GC

## 2019-03-28 PROCEDURE — 95816 EEG AWAKE AND DROWSY: CPT | Mod: 26

## 2019-03-28 PROCEDURE — 71250 CT THORAX DX C-: CPT | Mod: 26

## 2019-03-28 RX ORDER — THIAMINE MONONITRATE (VIT B1) 100 MG
500 TABLET ORAL DAILY
Qty: 0 | Refills: 0 | Status: COMPLETED | OUTPATIENT
Start: 2019-03-28 | End: 2019-03-30

## 2019-03-28 RX ADMIN — SODIUM CHLORIDE 60 MILLILITER(S): 9 INJECTION, SOLUTION INTRAVENOUS at 21:08

## 2019-03-28 RX ADMIN — Medication 800 MILLIGRAM(S): at 13:29

## 2019-03-28 RX ADMIN — Medication 1 TABLET(S): at 13:30

## 2019-03-28 RX ADMIN — PANTOPRAZOLE SODIUM 40 MILLIGRAM(S): 20 TABLET, DELAYED RELEASE ORAL at 05:00

## 2019-03-28 RX ADMIN — Medication 105 MILLIGRAM(S): at 13:29

## 2019-03-28 RX ADMIN — SODIUM CHLORIDE 60 MILLILITER(S): 9 INJECTION, SOLUTION INTRAVENOUS at 05:01

## 2019-03-28 RX ADMIN — Medication 1000 UNIT(S): at 13:30

## 2019-03-28 RX ADMIN — Medication 800 MILLIGRAM(S): at 21:08

## 2019-03-28 RX ADMIN — Medication 800 MILLIGRAM(S): at 05:00

## 2019-03-28 RX ADMIN — Medication 25 MILLIGRAM(S): at 05:01

## 2019-03-28 NOTE — DIETITIAN INITIAL EVALUATION ADULT. - PROBLEM SELECTOR PLAN 1
Patient with hx of inconsistent and poor oral intake in addition to chronic fluid loss from chronic diarrhea, which is likely cause of elevated sodium levels.  - trend sodium levels  - encourage oral hydration. If inadequate, will consider starting IV fluids hydration  - monitor I&Os

## 2019-03-28 NOTE — DIETITIAN INITIAL EVALUATION ADULT. - PROBLEM SELECTOR PLAN 2
Pt with hypoalbuminemia and significant peripheral edema c/w with severe protein-calorie malnutrition. Swelling in feet b/l may be contributing to inability to walk compared to prior.   - regular lactose-free diet  - check Vit B12 and Vit D 25-OH levels.  - assistance with meals  - PT evaluation  - compression stockings if available

## 2019-03-28 NOTE — PROGRESS NOTE ADULT - ASSESSMENT
Mr. Miranda is a 74 yo man with history of Anemia, HTN, chronic diarrhea, and recent hospitalization at [facility] from 3/3/19-3/21/19 brought in by family for generalized weakness and inability to walk.    Patient was discharged home with home PT services on 3/21/19 after being hospitalized since 3/3/19. During his hospitalization, he was managed for chronic diarrhea presumed to be 2/2 IBD since infectious and autoimmune w/u was negative, anemia due to blood loss from GI tract 2/2 PUD, requiring blood transfusion, and ESBL E. coli UTI with ertapenem via PICC. He was also determined not to have the diagnosis of CLL after flow cytometry results were available.     HPI was obtained primarily from patient's daughter and caregiver, Bee. As per daughter, when patient came home he was very weak. She wanted to bathe him but patient was unable to stand or walk to the bathroom. Due to his severe weakness, his daughter brought him back to the hospital. (23 Mar 2019 08:22)      Recommend:    - s/p completion of  ertapenem 1 week course for ESBL e.coli UTI on 3/25.     - Pt with diarrhea, thus far infectious w/u negative including stool cx, O&P (including microsporidium, cyclospora, isospora, cryptosporidium), gi pcr, cdiff, strongyloides neg, giardia neg, cmv pcr (-), cmv igG (+), IgM (-), HIV (-).      - stool for AFB negative    -GI eval noted, repeat stool studies ordered including repeat gi pcr, giardia, stool o&p, stool cx.   r/o autoimmune and celiac disease.       - Pt may need colonoscopy for biopsy if stool studies negative.  CMV IgG (+), recommend biopsy to r/o cmv colitis.        - Cont probiotics    - Pt with new hypothermia and change in mental status/disoriented.  Chck bcx x 2, repeat UA, urine cx.  Cxr no consolidations seen.  CTH no acute hemorrhage or stroke.  F/u CT chest and CTap.  Repeat UA is unremarkable, ucx grew enterococcus, possibly contaminant.  Will monitor off abx for now, unless pt develops worsening fever or leukocytosis.              Will follow       Adeline Marques  [phone]

## 2019-03-28 NOTE — PROGRESS NOTE ADULT - SUBJECTIVE AND OBJECTIVE BOX
Infectious Diseases progress note:    Subjective:  Pt seen and examined this afternoon.  Pt still with altered mental status.  Rectal bag in watery brown stools.  No fever or hypotension.  s/p ct c/a/p.  Repeat stools studies thus far neg for infectious w/u    ROS:  CONSTITUTIONAL:  No fever, chills, rigors  CARDIOVASCULAR:  No chest pain or palpitations  RESPIRATORY:   No SOB, cough, dyspnea on exertion.  No wheezing  GASTROINTESTINAL:  No abd pain, N/V, diarrhea/constipation  EXTREMITIES:  No swelling or joint pain  GENITOURINARY:  No burning on urination, increased frequency or urgency.  No flank pain  NEUROLOGIC:  No HA, visual disturbances  SKIN: No rashes    Allergies    No Known Allergies    Intolerances        ANTIBIOTICS/RELEVANT:  antimicrobials    immunologic:    OTHER:  cholecalciferol 1000 Unit(s) Oral daily  dextrose 5%. 1000 milliLiter(s) IV Continuous <Continuous>  lactobacillus acidophilus 1 Tablet(s) Oral daily  mesalamine DR Capsule 800 milliGRAM(s) Oral three times a day  metoprolol succinate ER 25 milliGRAM(s) Oral daily  pantoprazole    Tablet 40 milliGRAM(s) Oral before breakfast  thiamine IVPB 500 milliGRAM(s) IV Intermittent daily      Objective:  Vital Signs Last 24 Hrs  T(C): 36.1 (28 Mar 2019 20:56), Max: 36.9 (28 Mar 2019 12:15)  T(F): 97 (28 Mar 2019 20:56), Max: 98.4 (28 Mar 2019 12:15)  HR: 78 (28 Mar 2019 20:56) (73 - 99)  BP: 119/61 (28 Mar 2019 20:56) (102/51 - 125/70)  BP(mean): --  RR: 18 (28 Mar 2019 21:57) (16 - 19)  SpO2: 99% (28 Mar 2019 21:57) (99% - 100%)    PHYSICAL EXAM:  Constitutional: appears deconditioned, weak  Eyes:TODD, EOMI  Ear/Nose/Throat: no thrush, mucositis.  Moist mucous membranes	  Neck:no JVD, no lymphadenopathy, supple  Respiratory: CTA ruth  Cardiovascular: S1S2 RRR, no murmurs  Gastrointestinal:soft, nontender,  nondistended (+) BS, rectal bag with watery brown stools  Extremities:no e/e/c  Skin:  no rashes, open wounds or ulcerations        LABS:                        8.7    12.73 )-----------( 70       ( 28 Mar 2019 09:45 )             28.8         144  |  117<H>  |  24<H>  ----------------------------<  96  3.9   |  20<L>  |  1.58<H>    Ca    8.1<L>      28 Mar 2019 05:30  Phos  2.8       Mg     1.8         TPro  6.6  /  Alb  1.8<L>  /  TBili  0.7  /  DBili  x   /  AST  23  /  ALT  21  /  AlkPhos  264<H>        Urinalysis Basic - ( 27 Mar 2019 11:20 )    Color: YELLOW / Appearance: CLEAR / S.020 / pH: 6.0  Gluc: NEGATIVE / Ketone: NEGATIVE  / Bili: NEGATIVE / Urobili: TRACE   Blood: TRACE / Protein: 50 / Nitrite: NEGATIVE   Leuk Esterase: TRACE / RBC: 0-2 / WBC 11-25   Sq Epi: FEW / Non Sq Epi: x / Bacteria: MODERATE                          MICROBIOLOGY:      Urinalysis (19 @ 11:20)    Color: YELLOW    Urine Appearance: CLEAR    Glucose: NEGATIVE    Bilirubin: NEGATIVE    Ketone - Urine: NEGATIVE    Specific Gravity: 1.020    Blood: TRACE    pH - Urine: 6.0    Protein, Urine: 50    Urobilinogen: TRACE    Nitrite: NEGATIVE    Leukocyte Esterase Concentration: TRACE    Red Blood Cell - Urine: 0-2    White Blood Cell - Urine: 11-25    Hyaline Casts: 2-5    Bacteria: MODERATE    Squamous Epithelial: FEW        Culture - Urine (19 @ 11:40)    Culture - Urine:   ENTFC^Enterococcus faecium  COLONY COUNT: > = 100,000 CFU/ML    Specimen Source: URINE CATHETER    Culture - Blood (19 @ 09:47)    Culture - Blood:   NO ORGANISMS ISOLATED  NO ORGANISMS ISOLATED AT 24 HOURS    Specimen Source: BLOOD PERIPHERAL    Culture - Acid Fast - Other w/Smear (19 @ 15:16)    Culture - Acid Fast Smear Concentrated:   AFB SMEAR= NO ACID FAST BACILLI SEEN    Specimen Source: STOOL    GI PCR Panel, Stool (19 @ 14:21)    GI PCR Panel, Stool:   GI panel PCR evaluates for:  Campylobacter, Plesiomonas shigelloides, Salmonella,  Yersinia enterocolitica, Vibrio, Enteroaggregative  Escherichia coli (EAEC), Enteropathogenic E. coli (EPEC),  Enterotoxigenic E. coli (ETEC) lt/st, Shiga-like  toxin-producing E. coli (STEC) stx1/stx2,  Shigella/Enteroinvasive E. coli (EIEC), Cryptosporidium,  Cyclospora cayetanensis, Entamoeba histolytica, Giardia  lamblia, Adenovirus F 40/41, Astrovirus, Norovirus GI/GII,  Rotavirus A, Sapovirus  **********************************************************  GI PCR Results:  NOT DETECTED  TEST PERFORMED AT  [facility]  [address] [place] [address]  Phone: [phone]  Fax:   [phone]  :   KATY DAMON MD    Specimen Source: FECES          RADIOLOGY & ADDITIONAL STUDIES:    < from: CT Chest No Cont (19 @ 19:51) >  EXAM:  CT CHEST        PROCEDURE DATE:  Mar 28 2019     ******PRELIMINARY REPORT******    ******PRELIMINARY REPORT******            INTERPRETATION:  Limited evaluation due to lack of IV contrast.  Small bilateral pleural effusions with associated compressive bibasilar   subsegmental atelectasis.  Moderate ascites. Anasarca.    < end of copied text >          < from: CT Abdomen and Pelvis No Cont (19 @ 19:50) >  INTERPRETATION:  Limited evaluation due to lack of IV contrast.  Small bilateral pleural effusions with associated compressive bibasilar   subsegmental atelectasis.  Moderate ascites. Anasarca.    Follow-up official report in the a.m.    < end of copied text >

## 2019-03-28 NOTE — PROGRESS NOTE ADULT - ASSESSMENT
Mr. Miranda is a 74 yo man with history of Anemia, HTN, PUD, chronic diarrhea, and recent hospitalization at [facility] from 3/3/19-3/21/19 brought in by family for generalized weakness and inability to walk admitted for severe malnutrition, mild hypernatremia and severe deconditioning. Exam relatively benign aside for significant LE edema and weakness of legs. Anemia at baseline.

## 2019-03-28 NOTE — CHART NOTE - NSCHARTNOTEFT_GEN_A_CORE
NUTRITION SERVICES                                                                                  MALNUTRITION ALERT     Attention Health Care Provider: Upon nutritional assessment by the Registered Dietitian your patient was determined to meet criteria / has evidence of the following diagnosis/diagnoses:    [ ] Mild Protein Calorie Malnutrition   [ ] Moderate Protein Calorie Malnutrition   [x Unspecified Protein Calorie Malnutrition   [ ] Underweight / BMI <19  [ ] Morbid Obesity / BMI >40        By signing this assessment you are acknowledging the diagnosis/diagnoses.       PLAN OF CARE: Refer to Initial Dietitian Evaluation or Nutrition Follow-Up Documentation for Nutritional Recommendations. NUTRITION SERVICES                                                                                  MALNUTRITION ALERT     Attention Health Care Provider: Upon nutritional assessment by the Registered Dietitian your patient was determined to meet criteria / has evidence of the following diagnosis/diagnoses:    [ ] Mild Protein Calorie Malnutrition   [ ] Moderate Protein Calorie Malnutrition   [ ]Unspecified Protein Calorie Malnutrition  [x ]Severe protein Calorie Malnutrition   [ ] Underweight / BMI <19  [ ] Morbid Obesity / BMI >40        By signing this assessment you are acknowledging the diagnosis/diagnoses.       PLAN OF CARE: Refer to Initial Dietitian Evaluation or Nutrition Follow-Up Documentation for Nutritional Recommendations.

## 2019-03-28 NOTE — PROGRESS NOTE ADULT - ASSESSMENT
Mr. Miranda is a 74 yo man with history of Anemia, HTN, PUD, chronic diarrhea, and recent hospitalization at [facility] from 3/3/19-3/21/19 brought in by family for generalized weakness and inability to walk admitted for severe malnutrition, mild hypernatremia and severe deconditioning. Exam relatively benign aside for significant LE edema and weakness of legs. Anemia at baseline.

## 2019-03-28 NOTE — PROGRESS NOTE ADULT - PROBLEM SELECTOR PLAN 1
He has resp alkalosis as well as some metabolic acidosis: He has had lot of diarrhea: before: His chest x-ray with poor inspiration with bibasilar atelectasis other wise lung are clear: I DOUBT HE H IS HAVING  PE: But would do vq scan as wellas dopplers: Could it be related to diarrhea: He is off antibiotics at this time. I don't seem much of pneumonia on chest x-ray: His last ct scan is reviewed too: had some TIB opacities in upper lobes suggestive of bronchiolitis: CHF and bronchomalacia:  3/28: vq scan could not be dome yesterday: will attempt today: his previos dopplers were negative: new ABG is awaited:

## 2019-03-28 NOTE — DIETITIAN INITIAL EVALUATION ADULT. - NUTRITION INTERVENTION
.      CERTIFICATE OF RETURN TO SCHOOL    December 21, 2017      Re:   Suraj Patrick  [address] [facility] [address]                        This is to certify that Suraj Patrick has been under my care from 12/21/2017 and can return to school on 12/22/2017    RESTRICTIONS: None      REMARKS: None        SIGNATURE:___________________________________________,   12/21/2017      MD Lizet Mina MA          [facility]  [facility] [place]  [address] [phone]  Ext. [phone]  
Medical Food Supplements

## 2019-03-28 NOTE — PROGRESS NOTE ADULT - SUBJECTIVE AND OBJECTIVE BOX
[facility]      Seen earlier today, and examined.  - Today, patient is without complaints. according to pa she saw him earlier and he was very disoriented.            *****MEDICATIONS: Current medication reviewed and documented.    MEDICATIONS  (STANDING):  cholecalciferol 1000 Unit(s) Oral daily  dextrose 5%. 1000 milliLiter(s) (60 mL/Hr) IV Continuous <Continuous>  lactobacillus acidophilus 1 Tablet(s) Oral daily  mesalamine DR Capsule 800 milliGRAM(s) Oral three times a day  metoprolol succinate ER 25 milliGRAM(s) Oral daily  pantoprazole    Tablet 40 milliGRAM(s) Oral before breakfast  thiamine IVPB 500 milliGRAM(s) IV Intermittent daily    MEDICATIONS  (PRN):          ***** VITAL SIGNS:  T(F): 98.4 (19 @ 12:15), Max: 98.4 (19 @ 12:15)  HR: 83 (19 @ 12:15) (75 - 99)  BP: 102/51 (19 @ 12:15) (102/51 - 125/70)  RR: 19 (19 @ 12:15) (16 - 19)  SpO2: 100% (19 @ 12:15) (100% - 100%)  Wt(kg): --  ,   I&O's Summary    27 Mar 2019 07:01  -  28 Mar 2019 07:00  --------------------------------------------------------  IN: 540 mL / OUT: 500 mL / NET: 40 mL             *****PHYSICAL EXAM: Alert oriented x 1    able to recognize his son by name.   able to follow 1 step commands.     EOMI fundi not visualized  blinks to threat   No facial asymmetry   Tongue is midline    withdraws to pain x4   Gait : not assessed.  B/L down going toes                            8.7    12.73 )-----------( 70       ( 28 Mar 2019 09:45 )             28.8                   144  |  117<H>  |  24<H>  ----------------------------<  96  3.9   |  20<L>  |  1.58<H>    Ca    8.1<L>      28 Mar 2019 05:30  Phos  2.8       Mg     1.8         TPro  6.6  /  Alb  1.8<L>  /  TBili  0.7  /  DBili  x   /  AST  23  /  ALT  21  /  AlkPhos  264<H>                       ABG - ( 28 Mar 2019 09:45 )  pH, Arterial: 7.47  pH, Blood: x     /  pCO2: 26    /  pO2: 175   / HCO3: 21    / Base Excess: -4.4  /  SaO2: 99.7              Urinalysis Basic - ( 27 Mar 2019 11:20 )    Color: YELLOW / Appearance: CLEAR / S.020 / pH: 6.0  Gluc: NEGATIVE / Ketone: NEGATIVE  / Bili: NEGATIVE / Urobili: TRACE   Blood: TRACE / Protein: 50 / Nitrite: NEGATIVE   Leuk Esterase: TRACE / RBC: 0-2 / WBC 11-25   Sq Epi: FEW / Non Sq Epi: x / Bacteria: MODERATE      [All pertinent recent Imaging/Reports reviewed]           *****A S S E S S M E N T   A N D   P L A N :    Mr. Miranda is a 74 yo man with history of Anemia, HTN, chronic diarrhea, and recent hospitalization at [facility] from 3/3/19-3/21/19 brought in by family for generalized weakness and inability to walk.    Patient was discharged home with home PT services on 3/21/19 after being hospitalized since 3/3/19. During his hospitalization, he was managed for chronic diarrhea presumed to be 2/2 IBD since infectious and autoimmune w/u was negative, anemia due to blood loss from GI tract 2/2 PUD, requiring blood transfusion, and ESBL E. coli UTI with ertapenem via PICC. He was also determined not to have the diagnosis of CLL after flow cytometry results were available.           Problem/Recommendations 1: multifactorial toxic metabolic encephalopathy due to renal insufficiency and severe nutritional deficiency    b12/ ammonia wnl   would give thiamine 500 iv pb x 3 days  mvi  consider prosource supplements.   calorie count?  gi nutrition consult   given fluctuating mental status will get eeg to r/o seizures  according to family he used to drink heavily, last 5 mos he has cut down, it is unclear when he had his last drink.      Problem/Recommendations 2:Diarrheal illness    defer to gi     +recent travel to [place]  4 mos 2018 to 2019  giardia ? given prolonged diarrhea         Thank you for allowing me to participate in the care of this patient. Please do not hesitate to call me if you have any  questions.        ________________  Yessica Benitez MD  [facility]  [phone]      30 minutes spent on total encounter; more than 50 % of the visit was  spent counseling about plan of care, compliance to diet/exercise and medication regimen and or  coordinating care by the attending physician.      It is advised that s stroke patients follow up with VINAYAK Cerrato @ [phone] in 1- 2 weeks.   Others please follow up with Dr. Michael Nissenbaum [phone]

## 2019-03-28 NOTE — DIETITIAN INITIAL EVALUATION ADULT. - OTHER INFO
Spoke with Pt, wife and son. The Pt's usual weight was 150 lb. His current weight is 130 lbs (59.3 kg). Pt had a 20 lb or 13.33% weight loss over 6 months. Pt had a significant decrease in po intake over the same time. He was eating between 50 and 75% of his usual intake. He has significant muscle wasting as shown by protruding clavicle. Son reports Pt does better drinking than eating. Pt and family all willing to try Ensure Enlive which has milk in it but is lactose free.

## 2019-03-28 NOTE — PROGRESS NOTE ADULT - SUBJECTIVE AND OBJECTIVE BOX
Patient is a 73y old  Male who presents with a chief complaint of Generalized weakness and inability to walk (28 Mar 2019 22:03)      SUBJECTIVE / OVERNIGHT EVENTS:    Events noted.  Diarrhea  No N/V  Awake    MEDICATIONS  (STANDING):  cholecalciferol 1000 Unit(s) Oral daily  dextrose 5%. 1000 milliLiter(s) (60 mL/Hr) IV Continuous <Continuous>  lactobacillus acidophilus 1 Tablet(s) Oral daily  mesalamine DR Capsule 800 milliGRAM(s) Oral three times a day  metoprolol succinate ER 25 milliGRAM(s) Oral daily  pantoprazole    Tablet 40 milliGRAM(s) Oral before breakfast  thiamine IVPB 500 milliGRAM(s) IV Intermittent daily    MEDICATIONS  (PRN):        CAPILLARY BLOOD GLUCOSE        I&O's Summary    27 Mar 2019 07:01  -  28 Mar 2019 07:00  --------------------------------------------------------  IN: 540 mL / OUT: 500 mL / NET: 40 mL        PHYSICAL EXAM:  GENERAL: NAD  NECK: Supple, No JVD  CHEST/LUNG: Clear to auscultation bilaterally; No wheezing.  HEART: Regular rate and rhythm; No murmurs, rubs, or gallops  ABDOMEN: Soft, Nontender, Nondistended; Bowel sounds present  EXTREMITIES:   No edema  NEUROLOGY: AAO       LABS:                        8.7    12.73 )-----------( 70       ( 28 Mar 2019 09:45 )             28.8         144  |  117<H>  |  24<H>  ----------------------------<  96  3.9   |  20<L>  |  1.58<H>    Ca    8.1<L>      28 Mar 2019 05:30  Phos  2.8       Mg     1.8         TPro  6.6  /  Alb  1.8<L>  /  TBili  0.7  /  DBili  x   /  AST  23  /  ALT  21  /  AlkPhos  264<H>            Urinalysis Basic - ( 27 Mar 2019 11:20 )    Color: YELLOW / Appearance: CLEAR / S.020 / pH: 6.0  Gluc: NEGATIVE / Ketone: NEGATIVE  / Bili: NEGATIVE / Urobili: TRACE   Blood: TRACE / Protein: 50 / Nitrite: NEGATIVE   Leuk Esterase: TRACE / RBC: 0-2 / WBC 11-25   Sq Epi: FEW / Non Sq Epi: x / Bacteria: MODERATE      CAPILLARY BLOOD GLUCOSE         @ 14:21  Culture-urine --  Culture results --  method type --  Organism --  Organism Identification --  Specimen source FECES   @ 11:40  Culture-urine   ENTFC^Enterococcus faecium  COLONY COUNT: > = 100,000 CFU/ML  Culture results --  method type --  Organism --  Organism Identification --  Specimen source URINE CATHETER   @ 09:47  Culture-urine --  Culture results --  method type --  Organism --  Organism Identification --  Specimen source BLOOD PERIPHERAL   @ 01:00  Culture-urine --  Culture results --  method type --  Organism --  Organism Identification --  Specimen source FECES   @ 15:16  Culture-urine --  Culture results --  method type --  Organism --  Organism Identification --  Specimen source STOOL            @ 14:21  Culture blood --  Culture results --  Gram stain --  Gram stain blood --  Method type --  Organism --  Organism identification --  Specimen source FECES    @ 11:40  Culture blood --  Culture results --  Gram stain --  Gram stain blood --  Method type --  Organism --  Organism identification --  Specimen source URINE CATHETER    @ 09:47  Culture blood   NO ORGANISMS ISOLATED  NO ORGANISMS ISOLATED AT 24 HOURS  Culture results --  Gram stain --  Gram stain blood --  Method type --  Organism --  Organism identification --  Specimen source BLOOD PERIPHERAL    @ 01:00  Culture blood --  Culture results --  Gram stain --  Gram stain blood --  Method type --  Organism --  Organism identification --  Specimen source FECES    @ 15:16  Culture blood --  Culture results --  Gram stain --  Gram stain blood --  Method type --  Organism --  Organism identification --  Specimen source STOOL      RADIOLOGY & ADDITIONAL TESTS:    Imaging Personally Reviewed:    Consultant(s) Notes Reviewed:      Care Discussed with Consultants/Other Providers:

## 2019-03-28 NOTE — PROGRESS NOTE ADULT - ASSESSMENT
74 yo man with history of Anemia, HTN, chronic diarrhea, and recent hospitalization at [facility] from 3/3/19-3/21/19 brought in by family for generalized weakness and inability to walk found to have james and hypernatremia

## 2019-03-28 NOTE — PROGRESS NOTE ADULT - SUBJECTIVE AND OBJECTIVE BOX
Chief Complaint:  Patient is a 73y old  Male who presents with a chief complaint of Generalized weakness and inability to walk (27 Mar 2019 20:59)      Interval Events:   Patient pending full stool study results.  Significant AMS yesterday, work up only significant so far for positive UA    Allergies:  No Known Allergies      Hospital Medications:  cholecalciferol 1000 Unit(s) Oral daily  dextrose 5%. 1000 milliLiter(s) IV Continuous <Continuous>  lactobacillus acidophilus 1 Tablet(s) Oral daily  mesalamine DR Capsule 800 milliGRAM(s) Oral three times a day  metoprolol succinate ER 25 milliGRAM(s) Oral daily  pantoprazole    Tablet 40 milliGRAM(s) Oral before breakfast      PMHX/PSHX:  Chronic kidney disease (CKD)  Anemia, unspecified type  Essential hypertension  CLL (chronic lymphocytic leukemia)  No significant past surgical history  No significant past surgical history      Family history:  Family history of myocardial infarction (Sibling)          PHYSICAL EXAM:     GENERAL:  Appears stated age, well-groomed, well-nourished, no distress  HEENT:  NC/AT,  conjunctivae clear, sclera -anicteric  CHEST:  Full & symmetric excursion, no increased  HEART:  Regular rhythm  ABDOMEN:  Soft, non-tender, non-distended, normoactive bowel sounds,  no masses ,no hepato-splenomegaly,   EXTREMITIES:  no cyanosis,clubbing or edema  SKIN:  No rash/erythema/ecchymoses/petechiae/wounds/abscess/warm/dry  NEURO:  Alert, oriented    Vital Signs:  Vital Signs Last 24 Hrs  T(C): 36.3 (28 Mar 2019 04:45), Max: 36.8 (27 Mar 2019 16:45)  T(F): 97.4 (28 Mar 2019 04:45), Max: 98.2 (27 Mar 2019 16:45)  HR: 99 (28 Mar 2019 04:45) (85 - 99)  BP: 115/76 (28 Mar 2019 04:45) (103/52 - 125/70)  BP(mean): --  RR: 17 (28 Mar 2019 04:45) (16 - 18)  SpO2: 100% (28 Mar 2019 04:45) (100% - 100%)  Daily     Daily     LABS:                        10.0   9.18  )-----------( 67       ( 27 Mar 2019 05:30 )             33.1     03-28    144  |  117<H>  |  24<H>  ----------------------------<  96  3.9   |  20<L>  |  1.58<H>    Ca    8.1<L>      28 Mar 2019 05:30  Phos  2.8       Mg     1.8         TPro  6.6  /  Alb  1.8<L>  /  TBili  0.7  /  DBili  x   /  AST  23  /  ALT  21  /  AlkPhos  264<H>      LIVER FUNCTIONS - ( 28 Mar 2019 05:30 )  Alb: 1.8 g/dL / Pro: 6.6 g/dL / ALK PHOS: 264 u/L / ALT: 21 u/L / AST: 23 u/L / GGT: x             Urinalysis Basic - ( 27 Mar 2019 11:20 )    Color: YELLOW / Appearance: CLEAR / S.020 / pH: 6.0  Gluc: NEGATIVE / Ketone: NEGATIVE  / Bili: NEGATIVE / Urobili: TRACE   Blood: TRACE / Protein: 50 / Nitrite: NEGATIVE   Leuk Esterase: TRACE / RBC: 0-2 / WBC 11-25   Sq Epi: FEW / Non Sq Epi: x / Bacteria: MODERATE      Amylase Serum--      Lipase serum--       [id number]      Imaging:

## 2019-03-28 NOTE — PROGRESS NOTE ADULT - SUBJECTIVE AND OBJECTIVE BOX
[facility]   MD MAGALY MORAN MD ANGELA WONG, PA    TEL:  OFFICE: [phone]  DR ANDERSON CELL: [phone]  DR. MONAE CELL: [phone]  HUBERT WELLINGTON CELL: [phone]        Patient is a 73y old  Male who presents with a chief complaint of Generalized weakness and inability to walk (28 Mar 2019 10:03)      Patient seen and examined at bedside. No chest pain/sob    VITALS:  T(F): 98.4 (03-28-19 @ 12:15), Max: 98.4 (03-28-19 @ 12:15)  HR: 83 (03-28-19 @ 12:15)  BP: 102/51 (03-28-19 @ 12:15)  RR: 19 (03-28-19 @ 12:15)  SpO2: 100% (03-28-19 @ 12:15)  Wt(kg): --    03-27 @ 07:01  -  03-28 @ 07:00  --------------------------------------------------------  IN: 540 mL / OUT: 500 mL / NET: 40 mL          PHYSICAL EXAM:  Constitutional: NAD  Neck: No JVD  Respiratory: CTAB, no wheezes, rales or rhonchi  Cardiovascular: S1, S2, RRR  Gastrointestinal: BS+, soft, NT/ND  Extremities: 1+ peripheral edema    Hospital Medications:   MEDICATIONS  (STANDING):  cholecalciferol 1000 Unit(s) Oral daily  dextrose 5%. 1000 milliLiter(s) (60 mL/Hr) IV Continuous <Continuous>  lactobacillus acidophilus 1 Tablet(s) Oral daily  mesalamine DR Capsule 800 milliGRAM(s) Oral three times a day  metoprolol succinate ER 25 milliGRAM(s) Oral daily  pantoprazole    Tablet 40 milliGRAM(s) Oral before breakfast  thiamine IVPB 500 milliGRAM(s) IV Intermittent daily      LABS:  03-28    144  |  117<H>  |  24<H>  ----------------------------<  96  3.9   |  20<L>  |  1.58<H>    Ca    8.1<L>      28 Mar 2019 05:30  Phos  2.8     03-28  Mg     1.8     03-28    TPro  6.6  /  Alb  1.8<L>  /  TBili  0.7  /  DBili      /  AST  23  /  ALT  21  /  AlkPhos  264<H>  03-28    Creatinine Trend: 1.58 <--, 1.58 <--, 1.67 <--, 1.91 <--, 1.80 <--, 1.67 <--    Phosphorus Level, Serum: 2.8 mg/dL (03-28 @ 05:30)  Albumin, Serum: 1.8 g/dL (03-28 @ 05:30)                              8.7    12.73 )-----------( 70       ( 28 Mar 2019 09:45 )             28.8     Urine Studies:  Urinalysis - [03-27-19 @ 11:20]      Color YELLOW / Appearance CLEAR / SG 1.020 / pH 6.0      Gluc NEGATIVE / Ketone NEGATIVE  / Bili NEGATIVE / Urobili TRACE       Blood TRACE / Protein 50 / Leuk Est TRACE / Nitrite NEGATIVE      RBC 0-2 / WBC 11-25 / Hyaline 2-5 / Gran  / Sq Epi FEW / Non Sq Epi  / Bacteria MODERATE    Urine Creatinine 145.40      [03-25-19 @ 20:45]  Urine Sodium 21      [03-25-19 @ 20:45]  Urine Osmolality 495      [03-27-19 @ 06:16]    Iron 38, TIBC 156, %sat --      [03-05-19 @ 06:45]  Ferritin 355.7      [03-05-19 @ 06:45]  Vitamin D (25OH) 25.6      [03-23-19 @ 18:15]  TSH 1.98      [03-05-19 @ 06:45]    HBsAb Nonreactive      [03-20-19 @ 05:35]  HBsAg NEGATIVE      [03-20-19 @ 05:35]  HCV 0.30 S/CO, Nonreactive [facility]  [address] [place] [address], [address]  Phone: [phone]  Fax:   [phone]  :           KATY DAMON MD      [03-03-19 @ 20:30]    TODD: titer Negative, pattern --      [03-08-19 @ 06:30]    RADIOLOGY & ADDITIONAL STUDIES:

## 2019-03-28 NOTE — PROGRESS NOTE ADULT - ASSESSMENT
Impression:  1) Diarrhea - with previous negative work up inpatient.  Etiologies include infectious vs inflammatory vs  malabsorptive vs microscopic colitis.  Patient will likely ultimately need endoscopic evaluation but would first repeat stool work up prior to proceeding. Stool C. Diff negative, now with AMS.    Recommendations:   - send GI-PCR   - send stool O and P, send giardia   - send stool electrolytes (Na+, K+)   - send stool elastase   - send qualitative fecal fat   - send a-1 antitrypsin    -follow-up CBC; Fe++/TIBC. B12, foalte   - send celiac panel including tissue trans glutaminase IgA and IgA    - monitor stool output and volume   - lactose free LRD   - possible colonoscopy pending above for CMV and microscopic colitis    [place], PGY-4  Gastroenterology Fellow  Pager x [phone] or [phone]  (After 5 pm or on weekends please page GI on call)

## 2019-03-28 NOTE — PROGRESS NOTE ADULT - SUBJECTIVE AND OBJECTIVE BOX
Patient is a 73y old  Male who presents with a chief complaint of Generalized weakness and inability to walk (28 Mar 2019 07:41)      Any change in ROS: lethargic : no SOB   vq scan not done las tnight:       MEDICATIONS  (STANDING):  cholecalciferol 1000 Unit(s) Oral daily  dextrose 5%. 1000 milliLiter(s) (60 mL/Hr) IV Continuous <Continuous>  lactobacillus acidophilus 1 Tablet(s) Oral daily  mesalamine DR Capsule 800 milliGRAM(s) Oral three times a day  metoprolol succinate ER 25 milliGRAM(s) Oral daily  pantoprazole    Tablet 40 milliGRAM(s) Oral before breakfast  thiamine IVPB 500 milliGRAM(s) IV Intermittent daily    MEDICATIONS  (PRN):    Vital Signs Last 24 Hrs  T(C): 36.2 (28 Mar 2019 09:00), Max: 36.8 (27 Mar 2019 16:45)  T(F): 97.2 (28 Mar 2019 09:00), Max: 98.2 (27 Mar 2019 16:45)  HR: 75 (28 Mar 2019 09:00) (75 - 99)  BP: 115/60 (28 Mar 2019 09:00) (103/52 - 125/70)  BP(mean): --  RR: 18 (28 Mar 2019 09:00) (16 - 18)  SpO2: 100% (28 Mar 2019 09:00) (100% - 100%)    I&O's Summary    27 Mar 2019 07:01  -  28 Mar 2019 07:00  --------------------------------------------------------  IN: 540 mL / OUT: 500 mL / NET: 40 mL          Physical Exam:   GENERAL: NAD, well-groomed, well-developed  HEENT: TODD/   Atraumatic, Normocephalic  ENMT: No tonsillar erythema, exudates, or enlargement; Moist mucous membranes, Good dentition, No lesions  NECK: Supple, No JVD, Normal thyroid  CHEST/LUNG: Clear to auscultaion  CVS: Regular rate and rhythm; No murmurs, rubs, or gallops  GI: : Soft, Nontender, Nondistended; Bowel sounds present  NERVOUS SYSTEM:  Alert & Oriented X1  EXTREMITIES:  -edema  LYMPH: No lymphadenopathy noted  SKIN: No rashes or lesions  ENDOCRINOLOGY: No Thyromegaly  PSYCH: Appropriate    Labs:  20                            10.0   9.18  )-----------( 67       ( 27 Mar 2019 05:30 )             33.1                         9.8    9.73  )-----------( 76       ( 26 Mar 2019 06:40 )             32.6     03-    144  |  117<H>  |  24<H>  ----------------------------<  96  3.9   |  20<L>  |  1.58<H>      146<H>  |  118<H>  |  24<H>  ----------------------------<  104<H>  3.6   |  20<L>  |  1.58<H>      146<H>  |  117<H>  |  24<H>  ----------------------------<  112<H>  3.2<L>   |  18<L>  |  1.67<H>    Ca    8.1<L>      28 Mar 2019 05:30  Ca    7.9<L>      27 Mar 2019 05:30  Phos  2.8     -  Phos  3.0     -  Mg     1.8     -  Mg     1.9     -    TPro  6.6  /  Alb  1.8<L>  /  TBili  0.7  /  DBili  x   /  AST  23  /  ALT  21  /  AlkPhos  264<H>    TPro  5.9<L>  /  Alb  1.7<L>  /  TBili  0.5  /  DBili  x   /  AST  24  /  ALT  21  /  AlkPhos  255<H>      CAPILLARY BLOOD GLUCOSE          LIVER FUNCTIONS - ( 28 Mar 2019 05:30 )  Alb: 1.8 g/dL / Pro: 6.6 g/dL / ALK PHOS: 264 u/L / ALT: 21 u/L / AST: 23 u/L / GGT: x             Urinalysis Basic - ( 27 Mar 2019 11:20 )    Color: YELLOW / Appearance: CLEAR / S.020 / pH: 6.0  Gluc: NEGATIVE / Ketone: NEGATIVE  / Bili: NEGATIVE / Urobili: TRACE   Blood: TRACE / Protein: 50 / Nitrite: NEGATIVE   Leuk Esterase: TRACE / RBC: 0-2 / WBC 11-25   Sq Epi: FEW / Non Sq Epi: x / Bacteria: MODERATE            RECENT CULTURES:   @ 09:47 BLOOD PERIPHERAL                  NO ORGANISMS ISOLATED  NO ORGANISMS ISOLATED AT 24 HOURS   @ 15:16 STOOL       < from: Xray Chest 1 View- PORTABLE-Routine (19 @ 11:27) >  EXAM:  XR CHEST PORTABLE ROUTINE 1V        PROCEDURE DATE:  Mar 27 2019         INTERPRETATION:  CLINICAL INFORMATION: Altered mental status. Evaluate   for infection.    TECHNIQUE: AP view of the chest.    COMPARISON: Chest radiograph from 3/22/2019. CT chest from 3/15/2019.    FINDINGS:    Lines and tubes: None    Lungs: No focal pulmonary consolidation.    Pleura: Unchanged trace right pleural effusion.    Mediastinum and heart: Cardiac silhouette is normal.    Skeleton: Bilateral shoulder osteoarthrosis and degenerative spinal   disease.    IMPRESSION:     Unchanged trace right pleural effusion.              STEVEN KEARNEY M.D., RADIOLOGY RESIDENT  This document has been electronically signed.  BABAK BIRD M.D., ATTENDING RADIOLOGIST  This document has been electronically signed. Mar 27 2019  3:13PM    < end of copied text >         < from: CT Chest No Cont (03.15.19 @ 09:22) >  Gynecomastia.    IMPRESSION:     *  Tracheal wall thickening, centrilobular and tree-in-bud micronodules < from: Xray Chest 1 View- PORTABLE-Routine (19 @ 11:27) >  M:  XR CHEST PORTABLE ROUTINE 1V        PROCEDURE DATE:  Mar 27 2019         INTERPRETATION:  CLINICAL INFORMATION: Altered mental status. Evaluate   for infection.    TECHNIQUE: AP view of the chest.    COMPARISON: Chest radiograph from 3/22/2019. CT chest from 3/15/2019.    FINDINGS:    Lines and tubes: None    Lungs: No focal pulmonary consolidation.    Pleura: Unchanged trace right pleural effusion.    Mediastinum and heart: Cardiac silhouette is normal.    Skeleton: Bilateral shoulder osteoarthrosis and degenerative spinal   disease.    IMPRESSION:     Unchanged trace right pleural effusion.              STEVEN KEARNEY M.D., RADIOLOGY RESIDENT  This document has been electronically signed.  BABAK BIRD M.D., ATTENDING RADIOLOGIST  This document has been electronically signed. Mar 27 2019  3:13PM    < end of copied text >    predominantly within the upper lungs suggestive of   bronchitis/bronchiolitis.    *  Interlobular septal thickening and patchy groundglass opacities, trace   bilateral pleural effusions and anasarca suggestive of superimposed   pulmonary edema.    *  Ascites new from 3/4/2019.    *  Narrowing of bilateral main bronchi which may represent bronchomalacia.    < end of copied text >            RESPIRATORY CULTURES:          Studies  Chest X-RAY  CT SCAN Chest   Venous Dopplers: LE:   CT Abdomen  Others

## 2019-03-29 DIAGNOSIS — E87.2 ACIDOSIS: ICD-10-CM

## 2019-03-29 LAB
ANION GAP SERPL CALC-SCNC: 8 MMO/L — SIGNIFICANT CHANGE UP (ref 7–14)
BASE EXCESS BLDA CALC-SCNC: -4.7 MMOL/L — SIGNIFICANT CHANGE UP
BUN SERPL-MCNC: 21 MG/DL — SIGNIFICANT CHANGE UP (ref 7–23)
CALCIUM SERPL-MCNC: 7.6 MG/DL — LOW (ref 8.4–10.5)
CHLORIDE SERPL-SCNC: 117 MMOL/L — HIGH (ref 98–107)
CO2 SERPL-SCNC: 18 MMOL/L — LOW (ref 22–31)
CREAT SERPL-MCNC: 1.34 MG/DL — HIGH (ref 0.5–1.3)
FAT STL QN: NORMAL — SIGNIFICANT CHANGE UP
FAT STL QN: NORMAL — SIGNIFICANT CHANGE UP
FERRITIN SERPL-MCNC: 378.6 NG/ML — SIGNIFICANT CHANGE UP (ref 30–400)
FOLATE SERPL-MCNC: 12 NG/ML — SIGNIFICANT CHANGE UP (ref 4.7–20)
GLIADIN PEPTIDE IGA SER-ACNC: 13.7 — SIGNIFICANT CHANGE UP
GLIADIN PEPTIDE IGA SER-ACNC: NEGATIVE — SIGNIFICANT CHANGE UP
GLIADIN PEPTIDE IGG SER-ACNC: <5 — SIGNIFICANT CHANGE UP
GLIADIN PEPTIDE IGG SER-ACNC: SIGNIFICANT CHANGE UP
GLUCOSE BLDA-MCNC: 106 MG/DL — HIGH (ref 70–99)
GLUCOSE SERPL-MCNC: 106 MG/DL — HIGH (ref 70–99)
HCO3 BLDA-SCNC: 21 MMOL/L — LOW (ref 22–26)
HCT VFR BLD CALC: 28 % — LOW (ref 39–50)
HCT VFR BLDA CALC: 27.5 % — LOW (ref 39–51)
HGB BLD-MCNC: 8.8 G/DL — LOW (ref 13–17)
HGB BLDA-MCNC: 8.8 G/DL — LOW (ref 13–17)
IRON SATN MFR SERPL: 48 UG/DL — SIGNIFICANT CHANGE UP (ref 45–165)
IRON SATN MFR SERPL: 78 UG/DL — LOW (ref 155–535)
MAGNESIUM SERPL-MCNC: 1.6 MG/DL — SIGNIFICANT CHANGE UP (ref 1.6–2.6)
MCHC RBC-ENTMCNC: 29.1 PG — SIGNIFICANT CHANGE UP (ref 27–34)
MCHC RBC-ENTMCNC: 31.4 % — LOW (ref 32–36)
MCV RBC AUTO: 92.7 FL — SIGNIFICANT CHANGE UP (ref 80–100)
NRBC # FLD: 0.05 K/UL — SIGNIFICANT CHANGE UP (ref 0–0)
O+P SPEC CONC: SIGNIFICANT CHANGE UP
O+P SPEC CONC: SIGNIFICANT CHANGE UP
OSMOLALITY STL: 300 MOS/KG — SIGNIFICANT CHANGE UP
PCO2 BLDA: 31 MMHG — LOW (ref 35–48)
PH BLDA: 7.41 PH — SIGNIFICANT CHANGE UP (ref 7.35–7.45)
PHOSPHATE SERPL-MCNC: 2.5 MG/DL — SIGNIFICANT CHANGE UP (ref 2.5–4.5)
PLATELET # BLD AUTO: 60 K/UL — LOW (ref 150–400)
PMV BLD: SIGNIFICANT CHANGE UP FL (ref 7–13)
PO2 BLDA: 86 MMHG — SIGNIFICANT CHANGE UP (ref 83–108)
POTASSIUM BLDA-SCNC: 3.1 MMOL/L — LOW (ref 3.4–4.5)
POTASSIUM SERPL-MCNC: 3.5 MMOL/L — SIGNIFICANT CHANGE UP (ref 3.5–5.3)
POTASSIUM SERPL-SCNC: 3.5 MMOL/L — SIGNIFICANT CHANGE UP (ref 3.5–5.3)
RBC # BLD: 3.02 M/UL — LOW (ref 4.2–5.8)
RBC # FLD: 21.4 % — HIGH (ref 10.3–14.5)
SAO2 % BLDA: 97.1 % — SIGNIFICANT CHANGE UP (ref 95–99)
SODIUM BLDA-SCNC: 140 MMOL/L — SIGNIFICANT CHANGE UP (ref 136–146)
SODIUM SERPL-SCNC: 143 MMOL/L — SIGNIFICANT CHANGE UP (ref 135–145)
SPECIMEN SOURCE: SIGNIFICANT CHANGE UP
SPECIMEN SOURCE: SIGNIFICANT CHANGE UP
TRANSFERRIN SERPL-MCNC: 65 MG/DL — LOW (ref 200–360)
TRI STN SPEC: SIGNIFICANT CHANGE UP
TRI STN SPEC: SIGNIFICANT CHANGE UP
UIBC SERPL-MCNC: 29.6 UG/DL — LOW (ref 110–370)
VIT B12 SERPL-MCNC: > 2000 PG/ML — HIGH (ref 200–900)
WBC # BLD: 11.12 K/UL — HIGH (ref 3.8–10.5)
WBC # FLD AUTO: 11.12 K/UL — HIGH (ref 3.8–10.5)

## 2019-03-29 PROCEDURE — 99232 SBSQ HOSP IP/OBS MODERATE 35: CPT | Mod: GC

## 2019-03-29 RX ORDER — SODIUM BICARBONATE 1 MEQ/ML
650 SYRINGE (ML) INTRAVENOUS THREE TIMES A DAY
Qty: 0 | Refills: 0 | Status: COMPLETED | OUTPATIENT
Start: 2019-03-29 | End: 2019-04-01

## 2019-03-29 RX ADMIN — Medication 1000 UNIT(S): at 13:26

## 2019-03-29 RX ADMIN — Medication 650 MILLIGRAM(S): at 21:20

## 2019-03-29 RX ADMIN — Medication 650 MILLIGRAM(S): at 13:26

## 2019-03-29 RX ADMIN — Medication 105 MILLIGRAM(S): at 19:25

## 2019-03-29 RX ADMIN — Medication 800 MILLIGRAM(S): at 05:40

## 2019-03-29 RX ADMIN — Medication 800 MILLIGRAM(S): at 13:25

## 2019-03-29 RX ADMIN — Medication 1 TABLET(S): at 13:25

## 2019-03-29 RX ADMIN — Medication 800 MILLIGRAM(S): at 21:20

## 2019-03-29 RX ADMIN — Medication 1 TABLET(S): at 13:26

## 2019-03-29 RX ADMIN — Medication 25 MILLIGRAM(S): at 05:40

## 2019-03-29 RX ADMIN — PANTOPRAZOLE SODIUM 40 MILLIGRAM(S): 20 TABLET, DELAYED RELEASE ORAL at 05:40

## 2019-03-29 NOTE — PROGRESS NOTE ADULT - SUBJECTIVE AND OBJECTIVE BOX
Infectious Diseases progress note:    Subjective: Pt for sigmoidoscopy.  No new fever.  WBC 11.  Pt with anemia, thrombocytopenia.      ROS:  pt u/a    Allergies    No Known Allergies    Intolerances        ANTIBIOTICS/RELEVANT:  antimicrobials    immunologic:    OTHER:  cholecalciferol 1000 Unit(s) Oral daily  dextrose 5%. 1000 milliLiter(s) IV Continuous <Continuous>  lactobacillus acidophilus 1 Tablet(s) Oral daily  mesalamine DR Capsule 800 milliGRAM(s) Oral three times a day  metoprolol succinate ER 25 milliGRAM(s) Oral daily  multivitamin 1 Tablet(s) Oral daily  pantoprazole    Tablet 40 milliGRAM(s) Oral before breakfast  sodium bicarbonate 650 milliGRAM(s) Oral three times a day  thiamine IVPB 500 milliGRAM(s) IV Intermittent daily      Objective:  Vital Signs Last 24 Hrs  T(C): 36.6 (29 Mar 2019 20:17), Max: 36.7 (29 Mar 2019 14:47)  T(F): 97.8 (29 Mar 2019 20:17), Max: 98 (29 Mar 2019 14:47)  HR: 80 (29 Mar 2019 20:17) (69 - 88)  BP: 117/78 (29 Mar 2019 20:17) (116/62 - 137/73)  BP(mean): --  RR: 18 (29 Mar 2019 20:17) (16 - 18)  SpO2: 100% (29 Mar 2019 20:17) (98% - 100%)    PHYSICAL EXAM:  pt at study at time of bedside visit        LABS:                        8.8    11.12 )-----------( 60       ( 29 Mar 2019 05:20 )             28.0     03-29    143  |  117<H>  |  21  ----------------------------<  106<H>  3.5   |  18<L>  |  1.34<H>    Ca    7.6<L>      29 Mar 2019 05:20  Phos  2.5     03-29  Mg     1.6     03-29    TPro  6.6  /  Alb  1.8<L>  /  TBili  0.7  /  DBili  x   /  AST  23  /  ALT  21  /  AlkPhos  264<H>  03-28                            MICROBIOLOGY:      Culture - Urine (03.27.19 @ 11:40)    Culture - Urine:   ENTFC^Enterococcus faecium  COLONY COUNT: > = 100,000 CFU/ML    Culture - Urine:   CULTURE IN PROGRESS, FURTHER REPORT TO FOLLOW.  ENTFC^Enterococcus faecium  COLONY COUNT: > = 100,000 CFU/ML    Specimen Source: URINE CATHETER    Culture - Blood (03.27.19 @ 09:47)    Culture - Blood:   NO ORGANISMS ISOLATED  NO ORGANISMS ISOLATED AT 48 HRS.    Specimen Source: BLOOD PERIPHERAL        RADIOLOGY & ADDITIONAL STUDIES:    < from: CT Abdomen and Pelvis No Cont (03.28.19 @ 19:50) >  ABDOMEN AND PELVIS:    LIVER: Marked steatosis. Question of cirrhosis.  BILE DUCTS: Normal caliber.  GALLBLADDER: Gallstone.  SPLEEN: Unremarkable.  PANCREAS: Unremarkable.  ADRENALS: Unremarkable.  KIDNEYS/URETERS: No hydronephrosis.  No renal or ureteral stone.    BLADDER: Within normal limits.  REPRODUCTIVE ORGANS: The prostate and seminal vesicles are normal.    BOWEL: Normal in course and caliber without obstruction. Appendix is   normal. Rectal catheter in place.  PERITONEUM/RETROPERITONEUM: No pneumoperitoneum. Moderate free fluid in   the abdomen and pelvis.   VESSELS:  No evidence of aortic aneurysm. Marked vascular calcifications.  BONES/SOFT TISSUES: Anasarca. Bilateral healing anterolateral rib   fractures.    IMPRESSION: Marked interval improvement in appearance of the lungs with   minimal scattered groundglass opacities remaining.  Moderate ascites. Question of cirrhosis.    < end of copied text >

## 2019-03-29 NOTE — PROGRESS NOTE ADULT - ASSESSMENT
72 yo man with history of Anemia, HTN, chronic diarrhea, and recent hospitalization at [facility] from 3/3/19-3/21/19 brought in by family for generalized weakness and inability to walk found to have jmaes and hypernatremia

## 2019-03-29 NOTE — PROGRESS NOTE ADULT - ASSESSMENT
· Assessment	  Mr. Miranda is a 72 yo man with history of Anemia, HTN, PUD, chronic diarrhea, and recent hospitalization at [facility] from 3/3/19-3/21/19 brought in by family for generalized weakness and inability to walk admitted for severe malnutrition, mild hypernatremia and severe deconditioning. Exam relatively benign aside for significant LE edema and weakness of legs. Anemia at baseline.      Problem/Plan - 1:  ·  Problem: Hypernatremia.  Plan: -poor po intake,ivf  -PEDRO/CKD- nephro f/up noted.      Problem/Plan - 2:  ·  Problem: Severe malnutrition.  Plan: Pt with hypoalbuminemia and significant peripheral edema c/w with severe protein-calorie malnutrition. Swelling in feet b/l may be contributing to inability to walk compared to prior.   - regular lactose-free diet     Problem/Plan - 3:  ·  Problem: UTI due to extended-spectrum beta lactamase (ESBL) producing Escherichia coli.  Plan: Off abx.      Problem/Plan - 4:  ·  Problem: Essential hypertension.  Plan: BP currently normotensive.   - continue metoprolol succinate 25mg daily.      Problem/Plan - 5:  ·  Problem: Anemia, unspecified type.  Plan: Hb/Hct stable. Suspect due to anemia of chronic disease, especially in the setting of underlying CKD. Patient not iron deficient based on prior testing. No signs of current/active bleeding     Problem/Plan - 6:  Problem: PUD (peptic ulcer disease). Plan: Patient s/p EGD and push enteroscopy earlier this month. Pt found to have gastritis with non-bleeding ulcers and chronic active duodenitis with gastric foveolar metaplasia on pathology. Negative for H. pylori  - continue pantoprazole 40mg daily.     Problem/Plan - 7:  ·  Problem: Chronic diarrhea.  Plan: GI f/up noted.  - Stool studies ordered per GI  -Flex sigmoidoscopy as per GI.

## 2019-03-29 NOTE — PROGRESS NOTE ADULT - SUBJECTIVE AND OBJECTIVE BOX
Patient is a 73y old  Male who presents with a chief complaint of Generalized weakness and inability to walk (29 Mar 2019 12:22)      Any change in ROS: Ptis doing ok : no SOB :      MEDICATIONS  (STANDING):  cholecalciferol 1000 Unit(s) Oral daily  dextrose 5%. 1000 milliLiter(s) (60 mL/Hr) IV Continuous <Continuous>  lactobacillus acidophilus 1 Tablet(s) Oral daily  mesalamine DR Capsule 800 milliGRAM(s) Oral three times a day  metoprolol succinate ER 25 milliGRAM(s) Oral daily  multivitamin 1 Tablet(s) Oral daily  pantoprazole    Tablet 40 milliGRAM(s) Oral before breakfast  sodium bicarbonate 650 milliGRAM(s) Oral three times a day  thiamine IVPB 500 milliGRAM(s) IV Intermittent daily    MEDICATIONS  (PRN):    Vital Signs Last 24 Hrs  T(C): 36.4 (29 Mar 2019 05:35), Max: 36.7 (28 Mar 2019 16:30)  T(F): 97.6 (29 Mar 2019 05:35), Max: 98 (28 Mar 2019 16:30)  HR: 70 (29 Mar 2019 05:35) (70 - 88)  BP: 132/71 (29 Mar 2019 05:35) (110/61 - 137/73)  BP(mean): --  RR: 16 (29 Mar 2019 05:35) (16 - 18)  SpO2: 98% (29 Mar 2019 05:35) (98% - 100%)    I&O's Summary    28 Mar 2019 07:01  -  29 Mar 2019 07:00  --------------------------------------------------------  IN: 150 mL / OUT: 425 mL / NET: -275 mL          Physical Exam:   GENERAL: Looks ill:   HEENT: TODD/   Atraumatic, Normocephalic  ENMT: No tonsillar erythema, exudates, or enlargement; Moist mucous membranes, Good dentition, No lesions  NECK: Supple, No JVD, Normal thyroid  CHEST/LUNG: Clear to auscultaion  CVS: Regular rate and rhythm; No murmurs, rubs, or gallops  GI: : Soft, Nontender, Nondistended; Bowel sounds present  NERVOUS SYSTEM:  Alert & Awake  EXTREMITIES:  - edema  LYMPH: No lymphadenopathy noted  SKIN: No rashes or lesions  ENDOCRINOLOGY: No Thyromegaly  PSYCH: Calm+    Labs:  ABG - ( 29 Mar 2019 05:40 )  pH, Arterial: 7.41  pH, Blood: x     /  pCO2: 31    /  pO2: 86    / HCO3: 21    / Base Excess: -4.7  /  SaO2: 97.1            20                            8.8    11.12 )-----------( 60       ( 29 Mar 2019 05:20 )             28.0                         8.7    12.73 )-----------( 70       ( 28 Mar 2019 09:45 )             28.8                         10.0   9.18  )-----------( 67       ( 27 Mar 2019 05:30 )             33.1                         9.8    9.73  )-----------( 76       ( 26 Mar 2019 06:40 )             32.6     03-29    143  |  117<H>  |  21  ----------------------------<  106<H>  3.5   |  18<L>  |  1.34<H>  03-28    144  |  117<H>  |  24<H>  ----------------------------<  96  3.9   |  20<L>  |  1.58<H>  03-27    146<H>  |  118<H>  |  24<H>  ----------------------------<  104<H>  3.6   |  20<L>  |  1.58<H>  03-26    146<H>  |  117<H>  |  24<H>  ----------------------------<  112<H>  3.2<L>   |  18<L>  |  1.67<H>    Ca    7.6<L>      29 Mar 2019 05:20  Ca    8.1<L>      28 Mar 2019 05:30  Phos  2.5     03-29  Phos  2.8     03-28  Mg     1.6     03-29  Mg     1.8     03-28    TPro  6.6  /  Alb  1.8<L>  /  TBili  0.7  /  DBili  x   /  AST  23  /  ALT  21  /  AlkPhos  264<H>  03-28  TPro  5.9<L>  /  Alb  1.7<L>  /  TBili  0.5  /  DBili  x   /  AST  24  /  ALT  21  /  AlkPhos  255<H>  03-26    CAPILLARY BLOOD GLUCOSE          LIVER FUNCTIONS - ( 28 Mar 2019 05:30 )  Alb: 1.8 g/dL / Pro: 6.6 g/dL / ALK PHOS: 264 u/L / ALT: 21 u/L / AST: 23 u/L / GGT: x             < from: CT Chest No Cont (03.28.19 @ 19:51) >  normal. Rectal catheter in place.  PERITONEUM/RETROPERITONEUM: No pneumoperitoneum. Moderate free fluid in   the abdomen and pelvis.   VESSELS:  No evidence of aortic aneurysm. Marked vascular calcifications.  BONES/SOFT TISSUES: Anasarca. Bilateral healing anterolateral rib   fractures.    IMPRESSION: Marked interval improvement in appearance of the lungs with   minimal scattered groundglass opacities remaining.  Moderate ascites. Question of cirrhosis.                  MARY ALICE AMES M.D. ATTENDING RADIOLOGIST  This document has been electronically signed. Mar 29 2019  9:22AM    < end of copied text >          RECENT CULTURES:  03-27 @ 14:21 FECES                  03-27 @ 11:40 URINE CATHETER                  03-27 @ 09:47 BLOOD PERIPHERAL                  NO ORGANISMS ISOLATED  NO ORGANISMS ISOLATED AT 48 HRS.  03-27 @ 01:00 FECES                  03-26 @ 15:16 STOOL                        RESPIRATORY CULTURES:          Studies  Chest X-RAY  CT SCAN Chest   Venous Dopplers: LE:   CT Abdomen  Others

## 2019-03-29 NOTE — PROGRESS NOTE ADULT - SUBJECTIVE AND OBJECTIVE BOX
[facility] [place]   MD MAGALY MORAN MD ANGELA WONG, PA    TEL:  OFFICE: [phone]  DR ANDERSON CELL: [phone]  DR. MONAE CELL: [phone]  HUBERT WELLINGTON CELL: [phone]        Patient is a 73y old  Male who presents with a chief complaint of Generalized weakness and inability to walk (29 Mar 2019 07:42)      Patient seen and examined at bedside. No chest pain/sob    VITALS:  T(F): 97.6 (03-29-19 @ 05:35), Max: 98 (03-28-19 @ 16:30)  HR: 70 (03-29-19 @ 05:35)  BP: 132/71 (03-29-19 @ 05:35)  RR: 16 (03-29-19 @ 05:35)  SpO2: 98% (03-29-19 @ 05:35)  Wt(kg): --    03-28 @ 07:01  -  03-29 @ 07:00  --------------------------------------------------------  IN: 150 mL / OUT: 425 mL / NET: -275 mL          PHYSICAL EXAM:  Constitutional: NAD  Neck: No JVD  Respiratory: CTAB, no wheezes, rales or rhonchi  Cardiovascular: S1, S2, RRR  Gastrointestinal: BS+, soft, NT/ND  Extremities: No peripheral edema    Hospital Medications:   MEDICATIONS  (STANDING):  cholecalciferol 1000 Unit(s) Oral daily  dextrose 5%. 1000 milliLiter(s) (60 mL/Hr) IV Continuous <Continuous>  lactobacillus acidophilus 1 Tablet(s) Oral daily  mesalamine DR Capsule 800 milliGRAM(s) Oral three times a day  metoprolol succinate ER 25 milliGRAM(s) Oral daily  multivitamin 1 Tablet(s) Oral daily  pantoprazole    Tablet 40 milliGRAM(s) Oral before breakfast  thiamine IVPB 500 milliGRAM(s) IV Intermittent daily      LABS:  03-29    143  |  117<H>  |  21  ----------------------------<  106<H>  3.5   |  18<L>  |  1.34<H>    Ca    7.6<L>      29 Mar 2019 05:20  Phos  2.5     03-29  Mg     1.6     03-29    TPro  6.6  /  Alb  1.8<L>  /  TBili  0.7  /  DBili      /  AST  23  /  ALT  21  /  AlkPhos  264<H>  03-28    Creatinine Trend: 1.34 <--, 1.58 <--, 1.58 <--, 1.67 <--, 1.91 <--, 1.80 <--, 1.67 <--    Phosphorus Level, Serum: 2.5 mg/dL (03-29 @ 05:20)  Iron Total, Serum: 48 ug/dL (03-29 @ 05:20)  Ferritin, Serum: 378.6 ng/mL (03-29 @ 05:20)                              8.8    11.12 )-----------( 60       ( 29 Mar 2019 05:20 )             28.0     Urine Studies:  Urinalysis - [03-27-19 @ 11:20]      Color YELLOW / Appearance CLEAR / SG 1.020 / pH 6.0      Gluc NEGATIVE / Ketone NEGATIVE  / Bili NEGATIVE / Urobili TRACE       Blood TRACE / Protein 50 / Leuk Est TRACE / Nitrite NEGATIVE      RBC 0-2 / WBC 11-25 / Hyaline 2-5 / Gran  / Sq Epi FEW / Non Sq Epi  / Bacteria MODERATE    Urine Creatinine 145.40      [03-25-19 @ 20:45]  Urine Sodium 21      [03-25-19 @ 20:45]  Urine Osmolality 495      [03-27-19 @ 06:16]    Iron 48, TIBC 78, %sat --      [03-29-19 @ 05:20]  Ferritin 378.6      [03-29-19 @ 05:20]  Vitamin D (25OH) 25.6      [03-23-19 @ 18:15]  TSH 1.98      [03-05-19 @ 06:45]    HBsAb Nonreactive      [03-20-19 @ 05:35]  HBsAg NEGATIVE      [03-20-19 @ 05:35]  HCV 0.30 S/CO, Nonreactive i-nexus  [address] [place] [address], [address]  Phone: [phone]  Fax:   [phone]  :           KATY DAMON MD      [03-03-19 @ 20:30]    TODD: titer Negative, pattern --      [03-08-19 @ 06:30]    RADIOLOGY & ADDITIONAL STUDIES:

## 2019-03-29 NOTE — PROGRESS NOTE ADULT - PROBLEM SELECTOR PLAN 1
He has resp alkalosis as well as some metabolic acidosis: He has had lot of diarrhea: before: His chest x-ray with poor inspiration with bibasilar atelectasis other wise lung are clear: I DOUBT HE H IS HAVING  PE: But would do vq scan as wellas dopplers: Could it be related to diarrhea: He is off antibiotics at this time. I don't seem much of pneumonia on chest x-ray: His last ct scan is reviewed too: had some TIB opacities in upper lobes suggestive of bronchiolitis: CHF and bronchomalacia:  3/28: vq scan could not be dome yesterday: will attempt today: his previos dopplers were negative: new ABG is awaited:  3/29: ABG is pretty good: stable:

## 2019-03-29 NOTE — PROGRESS NOTE ADULT - SUBJECTIVE AND OBJECTIVE BOX
Patient is a 73y old  Male who presents with a chief complaint of Generalized weakness and inability to walk (29 Mar 2019 14:36)      SUBJECTIVE / OVERNIGHT EVENTS:    Events noted.  C/o weakness  No N/V/abd pain  No CP    MEDICATIONS  (STANDING):  cholecalciferol 1000 Unit(s) Oral daily  dextrose 5%. 1000 milliLiter(s) (60 mL/Hr) IV Continuous <Continuous>  lactobacillus acidophilus 1 Tablet(s) Oral daily  mesalamine DR Capsule 800 milliGRAM(s) Oral three times a day  metoprolol succinate ER 25 milliGRAM(s) Oral daily  multivitamin 1 Tablet(s) Oral daily  pantoprazole    Tablet 40 milliGRAM(s) Oral before breakfast  sodium bicarbonate 650 milliGRAM(s) Oral three times a day  thiamine IVPB 500 milliGRAM(s) IV Intermittent daily    MEDICATIONS  (PRN):        CAPILLARY BLOOD GLUCOSE        I&O's Summary    28 Mar 2019 07:01  -  29 Mar 2019 07:00  --------------------------------------------------------  IN: 150 mL / OUT: 425 mL / NET: -275 mL        PHYSICAL EXAM:  GENERAL: NAD  NECK: Supple, No JVD  CHEST/LUNG: Clear to auscultation bilaterally; No wheezing.  HEART: Regular rate and rhythm; No murmurs, rubs, or gallops  ABDOMEN: Soft, Nontender, Nondistended; Bowel sounds present  EXTREMITIES:   No edema  NEUROLOGY: Awake      LABS:                        8.8    11.12 )-----------( 60       ( 29 Mar 2019 05:20 )             28.0     03-29    143  |  117<H>  |  21  ----------------------------<  106<H>  3.5   |  18<L>  |  1.34<H>    Ca    7.6<L>      29 Mar 2019 05:20  Phos  2.5     03-29  Mg     1.6     03-29    TPro  6.6  /  Alb  1.8<L>  /  TBili  0.7  /  DBili  x   /  AST  23  /  ALT  21  /  AlkPhos  264<H>  03-28            CAPILLARY BLOOD GLUCOSE        03-27 @ 18:54  Culture-urine --  Culture results --  method type --  Organism --  Organism Identification --  Specimen source FECES  03-27 @ 14:21  Culture-urine --  Culture results --  method type --  Organism --  Organism Identification --  Specimen source FECES  03-27 @ 11:40  Culture-urine   ENTFC^Enterococcus faecium  COLONY COUNT: > = 100,000 CFU/ML  Culture results --  method type --  Organism --  Organism Identification --  Specimen source URINE CATHETER  03-27 @ 09:47  Culture-urine --  Culture results --  method type --  Organism --  Organism Identification --  Specimen source BLOOD PERIPHERAL  03-27 @ 01:03  Culture-urine --  Culture results --  method type --  Organism --  Organism Identification --  Specimen source FECES  03-27 @ 01:00  Culture-urine --  Culture results --  method type --  Organism --  Organism Identification --  Specimen source FECES  03-26 @ 15:16  Culture-urine --  Culture results --  method type --  Organism --  Organism Identification --  Specimen source STOOL           03-27 @ 18:54  Culture blood --  Culture results --  Gram stain --  Gram stain blood --  Method type --  Organism --  Organism identification --  Specimen source FECES   03-27 @ 14:21  Culture blood --  Culture results --  Gram stain --  Gram stain blood --  Method type --  Organism --  Organism identification --  Specimen source FECES   03-27 @ 11:40  Culture blood --  Culture results --  Gram stain --  Gram stain blood --  Method type --  Organism --  Organism identification --  Specimen source URINE CATHETER   03-27 @ 09:47  Culture blood   NO ORGANISMS ISOLATED  NO ORGANISMS ISOLATED AT 48 HRS.  Culture results --  Gram stain --  Gram stain blood --  Method type --  Organism --  Organism identification --  Specimen source BLOOD PERIPHERAL   03-27 @ 01:03  Culture blood --  Culture results --  Gram stain --  Gram stain blood --  Method type --  Organism --  Organism identification --  Specimen source FECES   03-27 @ 01:00  Culture blood --  Culture results --  Gram stain --  Gram stain blood --  Method type --  Organism --  Organism identification --  Specimen source FECES   03-26 @ 15:16  Culture blood --  Culture results --  Gram stain --  Gram stain blood --  Method type --  Organism --  Organism identification --  Specimen source STOOL      RADIOLOGY & ADDITIONAL TESTS:    Imaging Personally Reviewed:    Consultant(s) Notes Reviewed:      Care Discussed with Consultants/Other Providers:

## 2019-03-29 NOTE — PROGRESS NOTE ADULT - SUBJECTIVE AND OBJECTIVE BOX
Chief Complaint:  Patient is a 73y old  Male who presents with a chief complaint of Generalized weakness and inability to walk (28 Mar 2019 22:03)      Interval Events:   Patient with continued AMS and with assisted fall early this morning.    Allergies:  No Known Allergies      Hospital Medications:  cholecalciferol 1000 Unit(s) Oral daily  dextrose 5%. 1000 milliLiter(s) IV Continuous <Continuous>  lactobacillus acidophilus 1 Tablet(s) Oral daily  mesalamine DR Capsule 800 milliGRAM(s) Oral three times a day  metoprolol succinate ER 25 milliGRAM(s) Oral daily  pantoprazole    Tablet 40 milliGRAM(s) Oral before breakfast  thiamine IVPB 500 milliGRAM(s) IV Intermittent daily      PMHX/PSHX:  Chronic kidney disease (CKD)  Anemia, unspecified type  Essential hypertension  CLL (chronic lymphocytic leukemia)  No significant past surgical history  No significant past surgical history      Family history:  Family history of myocardial infarction (Sibling)          PHYSICAL EXAM:     GENERAL:  confused  HEENT:  NC/AT,  conjunctivae clear, sclera -anicteric  CHEST:  Full & symmetric excursion, no increased  HEART:  Regular rhythm  ABDOMEN:  Soft, non-tender, +distended, normoactive bowel sounds,  no masses ,no hepato-splenomegaly,   EXTREMITIES:  no cyanosis,clubbing or edema  SKIN:  No rash/erythema/ecchymoses/petechiae/wounds/abscess/warm/dry    Vital Signs:  Vital Signs Last 24 Hrs  T(C): 36.4 (29 Mar 2019 05:35), Max: 36.9 (28 Mar 2019 12:15)  T(F): 97.6 (29 Mar 2019 05:35), Max: 98.4 (28 Mar 2019 12:15)  HR: 70 (29 Mar 2019 05:35) (70 - 88)  BP: 132/71 (29 Mar 2019 05:35) (102/51 - 137/73)  BP(mean): --  RR: 16 (29 Mar 2019 05:35) (16 - 19)  SpO2: 98% (29 Mar 2019 05:35) (98% - 100%)  Daily     Daily Weight in k.3 (28 Mar 2019 13:08)    LABS:                        8.8    11.12 )-----------( 60       ( 29 Mar 2019 05:20 )             28.0     03-29    143  |  117<H>  |  21  ----------------------------<  106<H>  3.5   |  18<L>  |  1.34<H>    Ca    7.6<L>      29 Mar 2019 05:20  Phos  2.5       Mg     1.6         TPro  6.6  /  Alb  1.8<L>  /  TBili  0.7  /  DBili  x   /  AST  23  /  ALT  21  /  AlkPhos  264<H>      LIVER FUNCTIONS - ( 28 Mar 2019 05:30 )  Alb: 1.8 g/dL / Pro: 6.6 g/dL / ALK PHOS: 264 u/L / ALT: 21 u/L / AST: 23 u/L / GGT: x             Urinalysis Basic - ( 27 Mar 2019 11:20 )    Color: YELLOW / Appearance: CLEAR / S.020 / pH: 6.0  Gluc: NEGATIVE / Ketone: NEGATIVE  / Bili: NEGATIVE / Urobili: TRACE   Blood: TRACE / Protein: 50 / Nitrite: NEGATIVE   Leuk Esterase: TRACE / RBC: 0-2 / WBC 11-25   Sq Epi: FEW / Non Sq Epi: x / Bacteria: MODERATE          Imaging:

## 2019-03-29 NOTE — EEG REPORT - NS EEG TEXT BOX
[facility]   COMPREHENSIVE EPILEPSY CENTER   REPORT OF ROUTINE VIDEO EEG     [facility]: [address], [place] [address], Ph#: [phone]  [facility]: [address], [place] [address], Ph#: [phone]  Office: [address] [place] [address], [facility] [address], [place] [address] Ph#: [phone]    Patient Name: BONIFACIO GUERRERO  Age and : 73y ([birth year])  MRN #: [id number]  Location: [place] [address] A  Referring Physician: Cesario Antoine    Study Date: 19    _____________________________________________________________  TECHNICAL INFORMATION    Placement and Labeling of Electrodes:  The EEG was performed utilizing 20 channels referential EEG connections (coronal over temporal over parasagittal montage) using all standard 10-20 electrode placements with EKG.  Recording was at a sampling rate of 256 samples per second per channel.  Time synchronized digital video recording was done simultaneously with EEG recording.  A low light infrared camera was used for low light recording.  Lopez and seizure detection algorithms were utilized.    _____________________________________________________________  HISTORY    Patient is a 73y old  Male who presents with a chief complaint of Generalized weakness and inability to walk (29 Mar 2019 07:42)      PERTINENT MEDICATION:  none    _____________________________________________________________  STUDY INTERPRETATION    Findings: The background was continuous, spontaneously variable and reactive. During wakefulness, the posterior dominant rhythm consisted of symmetric, well-modulated 6-7 Hz activity, with amplitude to 30 uV, that attenuated to eye opening.     Background Slowing:  Diffuse theta and polymorphic delta slowing.    Focal Slowing:   None were present.    Sleep Background:  Drowsiness and stage II sleep transients were not recorded.    Other Non-Epileptiform Findings:  None were present.    Interictal Epileptiform Activity:   None were present.    Events:  Clinical events: None recorded.  Seizures: None recorded.    Activation Procedures:   Hyperventilation was not performed.    Photic stimulation was not performed.     Artifacts:  Intermittent myogenic and movement artifacts were noted.    ECG:  The heart rate on single channel ECG was predominantly between xx BPM.    _____________________________________________________________  EEG SUMMARY/CLASSIFICATION    Abnormal EEG in the awake state.  - Mild to moderate generalized slowing.    _____________________________________________________________  EEG IMPRESSION/CLINICAL CORRELATE    Abnormal EEG study.  1. Mild to moderate nonspecific diffuse or multifocal cerebral dysfunction.   2. No epileptiform pattern or seizure seen.      _____________________________________________________________    Destiny Olea MD  Attending Physician, [facility] [place]

## 2019-03-29 NOTE — CHART NOTE - NSCHARTNOTEFT_GEN_A_CORE
Notified by nurse pt had assisted fall. Per nurse she heard call bell and found pt in bed with feet daggling, she assisted him to the floor. She denies pt hitting head or any injuries.   Pt seen and evaluated, resting in bed, denies any pain or complaints. Pt Advised to use call bell for assistance.    vitals stable Notified by nurse pt had assisted fall. Per nurse she heard call bell and found pt in bed with feet dangling, she assisted him to the floor. She denies pt hitting head or any injuries.   Pt seen and evaluated, resting in bed, denies any pain or complaints. Pt still with AMS.   -vitals stable  -will continue to monitor   -Fall precaution   -Bed alarm   -Enhanced supervision

## 2019-03-29 NOTE — PROGRESS NOTE ADULT - ASSESSMENT
Mr. Miranda is a 72 yo man with history of Anemia, HTN, chronic diarrhea, and recent hospitalization at [facility] from 3/3/19-3/21/19 brought in by family for generalized weakness and inability to walk.    Patient was discharged home with home PT services on 3/21/19 after being hospitalized since 3/3/19. During his hospitalization, he was managed for chronic diarrhea presumed to be 2/2 IBD since infectious and autoimmune w/u was negative, anemia due to blood loss from GI tract 2/2 PUD, requiring blood transfusion, and ESBL E. coli UTI with ertapenem via PICC. He was also determined not to have the diagnosis of CLL after flow cytometry results were available.     HPI was obtained primarily from patient's daughter and caregiver, Bee. As per daughter, when patient came home he was very weak. She wanted to bathe him but patient was unable to stand or walk to the bathroom. Due to his severe weakness, his daughter brought him back to the hospital. (23 Mar 2019 08:22)      Recommend:    - s/p completion of  ertapenem 1 week course for ESBL e.coli UTI on 3/25.     - Pt with diarrhea, thus far infectious w/u negative including stool cx, O&P (including microsporidium, cyclospora, isospora, cryptosporidium), gi pcr, cdiff, strongyloides neg, giardia neg, cmv pcr (-), cmv igG (+), IgM (-), HIV (-).      - Pt with moderate ascited, and ?cirrhosis on CT ap.  Hep A/B/C serologies neg.     - stool for AFB negative    -GI eval noted, repeat stool studies ordered including repeat gi pcr, giardia, stool o&p, stool cx.   r/o autoimmune and celiac disease.       - Pt may need colonoscopy for biopsy if stool studies negative.  CMV IgG (+), recommend biopsy to r/o cmv colitis - pt for sigmoidoscopy on 3/29         - Pt with new hypothermia and change in mental status/disoriented.  Chck bcx x 2, repeat UA, urine cx.  Cxr no consolidations seen.  CTH no acute hemorrhage or stroke.  F/u CT chest and CTap.  Repeat UA is unremarkable, ucx grew enterococcus, possibly contaminant.  Will monitor off abx for now, unless pt develops worsening fever or leukocytosis.              Will follow       Adeline Marques  [phone]

## 2019-03-29 NOTE — PROGRESS NOTE ADULT - ATTENDING COMMENTS
Diarrhea persists without change. No melena or hematochezia. PE/labs as noted. Sigmoidoscopy planned for today but not able to proceed and will be rescheduled in view of recent nuclear scan. Complete stool studies as outlined above.

## 2019-03-29 NOTE — PROGRESS NOTE ADULT - ASSESSMENT
Mr. Miranda is a 74 yo man with history of Anemia, HTN, PUD, chronic diarrhea, and recent hospitalization at [facility] from 3/3/19-3/21/19 brought in by family for generalized weakness and inability to walk admitted for severe malnutrition, mild hypernatremia and severe deconditioning. Exam relatively benign aside for significant LE edema and weakness of legs. Anemia at baseline.

## 2019-03-30 LAB
-  AMPICILLIN: SIGNIFICANT CHANGE UP
-  CIPROFLOXACIN: SIGNIFICANT CHANGE UP
-  NITROFURANTOIN: SIGNIFICANT CHANGE UP
-  TETRACYCLINE: SIGNIFICANT CHANGE UP
-  VANCOMYCIN: SIGNIFICANT CHANGE UP
ANION GAP SERPL CALC-SCNC: 9 MMO/L — SIGNIFICANT CHANGE UP (ref 7–14)
BACTERIA UR CULT: SIGNIFICANT CHANGE UP
BUN SERPL-MCNC: 19 MG/DL — SIGNIFICANT CHANGE UP (ref 7–23)
CALCIUM SERPL-MCNC: 7.9 MG/DL — LOW (ref 8.4–10.5)
CHLORIDE SERPL-SCNC: 113 MMOL/L — HIGH (ref 98–107)
CO2 SERPL-SCNC: 18 MMOL/L — LOW (ref 22–31)
CREAT SERPL-MCNC: 1.3 MG/DL — SIGNIFICANT CHANGE UP (ref 0.5–1.3)
GLUCOSE SERPL-MCNC: 78 MG/DL — SIGNIFICANT CHANGE UP (ref 70–99)
HCT VFR BLD CALC: 34.4 % — LOW (ref 39–50)
HGB BLD-MCNC: 10.3 G/DL — LOW (ref 13–17)
MAGNESIUM SERPL-MCNC: 1.5 MG/DL — LOW (ref 1.6–2.6)
MCHC RBC-ENTMCNC: 28.7 PG — SIGNIFICANT CHANGE UP (ref 27–34)
MCHC RBC-ENTMCNC: 29.9 % — LOW (ref 32–36)
MCV RBC AUTO: 95.8 FL — SIGNIFICANT CHANGE UP (ref 80–100)
METHOD TYPE: SIGNIFICANT CHANGE UP
NRBC # FLD: 0.05 K/UL — SIGNIFICANT CHANGE UP (ref 0–0)
ORGANISM # SPEC MICROSCOPIC CNT: SIGNIFICANT CHANGE UP
ORGANISM # SPEC MICROSCOPIC CNT: SIGNIFICANT CHANGE UP
PHOSPHATE SERPL-MCNC: 2.9 MG/DL — SIGNIFICANT CHANGE UP (ref 2.5–4.5)
PLATELET # BLD AUTO: 70 K/UL — LOW (ref 150–400)
PMV BLD: SIGNIFICANT CHANGE UP FL (ref 7–13)
POTASSIUM SERPL-MCNC: 4.4 MMOL/L — SIGNIFICANT CHANGE UP (ref 3.5–5.3)
POTASSIUM SERPL-SCNC: 4.4 MMOL/L — SIGNIFICANT CHANGE UP (ref 3.5–5.3)
RBC # BLD: 3.59 M/UL — LOW (ref 4.2–5.8)
RBC # FLD: 21.5 % — HIGH (ref 10.3–14.5)
SODIUM SERPL-SCNC: 140 MMOL/L — SIGNIFICANT CHANGE UP (ref 135–145)
WBC # BLD: 8.87 K/UL — SIGNIFICANT CHANGE UP (ref 3.8–10.5)
WBC # FLD AUTO: 8.87 K/UL — SIGNIFICANT CHANGE UP (ref 3.8–10.5)

## 2019-03-30 RX ORDER — MAGNESIUM SULFATE 500 MG/ML
1 VIAL (ML) INJECTION ONCE
Qty: 0 | Refills: 0 | Status: COMPLETED | OUTPATIENT
Start: 2019-03-30 | End: 2019-03-30

## 2019-03-30 RX ORDER — SIMETHICONE 80 MG/1
80 TABLET, CHEWABLE ORAL
Qty: 0 | Refills: 0 | Status: DISCONTINUED | OUTPATIENT
Start: 2019-03-30 | End: 2019-04-12

## 2019-03-30 RX ADMIN — Medication 800 MILLIGRAM(S): at 06:32

## 2019-03-30 RX ADMIN — PANTOPRAZOLE SODIUM 40 MILLIGRAM(S): 20 TABLET, DELAYED RELEASE ORAL at 06:32

## 2019-03-30 RX ADMIN — Medication 650 MILLIGRAM(S): at 14:28

## 2019-03-30 RX ADMIN — Medication 25 MILLIGRAM(S): at 06:32

## 2019-03-30 RX ADMIN — SODIUM CHLORIDE 60 MILLILITER(S): 9 INJECTION, SOLUTION INTRAVENOUS at 14:27

## 2019-03-30 RX ADMIN — SIMETHICONE 80 MILLIGRAM(S): 80 TABLET, CHEWABLE ORAL at 16:30

## 2019-03-30 RX ADMIN — Medication 100 GRAM(S): at 12:10

## 2019-03-30 RX ADMIN — Medication 1000 UNIT(S): at 12:10

## 2019-03-30 RX ADMIN — Medication 800 MILLIGRAM(S): at 14:28

## 2019-03-30 RX ADMIN — SODIUM CHLORIDE 60 MILLILITER(S): 9 INJECTION, SOLUTION INTRAVENOUS at 06:33

## 2019-03-30 RX ADMIN — SODIUM CHLORIDE 60 MILLILITER(S): 9 INJECTION, SOLUTION INTRAVENOUS at 21:23

## 2019-03-30 RX ADMIN — Medication 1 TABLET(S): at 12:10

## 2019-03-30 RX ADMIN — Medication 105 MILLIGRAM(S): at 14:30

## 2019-03-30 RX ADMIN — Medication 650 MILLIGRAM(S): at 21:24

## 2019-03-30 RX ADMIN — Medication 800 MILLIGRAM(S): at 21:24

## 2019-03-30 RX ADMIN — Medication 650 MILLIGRAM(S): at 06:32

## 2019-03-30 NOTE — PROGRESS NOTE ADULT - PROBLEM SELECTOR PLAN 1
renal function improving, FEna<1 likely prerenal. continue current IVF. daughter at bedside today stating patient is still not eating and is having multiple watery diarrhea.   no hydro on US  avoid nephrotoxic agents  monitor bmp.

## 2019-03-30 NOTE — PROGRESS NOTE ADULT - ATTENDING COMMENTS
rpt ct chest  3/29: rpt ct chest is m uch better: cirrhosis: ? has cirrhosis  3/30: doing pretty good: from pulm point of view:

## 2019-03-30 NOTE — PROGRESS NOTE ADULT - SUBJECTIVE AND OBJECTIVE BOX
[facility] [place] NEPHROLOGY PRACTICE   MD MAGALY MORAN MD ANGELA WONG, PA    TEL:  OFFICE: [phone]  DR ANDERSON CELL: [phone]  DR. MONAE CELL: [phone]  HUBERT WELLINGTON CELL: [phone]        Patient is a 73y old  Male who presents with a chief complaint of Generalized weakness and inability to walk (29 Mar 2019 17:07)      Patient seen and examined at bedside. No chest pain/sob    VITALS:  T(F): 97.3 (03-30-19 @ 12:36), Max: 98 (03-29-19 @ 14:47)  HR: 67 (03-30-19 @ 12:36)  BP: 103/65 (03-30-19 @ 12:36)  RR: 18 (03-30-19 @ 12:36)  SpO2: 100% (03-30-19 @ 12:36)  Wt(kg): --    03-30 @ 07:01  -  03-30 @ 12:38  --------------------------------------------------------  IN: 0 mL / OUT: 700 mL / NET: -700 mL          PHYSICAL EXAM:  Constitutional: NAD  Neck: No JVD  Respiratory: CTAB, no wheezes, rales or rhonchi  Cardiovascular: S1, S2, RRR  Gastrointestinal: BS+, soft, NT/ND  Extremities: No peripheral edema    Hospital Medications:   MEDICATIONS  (STANDING):  cholecalciferol 1000 Unit(s) Oral daily  dextrose 5%. 1000 milliLiter(s) (60 mL/Hr) IV Continuous <Continuous>  lactobacillus acidophilus 1 Tablet(s) Oral daily  mesalamine DR Capsule 800 milliGRAM(s) Oral three times a day  metoprolol succinate ER 25 milliGRAM(s) Oral daily  multivitamin 1 Tablet(s) Oral daily  pantoprazole    Tablet 40 milliGRAM(s) Oral before breakfast  sodium bicarbonate 650 milliGRAM(s) Oral three times a day  thiamine IVPB 500 milliGRAM(s) IV Intermittent daily      LABS:  03-30    140  |  113<H>  |  19  ----------------------------<  78  4.4   |  18<L>  |  1.30    Ca    7.9<L>      30 Mar 2019 05:36  Phos  2.9     03-30  Mg     1.5     03-30      Creatinine Trend: 1.30 <--, 1.34 <--, 1.58 <--, 1.58 <--, 1.67 <--, 1.91 <--, 1.80 <--    Phosphorus Level, Serum: 2.9 mg/dL (03-30 @ 05:36)                              10.3   8.87  )-----------( 70       ( 30 Mar 2019 05:36 )             34.4     Urine Studies:  Urinalysis - [03-27-19 @ 11:20]      Color YELLOW / Appearance CLEAR / SG 1.020 / pH 6.0      Gluc NEGATIVE / Ketone NEGATIVE  / Bili NEGATIVE / Urobili TRACE       Blood TRACE / Protein 50 / Leuk Est TRACE / Nitrite NEGATIVE      RBC 0-2 / WBC 11-25 / Hyaline 2-5 / Gran  / Sq Epi FEW / Non Sq Epi  / Bacteria MODERATE    Urine Creatinine 145.40      [03-25-19 @ 20:45]  Urine Sodium 21      [03-25-19 @ 20:45]  Urine Osmolality 495      [03-27-19 @ 06:16]    Iron 48, TIBC 78, %sat --      [03-29-19 @ 05:20]  Ferritin 378.6      [03-29-19 @ 05:20]  Vitamin D (25OH) 25.6      [03-23-19 @ 18:15]  TSH 1.98      [03-05-19 @ 06:45]    HBsAb Nonreactive      [03-20-19 @ 05:35]  HBsAg NEGATIVE      [03-20-19 @ 05:35]  HCV 0.30 S/CO, Nonreactive [facility]  [address] [place] [address], [address]  Phone: [phone]  Fax:   [phone]  :           KATY DAMON MD      [03-03-19 @ 20:30]    TODD: titer Negative, pattern --      [03-08-19 @ 06:30]    RADIOLOGY & ADDITIONAL STUDIES:

## 2019-03-30 NOTE — PROGRESS NOTE ADULT - SUBJECTIVE AND OBJECTIVE BOX
Patient is a 73y old  Male who presents with a chief complaint of Generalized weakness and inability to walk (30 Mar 2019 13:26)      SUBJECTIVE / OVERNIGHT EVENTS:    Events noted.  No N/Abd pain  Rectal tube    MEDICATIONS  (STANDING):  cholecalciferol 1000 Unit(s) Oral daily  dextrose 5%. 1000 milliLiter(s) (60 mL/Hr) IV Continuous <Continuous>  lactobacillus acidophilus 1 Tablet(s) Oral daily  mesalamine DR Capsule 800 milliGRAM(s) Oral three times a day  metoprolol succinate ER 25 milliGRAM(s) Oral daily  multivitamin 1 Tablet(s) Oral daily  pantoprazole    Tablet 40 milliGRAM(s) Oral before breakfast  sodium bicarbonate 650 milliGRAM(s) Oral three times a day    MEDICATIONS  (PRN):  dicyclomine 20 milliGRAM(s) Oral four times a day before meals PRN abd pain  simethicone 80 milliGRAM(s) Chew four times a day PRN Gas        CAPILLARY BLOOD GLUCOSE        I&O's Summary    30 Mar 2019 07:01  -  30 Mar 2019 18:46  --------------------------------------------------------  IN: 0 mL / OUT: 700 mL / NET: -700 mL        PHYSICAL EXAM:    NECK: Supple, No JVD  CHEST/LUNG: Clear to auscultation bilaterally; No wheezing.  HEART: Regular rate and rhythm; No murmurs, rubs, or gallops  ABDOMEN: Soft, Nontender, Nondistended; Bowel sounds present  EXTREMITIES:   No edema  NEUROLOGY: Awake      LABS:                        10.3   8.87  )-----------( 70       ( 30 Mar 2019 05:36 )             34.4     03-30    140  |  113<H>  |  19  ----------------------------<  78  4.4   |  18<L>  |  1.30    Ca    7.9<L>      30 Mar 2019 05:36  Phos  2.9     03-30  Mg     1.5     03-30              CAPILLARY BLOOD GLUCOSE        03-27 @ 18:54  Culture-urine --  Culture results --  method type --  Organism --  Organism Identification --  Specimen source FECES  03-27 @ 14:21  Culture-urine --  Culture results --  method type --  Organism --  Organism Identification --  Specimen source FECES  03-27 @ 11:40  Culture-urine   CULTURE IN PROGRESS, FURTHER REPORT TO FOLLOW.  ENTFC^Enterococcus faecium  COLONY COUNT: > = 100,000 CFU/ML  Culture results --  method type POSITIVE FERNANDO 29  Organism Enterococcus faecium  Organism Identification Enterococcus faecium  Specimen source URINE CATHETER  03-27 @ 09:47  Culture-urine --  Culture results --  method type --  Organism --  Organism Identification --  Specimen source BLOOD PERIPHERAL  03-27 @ 01:03  Culture-urine --  Culture results --  method type --  Organism --  Organism Identification --  Specimen source FECES  03-27 @ 01:00  Culture-urine --  Culture results --  method type --  Organism --  Organism Identification --  Specimen source FECES  03-26 @ 15:16  Culture-urine --  Culture results --  method type --  Organism --  Organism Identification --  Specimen source STOOL           03-27 @ 18:54  Culture blood --  Culture results --  Gram stain --  Gram stain blood --  Method type --  Organism --  Organism identification --  Specimen source FECES   03-27 @ 14:21  Culture blood --  Culture results --  Gram stain --  Gram stain blood --  Method type --  Organism --  Organism identification --  Specimen source FECES   03-27 @ 11:40  Culture blood --  Culture results --  Gram stain --  Gram stain blood --  Method type POSITIVE FERNANDO 29  Organism Enterococcus faecium  Organism identification Enterococcus faecium  Specimen source URINE CATHETER   03-27 @ 09:47  Culture blood   NO ORGANISMS ISOLATED  NO ORGANISMS ISOLATED AT 72 HRS.  Culture results --  Gram stain --  Gram stain blood --  Method type --  Organism --  Organism identification --  Specimen source BLOOD PERIPHERAL   03-27 @ 01:03  Culture blood --  Culture results --  Gram stain --  Gram stain blood --  Method type --  Organism --  Organism identification --  Specimen source FECES   03-27 @ 01:00  Culture blood --  Culture results --  Gram stain --  Gram stain blood --  Method type --  Organism --  Organism identification --  Specimen source FECES   03-26 @ 15:16  Culture blood --  Culture results --  Gram stain --  Gram stain blood --  Method type --  Organism --  Organism identification --  Specimen source STOOL      RADIOLOGY & ADDITIONAL TESTS:    Imaging Personally Reviewed:    Consultant(s) Notes Reviewed:      Care Discussed with Consultants/Other Providers:

## 2019-03-30 NOTE — PROGRESS NOTE ADULT - ASSESSMENT
· Assessment	  Mr. Miranda is a 74 yo man with history of Anemia, HTN, PUD, chronic diarrhea, and recent hospitalization at [facility] from 3/3/19-3/21/19 brought in by family for generalized weakness and inability to walk admitted for severe malnutrition, mild hypernatremia and severe deconditioning. Exam relatively benign aside for significant LE edema and weakness of legs. Anemia at baseline.      Problem/Plan - 1:  ·  Problem: Hypernatremia.  Plan: -poor po intake,ivf  -PEDRO/CKD- nephro f/up noted.      Problem/Plan - 2:  ·  Problem: Severe malnutrition.  Plan: Pt with hypoalbuminemia and significant peripheral edema c/w with severe protein-calorie malnutrition. Swelling in feet b/l may be contributing to inability to walk compared to prior.   - regular lactose-free diet     Problem/Plan - 3:  ·  Problem: UTI due to extended-spectrum beta lactamase (ESBL) producing Escherichia coli.  Plan: Off abx.      Problem/Plan - 4:  ·  Problem: Essential hypertension.  Plan: BP currently normotensive.   - continue metoprolol succinate 25mg daily.      Problem/Plan - 5:  ·  Problem: Anemia, unspecified type.  Plan: Hb/Hct stable. Suspect due to anemia of chronic disease, especially in the setting of underlying CKD. Patient not iron deficient based on prior testing. No signs of current/active bleeding     Problem/Plan - 6:  Problem: PUD (peptic ulcer disease). Plan: Patient s/p EGD and push enteroscopy earlier this month. Pt found to have gastritis with non-bleeding ulcers and chronic active duodenitis with gastric foveolar metaplasia on pathology. Negative for H. pylori  - continue pantoprazole 40mg daily.     Problem/Plan - 7:  ·  Problem: Chronic diarrhea.  Plan: GI f/up noted.  -Flex sigmoidoscopy mon/tuesday    Dw family.

## 2019-03-30 NOTE — CHART NOTE - NSCHARTNOTEFT_GEN_A_CORE
Gastroenterology Brief Note   - Attempt made to perform unsedated flex sig yesterday   - patient unfortunately could not be brought into endoscopy room due to radioactive status given NM perfusion scan and inability to contact radiation clearance   - will tentatively plan for sedated flex sign Monday or Tuesday

## 2019-03-30 NOTE — PROGRESS NOTE ADULT - PROBLEM SELECTOR PLAN 1
He has resp alkalosis as well as some metabolic acidosis: He has had lot of diarrhea: before: His chest x-ray with poor inspiration with bibasilar atelectasis other wise lung are clear: I DOUBT HE H IS HAVING  PE: But would do vq scan as wellas dopplers: Could it be related to diarrhea: He is off antibiotics at this time. I don't seem much of pneumonia on chest x-ray: His last ct scan is reviewed too: had some TIB opacities in upper lobes suggestive of bronchiolitis: CHF and bronchomalacia:  3/28: vq scan could not be dome yesterday: will attempt today: his previos dopplers were negative: new ABG is awaited:  3/29: ABG is pretty good: stable:  3/30; seems to be doing ok : no SOB: Breathing wise is OK :

## 2019-03-30 NOTE — PROGRESS NOTE ADULT - SUBJECTIVE AND OBJECTIVE BOX
Patient is a 73y old  Male who presents with a chief complaint of Generalized weakness and inability to walk (30 Mar 2019 12:38)      Any change in ROS: Pt is doing ok : no SOB :  daughter at bedside:     MEDICATIONS  (STANDING):  cholecalciferol 1000 Unit(s) Oral daily  dextrose 5%. 1000 milliLiter(s) (60 mL/Hr) IV Continuous <Continuous>  lactobacillus acidophilus 1 Tablet(s) Oral daily  mesalamine DR Capsule 800 milliGRAM(s) Oral three times a day  metoprolol succinate ER 25 milliGRAM(s) Oral daily  multivitamin 1 Tablet(s) Oral daily  pantoprazole    Tablet 40 milliGRAM(s) Oral before breakfast  sodium bicarbonate 650 milliGRAM(s) Oral three times a day  thiamine IVPB 500 milliGRAM(s) IV Intermittent daily    MEDICATIONS  (PRN):    Vital Signs Last 24 Hrs  T(C): 36.3 (30 Mar 2019 12:36), Max: 36.7 (29 Mar 2019 14:47)  T(F): 97.3 (30 Mar 2019 12:36), Max: 98 (29 Mar 2019 14:47)  HR: 67 (30 Mar 2019 12:36) (67 - 80)  BP: 103/65 (30 Mar 2019 12:36) (103/65 - 128/63)  BP(mean): --  RR: 18 (30 Mar 2019 12:36) (16 - 18)  SpO2: 100% (30 Mar 2019 12:36) (100% - 100%)    I&O's Summary    30 Mar 2019 07:01  -  30 Mar 2019 13:27  --------------------------------------------------------  IN: 0 mL / OUT: 700 mL / NET: -700 mL          Physical Exam:   GENERAL: NAD, well-groomed, well-developed  HEENT: TODD/   Atraumatic, Normocephalic  ENMT: No tonsillar erythema, exudates, or enlargement; Moist mucous membranes, Good dentition, No lesions  NECK: Supple, No JVD, Normal thyroid  CHEST/LUNG: Clear to auscultaion  CVS: Regular rate and rhythm; No murmurs, rubs, or gallops  GI: : rectal tube:   NERVOUS SYSTEM:  Alert & Oriented X2  EXTREMITIES:  2+ Peripheral Pulses, No clubbing, cyanosis, or edema  LYMPH: No lymphadenopathy noted  SKIN: No rashes or lesions  ENDOCRINOLOGY: No Thyromegaly  PSYCH: Appropriate    Labs:  ABG - ( 29 Mar 2019 05:40 )  pH, Arterial: 7.41  pH, Blood: x     /  pCO2: 31    /  pO2: 86    / HCO3: 21    / Base Excess: -4.7  /  SaO2: 97.1            20                            10.3   8.87  )-----------( 70       ( 30 Mar 2019 05:36 )             34.4                         8.8    11.12 )-----------( 60       ( 29 Mar 2019 05:20 )             28.0                         8.7    12.73 )-----------( 70       ( 28 Mar 2019 09:45 )             28.8                         10.0   9.18  )-----------( 67       ( 27 Mar 2019 05:30 )             33.1     03-30    140  |  113<H>  |  19  ----------------------------<  78  4.4   |  18<L>  |  1.30  03-29    143  |  117<H>  |  21  ----------------------------<  106<H>  3.5   |  18<L>  |  1.34<H>  03-28    144  |  117<H>  |  24<H>  ----------------------------<  96  3.9   |  20<L>  |  1.58<H>  03-27    146<H>  |  118<H>  |  24<H>  ----------------------------<  104<H>  3.6   |  20<L>  |  1.58<H>    Ca    7.9<L>      30 Mar 2019 05:36  Ca    7.6<L>      29 Mar 2019 05:20  Phos  2.9     03-30  Phos  2.5     03-29  Mg     1.5     03-30  Mg     1.6     03-29    TPro  6.6  /  Alb  1.8<L>  /  TBili  0.7  /  DBili  x   /  AST  23  /  ALT  21  /  AlkPhos  264<H>  03-28    CAPILLARY BLOOD GLUCOSE            < from: CT Chest No Cont (03.28.19 @ 19:51) >  ADRENALS: Unremarkable.  KIDNEYS/URETERS: No hydronephrosis.  No renal or ureteral stone.    BLADDER: Within normal limits.  REPRODUCTIVE ORGANS: The prostate and seminal vesicles are normal.    BOWEL: Normal in course and caliber without obstruction. Appendix is   normal. Rectal catheter in place.  PERITONEUM/RETROPERITONEUM: No pneumoperitoneum. Moderate free fluid in   the abdomen and pelvis.   VESSELS:  No evidence of aortic aneurysm. Marked vascular calcifications.  BONES/SOFT TISSUES: Anasarca. Bilateral healing anterolateral rib   fractures.    IMPRESSION: Marked interval improvement in appearance of the lungs with   minimal scattered groundglass opacities remaining.  Moderate ascites. Question of cirrhosis.                  MARY ALICE AMES M.D. ATTENDING RADIOLOGIST  This document has been electronically signed. Mar 29 2019  9:22AM        < end of copied text >            RECENT CULTURES:  03-27 @ 18:54 FECES                  03-27 @ 14:21 FECES                  03-27 @ 11:40 URINE CATHETER                  03-27 @ 09:47 BLOOD PERIPHERAL                  NO ORGANISMS ISOLATED  NO ORGANISMS ISOLATED AT 72 HRS.  03-27 @ 01:03 FECES                  03-27 @ 01:00 FECES                  03-26 @ 15:16 STOOL                        RESPIRATORY CULTURES:          Studies  Chest X-RAY  CT SCAN Chest   Venous Dopplers: LE:   CT Abdomen  Others

## 2019-03-31 LAB
ANION GAP SERPL CALC-SCNC: 10 MMO/L — SIGNIFICANT CHANGE UP (ref 7–14)
BUN SERPL-MCNC: 17 MG/DL — SIGNIFICANT CHANGE UP (ref 7–23)
CALCIUM SERPL-MCNC: 7.4 MG/DL — LOW (ref 8.4–10.5)
CHLORIDE SERPL-SCNC: 107 MMOL/L — SIGNIFICANT CHANGE UP (ref 98–107)
CO2 SERPL-SCNC: 17 MMOL/L — LOW (ref 22–31)
CREAT SERPL-MCNC: 1.23 MG/DL — SIGNIFICANT CHANGE UP (ref 0.5–1.3)
GLUCOSE SERPL-MCNC: 80 MG/DL — SIGNIFICANT CHANGE UP (ref 70–99)
HCT VFR BLD CALC: 31.9 % — LOW (ref 39–50)
HGB BLD-MCNC: 9.6 G/DL — LOW (ref 13–17)
MAGNESIUM SERPL-MCNC: 1.7 MG/DL — SIGNIFICANT CHANGE UP (ref 1.6–2.6)
MCHC RBC-ENTMCNC: 28.8 PG — SIGNIFICANT CHANGE UP (ref 27–34)
MCHC RBC-ENTMCNC: 30.1 % — LOW (ref 32–36)
MCV RBC AUTO: 95.8 FL — SIGNIFICANT CHANGE UP (ref 80–100)
NRBC # FLD: 0.03 K/UL — SIGNIFICANT CHANGE UP (ref 0–0)
PHOSPHATE SERPL-MCNC: 2.7 MG/DL — SIGNIFICANT CHANGE UP (ref 2.5–4.5)
PLATELET # BLD AUTO: 35 K/UL — LOW (ref 150–400)
PMV BLD: SIGNIFICANT CHANGE UP FL (ref 7–13)
POTASSIUM SERPL-MCNC: 3.4 MMOL/L — LOW (ref 3.5–5.3)
POTASSIUM SERPL-SCNC: 3.4 MMOL/L — LOW (ref 3.5–5.3)
RBC # BLD: 3.33 M/UL — LOW (ref 4.2–5.8)
RBC # FLD: 21.3 % — HIGH (ref 10.3–14.5)
SODIUM SERPL-SCNC: 134 MMOL/L — LOW (ref 135–145)
WBC # BLD: 10.26 K/UL — SIGNIFICANT CHANGE UP (ref 3.8–10.5)
WBC # FLD AUTO: 10.26 K/UL — SIGNIFICANT CHANGE UP (ref 3.8–10.5)

## 2019-03-31 RX ORDER — CALCIUM CARBONATE 500(1250)
1 TABLET ORAL
Qty: 0 | Refills: 0 | Status: DISCONTINUED | OUTPATIENT
Start: 2019-03-31 | End: 2019-04-03

## 2019-03-31 RX ORDER — POTASSIUM CHLORIDE 20 MEQ
10 PACKET (EA) ORAL
Qty: 0 | Refills: 0 | Status: COMPLETED | OUTPATIENT
Start: 2019-03-31 | End: 2019-03-31

## 2019-03-31 RX ORDER — POTASSIUM CHLORIDE 20 MEQ
20 PACKET (EA) ORAL ONCE
Qty: 0 | Refills: 0 | Status: DISCONTINUED | OUTPATIENT
Start: 2019-03-31 | End: 2019-03-31

## 2019-03-31 RX ADMIN — Medication 1 TABLET(S): at 11:50

## 2019-03-31 RX ADMIN — Medication 100 MILLIEQUIVALENT(S): at 16:33

## 2019-03-31 RX ADMIN — SIMETHICONE 80 MILLIGRAM(S): 80 TABLET, CHEWABLE ORAL at 05:20

## 2019-03-31 RX ADMIN — Medication 100 MILLIEQUIVALENT(S): at 13:18

## 2019-03-31 RX ADMIN — Medication 100 MILLIEQUIVALENT(S): at 11:50

## 2019-03-31 RX ADMIN — Medication 650 MILLIGRAM(S): at 21:58

## 2019-03-31 RX ADMIN — Medication 1000 UNIT(S): at 11:50

## 2019-03-31 RX ADMIN — PANTOPRAZOLE SODIUM 40 MILLIGRAM(S): 20 TABLET, DELAYED RELEASE ORAL at 05:20

## 2019-03-31 RX ADMIN — Medication 25 MILLIGRAM(S): at 05:20

## 2019-03-31 RX ADMIN — SODIUM CHLORIDE 60 MILLILITER(S): 9 INJECTION, SOLUTION INTRAVENOUS at 05:19

## 2019-03-31 RX ADMIN — Medication 800 MILLIGRAM(S): at 21:58

## 2019-03-31 RX ADMIN — Medication 800 MILLIGRAM(S): at 05:20

## 2019-03-31 RX ADMIN — Medication 650 MILLIGRAM(S): at 05:20

## 2019-03-31 NOTE — PROGRESS NOTE ADULT - PROBLEM SELECTOR PLAN 1
He has resp alkalosis as well as some metabolic acidosis: He has had lot of diarrhea: before: His chest x-ray with poor inspiration with bibasilar atelectasis other wise lung are clear: I DOUBT HE H IS HAVING  PE: But would do vq scan as wellas dopplers: Could it be related to diarrhea: He is off antibiotics at this time. I don't seem much of pneumonia on chest x-ray: His last ct scan is reviewed too: had some TIB opacities in upper lobes suggestive of bronchiolitis: CHF and bronchomalacia:  3/28: vq scan could not be dome yesterday: will attempt today: his previos dopplers were negative: new ABG is awaited:  3/29: ABG is pretty good: stable:  3/30; seems to be doing ok : no SOB: Breathing wise is OK :  3/31> breathing wise stable: no wheezing

## 2019-03-31 NOTE — CHART NOTE - NSCHARTNOTEFT_GEN_A_CORE
Gastroenterology Brief Note   - NPO for flex sig tomorrow   - to be done for diarrhea   - tap water enemas at 6 am x 2

## 2019-03-31 NOTE — PROGRESS NOTE ADULT - ASSESSMENT
Mr. Miranda is a 72 yo man with history of Anemia, HTN, PUD, chronic diarrhea, and recent hospitalization at [facility] from 3/3/19-3/21/19 brought in by family for generalized weakness and inability to walk admitted for severe malnutrition, mild hypernatremia and severe deconditioning. Exam relatively benign aside for significant LE edema and weakness of legs. Anemia at baseline.

## 2019-03-31 NOTE — PROGRESS NOTE ADULT - PROBLEM SELECTOR PLAN 1
renal function improving, FEna<1 likely prerenal  Patient states does not eat full tray and had diarrhea x 1 today  IVF discontinued  no hydro on US  avoid nephrotoxic agents  monitor bmp.  Encourage eating

## 2019-03-31 NOTE — PROGRESS NOTE ADULT - SUBJECTIVE AND OBJECTIVE BOX
Patient seen and examined at bedside. No chest pain/sob    VITALS:  T(F): 97.2 (03-31-19 @ 12:25), Max: 97.6 (03-30-19 @ 21:22)  HR: 65 (03-31-19 @ 12:25)  BP: 108/65 (03-31-19 @ 12:25)  RR: 18 (03-31-19 @ 12:25)  SpO2: 100% (03-31-19 @ 12:25)  Wt(kg): --    03-30 @ 07:01  -  03-31 @ 07:00  --------------------------------------------------------  IN: 0 mL / OUT: 700 mL / NET: -700 mL          PHYSICAL EXAM:  Constitutional: NAD  Neck: No JVD  Respiratory: CTAB, no wheezes, rales or rhonchi  Cardiovascular: S1, S2, RRR  Gastrointestinal: BS+, soft, NT/ND  Extremities: (+) right foot edema    Hospital Medications:   MEDICATIONS  (STANDIcalcium carbonate   1250 mG (OsCal) 1 Tablet(s) Oral two times a day  cholecalciferol 1000 Unit(s) Oral daily  lactobacillus acidophilus 1 Tablet(s) Oral daily  mesalamine DR Capsule 800 milliGRAM(s) Oral three times a day  metoprolol succinate ER 25 milliGRAM(s) Oral daily  multivitamin 1 Tablet(s) Oral daily  pantoprazole    Tablet 40 milliGRAM(s) Oral before breakfast  potassium chloride  10 mEq/100 mL IVPB 10 milliEquivalent(s) IV Intermittent every 1 hour  sodium bicarbonate 650 milliGRAM(s) Oral three times a day      LABS:  03-31    134<L>  |  107  |  17  ----------------------------<  80  3.4<L>   |  17<L>  |  1.23    Ca    7.4<L>      31 Mar 2019 05:05  Phos  2.7     03-31  Mg     1.7     03-31      Creatinine Trend: 1.23 <--, 1.30 <--, 1.34 <--, 1.58 <--, 1.58 <--, 1.67 <--  Phosphorus Level, Serum: 2.7 mg/dL (03-31 @ 05:05)                              9.6    10.26 )-----------( 35       ( 31 Mar 2019 05:05 )             31.9     Urine Studies:  Osmolality, Random Urine: 495 mosmo/kg (03-27 @ 06:16)  Creatinine, Random Urine: 145.40 mg/dL (03-25 @ 20:45)  Sodium, Random Urine: 21 mmol/L (03-25 @ 20:45)    Osmolality, Random Urine: 495 mosmo/kg (03-27 @ 06:16)  Creatinine, Random Urine: 145.40 mg/dL (03-25 @ 20:45)  Sodium, Random Urine: 21 mmol/L (03-25 @ 20:45)    Urinalysis - [03-27-19 @ 11:20]      Color YELLOW / Appearance CLEAR / SG 1.020 / pH 6.0      Gluc NEGATIVE / Ketone NEGATIVE  / Bili NEGATIVE / Urobili TRACE       Blood TRACE / Protein 50 / Leuk Est TRACE / Nitrite NEGATIVE      RBC 0-2 / WBC 11-25 / Hyaline 2-5 / Gran  / Sq Epi FEW / Non Sq Epi  / Bacteria MODERATE    Urine Creatinine 145.40      [03-25-19 @ 20:45]  Urine Sodium 21      [03-25-19 @ 20:45]  Urine Osmolality 495      [03-27-19 @ 06:16]    Iron 48, TIBC 78, %sat --      [03-29-19 @ 05:20]  Ferritin 378.6      [03-29-19 @ 05:20]  Vitamin D (25OH) 25.6      [03-23-19 @ 18:15]  TSH 1.98      [03-05-19 @ 06:45]    HBsAb Nonreactive      [03-20-19 @ 05:35]  HBsAg NEGATIVE      [03-20-19 @ 05:35]  HCV 0.30 S/CO, Nonreactive [facility] [employer]  [address] [place] [address], [address]  Phone: [phone]  Fax:   [phone]  :           KAYT DAMON MD      [03-03-19 @ 20:30]    TODD: titer Negative, pattern --      [03-08-19 @ 06:30]    RADIOLOGY & ADDITIONAL STUDIES:

## 2019-03-31 NOTE — PROGRESS NOTE ADULT - SUBJECTIVE AND OBJECTIVE BOX
Patient is a 73y old  Male who presents with a chief complaint of Generalized weakness and inability to walk (31 Mar 2019 15:20)      SUBJECTIVE / OVERNIGHT EVENTS:    Events noted.  Rectal tube  No N/V/Abd pain    MEDICATIONS  (STANDING):  calcium carbonate   1250 mG (OsCal) 1 Tablet(s) Oral two times a day  cholecalciferol 1000 Unit(s) Oral daily  lactobacillus acidophilus 1 Tablet(s) Oral daily  mesalamine DR Capsule 800 milliGRAM(s) Oral three times a day  metoprolol succinate ER 25 milliGRAM(s) Oral daily  multivitamin 1 Tablet(s) Oral daily  pantoprazole    Tablet 40 milliGRAM(s) Oral before breakfast  sodium bicarbonate 650 milliGRAM(s) Oral three times a day    MEDICATIONS  (PRN):  dicyclomine 20 milliGRAM(s) Oral four times a day before meals PRN abd pain  simethicone 80 milliGRAM(s) Chew four times a day PRN Gas        CAPILLARY BLOOD GLUCOSE        I&O's Summary    30 Mar 2019 07:01  -  31 Mar 2019 07:00  --------------------------------------------------------  IN: 0 mL / OUT: 700 mL / NET: -700 mL        PHYSICAL EXAM:  GENERAL: NAD  NECK: Supple, No JVD  CHEST/LUNG: Clear to auscultation bilaterally; No wheezing.  HEART: Regular rate and rhythm; No murmurs, rubs, or gallops  ABDOMEN: Soft, Nontender, Nondistended; Bowel sounds present  EXTREMITIES:   No edema  NEUROLOGY: AAO      LABS:                        9.6    10.26 )-----------( 35       ( 31 Mar 2019 05:05 )             31.9     03-31    134<L>  |  107  |  17  ----------------------------<  80  3.4<L>   |  17<L>  |  1.23    Ca    7.4<L>      31 Mar 2019 05:05  Phos  2.7     03-31  Mg     1.7     03-31              CAPILLARY BLOOD GLUCOSE        03-27 @ 18:54  Culture-urine --  Culture results --  method type --  Organism --  Organism Identification --  Specimen source FECES  03-27 @ 14:21  Culture-urine --  Culture results --  method type --  Organism --  Organism Identification --  Specimen source FECES  03-27 @ 11:40  Culture-urine   CULTURE IN PROGRESS, FURTHER REPORT TO FOLLOW.  ENTFC^Enterococcus faecium  COLONY COUNT: > = 100,000 CFU/ML  Culture results --  method type POSITIVE FERNANDO 29  Organism Enterococcus faecium  Organism Identification Enterococcus faecium  Specimen source URINE CATHETER  03-27 @ 09:47  Culture-urine --  Culture results --  method type --  Organism --  Organism Identification --  Specimen source BLOOD PERIPHERAL  03-27 @ 01:03  Culture-urine --  Culture results --  method type --  Organism --  Organism Identification --  Specimen source FECES  03-27 @ 01:00  Culture-urine --  Culture results --  method type --  Organism --  Organism Identification --  Specimen source FECES  03-26 @ 15:16  Culture-urine --  Culture results --  method type --  Organism --  Organism Identification --  Specimen source STOOL           03-27 @ 18:54  Culture blood --  Culture results --  Gram stain --  Gram stain blood --  Method type --  Organism --  Organism identification --  Specimen source FECES   03-27 @ 14:21  Culture blood --  Culture results --  Gram stain --  Gram stain blood --  Method type --  Organism --  Organism identification --  Specimen source FECES   03-27 @ 11:40  Culture blood --  Culture results --  Gram stain --  Gram stain blood --  Method type POSITIVE FERNANDO 29  Organism Enterococcus faecium  Organism identification Enterococcus faecium  Specimen source URINE CATHETER   03-27 @ 09:47  Culture blood   NO ORGANISMS ISOLATED  NO ORGANISMS ISOLATED AT 96 HOURS  Culture results --  Gram stain --  Gram stain blood --  Method type --  Organism --  Organism identification --  Specimen source BLOOD PERIPHERAL   03-27 @ 01:03  Culture blood --  Culture results --  Gram stain --  Gram stain blood --  Method type --  Organism --  Organism identification --  Specimen source FECES   03-27 @ 01:00  Culture blood --  Culture results --  Gram stain --  Gram stain blood --  Method type --  Organism --  Organism identification --  Specimen source FECES   03-26 @ 15:16  Culture blood --  Culture results --  Gram stain --  Gram stain blood --  Method type --  Organism --  Organism identification --  Specimen source STOOL      RADIOLOGY & ADDITIONAL TESTS:    Imaging Personally Reviewed:    Consultant(s) Notes Reviewed:      Care Discussed with Consultants/Other Providers:

## 2019-03-31 NOTE — PROGRESS NOTE ADULT - SUBJECTIVE AND OBJECTIVE BOX
Patient is a 73y old  Male who presents with a chief complaint of Generalized weakness and inability to walk (31 Mar 2019 13:25)      Any change in ROS: Cadence gok : no SOB :      MEDICATIONS  (STANDING):  calcium carbonate   1250 mG (OsCal) 1 Tablet(s) Oral two times a day  cholecalciferol 1000 Unit(s) Oral daily  lactobacillus acidophilus 1 Tablet(s) Oral daily  mesalamine DR Capsule 800 milliGRAM(s) Oral three times a day  metoprolol succinate ER 25 milliGRAM(s) Oral daily  multivitamin 1 Tablet(s) Oral daily  pantoprazole    Tablet 40 milliGRAM(s) Oral before breakfast  potassium chloride  10 mEq/100 mL IVPB 10 milliEquivalent(s) IV Intermittent every 1 hour  sodium bicarbonate 650 milliGRAM(s) Oral three times a day    MEDICATIONS  (PRN):  dicyclomine 20 milliGRAM(s) Oral four times a day before meals PRN abd pain  simethicone 80 milliGRAM(s) Chew four times a day PRN Gas    Vital Signs Last 24 Hrs  T(C): 36.2 (31 Mar 2019 12:25), Max: 36.4 (30 Mar 2019 21:22)  T(F): 97.2 (31 Mar 2019 12:25), Max: 97.6 (30 Mar 2019 21:22)  HR: 65 (31 Mar 2019 12:25) (65 - 83)  BP: 108/65 (31 Mar 2019 12:25) (108/53 - 117/63)  BP(mean): --  RR: 18 (31 Mar 2019 12:25) (18 - 18)  SpO2: 100% (31 Mar 2019 12:25) (100% - 100%)    I&O's Summary    30 Mar 2019 07:01  -  31 Mar 2019 07:00  --------------------------------------------------------  IN: 0 mL / OUT: 700 mL / NET: -700 mL          Physical Exam:   GENERAL: NAD, well-groomed, well-developed  HEENT: TODD/   Atraumatic, Normocephalic  ENMT: No tonsillar erythema, exudates, or enlargement; Moist mucous membranes, Good dentition, No lesions  NECK: Supple, No JVD, Normal thyroid  CHEST/LUNG: Clear to auscultaion  CVS: Regular rate and rhythm; No murmurs, rubs, or gallops  GI: : Soft, Nontender, Nondistended; Bowel sounds present  NERVOUS SYSTEM:  Alert & Oriented X2-3  EXTREMITIES:  2+ Peripheral Pulses, No clubbing, cyanosis, or edema  LYMPH: No lymphadenopathy noted  SKIN: No rashes or lesions  ENDOCRINOLOGY: No Thyromegaly  PSYCH: Appropriate    Labs:  20                            9.6    10.26 )-----------( 35       ( 31 Mar 2019 05:05 )             31.9                         10.3   8.87  )-----------( 70       ( 30 Mar 2019 05:36 )             34.4                         8.8    11.12 )-----------( 60       ( 29 Mar 2019 05:20 )             28.0                         8.7    12.73 )-----------( 70       ( 28 Mar 2019 09:45 )             28.8     03-31    134<L>  |  107  |  17  ----------------------------<  80  3.4<L>   |  17<L>  |  1.23  03-30    140  |  113<H>  |  19  ----------------------------<  78  4.4   |  18<L>  |  1.30  03-29    143  |  117<H>  |  21  ----------------------------<  106<H>  3.5   |  18<L>  |  1.34<H>  03-28    144  |  117<H>  |  24<H>  ----------------------------<  96  3.9   |  20<L>  |  1.58<H>    Ca    7.4<L>      31 Mar 2019 05:05  Ca    7.9<L>      30 Mar 2019 05:36  Phos  2.7     03-31  Phos  2.9     03-30  Mg     1.7     03-31  Mg     1.5     03-30    TPro  6.6  /  Alb  1.8<L>  /  TBili  0.7  /  DBili  x   /  AST  23  /  ALT  21  /  AlkPhos  264<H>  03-28    CAPILLARY BLOOD GLUCOSE      < from: CT Chest No Cont (03.28.19 @ 19:51) >  before. Interval improvement in appearance of the parenchyma since the   prior exam with minimal residual groundglass opacities bilaterally.  PLEURA: Small bilateral pleural effusions, new on the right.    VESSELS: Mild calcifications of thoracic aorta and moderate coronary   artery calcifications.  MEDIASTINUM: Heart size is normal. No pericardial effusion. No   mediastinal, hilar, axillary or supraclavicular lymphadenopathy.  CHEST WALL AND LOWERNECK: Mild bilateral gynecomastia.        ABDOMEN AND PELVIS:    LIVER: Marked steatosis. Question of cirrhosis.  BILE DUCTS: Normal caliber.  GALLBLADDER: Gallstone.  SPLEEN: Unremarkable.  PANCREAS: Unremarkable.  ADRENALS: Unremarkable.  KIDNEYS/URETERS: No hydronephrosis.  No renal or ureteral stone.    BLADDER: Within normal limits.  REPRODUCTIVE ORGANS: The prostate and seminal vesicles are normal.    BOWEL: Normal in course and caliber without obstruction. Appendix is   normal. Rectal catheter in place.  PERITONEUM/RETROPERITONEUM: No pneumoperitoneum. Moderate free fluid in   the abdomen and pelvis.   VESSELS:  No evidence of aortic aneurysm. Marked vascular calcifications.  BONES/SOFT TISSUES: Anasarca. Bilateral healing anterolateral rib   fractures.    IMPRESSION: Marked interval improvement in appearance of the lungs with   minimal scattered groundglass opacities remaining.  Moderate ascites. Question of cirrhosis.                  MARY ALICE AMES M.D. ATTENDING RADIOLOGIST  This document has been electronically signed. Mar 29 2019  9:22AM        < end of copied text >                  RECENT CULTURES:  03-27 @ 18:54 FECES                  03-27 @ 14:21 FECES                  03-27 @ 11:40 URINE CATHETER   POSITIVE FERNANDO 29      Enterococcus faecium  Enterococcus faecium       03-27 @ 09:47 BLOOD PERIPHERAL                  NO ORGANISMS ISOLATED  NO ORGANISMS ISOLATED AT 96 HOURS  03-27 @ 01:03 FECES                  03-27 @ 01:00 FECES                  03-26 @ 15:16 STOOL                        RESPIRATORY CULTURES:          Studies  Chest X-RAY  CT SCAN Chest   Venous Dopplers: LE:   CT Abdomen  Others

## 2019-03-31 NOTE — PROGRESS NOTE ADULT - ASSESSMENT
· Assessment	  Mr. Miranda is a 74 yo man with history of Anemia, HTN, PUD, chronic diarrhea, and recent hospitalization at [facility] from 3/3/19-3/21/19 brought in by family for generalized weakness and inability to walk admitted for severe malnutrition, mild hypernatremia and severe deconditioning. Exam relatively benign aside for significant LE edema and weakness of legs. Anemia at baseline.      Problem/Plan - 1:  ·  Problem: Hypernatremia.  Plan: -poor po intake,ivf  -PEDRO/CKD- nephro f/up noted.      Problem/Plan - 2:  ·  Problem: Severe malnutrition.  Plan: Pt with hypoalbuminemia and significant peripheral edema c/w with severe protein-calorie malnutrition. Swelling in feet b/l may be contributing to inability to walk compared to prior.   - regular lactose-free diet     Problem/Plan - 3:  ·  Problem: UTI due to extended-spectrum beta lactamase (ESBL) producing Escherichia coli.  Plan: Off abx.      Problem/Plan - 4:  ·  Problem: Essential hypertension.  Plan: BP currently normotensive.   - continue metoprolol succinate 25mg daily.      Problem/Plan - 5:  ·  Problem: Anemia, unspecified type.  Plan: Hb/Hct stable. Suspect due to anemia of chronic disease, especially in the setting of underlying CKD. Patient not iron deficient based on prior testing. No signs of current/active bleeding     Problem/Plan - 6:  Problem: PUD (peptic ulcer disease). Plan: Patient s/p EGD and push enteroscopy earlier this month. Pt found to have gastritis with non-bleeding ulcers and chronic active duodenitis with gastric foveolar metaplasia on pathology. Negative for H. pylori  - continue pantoprazole 40mg daily.     Problem/Plan - 7:  ·  Problem: Chronic diarrhea.  Plan: GI f/up noted.  -Flex sigmoidoscopy mon/tuesday    Dw family.

## 2019-03-31 NOTE — PROGRESS NOTE ADULT - ASSESSMENT
72 yo man with history of Anemia, HTN, chronic diarrhea, and recent hospitalization at [facility] from 3/3/19-3/21/19 brought in by family for generalized weakness and inability to walk found to have james and hypernatremia

## 2019-04-01 LAB
ANION GAP SERPL CALC-SCNC: 11 MMO/L — SIGNIFICANT CHANGE UP (ref 7–14)
APPEARANCE UR: CLEAR — SIGNIFICANT CHANGE UP
BACTERIA # UR AUTO: SIGNIFICANT CHANGE UP
BACTERIA BLD CULT: SIGNIFICANT CHANGE UP
BILIRUB UR-MCNC: NEGATIVE — SIGNIFICANT CHANGE UP
BLOOD UR QL VISUAL: NEGATIVE — SIGNIFICANT CHANGE UP
BUN SERPL-MCNC: 15 MG/DL — SIGNIFICANT CHANGE UP (ref 7–23)
CA-I BLD-SCNC: 1.15 MMOL/L — SIGNIFICANT CHANGE UP (ref 1.03–1.23)
CALCIUM SERPL-MCNC: 7.9 MG/DL — LOW (ref 8.4–10.5)
CHLORIDE SERPL-SCNC: 109 MMOL/L — HIGH (ref 98–107)
CO2 SERPL-SCNC: 19 MMOL/L — LOW (ref 22–31)
COLOR SPEC: YELLOW — SIGNIFICANT CHANGE UP
CREAT SERPL-MCNC: 1.19 MG/DL — SIGNIFICANT CHANGE UP (ref 0.5–1.3)
GLUCOSE SERPL-MCNC: 63 MG/DL — LOW (ref 70–99)
GLUCOSE UR-MCNC: NEGATIVE — SIGNIFICANT CHANGE UP
HCT VFR BLD CALC: 32.2 % — LOW (ref 39–50)
HGB BLD-MCNC: 9.6 G/DL — LOW (ref 13–17)
HYALINE CASTS # UR AUTO: NEGATIVE — SIGNIFICANT CHANGE UP
KETONES UR-MCNC: NEGATIVE — SIGNIFICANT CHANGE UP
LEUKOCYTE ESTERASE UR-ACNC: NEGATIVE — SIGNIFICANT CHANGE UP
MAGNESIUM SERPL-MCNC: 1.6 MG/DL — SIGNIFICANT CHANGE UP (ref 1.6–2.6)
MCHC RBC-ENTMCNC: 28.7 PG — SIGNIFICANT CHANGE UP (ref 27–34)
MCHC RBC-ENTMCNC: 29.8 % — LOW (ref 32–36)
MCV RBC AUTO: 96.4 FL — SIGNIFICANT CHANGE UP (ref 80–100)
NITRITE UR-MCNC: NEGATIVE — SIGNIFICANT CHANGE UP
NRBC # FLD: 0.04 K/UL — SIGNIFICANT CHANGE UP (ref 0–0)
PH UR: 6 — SIGNIFICANT CHANGE UP (ref 5–8)
PHOSPHATE SERPL-MCNC: 3.2 MG/DL — SIGNIFICANT CHANGE UP (ref 2.5–4.5)
PLATELET # BLD AUTO: 63 K/UL — LOW (ref 150–400)
PMV BLD: SIGNIFICANT CHANGE UP FL (ref 7–13)
POTASSIUM SERPL-MCNC: 4.2 MMOL/L — SIGNIFICANT CHANGE UP (ref 3.5–5.3)
POTASSIUM SERPL-SCNC: 4.2 MMOL/L — SIGNIFICANT CHANGE UP (ref 3.5–5.3)
PROT UR-MCNC: 30 — SIGNIFICANT CHANGE UP
RBC # BLD: 3.34 M/UL — LOW (ref 4.2–5.8)
RBC # FLD: 21.6 % — HIGH (ref 10.3–14.5)
RBC CASTS # UR COMP ASSIST: SIGNIFICANT CHANGE UP (ref 0–?)
SODIUM SERPL-SCNC: 139 MMOL/L — SIGNIFICANT CHANGE UP (ref 135–145)
SP GR SPEC: 1.01 — SIGNIFICANT CHANGE UP (ref 1–1.04)
SQUAMOUS # UR AUTO: SIGNIFICANT CHANGE UP
UROBILINOGEN FLD QL: NORMAL — SIGNIFICANT CHANGE UP
WBC # BLD: 7.26 K/UL — SIGNIFICANT CHANGE UP (ref 3.8–10.5)
WBC # FLD AUTO: 7.26 K/UL — SIGNIFICANT CHANGE UP (ref 3.8–10.5)
WBC UR QL: SIGNIFICANT CHANGE UP (ref 0–?)

## 2019-04-01 PROCEDURE — 88342 IMHCHEM/IMCYTCHM 1ST ANTB: CPT | Mod: 26

## 2019-04-01 PROCEDURE — 45385 COLONOSCOPY W/LESION REMOVAL: CPT | Mod: GC

## 2019-04-01 PROCEDURE — 88305 TISSUE EXAM BY PATHOLOGIST: CPT | Mod: 26

## 2019-04-01 PROCEDURE — 71045 X-RAY EXAM CHEST 1 VIEW: CPT | Mod: 26

## 2019-04-01 PROCEDURE — 45380 COLONOSCOPY AND BIOPSY: CPT | Mod: GC,59

## 2019-04-01 RX ORDER — SODIUM BICARBONATE 1 MEQ/ML
650 SYRINGE (ML) INTRAVENOUS
Qty: 0 | Refills: 0 | Status: DISCONTINUED | OUTPATIENT
Start: 2019-04-01 | End: 2019-04-03

## 2019-04-01 RX ORDER — VANCOMYCIN HCL 1 G
1000 VIAL (EA) INTRAVENOUS ONCE
Qty: 0 | Refills: 0 | Status: COMPLETED | OUTPATIENT
Start: 2019-04-01 | End: 2019-04-01

## 2019-04-01 RX ADMIN — Medication 25 MILLIGRAM(S): at 05:28

## 2019-04-01 RX ADMIN — Medication 650 MILLIGRAM(S): at 17:13

## 2019-04-01 RX ADMIN — Medication 800 MILLIGRAM(S): at 14:19

## 2019-04-01 RX ADMIN — Medication 1 TABLET(S): at 14:20

## 2019-04-01 RX ADMIN — Medication 1000 UNIT(S): at 14:19

## 2019-04-01 RX ADMIN — Medication 1 TABLET(S): at 17:09

## 2019-04-01 RX ADMIN — Medication 800 MILLIGRAM(S): at 21:09

## 2019-04-01 RX ADMIN — Medication 1 TABLET(S): at 14:19

## 2019-04-01 RX ADMIN — Medication 250 MILLIGRAM(S): at 17:12

## 2019-04-01 RX ADMIN — Medication 650 MILLIGRAM(S): at 05:28

## 2019-04-01 NOTE — PROGRESS NOTE ADULT - ASSESSMENT
Mr. Miranda is a 72 yo man with history of Anemia, HTN, chronic diarrhea, and recent hospitalization at [facility] from 3/3/19-3/21/19 brought in by family for generalized weakness and inability to walk.    Patient was discharged home with home PT services on 3/21/19 after being hospitalized since 3/3/19. During his hospitalization, he was managed for chronic diarrhea presumed to be 2/2 IBD since infectious and autoimmune w/u was negative, anemia due to blood loss from GI tract 2/2 PUD, requiring blood transfusion, and ESBL E. coli UTI with ertapenem via PICC. He was also determined not to have the diagnosis of CLL after flow cytometry results were available.     HPI was obtained primarily from patient's daughter and caregiver, Bee. As per daughter, when patient came home he was very weak. She wanted to bathe him but patient was unable to stand or walk to the bathroom. Due to his severe weakness, his daughter brought him back to the hospital. (23 Mar 2019 08:22)      Recommend:    - s/p completion of  ertapenem 1 week course for ESBL e.coli UTI on 3/25.     - Pt with diarrhea, thus far infectious w/u negative including stool cx, O&P (including microsporidium, cyclospora, isospora, cryptosporidium), gi pcr, cdiff, strongyloides neg, giardia neg, cmv pcr (-), cmv igG (+), IgM (-), HIV (-).      - Pt with moderate ascites, and ?cirrhosis on CT ap.  Hep A/B/C serologies neg.     - stool for AFB negative    -GI eval noted, repeat stool studies ordered including repeat gi pcr, giardia, stool o&p, stool cx.   r/o autoimmune and celiac disease.       -  CMV IgG (+), recommend biopsy to r/o cmv colitis - pt for sigmoidoscopy on 4/1.  f/u path        - Pt with intermittent hypothermia and change in mental status/disoriented.   Repeat Ucx grew enterococcus faecium, UA was unremarkable at that time.  Given recurrent hypothermia, will opt to treat - start vancomycin to treat enterococcus.      - Repeat ua, ucx, cxr, bcx ordered today.  Thus far ua is neg, and cxr without consolidations.  Pt is hemodynamically stable.  Will follow             Will follow       Adeline Marques  [phone]

## 2019-04-01 NOTE — PROGRESS NOTE ADULT - ASSESSMENT
· Assessment	  Mr. Miranda is a 74 yo man with history of Anemia, HTN, PUD, chronic diarrhea, and recent hospitalization at [facility] from 3/3/19-3/21/19 brought in by family for generalized weakness and inability to walk admitted for severe malnutrition, mild hypernatremia and severe deconditioning. Exam relatively benign aside for significant LE edema and weakness of legs. Anemia at baseline.        Problem/Plan - 7:  ·  Problem: Chronic diarrhea.  Plan: GI/ID f/up noted.  -S/p Colonoscopy and biopsy.  -Mesalamine

## 2019-04-01 NOTE — CONSULT NOTE ADULT - SUBJECTIVE AND OBJECTIVE BOX
Cardiovascular Disease Initial Evaluation    CHIEF COMPLAINT: Weakness    HISTORY OF PRESENT ILLNESS:  This is a 73 year old man with HTN, anemia, chronic diarrhea and recent UTI who presented to [facility] on 3/22/2019 with generalized weakness. Mr. Miranda was found to have severe malnutrition and hypernatremia from decreased PO intake. Furthermore his diarrhea is persistent. The plan is for flex-sig by GI. The patient denies pain.     Allergies  No Known Allergies  	    MEDICATIONS:  metoprolol succinate ER 25 milliGRAM(s) Oral daily  dicyclomine 20 milliGRAM(s) Oral four times a day before meals PRN  mesalamine DR Capsule 800 milliGRAM(s) Oral three times a day  pantoprazole    Tablet 40 milliGRAM(s) Oral before breakfast  simethicone 80 milliGRAM(s) Chew four times a day PRN  calcium carbonate   1250 mG (OsCal) 1 Tablet(s) Oral two times a day  cholecalciferol 1000 Unit(s) Oral daily  multivitamin 1 Tablet(s) Oral daily      PAST MEDICAL & SURGICAL HISTORY:  Chronic kidney disease (CKD)  Anemia, unspecified type  Essential hypertension  No significant past surgical history      FAMILY HISTORY:  Family history of myocardial infarction (Sibling): Father, mother, brother      SOCIAL HISTORY:    Non-smoker        REVIEW OF SYSTEMS:  See HPI, otherwise complete 10 point review of systems negative      PHYSICAL EXAM:  T(C): 36.3 (04-01-19 @ 05:26), Max: 36.5 (04-01-19 @ 02:00)  HR: 87 (04-01-19 @ 05:26) (65 - 87)  BP: 130/72 (04-01-19 @ 05:26) (108/65 - 130/72)  RR: 18 (04-01-19 @ 05:26) (18 - 18)  SpO2: 98% (04-01-19 @ 05:26) (98% - 100%)  Wt(kg): --  I&O's Summary      Appearance: No Acute Distress	  HEENT:  Normal oral mucosa, PERRL, EOMI	  Cardiovascular: Normal S1 S2, No JVD, No murmurs/rubs/gallops  Respiratory: Lungs clear to auscultation bilaterally  Gastrointestinal:  Soft, Non-tender, + BS	  Skin: No rashes, No ecchymoses, No cyanosis	  Neurologic: Non-focal  Extremities: No clubbing, cyanosis or edema  Vascular: Peripheral pulses palpable 2+ bilaterally  Psychiatry: Awake    Laboratory Data:	 	    CBC Full  -  ( 31 Mar 2019 05:05 )  WBC Count : 10.26 K/uL  Hemoglobin : 9.6 g/dL  Hematocrit : 31.9 %  Platelet Count - Automated : 35 K/uL  Mean Cell Volume : 95.8 fL  Mean Cell Hemoglobin : 28.8 pg  Mean Cell Hemoglobin Concentration : 30.1 %  Auto Neutrophil # : x  Auto Lymphocyte # : x  Auto Monocyte # : x  Auto Eosinophil # : x  Auto Basophil # : x  Auto Neutrophil % : x  Auto Lymphocyte % : x  Auto Monocyte % : x  Auto Eosinophil % : x  Auto Basophil % : x    04-01    139  |  109<H>  |  15  ----------------------------<  63<L>  4.2   |  19<L>  |  1.19  03-31    134<L>  |  107  |  17  ----------------------------<  80  3.4<L>   |  17<L>  |  1.23    Ca    7.9<L>      01 Apr 2019 07:25  Ca    7.4<L>      31 Mar 2019 05:05  Phos  3.2     04-01  Phos  2.7     03-31  Mg     1.6     04-01  Mg     1.7     03-31        Interpretation of Telemetry: n/a	    ECG:  	Sinus; low voltage 	    Assessment: 73 year old man with HTN, anemia, chronic diarrhea, malnutrition and low voltage EKG presents with weakness.    Plan of Care:    #Pre-operative evaluation-  Mr. Miranda displays no signs or symptoms or active coronary ischemia, decompensated CHF or malignant arrythmia.  EKG is sinus with low voltage and no ischemic changes.  TTE from 3/10 reviewed- no significant structural heart disease.   There is no cardiac objection to proceeding with flex sigmoidoscopy.     #HTN-  BP controlled on current regimen.       62 minutes spent on total encounter; more than 50% of the visit was spent counseling and/or coordinating care by the attending physician.   	  Darren Herring MD [facility]  Cardiovascular Diseases  [phone]

## 2019-04-01 NOTE — PROGRESS NOTE ADULT - SUBJECTIVE AND OBJECTIVE BOX
Infectious Diseases progress note:    Subjective: Events noted.  Pt s/p flex sigmoidoscopy.  Hypothermic today.  Pt on warming blanket.  Awake and alert.  Offers no specific complaints.      ROS:  CONSTITUTIONAL:  No fever, chills, rigors  CARDIOVASCULAR:  No chest pain or palpitations  RESPIRATORY:   No SOB, cough, dyspnea on exertion.  No wheezing  GASTROINTESTINAL:  No abd pain, N/V, diarrhea/constipation  EXTREMITIES:  No swelling or joint pain  GENITOURINARY:  No burning on urination, increased frequency or urgency.  No flank pain  NEUROLOGIC:  No HA, visual disturbances  SKIN: No rashes    Allergies    No Known Allergies    Intolerances        ANTIBIOTICS/RELEVANT:  antimicrobials    immunologic:    OTHER:  calcium carbonate   1250 mG (OsCal) 1 Tablet(s) Oral two times a day  cholecalciferol 1000 Unit(s) Oral daily  dicyclomine 20 milliGRAM(s) Oral four times a day before meals PRN  lactobacillus acidophilus 1 Tablet(s) Oral daily  mesalamine DR Capsule 800 milliGRAM(s) Oral three times a day  metoprolol succinate ER 25 milliGRAM(s) Oral daily  multivitamin 1 Tablet(s) Oral daily  pantoprazole    Tablet 40 milliGRAM(s) Oral before breakfast  simethicone 80 milliGRAM(s) Chew four times a day PRN  sodium bicarbonate 650 milliGRAM(s) Oral two times a day      Objective:  Vital Signs Last 24 Hrs  T(C): 36.4 (2019 17:21), Max: 36.5 (2019 02:00)  T(F): 97.6 (2019 17:21), Max: 97.7 (2019 02:00)  HR: 73 (2019 13:48) (69 - 87)  BP: 127/74 (2019 13:48) (110/66 - 130/72)  BP(mean): --  RR: 18 (2019 13:48) (18 - 18)  SpO2: 100% (2019 13:48) (98% - 100%)    PHYSICAL EXAM:  Constitutional:NAD  Eyes:TODD, EOMI  Ear/Nose/Throat: no thrush, mucositis.  Moist mucous membranes	  Neck:no JVD, no lymphadenopathy, supple  Respiratory: CTA ruth  Cardiovascular: S1S2 RRR, no murmurs  Gastrointestinal:soft, nontender,  nondistended (+) BS  Extremities:no e/e/c  Skin:  no rashes, open wounds or ulcerations        LABS:                        9.6    7.26  )-----------( 63       ( 2019 07:25 )             32.2     04-    139  |  109<H>  |  15  ----------------------------<  63<L>  4.2   |  19<L>  |  1.19    Ca    7.9<L>      2019 07:25  Phos  3.2       Mg     1.6             Urinalysis Basic - ( 2019 17:00 )    Color: YELLOW / Appearance: CLEAR / S.015 / pH: 6.0  Gluc: NEGATIVE / Ketone: NEGATIVE  / Bili: NEGATIVE / Urobili: NORMAL   Blood: NEGATIVE / Protein: 30 / Nitrite: NEGATIVE   Leuk Esterase: NEGATIVE / RBC: 0-2 / WBC 3-5   Sq Epi: OCC / Non Sq Epi: x / Bacteria: FEW      Culture - Urine (19 @ 11:40)    -  Tetra/Doxy: S <=1 FERNANDO    -  Nitrofurantoin: S <=32 FERNANDO    -  Vancomycin: S 1 FERNANDO    Culture - Urine:   CULTURE IN PROGRESS, FURTHER REPORT TO FOLLOW.  ENTFC^Enterococcus faecium  COLONY COUNT: > = 100,000 CFU/ML    Culture - Urine:   COLONY COUNT: > = 100,000 CFU/ML    -  Ampicillin: R >8 FERNANDO    -  Ciprofloxacin: R >2 FERNANDO    Specimen Source: URINE CATHETER    Organism Identification: Enterococcus faecium    Organism: Enterococcus faecium    Method Type: POSITIVE FERNANDO 29                        MICROBIOLOGY:          RADIOLOGY & ADDITIONAL STUDIES:    < from: Xray Chest 1 View- PORTABLE-Urgent (19 @ 15:07) >    IMPRESSION:  Trace to small bilateral pleural effusions.    Linear atelectasis in the left mid and lower lung.    Question of air in a skin fold in the left lateral chest and abdominal   wall versus subcutaneous emphysema.    < end of copied text >

## 2019-04-01 NOTE — PROGRESS NOTE ADULT - SUBJECTIVE AND OBJECTIVE BOX
[facility]   MD MAGALY MORAN MD ANGELA WONG, PA    TEL:  OFFICE: [phone]  DR ANDERSON CELL: [phone]  DR. MONAE CELL: [phone]  HUBERT WELLINGTON CELL: [phone]        Patient is a 73y old  Male who presents with a chief complaint of Generalized weakness and inability to walk (01 Apr 2019 09:00)      Patient seen and examined at bedside. No chest pain/sob. still c/o abd pain and diarrhea. is eating better now    VITALS:  T(F): 95.5 (04-01-19 @ 13:48), Max: 97.7 (04-01-19 @ 02:00)  HR: 73 (04-01-19 @ 13:48)  BP: 127/74 (04-01-19 @ 13:48)  RR: 18 (04-01-19 @ 13:48)  SpO2: 100% (04-01-19 @ 13:48)  Wt(kg): --        PHYSICAL EXAM:  Constitutional: NAD  Neck: No JVD  Respiratory: CTAB, no wheezes, rales or rhonchi  Cardiovascular: S1, S2, RRR  Gastrointestinal: BS+, soft, NT/ND  Extremities: No peripheral edema    Hospital Medications:   MEDICATIONS  (STANDING):  calcium carbonate   1250 mG (OsCal) 1 Tablet(s) Oral two times a day  cholecalciferol 1000 Unit(s) Oral daily  lactobacillus acidophilus 1 Tablet(s) Oral daily  mesalamine DR Capsule 800 milliGRAM(s) Oral three times a day  metoprolol succinate ER 25 milliGRAM(s) Oral daily  multivitamin 1 Tablet(s) Oral daily  pantoprazole    Tablet 40 milliGRAM(s) Oral before breakfast      LABS:  04-01    139  |  109<H>  |  15  ----------------------------<  63<L>  4.2   |  19<L>  |  1.19    Ca    7.9<L>      01 Apr 2019 07:25  Phos  3.2     04-01  Mg     1.6     04-01      Creatinine Trend: 1.19 <--, 1.23 <--, 1.30 <--, 1.34 <--, 1.58 <--, 1.58 <--, 1.67 <--    Phosphorus Level, Serum: 3.2 mg/dL (04-01 @ 07:25)                              9.6    7.26  )-----------( 63       ( 01 Apr 2019 07:25 )             32.2     Urine Studies:  Urinalysis - [03-27-19 @ 11:20]      Color YELLOW / Appearance CLEAR / SG 1.020 / pH 6.0      Gluc NEGATIVE / Ketone NEGATIVE  / Bili NEGATIVE / Urobili TRACE       Blood TRACE / Protein 50 / Leuk Est TRACE / Nitrite NEGATIVE      RBC 0-2 / WBC 11-25 / Hyaline 2-5 / Gran  / Sq Epi FEW / Non Sq Epi  / Bacteria MODERATE    Urine Creatinine 145.40      [03-25-19 @ 20:45]  Urine Sodium 21      [03-25-19 @ 20:45]  Urine Osmolality 495      [03-27-19 @ 06:16]    Iron 48, TIBC 78, %sat --      [03-29-19 @ 05:20]  Ferritin 378.6      [03-29-19 @ 05:20]  Vitamin D (25OH) 25.6      [03-23-19 @ 18:15]  TSH 1.98      [03-05-19 @ 06:45]    HBsAb Nonreactive      [03-20-19 @ 05:35]  HBsAg NEGATIVE      [03-20-19 @ 05:35]  HCV 0.30 S/CO, Nonreactive [facility] [address], [address]  Phone: [phone]  Fax:   [phone]  :           KATY DAMON MD      [03-03-19 @ 20:30]    TODD: titer Negative, pattern --      [03-08-19 @ 06:30]    RADIOLOGY & ADDITIONAL STUDIES:

## 2019-04-01 NOTE — CHART NOTE - NSCHARTNOTEFT_GEN_A_CORE
Attending requesting cardiology clearance prior to flex sigmoidoscopy. House cardiology called, awaiting recommendations.

## 2019-04-01 NOTE — PROGRESS NOTE ADULT - ASSESSMENT
72 yo man with history of Anemia, HTN, chronic diarrhea, and recent hospitalization at [facility] from 3/3/19-3/21/19 brought in by family for generalized weakness and inability to walk found to have james and hypernatremia

## 2019-04-01 NOTE — CHART NOTE - NSCHARTNOTEFT_GEN_A_CORE
Notified by RN that patient hypothermic with rectal temp 95.5. Discussed with ID (Dr. Marques) - repeat Pancx ordered (BCx x2, UA/UCx, CXR), and give vanco after pancx obtained. Will follow up official ID recommendations.

## 2019-04-01 NOTE — PROGRESS NOTE ADULT - PROBLEM SELECTOR PLAN 8
non AG  likely sec to GI lost patient has chronic diarrhea  completed bicarb tid x3 days  still acidosis, will start bicarb bid   monitor

## 2019-04-01 NOTE — PROGRESS NOTE ADULT - SUBJECTIVE AND OBJECTIVE BOX
Patient is a 73y old  Male who presents with a chief complaint of Generalized weakness and inability to walk (2019 20:03)      SUBJECTIVE / OVERNIGHT EVENTS:    Events noted.    RESPIRATORY: No cough, wheezing,  No shortness of breath  CARDIOVASCULAR: No chest pain, palpitations, dizziness, or leg swelling  GASTROINTESTINAL: Diarrhea  NEUROLOGICAL: No headaches,     MEDICATIONS  (STANDING):  calcium carbonate   1250 mG (OsCal) 1 Tablet(s) Oral two times a day  cholecalciferol 1000 Unit(s) Oral daily  lactobacillus acidophilus 1 Tablet(s) Oral daily  mesalamine DR Capsule 800 milliGRAM(s) Oral three times a day  metoprolol succinate ER 25 milliGRAM(s) Oral daily  multivitamin 1 Tablet(s) Oral daily  pantoprazole    Tablet 40 milliGRAM(s) Oral before breakfast  sodium bicarbonate 650 milliGRAM(s) Oral two times a day    MEDICATIONS  (PRN):  dicyclomine 20 milliGRAM(s) Oral four times a day before meals PRN abd pain  simethicone 80 milliGRAM(s) Chew four times a day PRN Gas        CAPILLARY BLOOD GLUCOSE        I&O's Summary      PHYSICAL EXAM:  GENERAL: NAD  NECK: Supple, No JVD  CHEST/LUNG: Clear to auscultation bilaterally; No wheezing.  HEART: Regular rate and rhythm; No murmurs, rubs, or gallops  ABDOMEN: Soft, Nontender, Nondistended; Bowel sounds present  EXTREMITIES:   No edema  NEUROLOGY: Awake      LABS:                        9.6    7.26  )-----------( 63       ( 2019 07:25 )             32.2     04-01    139  |  109<H>  |  15  ----------------------------<  63<L>  4.2   |  19<L>  |  1.19    Ca    7.9<L>      2019 07:25  Phos  3.2     04-  Mg     1.6     04-            Urinalysis Basic - ( 2019 17:00 )    Color: YELLOW / Appearance: CLEAR / S.015 / pH: 6.0  Gluc: NEGATIVE / Ketone: NEGATIVE  / Bili: NEGATIVE / Urobili: NORMAL   Blood: NEGATIVE / Protein: 30 / Nitrite: NEGATIVE   Leuk Esterase: NEGATIVE / RBC: 0-2 / WBC 3-5   Sq Epi: OCC / Non Sq Epi: x / Bacteria: FEW      CAPILLARY BLOOD GLUCOSE         @ 18:54  Culture-urine --  Culture results --  method type --  Organism --  Organism Identification --  Specimen source FECES   @ 14:21  Culture-urine --  Culture results --  method type --  Organism --  Organism Identification --  Specimen source FECES   @ 11:40  Culture-urine   CULTURE IN PROGRESS, FURTHER REPORT TO FOLLOW.  ENTFC^Enterococcus faecium  COLONY COUNT: > = 100,000 CFU/ML  Culture results --  method type POSITIVE FERNANDO 29  Organism Enterococcus faecium  Organism Identification Enterococcus faecium  Specimen source URINE CATHETER   @ 09:47  Culture-urine --  Culture results --  method type --  Organism --  Organism Identification --  Specimen source BLOOD PERIPHERAL   @ 01:03  Culture-urine --  Culture results --  method type --  Organism --  Organism Identification --  Specimen source FECES   @ 01:00  Culture-urine --  Culture results --  method type --  Organism --  Organism Identification --  Specimen source FECES   @ 15:16  Culture-urine --  Culture results --  method type --  Organism --  Organism Identification --  Specimen source STOOL            @ 18:54  Culture blood --  Culture results --  Gram stain --  Gram stain blood --  Method type --  Organism --  Organism identification --  Specimen source FECES    @ 14:21  Culture blood --  Culture results --  Gram stain --  Gram stain blood --  Method type --  Organism --  Organism identification --  Specimen source FECES    @ 11:40  Culture blood --  Culture results --  Gram stain --  Gram stain blood --  Method type POSITIVE FERNANDO 29  Organism Enterococcus faecium  Organism identification Enterococcus faecium  Specimen source URINE CATHETER    @ 09:47  Culture blood   NO ORGANISMS ISOLATED  Culture results --  Gram stain --  Gram stain blood --  Method type --  Organism --  Organism identification --  Specimen source BLOOD PERIPHERAL    @ 01:03  Culture blood --  Culture results --  Gram stain --  Gram stain blood --  Method type --  Organism --  Organism identification --  Specimen source FECES    @ 01:00  Culture blood --  Culture results --  Gram stain --  Gram stain blood --  Method type --  Organism --  Organism identification --  Specimen source FECES    @ 15:16  Culture blood --  Culture results --  Gram stain --  Gram stain blood --  Method type --  Organism --  Organism identification --  Specimen source STOOL      RADIOLOGY & ADDITIONAL TESTS:    Imaging Personally Reviewed:    Consultant(s) Notes Reviewed:      Care Discussed with Consultants/Other Providers:

## 2019-04-01 NOTE — PROGRESS NOTE ADULT - PROBLEM SELECTOR PLAN 1
renal function improving  PEDRO FEna<1 likely prerenal no hydro on US, renal function improved with IVF  eating better, IVF d/c'd  avoid nephrotoxic agents  monitor bmp.  Encourage eating

## 2019-04-02 LAB
ANION GAP SERPL CALC-SCNC: 13 MMO/L — SIGNIFICANT CHANGE UP (ref 7–14)
BUN SERPL-MCNC: 16 MG/DL — SIGNIFICANT CHANGE UP (ref 7–23)
CA-I BLD-SCNC: 1.16 MMOL/L — SIGNIFICANT CHANGE UP (ref 1.03–1.23)
CALCIUM SERPL-MCNC: 7.9 MG/DL — LOW (ref 8.4–10.5)
CHLORIDE SERPL-SCNC: 108 MMOL/L — HIGH (ref 98–107)
CO2 SERPL-SCNC: 19 MMOL/L — LOW (ref 22–31)
CREAT SERPL-MCNC: 1.25 MG/DL — SIGNIFICANT CHANGE UP (ref 0.5–1.3)
FAT STL QN: NORMAL — SIGNIFICANT CHANGE UP
FAT STL QN: NORMAL — SIGNIFICANT CHANGE UP
GLUCOSE SERPL-MCNC: 72 MG/DL — SIGNIFICANT CHANGE UP (ref 70–99)
HCT VFR BLD CALC: 31.4 % — LOW (ref 39–50)
HGB BLD-MCNC: 9.6 G/DL — LOW (ref 13–17)
MAGNESIUM SERPL-MCNC: 1.5 MG/DL — LOW (ref 1.6–2.6)
MCHC RBC-ENTMCNC: 29.1 PG — SIGNIFICANT CHANGE UP (ref 27–34)
MCHC RBC-ENTMCNC: 30.6 % — LOW (ref 32–36)
MCV RBC AUTO: 95.2 FL — SIGNIFICANT CHANGE UP (ref 80–100)
NRBC # FLD: 0.03 K/UL — SIGNIFICANT CHANGE UP (ref 0–0)
PHOSPHATE SERPL-MCNC: 3.7 MG/DL — SIGNIFICANT CHANGE UP (ref 2.5–4.5)
PLATELET # BLD AUTO: 71 K/UL — LOW (ref 150–400)
PMV BLD: 13.1 FL — HIGH (ref 7–13)
POTASSIUM SERPL-MCNC: 3.2 MMOL/L — LOW (ref 3.5–5.3)
POTASSIUM SERPL-SCNC: 3.2 MMOL/L — LOW (ref 3.5–5.3)
RBC # BLD: 3.3 M/UL — LOW (ref 4.2–5.8)
RBC # FLD: 21.5 % — HIGH (ref 10.3–14.5)
SODIUM SERPL-SCNC: 140 MMOL/L — SIGNIFICANT CHANGE UP (ref 135–145)
SPECIMEN SOURCE: SIGNIFICANT CHANGE UP
WBC # BLD: 9.77 K/UL — SIGNIFICANT CHANGE UP (ref 3.8–10.5)
WBC # FLD AUTO: 9.77 K/UL — SIGNIFICANT CHANGE UP (ref 3.8–10.5)

## 2019-04-02 PROCEDURE — 99232 SBSQ HOSP IP/OBS MODERATE 35: CPT | Mod: GC

## 2019-04-02 PROCEDURE — 71045 X-RAY EXAM CHEST 1 VIEW: CPT | Mod: 26

## 2019-04-02 RX ORDER — MAGNESIUM SULFATE 500 MG/ML
1 VIAL (ML) INJECTION ONCE
Qty: 0 | Refills: 0 | Status: COMPLETED | OUTPATIENT
Start: 2019-04-02 | End: 2019-04-02

## 2019-04-02 RX ORDER — VANCOMYCIN HCL 1 G
750 VIAL (EA) INTRAVENOUS EVERY 12 HOURS
Qty: 0 | Refills: 0 | Status: DISCONTINUED | OUTPATIENT
Start: 2019-04-02 | End: 2019-04-03

## 2019-04-02 RX ORDER — SODIUM CHLORIDE 9 MG/ML
1000 INJECTION, SOLUTION INTRAVENOUS
Qty: 0 | Refills: 0 | Status: DISCONTINUED | OUTPATIENT
Start: 2019-04-02 | End: 2019-04-03

## 2019-04-02 RX ORDER — VANCOMYCIN HCL 1 G
1000 VIAL (EA) INTRAVENOUS EVERY 12 HOURS
Qty: 0 | Refills: 0 | Status: DISCONTINUED | OUTPATIENT
Start: 2019-04-02 | End: 2019-04-02

## 2019-04-02 RX ORDER — SODIUM CHLORIDE 9 MG/ML
1000 INJECTION, SOLUTION INTRAVENOUS
Qty: 0 | Refills: 0 | Status: DISCONTINUED | OUTPATIENT
Start: 2019-04-02 | End: 2019-04-02

## 2019-04-02 RX ORDER — POTASSIUM CHLORIDE 20 MEQ
20 PACKET (EA) ORAL ONCE
Qty: 0 | Refills: 0 | Status: COMPLETED | OUTPATIENT
Start: 2019-04-02 | End: 2019-04-02

## 2019-04-02 RX ORDER — LOPERAMIDE HCL 2 MG
2 TABLET ORAL
Qty: 0 | Refills: 0 | Status: DISCONTINUED | OUTPATIENT
Start: 2019-04-02 | End: 2019-04-02

## 2019-04-02 RX ORDER — LOPERAMIDE HCL 2 MG
2 TABLET ORAL ONCE
Qty: 0 | Refills: 0 | Status: COMPLETED | OUTPATIENT
Start: 2019-04-02 | End: 2019-04-02

## 2019-04-02 RX ORDER — LOPERAMIDE HCL 2 MG
2 TABLET ORAL
Qty: 0 | Refills: 0 | Status: DISCONTINUED | OUTPATIENT
Start: 2019-04-02 | End: 2019-04-03

## 2019-04-02 RX ORDER — POTASSIUM CHLORIDE 20 MEQ
10 PACKET (EA) ORAL
Qty: 0 | Refills: 0 | Status: COMPLETED | OUTPATIENT
Start: 2019-04-02 | End: 2019-04-02

## 2019-04-02 RX ADMIN — Medication 250 MILLIGRAM(S): at 05:09

## 2019-04-02 RX ADMIN — Medication 1000 UNIT(S): at 12:00

## 2019-04-02 RX ADMIN — Medication 800 MILLIGRAM(S): at 13:21

## 2019-04-02 RX ADMIN — Medication 2 MILLIGRAM(S): at 21:32

## 2019-04-02 RX ADMIN — Medication 100 MILLIEQUIVALENT(S): at 12:00

## 2019-04-02 RX ADMIN — Medication 250 MILLIGRAM(S): at 18:13

## 2019-04-02 RX ADMIN — Medication 1 TABLET(S): at 12:00

## 2019-04-02 RX ADMIN — Medication 800 MILLIGRAM(S): at 05:08

## 2019-04-02 RX ADMIN — Medication 100 GRAM(S): at 14:44

## 2019-04-02 RX ADMIN — Medication 25 MILLIGRAM(S): at 05:08

## 2019-04-02 RX ADMIN — Medication 1 TABLET(S): at 05:08

## 2019-04-02 RX ADMIN — Medication 20 MILLIEQUIVALENT(S): at 18:22

## 2019-04-02 RX ADMIN — Medication 1 TABLET(S): at 18:13

## 2019-04-02 RX ADMIN — Medication 20 MILLIEQUIVALENT(S): at 12:00

## 2019-04-02 RX ADMIN — Medication 100 MILLIEQUIVALENT(S): at 15:57

## 2019-04-02 RX ADMIN — Medication 650 MILLIGRAM(S): at 18:19

## 2019-04-02 RX ADMIN — Medication 100 MILLIEQUIVALENT(S): at 20:22

## 2019-04-02 RX ADMIN — Medication 800 MILLIGRAM(S): at 21:33

## 2019-04-02 RX ADMIN — Medication 2 MILLIGRAM(S): at 12:00

## 2019-04-02 RX ADMIN — Medication 2 MILLIGRAM(S): at 19:42

## 2019-04-02 RX ADMIN — SODIUM CHLORIDE 100 MILLILITER(S): 9 INJECTION, SOLUTION INTRAVENOUS at 18:19

## 2019-04-02 RX ADMIN — Medication 650 MILLIGRAM(S): at 05:09

## 2019-04-02 RX ADMIN — PANTOPRAZOLE SODIUM 40 MILLIGRAM(S): 20 TABLET, DELAYED RELEASE ORAL at 05:08

## 2019-04-02 NOTE — PROGRESS NOTE ADULT - SUBJECTIVE AND OBJECTIVE BOX
Patient is a 73y old  Male who presents with a chief complaint of Generalized weakness and inability to walk (2019 20:03)      SUBJECTIVE / OVERNIGHT EVENTS:    Events noted.  Weak/Tired/Sleeping  RESPIRATORY: No cough, wheezing,  No shortness of breath  CARDIOVASCULAR: No chest pain, palpitations, dizziness, or leg swelling  GASTROINTESTINAL: Diarrhea +  NEUROLOGICAL: No headaches,     MEDICATIONS  (STANDING):  calcium carbonate   1250 mG (OsCal) 1 Tablet(s) Oral two times a day  cholecalciferol 1000 Unit(s) Oral daily  lactobacillus acidophilus 1 Tablet(s) Oral daily  mesalamine DR Capsule 800 milliGRAM(s) Oral three times a day  metoprolol succinate ER 25 milliGRAM(s) Oral daily  multivitamin 1 Tablet(s) Oral daily  pantoprazole    Tablet 40 milliGRAM(s) Oral before breakfast  sodium bicarbonate 650 milliGRAM(s) Oral two times a day    MEDICATIONS  (PRN):  dicyclomine 20 milliGRAM(s) Oral four times a day before meals PRN abd pain  simethicone 80 milliGRAM(s) Chew four times a day PRN Gas        CAPILLARY BLOOD GLUCOSE        I&O's Summary      PHYSICAL EXAM:  GENERAL: NAD  NECK: Supple, No JVD  CHEST/LUNG: Clear to auscultation bilaterally; No wheezing.  HEART: Regular rate and rhythm; No murmurs, rubs, or gallops  ABDOMEN: Soft, Nontender, Nondistended; Bowel sounds present  EXTREMITIES:   No edema  NEUROLOGY: Awake      LABS:                        9.6    7.26  )-----------( 63       ( 2019 07:25 )             32.2     04-01    139  |  109<H>  |  15  ----------------------------<  63<L>  4.2   |  19<L>  |  1.19    Ca    7.9<L>      2019 07:25  Phos  3.2     04-  Mg     1.6     -            Urinalysis Basic - ( 2019 17:00 )    Color: YELLOW / Appearance: CLEAR / S.015 / pH: 6.0  Gluc: NEGATIVE / Ketone: NEGATIVE  / Bili: NEGATIVE / Urobili: NORMAL   Blood: NEGATIVE / Protein: 30 / Nitrite: NEGATIVE   Leuk Esterase: NEGATIVE / RBC: 0-2 / WBC 3-5   Sq Epi: OCC / Non Sq Epi: x / Bacteria: FEW      CAPILLARY BLOOD GLUCOSE         @ 18:54  Culture-urine --  Culture results --  method type --  Organism --  Organism Identification --  Specimen source FECES   @ 14:21  Culture-urine --  Culture results --  method type --  Organism --  Organism Identification --  Specimen source FECES   @ 11:40  Culture-urine   CULTURE IN PROGRESS, FURTHER REPORT TO FOLLOW.  ENTFC^Enterococcus faecium  COLONY COUNT: > = 100,000 CFU/ML  Culture results --  method type POSITIVE FERNANDO 29  Organism Enterococcus faecium  Organism Identification Enterococcus faecium  Specimen source URINE CATHETER   @ 09:47  Culture-urine --  Culture results --  method type --  Organism --  Organism Identification --  Specimen source BLOOD PERIPHERAL   @ 01:03  Culture-urine --  Culture results --  method type --  Organism --  Organism Identification --  Specimen source FECES   @ 01:00  Culture-urine --  Culture results --  method type --  Organism --  Organism Identification --  Specimen source FECES   @ 15:16  Culture-urine --  Culture results --  method type --  Organism --  Organism Identification --  Specimen source STOOL            @ 18:54  Culture blood --  Culture results --  Gram stain --  Gram stain blood --  Method type --  Organism --  Organism identification --  Specimen source FECES    @ 14:21  Culture blood --  Culture results --  Gram stain --  Gram stain blood --  Method type --  Organism --  Organism identification --  Specimen source FECES    @ 11:40  Culture blood --  Culture results --  Gram stain --  Gram stain blood --  Method type POSITIVE FERNANDO 29  Organism Enterococcus faecium  Organism identification Enterococcus faecium  Specimen source URINE CATHETER    @ 09:47  Culture blood   NO ORGANISMS ISOLATED  Culture results --  Gram stain --  Gram stain blood --  Method type --  Organism --  Organism identification --  Specimen source BLOOD PERIPHERAL    @ 01:03  Culture blood --  Culture results --  Gram stain --  Gram stain blood --  Method type --  Organism --  Organism identification --  Specimen source FECES    @ 01:00  Culture blood --  Culture results --  Gram stain --  Gram stain blood --  Method type --  Organism --  Organism identification --  Specimen source FECES    @ 15:16  Culture blood --  Culture results --  Gram stain --  Gram stain blood --  Method type --  Organism --  Organism identification --  Specimen source STOOL      RADIOLOGY & ADDITIONAL TESTS:    Imaging Personally Reviewed:    Consultant(s) Notes Reviewed:      Care Discussed with Consultants/Other Providers:

## 2019-04-02 NOTE — PROGRESS NOTE ADULT - ASSESSMENT
Impression:  1) Diarrhea - with previous negative work up inpatient.  Etiologies include infectious vs inflammatory vs  malabsorptive vs microscopic colitis.  Patient will likely ultimately need endoscopic evaluation but would first repeat stool work up prior to proceeding. Stool C. Diff negative, now with AMS. Stool fat, o and p and giardia normal. Celiac panel returned weakly positive.    Recommendations:   - f/u stool electrolytes (Na+, K+)   - f/u stool elastase   - f/u stool a-1 antitrypsin    -follow-up CBC; Fe++/TIBC. B12, foalte   - f/u celiac panel including tissue trans glutaminase IgA and IgA    - monitor stool output and volume   - gluten and lactose free LRD   - will attempt un-sedated flexible sigmoidoscopy    [place], PGY-4  Gastroenterology Fellow

## 2019-04-02 NOTE — PROGRESS NOTE ADULT - SUBJECTIVE AND OBJECTIVE BOX
Chief Complaint:  Patient is a 73y old  Male who presents with a chief complaint of Generalized weakness and inability to walk (2019 20:03)      Interval Events:   The patient had colonoscopy yesterday which other than one polyp appeared macroscopically normal.      Allergies:  No Known Allergies      Hospital Medications:  calcium carbonate   1250 mG (OsCal) 1 Tablet(s) Oral two times a day  cholecalciferol 1000 Unit(s) Oral daily  dicyclomine 20 milliGRAM(s) Oral four times a day before meals PRN  lactobacillus acidophilus 1 Tablet(s) Oral daily  mesalamine DR Capsule 800 milliGRAM(s) Oral three times a day  metoprolol succinate ER 25 milliGRAM(s) Oral daily  multivitamin 1 Tablet(s) Oral daily  pantoprazole    Tablet 40 milliGRAM(s) Oral before breakfast  simethicone 80 milliGRAM(s) Chew four times a day PRN  sodium bicarbonate 650 milliGRAM(s) Oral two times a day  vancomycin  IVPB 750 milliGRAM(s) IV Intermittent every 12 hours      PMHX/PSHX:  Chronic kidney disease (CKD)  Anemia, unspecified type  Essential hypertension  CLL (chronic lymphocytic leukemia)  No significant past surgical history  No significant past surgical history      Family history:  Family history of myocardial infarction (Sibling)          PHYSICAL EXAM:     GENERAL:  Appears stated age, well-groomed, well-nourished, no distress  HEENT:  NC/AT,  conjunctivae clear, sclera -anicteric  CHEST:  Full & symmetric excursion, no increased  HEART:  Regular rhythm  ABDOMEN:  Soft, non-tender, non-distended, normoactive bowel sounds,  no masses ,no hepato-splenomegaly,   EXTREMITIES:  no cyanosis,clubbing or edema  SKIN:  No rash/erythema/ecchymoses/petechiae/wounds/abscess/warm/dry  NEURO:  Alert, oriented    Vital Signs:  Vital Signs Last 24 Hrs  T(C): 36.4 (2019 05:06), Max: 36.4 (2019 17:21)  T(F): 97.5 (2019 05:06), Max: 97.6 (2019 17:21)  HR: 91 (2019 05:06) (73 - 92)  BP: 120/70 (2019 05:06) (115/68 - 127/74)  BP(mean): --  RR: 18 (2019 05:06) (18 - 18)  SpO2: 100% (2019 05:06) (97% - 100%)  Daily     Daily Weight in k.8 (2019 05:06)    LABS:                        9.6    9.77  )-----------( 71       ( 2019 06:00 )             31.4     04-02    140  |  108<H>  |  16  ----------------------------<  72  3.2<L>   |  19<L>  |  1.25    Ca    7.9<L>      2019 06:00  Phos  3.7     04-02  Mg     1.5     04-02          Urinalysis Basic - ( 2019 17:00 )    Color: YELLOW / Appearance: CLEAR / S.015 / pH: 6.0  Gluc: NEGATIVE / Ketone: NEGATIVE  / Bili: NEGATIVE / Urobili: NORMAL   Blood: NEGATIVE / Protein: 30 / Nitrite: NEGATIVE   Leuk Esterase: NEGATIVE / RBC: 0-2 / WBC 3-5   Sq Epi: OCC / Non Sq Epi: x / Bacteria: FEW          Imaging:

## 2019-04-02 NOTE — CHART NOTE - NSCHARTNOTEFT_GEN_A_CORE
NUTRITION FOLLOW-UP:  Met with Pt.... he is alert although confused and disoriented.  Observed w 0% consumption of breakfast, 100% consumption of Ensure Enlive (although 2 unopened bottles of Ensure seen @ bedside).  Calorie count documentation (3/30-3/31/19)... indicates inadequate PO energy intake (<25% of estimated nutrient needs).  PO intake reported as consistently poor throughout admission (>1 week).  Pt./RN deny any known issues chewing/swallowing, or nausea/vomiting.  Pt. noted and reported with persistent diarrhea.  Per GI note----> recommend further GI workup & addition of gluten and low residue diet modifications.    Pt. continues to exhibit significant weight loss (2.5% since hospitalization x 1-2 weeks).  At this time, would consider alternate means of nutrition, if consistent with Pt./family wishes & establishing long term nutritional goals of care.  Discussed and informed ADS NP of nutrition recommendations.        Weight:  57.8kg (4/2/19)               59.3kg (3/23/19)    Edema:  None noted.    Skin: No noted edema.      Pertinent Medications: MEDICATIONS  (STANDING):  calcium carbonate   1250 mG (OsCal) 1 Tablet(s) Oral two times a day  cholecalciferol 1000 Unit(s) Oral daily  lactobacillus acidophilus 1 Tablet(s) Oral daily  magnesium sulfate  IVPB 1 Gram(s) IV Intermittent once  mesalamine DR Capsule 800 milliGRAM(s) Oral three times a day  metoprolol succinate ER 25 milliGRAM(s) Oral daily  multivitamin 1 Tablet(s) Oral daily  pantoprazole    Tablet 40 milliGRAM(s) Oral before breakfast  potassium chloride    Tablet ER 20 milliEquivalent(s) Oral once  potassium chloride  10 mEq/100 mL IVPB 10 milliEquivalent(s) IV Intermittent every 1 hour  sodium bicarbonate 650 milliGRAM(s) Oral two times a day  vancomycin  IVPB 750 milliGRAM(s) IV Intermittent every 12 hours    MEDICATIONS  (PRN):  dicyclomine 20 milliGRAM(s) Oral four times a day before meals PRN abd pain  simethicone 80 milliGRAM(s) Chew four times a day PRN Gas    Pertinent Labs:  04-02 Na140 mmol/L Glu 72 mg/dL K+ 3.2 mmol/L<L> Cr  1.25 mg/dL BUN 16 mg/dL 04-02 Phos 3.7 mg/dL 03-28 Alb 1.8 g/dL<L>          Current Diet:  Low Sodium Lactose restricted + Ensure Enlive 8oz PO 2x daily       NUTRITION Dx:  Pt. remains severely malnourished in the setting of acute/chronic illness as evidenced by <25% PO energy intake x >5 days with significant 2.5% weight decrease x 1-2 weeks.        PLAN/RECOMMENDATIONS:    1) Add gluten/gliadin & low residue/fiber modifications to low Sodium, lactose restricted diet with Ensure Enlive 8oz PO 2x daily   2) Obtain weekly weights  3) Consider alternate means of nutrition & establish long term nutritional goals of care.  4) RDN remains available and will f/u PRN.          Yoon Ayala RDN, CDN pager [phone]

## 2019-04-02 NOTE — PROGRESS NOTE ADULT - ASSESSMENT
Mr. Miranda is a 74 yo man with history of Anemia, HTN, PUD, chronic diarrhea, and recent hospitalization at [facility] from 3/3/19-3/21/19 brought in by family for generalized weakness and inability to walk admitted for severe malnutrition, mild hypernatremia and severe deconditioning. Exam relatively benign aside for significant LE edema and weakness of legs. Anemia at baseline.

## 2019-04-02 NOTE — PROGRESS NOTE ADULT - ASSESSMENT
72 yo man with history of Anemia, HTN, chronic diarrhea, and recent hospitalization at [facility] from 3/3/19-3/21/19 brought in by family for generalized weakness and inability to walk found to have james and hypernatremia

## 2019-04-02 NOTE — PROGRESS NOTE ADULT - SUBJECTIVE AND OBJECTIVE BOX
[facility] [place] [facility]   MD MAGALY MORAN MD ANGELA WONG, PA    TEL:  OFFICE: [phone]  DR ANDERSON CELL: [phone]  DR. MONAE CELL: [phone]  HUBERT WELLINGTON CELL: [phone]        Patient is a 73y old  Male who presents with a chief complaint of Generalized weakness and inability to walk (02 Apr 2019 12:35)      Patient seen and examined at bedside. No chest pain/sob    VITALS:  T(F): 97.5 (04-02-19 @ 05:06), Max: 97.6 (04-01-19 @ 17:21)  HR: 91 (04-02-19 @ 05:06)  BP: 120/70 (04-02-19 @ 05:06)  RR: 18 (04-02-19 @ 05:06)  SpO2: 100% (04-02-19 @ 05:06)  Wt(kg): --        PHYSICAL EXAM:  Constitutional: NAD  Neck: No JVD  Respiratory: CTAB, no wheezes, rales or rhonchi  Cardiovascular: S1, S2, RRR  Gastrointestinal: BS+, soft, NT/ND  Extremities: No peripheral edema    Hospital Medications:   MEDICATIONS  (STANDING):  calcium carbonate   1250 mG (OsCal) 1 Tablet(s) Oral two times a day  cholecalciferol 1000 Unit(s) Oral daily  lactobacillus acidophilus 1 Tablet(s) Oral daily  magnesium sulfate  IVPB 1 Gram(s) IV Intermittent once  mesalamine DR Capsule 800 milliGRAM(s) Oral three times a day  metoprolol succinate ER 25 milliGRAM(s) Oral daily  multivitamin 1 Tablet(s) Oral daily  pantoprazole    Tablet 40 milliGRAM(s) Oral before breakfast  potassium chloride    Tablet ER 20 milliEquivalent(s) Oral once  potassium chloride  10 mEq/100 mL IVPB 10 milliEquivalent(s) IV Intermittent every 1 hour  sodium bicarbonate 650 milliGRAM(s) Oral two times a day  vancomycin  IVPB 750 milliGRAM(s) IV Intermittent every 12 hours      LABS:  04-02    140  |  108<H>  |  16  ----------------------------<  72  3.2<L>   |  19<L>  |  1.25    Ca    7.9<L>      02 Apr 2019 06:00  Phos  3.7     04-02  Mg     1.5     04-02      Creatinine Trend: 1.25 <--, 1.19 <--, 1.23 <--, 1.30 <--, 1.34 <--, 1.58 <--, 1.58 <--    Phosphorus Level, Serum: 3.7 mg/dL (04-02 @ 06:00)                              9.6    9.77  )-----------( 71       ( 02 Apr 2019 06:00 )             31.4     Urine Studies:  Urinalysis - [04-01-19 @ 17:00]      Color YELLOW / Appearance CLEAR / SG 1.015 / pH 6.0      Gluc NEGATIVE / Ketone NEGATIVE  / Bili NEGATIVE / Urobili NORMAL       Blood NEGATIVE / Protein 30 / Leuk Est NEGATIVE / Nitrite NEGATIVE      RBC 0-2 / WBC 3-5 / Hyaline NEGATIVE / Gran  / Sq Epi OCC / Non Sq Epi  / Bacteria FEW    Urine Osmolality 495      [03-27-19 @ 06:16]    Iron 48, TIBC 78, %sat --      [03-29-19 @ 05:20]  Ferritin 378.6      [03-29-19 @ 05:20]  Vitamin D (25OH) 25.6      [03-23-19 @ 18:15]  TSH 1.98      [03-05-19 @ 06:45]    HBsAb Nonreactive      [03-20-19 @ 05:35]  HBsAg NEGATIVE      [03-20-19 @ 05:35]  HCV 0.30 S/CO, Nonreactive [facility] [employer] [address], [address]  Phone: [phone]  Fax:   [phone]  :           KATY DAMON MD      [03-03-19 @ 20:30]    TODD: titer Negative, pattern --      [03-08-19 @ 06:30]    RADIOLOGY & ADDITIONAL STUDIES:

## 2019-04-02 NOTE — PROGRESS NOTE ADULT - SUBJECTIVE AND OBJECTIVE BOX
Infectious Diseases progress note:    Subjective:  NAD, resting.  AFebrile.  No hypothermia.  No resp distress.  Ucx / also growing E.faecium    ROS:  CONSTITUTIONAL:  No fever, chills, rigors  CARDIOVASCULAR:  No chest pain or palpitations  RESPIRATORY:   No SOB, cough, dyspnea on exertion.  No wheezing  GASTROINTESTINAL:  No abd pain, N/V, diarrhea/constipation  EXTREMITIES:  No swelling or joint pain  GENITOURINARY:  No burning on urination, increased frequency or urgency.  No flank pain  NEUROLOGIC:  No HA, visual disturbances  SKIN: No rashes    Allergies    No Known Allergies    Intolerances        ANTIBIOTICS/RELEVANT:  antimicrobials  vancomycin  IVPB 750 milliGRAM(s) IV Intermittent every 12 hours    immunologic:    OTHER:  calcium carbonate   1250 mG (OsCal) 1 Tablet(s) Oral two times a day  cholecalciferol 1000 Unit(s) Oral daily  dextrose 5% + sodium chloride 0.45%. 1000 milliLiter(s) IV Continuous <Continuous>  dicyclomine 20 milliGRAM(s) Oral four times a day before meals PRN  lactobacillus acidophilus 1 Tablet(s) Oral daily  loperamide 2 milliGRAM(s) Oral five times a day  mesalamine DR Capsule 800 milliGRAM(s) Oral three times a day  metoprolol succinate ER 25 milliGRAM(s) Oral daily  multivitamin 1 Tablet(s) Oral daily  pantoprazole    Tablet 40 milliGRAM(s) Oral before breakfast  simethicone 80 milliGRAM(s) Chew four times a day PRN  sodium bicarbonate 650 milliGRAM(s) Oral two times a day      Objective:  Vital Signs Last 24 Hrs  T(C): 36.8 (2019 17:05), Max: 36.8 (2019 17:05)  T(F): 98.2 (2019 17:05), Max: 98.2 (2019 17:05)  HR: 90 (2019 17:05) (89 - 92)  BP: 122/70 (2019 17:05) (103/66 - 122/70)  BP(mean): --  RR: 16 (2019 17:05) (16 - 18)  SpO2: 98% (2019 17:05) (97% - 100%)    PHYSICAL EXAM:  Constitutional:NAD  Eyes:TODD, EOMI  Ear/Nose/Throat: no thrush, mucositis.  Moist mucous membranes	  Neck:no JVD, no lymphadenopathy, supple  Respiratory: CTA ruth  Cardiovascular: S1S2 RRR, no murmurs  Gastrointestinal:soft, nontender,  nondistended (+) BS  Extremities:no e/e/c  Skin:  no rashes, open wounds or ulcerations        LABS:                        9.6    9.77  )-----------( 71       ( 2019 06:00 )             31.4     04-02    140  |  108<H>  |  16  ----------------------------<  72  3.2<L>   |  19<L>  |  1.25    Ca    7.9<L>      2019 06:00  Phos  3.7     04-  Mg     1.5     04-        Urinalysis Basic - ( 2019 17:00 )    Color: YELLOW / Appearance: CLEAR / S.015 / pH: 6.0  Gluc: NEGATIVE / Ketone: NEGATIVE  / Bili: NEGATIVE / Urobili: NORMAL   Blood: NEGATIVE / Protein: 30 / Nitrite: NEGATIVE   Leuk Esterase: NEGATIVE / RBC: 0-2 / WBC 3-5   Sq Epi: OCC / Non Sq Epi: x / Bacteria: FEW            MICROBIOLOGY:    Culture - Urine (19 @ 19:23)    Culture - Urine:   ENTFC^Enterococcus faecium  COLONY COUNT: > = 100,000 CFU/ML    Specimen Source: URINE CATHETER    Culture - Blood (19 @ 16:53)    Culture - Blood:   NO ORGANISMS ISOLATED  NO ORGANISMS ISOLATED AT 24 HOURS    Specimen Source: BLOOD VENOUS    Culture - Blood (19 @ 15:53)    Culture - Blood:   NO ORGANISMS ISOLATED  NO ORGANISMS ISOLATED AT 24 HOURS    Specimen Source: BLOOD PERIPHERAL    Culture - Urine (19 @ 11:40)    -  Ciprofloxacin: R >2 FERNANDO    -  Ampicillin: R >8 FERNANDO    -  Tetra/Doxy: S <=1 FERNANDO    Culture - Urine:   CULTURE IN PROGRESS, FURTHER REPORT TO FOLLOW.  ENTFC^Enterococcus faecium  COLONY COUNT: > = 100,000 CFU/ML    Culture - Urine:   COLONY COUNT: > = 100,000 CFU/ML    -  Vancomycin: S 1 FERNANDO    -  Nitrofurantoin: S <=32 FERNANDO    Specimen Source: URINE CATHETER    Organism Identification: Enterococcus faecium    Organism: Enterococcus faecium    Method Type: POSITIVE FERNANDO 29    Culture - Blood (19 @ 09:47)    Culture - Blood:   NO ORGANISMS ISOLATED    Specimen Source: BLOOD PERIPHERAL          RADIOLOGY & ADDITIONAL STUDIES:    < from: Xray Chest 1 View- PORTABLE-Urgent (19 @ 13:09) >  IMPRESSION:  No pneumothorax or subcutaneous emphysema seen.    Trace right pleural effusion.    Linear atelectasis in the left mid and lower lung.    < end of copied text >

## 2019-04-02 NOTE — PROGRESS NOTE ADULT - ATTENDING COMMENTS
Pt seen and examined with GI fellow. I agree with the above assessment and plan. Awaiting biopsies of the colon.  Patient to get imodium for diarrhea. Awaiting diarrhea laboratory workup as well.

## 2019-04-02 NOTE — CONSULT NOTE ADULT - ASSESSMENT
73M with recurrent and intractable diarrhea, has TCP (likely from chronic inflammation and possibly abx he received in the prior hospital stay) and anemia (of chronic disease), no bleeding, will recommend:  - aggressive Rx of dehydration and diarrhea  - monitor CBC  - transfusion of plts only if plts < 10K or plts ~ 50 but pt is bleeding  - no contraindication to DVT prophylaxis  - rest of the RX as per medicine, GI  - discussed with NP

## 2019-04-02 NOTE — PROGRESS NOTE ADULT - ASSESSMENT
· Assessment	  Mr. Miranda is a 72 yo man with history of Anemia, HTN, PUD, chronic diarrhea, and recent hospitalization at [facility] from 3/3/19-3/21/19 brought in by family for generalized weakness and inability to walk admitted for severe malnutrition, mild hypernatremia and severe deconditioning. Exam relatively benign aside for significant LE edema and weakness of legs. Anemia at baseline.         ·  Problem: Chronic diarrhea.  Plan: GI f/up noted.  - S/p colonoscopy and biopsy.     Pancytopenia:  Hem eval noted.  CBC    Dw family.

## 2019-04-02 NOTE — PROGRESS NOTE ADULT - SUBJECTIVE AND OBJECTIVE BOX
Patient is a 73y old  Male who presents with a chief complaint of Generalized weakness and inability to walk (2019 06:51)      Any change in ROS: Pt is alert and awake: no SOB :      MEDICATIONS  (STANDING):  calcium carbonate   1250 mG (OsCal) 1 Tablet(s) Oral two times a day  cholecalciferol 1000 Unit(s) Oral daily  lactobacillus acidophilus 1 Tablet(s) Oral daily  magnesium sulfate  IVPB 1 Gram(s) IV Intermittent once  mesalamine DR Capsule 800 milliGRAM(s) Oral three times a day  metoprolol succinate ER 25 milliGRAM(s) Oral daily  multivitamin 1 Tablet(s) Oral daily  pantoprazole    Tablet 40 milliGRAM(s) Oral before breakfast  potassium chloride  10 mEq/100 mL IVPB 10 milliEquivalent(s) IV Intermittent every 1 hour  sodium bicarbonate 650 milliGRAM(s) Oral two times a day  vancomycin  IVPB 750 milliGRAM(s) IV Intermittent every 12 hours    MEDICATIONS  (PRN):  dicyclomine 20 milliGRAM(s) Oral four times a day before meals PRN abd pain  loperamide 2 milliGRAM(s) Oral four times a day PRN Diarrhea  simethicone 80 milliGRAM(s) Chew four times a day PRN Gas    Vital Signs Last 24 Hrs  T(C): 36.4 (2019 05:06), Max: 36.4 (2019 17:21)  T(F): 97.5 (2019 05:06), Max: 97.6 (2019 17:21)  HR: 91 (2019 05:06) (73 - 92)  BP: 120/70 (2019 05:06) (115/68 - 127/74)  BP(mean): --  RR: 18 (2019 05:06) (18 - 18)  SpO2: 100% (2019 05:06) (97% - 100%)    I&O's Summary        Physical Exam:   GENERAL: NAD, well-groomed, well-developed  HEENT: TODD/   Atraumatic, Normocephalic  ENMT: No tonsillar erythema, exudates, or enlargement; Moist mucous membranes, Good dentition, No lesions  NECK: Supple, No JVD, Normal thyroid  CHEST/LUNG: Clear to auscultaion  CVS: Regular rate and rhythm; No murmurs, rubs, or gallops  GI: : Soft, Nontender, Nondistended; Bowel sounds present  NERVOUS SYSTEM:  Alert & Oriented X1-23  EXTREMITIES:  2+ Peripheral Pulses, No clubbing, cyanosis, or edema  LYMPH: No lymphadenopathy noted  SKIN: No rashes or lesions  ENDOCRINOLOGY: No Thyromegaly  PSYCH: Calm+    Labs:  20                            9.6    9.77  )-----------( 71       ( 2019 06:00 )             31.4                         9.6    7.26  )-----------( 63       ( 2019 07:25 )             32.2                         9.6    10.26 )-----------( 35       ( 31 Mar 2019 05:05 )             31.9                         10.3   8.87  )-----------( 70       ( 30 Mar 2019 05:36 )             34.4     04-02    140  |  108<H>  |  16  ----------------------------<  72  3.2<L>   |  19<L>  |  1.25  04-01    139  |  109<H>  |  15  ----------------------------<  63<L>  4.2   |  19<L>  |  1.19  03-31    134<L>  |  107  |  17  ----------------------------<  80  3.4<L>   |  17<L>  |  1.23  03-30    140  |  113<H>  |  19  ----------------------------<  78  4.4   |  18<L>  |  1.30    Ca    7.9<L>      2019 06:00  Ca    7.9<L>      2019 07:25  Phos  3.7     04-02  Phos  3.2     04-01  Mg     1.5     04-02  Mg     1.6     04-01      CAPILLARY BLOOD GLUCOSE        < from: Xray Chest 1 View- PORTABLE-Urgent (04.01.19 @ 15:07) >  EXAM:  XR CHEST PORTABLE URGENT 1V        PROCEDURE DATE:  2019         INTERPRETATION:  TIME OF EXAM: 2019 at 2:36 PM.    CLINICAL INFORMATION: Hypothermia.    COMPARISON:  CT scan of the chest from 2019.    TECHNIQUE:   AP Portable chest x-ray. Lordotic and rotated. Skin fold   projects over the left chest.    INTERPRETATION:     The heart is not enlarged. The thoracic aorta is tortuous.  There are trace to small bilateral pleural effusions.  There is linear atelectasis in the left mid and lower lung.  No pneumothorax is seen.  There is question of air in a skin fold in the left lateral chest and   abdominal wall versus subcutaneous emphysema.              IMPRESSION:  Trace to small bilateral pleural effusions.    Linear atelectasis in the left mid and lower lung.    Question of air in a skin fold in the left lateral chest and abdominal   wall versus subcutaneous emphysema.                  NORA HASKINS M.D., ATTENDING RADIOLOGIST  This document has been electronically signed. 2019  3:50PM        < end of copied text >        Urinalysis Basic - ( 2019 17:00 )    Color: YELLOW / Appearance: CLEAR / S.015 / pH: 6.0  Gluc: NEGATIVE / Ketone: NEGATIVE  / Bili: NEGATIVE / Urobili: NORMAL   Blood: NEGATIVE / Protein: 30 / Nitrite: NEGATIVE   Leuk Esterase: NEGATIVE / RBC: 0-2 / WBC 3-5   Sq Epi: OCC / Non Sq Epi: x / Bacteria: FEW            RECENT CULTURES:   @ 18:54 FECES                   @ 14:21 FECES                   @ 11:40 URINE CATHETER   POSITIVE FERNANDO 29      Enterococcus faecium  Enterococcus faecium        @ 09:47 BLOOD PERIPHERAL                  NO ORGANISMS ISOLATED   @ 01:03 FECES                   @ 01:00 FECES                   @ 15:16 STOOL                        RESPIRATORY CULTURES:          Studies  Chest X-RAY  CT SCAN Chest   Venous Dopplers: LE:   CT Abdomen  Others

## 2019-04-02 NOTE — PROGRESS NOTE ADULT - PROBLEM SELECTOR PLAN 1
He has resp alkalosis as well as some metabolic acidosis: He has had lot of diarrhea: before: His chest x-ray with poor inspiration with bibasilar atelectasis other wise lung are clear: I DOUBT HE H IS HAVING  PE: But would do vq scan as wellas dopplers: Could it be related to diarrhea: He is off antibiotics at this time. I don't seem much of pneumonia on chest x-ray: His last ct scan is reviewed too: had some TIB opacities in upper lobes suggestive of bronchiolitis: CHF and bronchomalacia:  3/28: vq scan could not be dome yesterday: will attempt today: his previos dopplers were negative: new ABG is awaited:  3/29: ABG is pretty good: stable:  3/30; seems to be doing ok : no SOB: Breathing wise is OK :  3/31> breathing wise stable: no wheezing  4/2: yesterdays x-ray noted: rpt chest x-ray : There is no sq emphysema on palpation:

## 2019-04-02 NOTE — PROGRESS NOTE ADULT - ASSESSMENT
Mr. Miranda is a 74 yo man with history of Anemia, HTN, chronic diarrhea, and recent hospitalization at [facility] from 3/3/19-3/21/19 brought in by family for generalized weakness and inability to walk.    Patient was discharged home with home PT services on 3/21/19 after being hospitalized since 3/3/19. During his hospitalization, he was managed for chronic diarrhea presumed to be 2/2 IBD since infectious and autoimmune w/u was negative, anemia due to blood loss from GI tract 2/2 PUD, requiring blood transfusion, and ESBL E. coli UTI with ertapenem via PICC. He was also determined not to have the diagnosis of CLL after flow cytometry results were available.     HPI was obtained primarily from patient's daughter and caregiver, Bee. As per daughter, when patient came home he was very weak. She wanted to bathe him but patient was unable to stand or walk to the bathroom. Due to his severe weakness, his daughter brought him back to the hospital. (23 Mar 2019 08:22)      Recommend:    - s/p completion of  ertapenem 1 week course for ESBL e.coli UTI on 3/25.     - Pt with diarrhea, thus far infectious w/u negative including stool cx, O&P (including microsporidium, cyclospora, isospora, cryptosporidium), gi pcr, cdiff, strongyloides neg, giardia neg, cmv pcr (-), cmv igG (+), IgM (-), HIV (-).      - Pt with moderate ascites, and ?cirrhosis on CT ap.  Hep A/B/C serologies neg.     - stool for AFB negative    -GI eval noted, repeat stool studies ordered including repeat gi pcr, giardia, stool o&p, stool cx.   r/o autoimmune and celiac disease.       -  CMV IgG (+), recommend biopsy to r/o cmv colitis - pt s/p colonoscopy on 4/1.  f/u path        - Pt with intermittent hypothermia and change in mental status/disoriented.   Repeat Ucx grew enterococcus faecium, UA was unremarkable at that time.  Given recurrent hypothermia, will opt to treat - start vancomycin to treat enterococcus.      - Repeat ua, ucx, cxr, bcx ordered 4/1.  Thus far ua is neg, and cxr without consolidations.  Pt is hemodynamically stable.  Repeat Ucx growing enterococcus faecium, although UA (-).  Cont vancomycin            Will follow       Adeline Marques  [phone]

## 2019-04-02 NOTE — CONSULT NOTE ADULT - SUBJECTIVE AND OBJECTIVE BOX
Patient is a 73y old  Male who presents with a chief complaint of Generalized weakness and inability to walk (02 Apr 2019 12:42). Pt had a diagnosis of CLL but flow cytometry did not reveal any such population, pt was here with diarrhea, infections and was discharged after adequate Rx, cam back with diarrhea, dehydration. Plts have been decreasing. Pt denies any symptoms today - no bleeding, pain, fevers or chills and rest of the ROS unremarkable except weakness and weak leg muscles and inability to walk much.      PAST MEDICAL & SURGICAL HISTORY:  Chronic kidney disease (CKD)  Anemia, unspecified type  Essential hypertension  No significant past surgical history    Meds:  calcium carbonate   1250 mG (OsCal) 1 Tablet(s) Oral two times a day  cholecalciferol 1000 Unit(s) Oral daily  dextrose 5% + sodium chloride 0.45%. 1000 milliLiter(s) IV Continuous <Continuous>  dicyclomine 20 milliGRAM(s) Oral four times a day before meals PRN  lactobacillus acidophilus 1 Tablet(s) Oral daily  loperamide 2 milliGRAM(s) Oral five times a day  mesalamine DR Capsule 800 milliGRAM(s) Oral three times a day  metoprolol succinate ER 25 milliGRAM(s) Oral daily  multivitamin 1 Tablet(s) Oral daily  pantoprazole    Tablet 40 milliGRAM(s) Oral before breakfast  simethicone 80 milliGRAM(s) Chew four times a day PRN  sodium bicarbonate 650 milliGRAM(s) Oral two times a day  vancomycin  IVPB 750 milliGRAM(s) IV Intermittent every 12 hours      No Known Allergies      FAMILY HISTORY:  Family history of myocardial infarction (Sibling): Father, mother, brother      Vital Signs Last 24 Hrs  T(C): 36.8 (02 Apr 2019 17:05), Max: 36.8 (02 Apr 2019 17:05)  T(F): 98.2 (02 Apr 2019 17:05), Max: 98.2 (02 Apr 2019 17:05)  HR: 90 (02 Apr 2019 17:05) (89 - 92)  BP: 122/70 (02 Apr 2019 17:05) (103/66 - 125/71)  BP(mean): --  RR: 16 (02 Apr 2019 17:05) (16 - 18)  SpO2: 98% (02 Apr 2019 17:05) (97% - 100%)                          9.6    9.77  )-----------( 71       ( 02 Apr 2019 06:00 )             31.4       04-02    140  |  108<H>  |  16  ----------------------------<  72  3.2<L>   |  19<L>  |  1.25    Ca    7.9<L>      02 Apr 2019 06:00  Phos  3.7     04-02  Mg     1.5     04-02          CT c/a/p: IMPRESSION: Marked interval improvement in appearance of the lungs with   minimal scattered groundglass opacities remaining.  Moderate ascites. Question of cirrhosis.                  MARY ALICE AMES M.D. ATTENDING RADIOLOGIST  This document has been electronically signed. Mar 29 2019  9:22AM

## 2019-04-03 DIAGNOSIS — G93.40 ENCEPHALOPATHY, UNSPECIFIED: ICD-10-CM

## 2019-04-03 LAB
-  AMPICILLIN: SIGNIFICANT CHANGE UP
-  CIPROFLOXACIN: SIGNIFICANT CHANGE UP
-  NITROFURANTOIN: SIGNIFICANT CHANGE UP
-  TETRACYCLINE: SIGNIFICANT CHANGE UP
-  VANCOMYCIN: SIGNIFICANT CHANGE UP
ALBUMIN FLD-MCNC: 0.1 G/DL — SIGNIFICANT CHANGE UP
ANION GAP SERPL CALC-SCNC: 10 MMO/L — SIGNIFICANT CHANGE UP (ref 7–14)
APTT BLD: 52 SEC — HIGH (ref 27.5–36.3)
BACTERIA UR CULT: SIGNIFICANT CHANGE UP
BODY FLUID TYPE: SIGNIFICANT CHANGE UP
BUN SERPL-MCNC: 15 MG/DL — SIGNIFICANT CHANGE UP (ref 7–23)
CALCIUM SERPL-MCNC: 7.7 MG/DL — LOW (ref 8.4–10.5)
CHLORIDE SERPL-SCNC: 112 MMOL/L — HIGH (ref 98–107)
CLARITY SPEC: SIGNIFICANT CHANGE UP
CO2 SERPL-SCNC: 18 MMOL/L — LOW (ref 22–31)
COLOR FLD: YELLOW — SIGNIFICANT CHANGE UP
CREAT SERPL-MCNC: 1.22 MG/DL — SIGNIFICANT CHANGE UP (ref 0.5–1.3)
GLUCOSE FLD-MCNC: 126 MG/DL — SIGNIFICANT CHANGE UP
GLUCOSE SERPL-MCNC: 108 MG/DL — HIGH (ref 70–99)
GRAM STN FLD: SIGNIFICANT CHANGE UP
HCT VFR BLD CALC: 29.4 % — LOW (ref 39–50)
HGB BLD-MCNC: 8.8 G/DL — LOW (ref 13–17)
INR BLD: 2.11 — HIGH (ref 0.88–1.17)
LDH SERPL L TO P-CCNC: 50 U/L — SIGNIFICANT CHANGE UP
LYMPHOCYTES NFR FLD: 12 % — SIGNIFICANT CHANGE UP
MAGNESIUM SERPL-MCNC: 1.6 MG/DL — SIGNIFICANT CHANGE UP (ref 1.6–2.6)
MCHC RBC-ENTMCNC: 28.6 PG — SIGNIFICANT CHANGE UP (ref 27–34)
MCHC RBC-ENTMCNC: 29.9 % — LOW (ref 32–36)
MCV RBC AUTO: 95.5 FL — SIGNIFICANT CHANGE UP (ref 80–100)
MESOTHL CELL # FLD: 3 % — SIGNIFICANT CHANGE UP
METHOD TYPE: SIGNIFICANT CHANGE UP
MONOCYTES # FLD: 85 % — SIGNIFICANT CHANGE UP
NRBC # FLD: 0.03 K/UL — SIGNIFICANT CHANGE UP (ref 0–0)
ORGANISM # SPEC MICROSCOPIC CNT: SIGNIFICANT CHANGE UP
ORGANISM # SPEC MICROSCOPIC CNT: SIGNIFICANT CHANGE UP
PHOSPHATE SERPL-MCNC: 2 MG/DL — LOW (ref 2.5–4.5)
PLATELET # BLD AUTO: 72 K/UL — LOW (ref 150–400)
PMV BLD: SIGNIFICANT CHANGE UP FL (ref 7–13)
POTASSIUM SERPL-MCNC: 3.9 MMOL/L — SIGNIFICANT CHANGE UP (ref 3.5–5.3)
POTASSIUM SERPL-SCNC: 3.9 MMOL/L — SIGNIFICANT CHANGE UP (ref 3.5–5.3)
PROT FLD-MCNC: 0.7 G/DL — SIGNIFICANT CHANGE UP
PROTHROM AB SERPL-ACNC: 24 SEC — HIGH (ref 9.8–13.1)
RBC # BLD: 3.08 M/UL — LOW (ref 4.2–5.8)
RBC # FLD: 22 % — HIGH (ref 10.3–14.5)
RCV VOL RI: 1000 CELL/UL — HIGH (ref 0–5)
SODIUM SERPL-SCNC: 140 MMOL/L — SIGNIFICANT CHANGE UP (ref 135–145)
SPECIMEN SOURCE: SIGNIFICANT CHANGE UP
TOTAL CELLS COUNTED, BODY FLUID: 100 CELLS — SIGNIFICANT CHANGE UP
TOTAL NUCLEATED CELL COUNT, BODY FLUID: 157 CELL/UL — HIGH (ref 0–5)
TTG IGA SER-ACNC: 4.9 U/ML — HIGH
TTG IGG SER-ACNC: 5.6 U/ML — SIGNIFICANT CHANGE UP
VANCOMYCIN TROUGH SERPL-MCNC: 17.6 UG/ML — SIGNIFICANT CHANGE UP (ref 10–20)
VANCOMYCIN TROUGH SERPL-MCNC: 22.2 UG/ML — HIGH (ref 10–20)
WBC # BLD: 12.64 K/UL — HIGH (ref 3.8–10.5)
WBC # FLD AUTO: 12.64 K/UL — HIGH (ref 3.8–10.5)

## 2019-04-03 PROCEDURE — 99232 SBSQ HOSP IP/OBS MODERATE 35: CPT | Mod: GC

## 2019-04-03 PROCEDURE — 49083 ABD PARACENTESIS W/IMAGING: CPT | Mod: GC

## 2019-04-03 PROCEDURE — 70450 CT HEAD/BRAIN W/O DYE: CPT | Mod: 26

## 2019-04-03 PROCEDURE — 90792 PSYCH DIAG EVAL W/MED SRVCS: CPT

## 2019-04-03 PROCEDURE — 93010 ELECTROCARDIOGRAM REPORT: CPT

## 2019-04-03 RX ORDER — LACTOBACILLUS ACIDOPHILUS 100MM CELL
1 CAPSULE ORAL
Qty: 0 | Refills: 0 | Status: DISCONTINUED | OUTPATIENT
Start: 2019-04-03 | End: 2019-04-12

## 2019-04-03 RX ORDER — LOPERAMIDE HCL 2 MG
2 TABLET ORAL
Qty: 0 | Refills: 0 | Status: DISCONTINUED | OUTPATIENT
Start: 2019-04-03 | End: 2019-04-04

## 2019-04-03 RX ORDER — QUETIAPINE FUMARATE 200 MG/1
25 TABLET, FILM COATED ORAL AT BEDTIME
Qty: 0 | Refills: 0 | Status: DISCONTINUED | OUTPATIENT
Start: 2019-04-03 | End: 2019-04-12

## 2019-04-03 RX ORDER — LANOLIN ALCOHOL/MO/W.PET/CERES
3 CREAM (GRAM) TOPICAL
Qty: 0 | Refills: 0 | Status: DISCONTINUED | OUTPATIENT
Start: 2019-04-03 | End: 2019-04-12

## 2019-04-03 RX ORDER — CALCIUM CARBONATE 500(1250)
1 TABLET ORAL
Qty: 0 | Refills: 0 | Status: DISCONTINUED | OUTPATIENT
Start: 2019-04-03 | End: 2019-04-12

## 2019-04-03 RX ORDER — THIAMINE MONONITRATE (VIT B1) 100 MG
500 TABLET ORAL DAILY
Qty: 0 | Refills: 0 | Status: COMPLETED | OUTPATIENT
Start: 2019-04-03 | End: 2019-04-06

## 2019-04-03 RX ORDER — SODIUM BICARBONATE 1 MEQ/ML
650 SYRINGE (ML) INTRAVENOUS
Qty: 0 | Refills: 0 | Status: DISCONTINUED | OUTPATIENT
Start: 2019-04-03 | End: 2019-04-10

## 2019-04-03 RX ORDER — SODIUM CHLORIDE 9 MG/ML
1000 INJECTION, SOLUTION INTRAVENOUS
Qty: 0 | Refills: 0 | Status: DISCONTINUED | OUTPATIENT
Start: 2019-04-03 | End: 2019-04-04

## 2019-04-03 RX ORDER — LANOLIN ALCOHOL/MO/W.PET/CERES
3 CREAM (GRAM) TOPICAL ONCE
Qty: 0 | Refills: 0 | Status: COMPLETED | OUTPATIENT
Start: 2019-04-03 | End: 2019-04-03

## 2019-04-03 RX ORDER — POTASSIUM PHOSPHATE, MONOBASIC POTASSIUM PHOSPHATE, DIBASIC 236; 224 MG/ML; MG/ML
15 INJECTION, SOLUTION INTRAVENOUS ONCE
Qty: 0 | Refills: 0 | Status: COMPLETED | OUTPATIENT
Start: 2019-04-03 | End: 2019-04-03

## 2019-04-03 RX ORDER — VANCOMYCIN HCL 1 G
500 VIAL (EA) INTRAVENOUS EVERY 12 HOURS
Qty: 0 | Refills: 0 | Status: DISCONTINUED | OUTPATIENT
Start: 2019-04-03 | End: 2019-04-05

## 2019-04-03 RX ADMIN — Medication 800 MILLIGRAM(S): at 13:12

## 2019-04-03 RX ADMIN — Medication 2 MILLIGRAM(S): at 13:12

## 2019-04-03 RX ADMIN — SODIUM CHLORIDE 60 MILLILITER(S): 9 INJECTION, SOLUTION INTRAVENOUS at 02:21

## 2019-04-03 RX ADMIN — Medication 800 MILLIGRAM(S): at 06:43

## 2019-04-03 RX ADMIN — Medication 2 MILLIGRAM(S): at 02:20

## 2019-04-03 RX ADMIN — Medication 2 MILLIGRAM(S): at 21:22

## 2019-04-03 RX ADMIN — Medication 105 MILLIGRAM(S): at 20:03

## 2019-04-03 RX ADMIN — Medication 2 MILLIGRAM(S): at 09:34

## 2019-04-03 RX ADMIN — Medication 650 MILLIGRAM(S): at 06:43

## 2019-04-03 RX ADMIN — PANTOPRAZOLE SODIUM 40 MILLIGRAM(S): 20 TABLET, DELAYED RELEASE ORAL at 06:43

## 2019-04-03 RX ADMIN — Medication 3 MILLIGRAM(S): at 21:22

## 2019-04-03 RX ADMIN — SODIUM CHLORIDE 60 MILLILITER(S): 9 INJECTION, SOLUTION INTRAVENOUS at 06:46

## 2019-04-03 RX ADMIN — Medication 3 MILLIGRAM(S): at 00:15

## 2019-04-03 RX ADMIN — Medication 100 MILLIGRAM(S): at 20:03

## 2019-04-03 RX ADMIN — Medication 1 TABLET(S): at 18:25

## 2019-04-03 RX ADMIN — Medication 1 TABLET(S): at 13:12

## 2019-04-03 RX ADMIN — Medication 25 MILLIGRAM(S): at 06:43

## 2019-04-03 RX ADMIN — Medication 1000 UNIT(S): at 13:12

## 2019-04-03 RX ADMIN — Medication 1 TABLET(S): at 18:24

## 2019-04-03 RX ADMIN — Medication 800 MILLIGRAM(S): at 21:22

## 2019-04-03 RX ADMIN — Medication 650 MILLIGRAM(S): at 18:24

## 2019-04-03 RX ADMIN — POTASSIUM PHOSPHATE, MONOBASIC POTASSIUM PHOSPHATE, DIBASIC 62.5 MILLIMOLE(S): 236; 224 INJECTION, SOLUTION INTRAVENOUS at 22:32

## 2019-04-03 RX ADMIN — Medication 1 TABLET(S): at 06:44

## 2019-04-03 RX ADMIN — QUETIAPINE FUMARATE 25 MILLIGRAM(S): 200 TABLET, FILM COATED ORAL at 23:26

## 2019-04-03 NOTE — PROGRESS NOTE ADULT - ATTENDING COMMENTS
is thrombocytopenic:  4/2: reps wise he is stble for any kind og GI procedure:Rpt chest x-ray today  4/3: stable from resp point of view:

## 2019-04-03 NOTE — PROGRESS NOTE ADULT - SUBJECTIVE AND OBJECTIVE BOX
Pt is feeling slightly better but still very weak and can not ambulate. No bleeding, pain and a ROS otherwise unremarkable. Able to eat somewhat.         Meds:  calcium carbonate   1250 mG (OsCal) 1 Tablet(s) Oral two times a day  cholecalciferol 1000 Unit(s) Oral daily  dextrose 5% + sodium chloride 0.45%. 1000 milliLiter(s) IV Continuous <Continuous>  dicyclomine 20 milliGRAM(s) Oral four times a day before meals PRN  lactobacillus acidophilus 1 Tablet(s) Oral daily  loperamide 2 milliGRAM(s) Oral five times a day  mesalamine DR Capsule 800 milliGRAM(s) Oral three times a day  metoprolol succinate ER 25 milliGRAM(s) Oral daily  multivitamin 1 Tablet(s) Oral daily  pantoprazole    Tablet 40 milliGRAM(s) Oral before breakfast  simethicone 80 milliGRAM(s) Chew four times a day PRN  sodium bicarbonate 650 milliGRAM(s) Oral two times a day  vancomycin  IVPB 750 milliGRAM(s) IV Intermittent every 12 hours      Vital Signs Last 24 Hrs  T(C): 36.2 (03 Apr 2019 06:38), Max: 36.8 (02 Apr 2019 17:05)  T(F): 97.2 (03 Apr 2019 06:38), Max: 98.2 (02 Apr 2019 17:05)  HR: 110 (03 Apr 2019 06:41) (89 - 117)  BP: 124/68 (03 Apr 2019 06:38) (103/66 - 136/84)  BP(mean): --  RR: 18 (03 Apr 2019 06:38) (16 - 18)  SpO2: 100% (03 Apr 2019 06:38) (98% - 100%)                          9.6    9.77  )-----------( 71       ( 02 Apr 2019 06:00 )             31.4       04-02    140  |  108<H>  |  16  ----------------------------<  72  3.2<L>   |  19<L>  |  1.25    Ca    7.9<L>      02 Apr 2019 06:00  Phos  3.7     04-02  Mg     1.5     04-02

## 2019-04-03 NOTE — PROGRESS NOTE ADULT - SUBJECTIVE AND OBJECTIVE BOX
[facility] [place] [facility]   MD MAGALY MORAN MD ANGELA WONG, PA    TEL:  OFFICE: [phone]  DR ANDERSON CELL: [phone]  DR. MONAE CELL: [phone]  HUBERT WELLINGTON CELL: [phone]        Patient is a 73y old  Male who presents with a chief complaint of Generalized weakness and inability to walk (03 Apr 2019 14:17)      Patient seen and examined at bedside. No chest pain/sob    VITALS:  T(F): 97.7 (04-03-19 @ 13:54), Max: 98.2 (04-02-19 @ 17:05)  HR: 88 (04-03-19 @ 13:54)  BP: 119/68 (04-03-19 @ 13:54)  RR: 18 (04-03-19 @ 13:54)  SpO2: 99% (04-03-19 @ 13:54)  Wt(kg): --        PHYSICAL EXAM:  Constitutional: NAD  Neck: No JVD  Respiratory: CTAB, no wheezes, rales or rhonchi  Cardiovascular: S1, S2, RRR  Gastrointestinal: BS+, soft, NT/ND  Extremities: No peripheral edema    Hospital Medications:   MEDICATIONS  (STANDING):  calcium carbonate   1250 mG (OsCal) 1 Tablet(s) Oral two times a day  cholecalciferol 1000 Unit(s) Oral daily  lactobacillus acidophilus 1 Tablet(s) Oral three times a day with meals  loperamide 2 milliGRAM(s) Oral five times a day  mesalamine DR Capsule 800 milliGRAM(s) Oral three times a day  metoprolol succinate ER 25 milliGRAM(s) Oral daily  multivitamin 1 Tablet(s) Oral daily  multivitamin  Chewable 1 Tablet(s) Oral daily  multivitamin/thiamine/folic acid in sodium chloride 0.9% 1000 milliLiter(s) (60 mL/Hr) IV Continuous <Continuous>  pantoprazole    Tablet 40 milliGRAM(s) Oral before breakfast  sodium bicarbonate 650 milliGRAM(s) Oral two times a day  thiamine IVPB 500 milliGRAM(s) IV Intermittent daily  vancomycin  IVPB 500 milliGRAM(s) IV Intermittent every 12 hours      LABS:  04-02    140  |  108<H>  |  16  ----------------------------<  72  3.2<L>   |  19<L>  |  1.25    Ca    7.9<L>      02 Apr 2019 06:00  Phos  3.7     04-02  Mg     1.5     04-02      Creatinine Trend: 1.25 <--, 1.19 <--, 1.23 <--, 1.30 <--, 1.34 <--, 1.58 <--                                9.6    9.77  )-----------( 71       ( 02 Apr 2019 06:00 )             31.4     Urine Studies:  Urinalysis - [04-01-19 @ 17:00]      Color YELLOW / Appearance CLEAR / SG 1.015 / pH 6.0      Gluc NEGATIVE / Ketone NEGATIVE  / Bili NEGATIVE / Urobili NORMAL       Blood NEGATIVE / Protein 30 / Leuk Est NEGATIVE / Nitrite NEGATIVE      RBC 0-2 / WBC 3-5 / Hyaline NEGATIVE / Gran  / Sq Epi OCC / Non Sq Epi  / Bacteria FEW      Iron 48, TIBC 78, %sat --      [03-29-19 @ 05:20]  Ferritin 378.6      [03-29-19 @ 05:20]  Vitamin D (25OH) 25.6      [03-23-19 @ 18:15]  TSH 1.98      [03-05-19 @ 06:45]    HBsAb Nonreactive      [03-20-19 @ 05:35]  HBsAg NEGATIVE      [03-20-19 @ 05:35]    TODD: titer Negative, pattern --      [03-08-19 @ 06:30]    RADIOLOGY & ADDITIONAL STUDIES:

## 2019-04-03 NOTE — PROCEDURE NOTE - NSINFORMCONSENT_GEN_A_CORE
From faimly/Benefits, risks, and possible complications of procedure explained to patient/caregiver who verbalized understanding and gave verbal consent.

## 2019-04-03 NOTE — BEHAVIORAL HEALTH ASSESSMENT NOTE - SUMMARY
Briefly the patient is a 73 year old male, , lives at home with daughter, supportive family, does not have a history of mental health issues, no evident cognitive decline at baseline as per family, has a medical history notable for Anemia, HTN, Chronic diarrhea , was recent discharged from [facility] back home with home PT services, presents again from home with worsening weakness and confusion. Infectious studies are ongoing. Poor PO intake. Agitated, restless in bed, confused, and attempted to remove IV line, etc.   Based on assessment done today and collateral obtained from family, at baseline patient does not show any evident cognitive difficulties, and now in the last 1 week has been more confused and disoriented. Symptoms consistent with delirium at this point.     Recommendations  - Routine observation. If added supervision is needed to ensure intact tubings, team to consider an EO  - Continue r/o organic causes of delirium- infectious, metabolic. If mentation does not improve, consider another brain imaging. Neurology consult at that time might be beneficial as well.  - Collaborate care with family  - Frequent orientation  - Avoid bzd, anticholinergics and antihistamines  - Preferably communicate in Malayalam  - Melatonin 3mg q 8pm po- for sleep wake reversal associated with delirium  - Check qtc again.   - AGITATION= If qtc <=500, can try Seroquel 25mg q 6hrs prn po. IF PRN WERE TO BE NEEDED PLEASE ENSURE THAT SON IS CALLED TO ALERT HIM

## 2019-04-03 NOTE — PROGRESS NOTE ADULT - ASSESSMENT
Impression:  1) Diarrhea - with previous negative work up inpatient.  Etiologies include infectious vs inflammatory vs  malabsorptive vs microscopic colitis.  Patient will likely ultimately need endoscopic evaluation but would first repeat stool work up prior to proceeding. Stool C. Diff negative, now with AMS. Stool fat, o and p and giardia normal. Celiac panel returned weakly positive.    Recommendations:   - send stool electrolytes (Na+, K+)   - f/u stool elastase   - send stool a-1 antitrypsin    -follow-up CBC; Fe++/TIBC. B12, foalte   - f/u celiac panel including tissue trans glutaminase IgA and IgA    - monitor stool output and volume   - gluten and lactose free LRD   - will attempt un-sedated flexible sigmoidoscopy    [place], PGY-4  Gastroenterology Fellow

## 2019-04-03 NOTE — PROGRESS NOTE ADULT - SUBJECTIVE AND OBJECTIVE BOX
Patient is a 73y old  Male who presents with a chief complaint of Generalized weakness and inability to walk (03 Apr 2019 17:57)      SUBJECTIVE / OVERNIGHT EVENTS:    Events noted.  Agitated/Pulling out lines  Diarrhea    MEDICATIONS  (STANDING):  calcium carbonate   1250 mG (OsCal) 1 Tablet(s) Oral two times a day  cholecalciferol 1000 Unit(s) Oral daily  lactobacillus acidophilus 1 Tablet(s) Oral three times a day with meals  loperamide 2 milliGRAM(s) Oral five times a day  melatonin 3 milliGRAM(s) Oral <User Schedule>  mesalamine DR Capsule 800 milliGRAM(s) Oral three times a day  metoprolol succinate ER 25 milliGRAM(s) Oral daily  multivitamin 1 Tablet(s) Oral daily  multivitamin/thiamine/folic acid in sodium chloride 0.9% 1000 milliLiter(s) (60 mL/Hr) IV Continuous <Continuous>  pantoprazole    Tablet 40 milliGRAM(s) Oral before breakfast  sodium bicarbonate 650 milliGRAM(s) Oral two times a day  thiamine IVPB 500 milliGRAM(s) IV Intermittent daily  vancomycin  IVPB 500 milliGRAM(s) IV Intermittent every 12 hours    MEDICATIONS  (PRN):  dicyclomine 20 milliGRAM(s) Oral four times a day before meals PRN abd pain  QUEtiapine 25 milliGRAM(s) Oral at bedtime PRN for agitation/sleep  simethicone 80 milliGRAM(s) Chew four times a day PRN Gas        CAPILLARY BLOOD GLUCOSE        I&O's Summary      PHYSICAL EXAM:  GENERAL: NAD  NECK: Supple, No JVD  CHEST/LUNG: Clear to auscultation bilaterally; No wheezing.  HEART: Regular rate and rhythm; No murmurs, rubs, or gallops  ABDOMEN: Soft, Nontender, Nondistended; Bowel sounds present  EXTREMITIES:   No edema  NEUROLOGY: Awake/Lethargic      LABS:                        8.8    12.64 )-----------( 72       ( 03 Apr 2019 17:43 )             29.4     04-03    140  |  112<H>  |  15  ----------------------------<  108<H>  3.9   |  18<L>  |  1.22    Ca    7.7<L>      03 Apr 2019 17:43  Phos  2.0     04-03  Mg     1.6     04-03      PT/INR - ( 03 Apr 2019 17:43 )   PT: 24.0 SEC;   INR: 2.11          PTT - ( 03 Apr 2019 17:43 )  PTT:52.0 SEC        CAPILLARY BLOOD GLUCOSE        04-03 @ 12:26  Culture-urine --  Culture results --  method type --  Organism --  Organism Identification --  Specimen source PERITONEAL FLUID  04-01 @ 19:23  Culture-urine   COLONY COUNT: > = 100,000 CFU/ML  Culture results --  method type POSITIVE FERNANDO 29  Organism Enterococcus faecium  Organism Identification Enterococcus faecium  Specimen source URINE CATHETER  04-01 @ 16:53  Culture-urine --  Culture results --  method type --  Organism --  Organism Identification --  Specimen source BLOOD VENOUS  04-01 @ 15:53  Culture-urine --  Culture results --  method type --  Organism --  Organism Identification --  Specimen source BLOOD PERIPHERAL      04-03 @ 12:26  Culture-CSF --  Culture results --  Gram stain   NOS^No Organisms Seen  WBC^White Blood Cells  QNTY CELLS IN GRAM STAIN: RARE (1+)  Gram stain spinal fluid --  Method type --  Organism --  Organism identification --  Specimen source PERITONEAL FLUID       04-03 @ 12:26  Culture blood --  Culture results --  Gram stain   NOS^No Organisms Seen  WBC^White Blood Cells  QNTY CELLS IN GRAM STAIN: RARE (1+)  Gram stain blood --  Method type --  Organism --  Organism identification --  Specimen source PERITONEAL FLUID   04-01 @ 19:23  Culture blood --  Culture results --  Gram stain --  Gram stain blood --  Method type POSITIVE FERNANDO 29  Organism Enterococcus faecium  Organism identification Enterococcus faecium  Specimen source URINE CATHETER   04-01 @ 16:53  Culture blood   NO ORGANISMS ISOLATED  NO ORGANISMS ISOLATED AT 48 HRS.  Culture results --  Gram stain --  Gram stain blood --  Method type --  Organism --  Organism identification --  Specimen source BLOOD VENOUS   04-01 @ 15:53  Culture blood   NO ORGANISMS ISOLATED  NO ORGANISMS ISOLATED AT 48 HRS.  Culture results --  Gram stain --  Gram stain blood --  Method type --  Organism --  Organism identification --  Specimen source BLOOD PERIPHERAL      RADIOLOGY & ADDITIONAL TESTS:    Imaging Personally Reviewed:    Consultant(s) Notes Reviewed:      Care Discussed with Consultants/Other Providers:

## 2019-04-03 NOTE — PROGRESS NOTE ADULT - ASSESSMENT
Mr. Miranda is a 72 yo man with history of Anemia, HTN, PUD, chronic diarrhea, and recent hospitalization at [facility] from 3/3/19-3/21/19 brought in by family for generalized weakness and inability to walk admitted for severe malnutrition, mild hypernatremia and severe deconditioning. Exam relatively benign aside for significant LE edema and weakness of legs. Anemia at baseline.

## 2019-04-03 NOTE — PROGRESS NOTE ADULT - SUBJECTIVE AND OBJECTIVE BOX
Cardiovascular Disease Progress Note    Overnight events: No acute events overnight. Mr. Miranda is calm in bed. He denies pain or SOB.     Otherwise review of systems negative    Objective Findings:  T(C): 36.2 (04-03-19 @ 06:38), Max: 36.8 (04-02-19 @ 17:05)  HR: 110 (04-03-19 @ 06:41) (89 - 117)  BP: 124/68 (04-03-19 @ 06:38) (103/66 - 136/84)  RR: 18 (04-03-19 @ 06:38) (16 - 18)  SpO2: 100% (04-03-19 @ 06:38) (98% - 100%)  Wt(kg): --  Daily     Daily       Physical Exam:  Gen: NAD  HEENT: EOMI  CV: RRR, normal S1 + S2, no m/r/g  Lungs: CTAB  Abd: soft, non-tender  Ext: No edema    Telemetry: n/a    Laboratory Data:                        9.6    9.77  )-----------( 71       ( 02 Apr 2019 06:00 )             31.4     04-02    140  |  108<H>  |  16  ----------------------------<  72  3.2<L>   |  19<L>  |  1.25    Ca    7.9<L>      02 Apr 2019 06:00  Phos  3.7     04-02  Mg     1.5     04-02                Inpatient Medications:  MEDICATIONS  (STANDING):  calcium carbonate   1250 mG (OsCal) 1 Tablet(s) Oral two times a day  cholecalciferol 1000 Unit(s) Oral daily  dextrose 5% + sodium chloride 0.45%. 1000 milliLiter(s) (60 mL/Hr) IV Continuous <Continuous>  lactobacillus acidophilus 1 Tablet(s) Oral daily  loperamide 2 milliGRAM(s) Oral five times a day  mesalamine DR Capsule 800 milliGRAM(s) Oral three times a day  metoprolol succinate ER 25 milliGRAM(s) Oral daily  multivitamin 1 Tablet(s) Oral daily  pantoprazole    Tablet 40 milliGRAM(s) Oral before breakfast  sodium bicarbonate 650 milliGRAM(s) Oral two times a day  vancomycin  IVPB 750 milliGRAM(s) IV Intermittent every 12 hours      Assessment: 73 year old man with HTN, anemia, chronic diarrhea, malnutrition and low voltage EKG presents with weakness.    Plan of Care:    #Pre-operative evaluation-  Mr. Miranda displays no signs or symptoms or active coronary ischemia, decompensated CHF or malignant arrythmia.  EKG is sinus with low voltage and no ischemic changes.  TTE from 3/10 reviewed- no significant structural heart disease.   There is no cardiac objection to proceeding with flex sigmoidoscopy.     #HTN-  BP controlled on current regimen.     Over 25 minutes spent on total encounter; more than 50% of the visit was spent counseling and/or coordinating care by the attending physician.      Darren Herring MD [facility]  Cardiovascular Disease  [phone] Cardiovascular Disease Progress Note    Overnight events: No acute events overnight. Mr. Miranda is calm in bed. He denies pain or SOB.     Otherwise review of systems negative    Objective Findings:  T(C): 36.2 (04-03-19 @ 06:38), Max: 36.8 (04-02-19 @ 17:05)  HR: 110 (04-03-19 @ 06:41) (89 - 117)  BP: 124/68 (04-03-19 @ 06:38) (103/66 - 136/84)  RR: 18 (04-03-19 @ 06:38) (16 - 18)  SpO2: 100% (04-03-19 @ 06:38) (98% - 100%)  Wt(kg): --  Daily     Daily       Physical Exam:  Gen: NAD  HEENT: EOMI  CV: RRR, normal S1 + S2, no m/r/g  Lungs: CTAB  Abd: soft, non-tender  Ext: No edema    Telemetry: n/a    Laboratory Data:                        9.6    9.77  )-----------( 71       ( 02 Apr 2019 06:00 )             31.4     04-02    140  |  108<H>  |  16  ----------------------------<  72  3.2<L>   |  19<L>  |  1.25    Ca    7.9<L>      02 Apr 2019 06:00  Phos  3.7     04-02  Mg     1.5     04-02                Inpatient Medications:  MEDICATIONS  (STANDING):  calcium carbonate   1250 mG (OsCal) 1 Tablet(s) Oral two times a day  cholecalciferol 1000 Unit(s) Oral daily  dextrose 5% + sodium chloride 0.45%. 1000 milliLiter(s) (60 mL/Hr) IV Continuous <Continuous>  lactobacillus acidophilus 1 Tablet(s) Oral daily  loperamide 2 milliGRAM(s) Oral five times a day  mesalamine DR Capsule 800 milliGRAM(s) Oral three times a day  metoprolol succinate ER 25 milliGRAM(s) Oral daily  multivitamin 1 Tablet(s) Oral daily  pantoprazole    Tablet 40 milliGRAM(s) Oral before breakfast  sodium bicarbonate 650 milliGRAM(s) Oral two times a day  vancomycin  IVPB 750 milliGRAM(s) IV Intermittent every 12 hours      Assessment: 73 year old man with HTN, anemia, chronic diarrhea, malnutrition and low voltage EKG presents with weakness.    Plan of Care:    #Post-operative evaluation-  Mr. Miranda tolerated colonoscopy well on 4/1.  He displays no signs or symptoms or post-procedural coronary ischemia, decompensated CHF or malignant arrythmia.  TTE from 3/10 reviewed- no significant structural heart disease.   Continue current cardiac management.      #HTN-  BP controlled on current regimen.     Over 25 minutes spent on total encounter; more than 50% of the visit was spent counseling and/or coordinating care by the attending physician.      Darren Herring MD [facility]  Cardiovascular Disease  [phone]

## 2019-04-03 NOTE — PROGRESS NOTE ADULT - ASSESSMENT
Mr. Miranda is a 74 yo man with history of Anemia, HTN, chronic diarrhea, and recent hospitalization at [facility] from 3/3/19-3/21/19 brought in by family for generalized weakness and inability to walk.    Patient was discharged home with home PT services on 3/21/19 after being hospitalized since 3/3/19. During his hospitalization, he was managed for chronic diarrhea presumed to be 2/2 IBD since infectious and autoimmune w/u was negative, anemia due to blood loss from GI tract 2/2 PUD, requiring blood transfusion, and ESBL E. coli UTI with ertapenem via PICC. He was also determined not to have the diagnosis of CLL after flow cytometry results were available.     HPI was obtained primarily from patient's daughter and caregiver, Bee. As per daughter, when patient came home he was very weak. She wanted to bathe him but patient was unable to stand or walk to the bathroom. Due to his severe weakness, his daughter brought him back to the hospital. (23 Mar 2019 08:22)      Recommend:    - s/p completion of  ertapenem 1 week course for ESBL e.coli UTI on 3/25.   Pt restarted on vancomycin for enterococcus faecium UTI.    - Pt with diarrhea, thus far infectious w/u negative including stool cx, O&P (including microsporidium, cyclospora, isospora, cryptosporidium), gi pcr, cdiff, strongyloides neg, giardia neg, cmv pcr (-), cmv igG (+), IgM (-), HIV (-).  Stool for AFB (-)    - Pt with moderate ascites, and ?cirrhosis on CT ap.  Hep A/B/C serologies neg.  s/p paracentesis today on 4/3.  SAAG elevated, low albumin.  Low Tnc, do not suspect SBP.     -GI eval noted, repeat stool studies ordered including repeat gi pcr, giardia, stool o&p, stool cx.   r/o autoimmune and celiac disease.       -  CMV IgG (+), recommend biopsy to r/o cmv colitis - pt s/p colonoscopy on 4/1.  f/u path      - Pt with intermittent hypothermia and change in mental status/disoriented.   Repeat Ucx grew enterococcus faecium, UA was unremarkable at that time.  Given recurrent hypothermia, will opt to treat - start vancomycin to treat enterococcus: complete 7 day course.         d/w daughter at bedside.           Will follow       Adeline Marques  [phone]

## 2019-04-03 NOTE — BEHAVIORAL HEALTH ASSESSMENT NOTE - NSBHCHARTREVIEWLAB_PSY_A_CORE FT
CBC Full  -  ( 02 Apr 2019 06:00 )  WBC Count : 9.77 K/uL  RBC Count : 3.30 M/uL  Hemoglobin : 9.6 g/dL  Hematocrit : 31.4 %  Platelet Count - Automated : 71 K/uL  Mean Cell Volume : 95.2 fL  Mean Cell Hemoglobin : 29.1 pg  Mean Cell Hemoglobin Concentration : 30.6 %  Auto Neutrophil # : x  Auto Lymphocyte # : x  Auto Monocyte # : x  Auto Eosinophil # : x  Auto Basophil # : x  Auto Neutrophil % : x  Auto Lymphocyte % : x  Auto Monocyte % : x  Auto Eosinophil % : x  Auto Basophil % : x  04-02    140  |  108<H>  |  16  ----------------------------<  72  3.2<L>   |  19<L>  |  1.25    Ca    7.9<L>      02 Apr 2019 06:00  Phos  3.7     04-02  Mg     1.5     04-02

## 2019-04-03 NOTE — PROGRESS NOTE ADULT - PROBLEM SELECTOR PLAN 1
He has resp alkalosis as well as some metabolic acidosis: He has had lot of diarrhea: before: His chest x-ray with poor inspiration with bibasilar atelectasis other wise lung are clear: I DOUBT HE H IS HAVING  PE: But would do vq scan as wellas dopplers: Could it be related to diarrhea: He is off antibiotics at this time. I don't seem much of pneumonia on chest x-ray: His last ct scan is reviewed too: had some TIB opacities in upper lobes suggestive of bronchiolitis: CHF and bronchomalacia:  3/28: vq scan could not be dome yesterday: will attempt today: his previos dopplers were negative: new ABG is awaited:  3/29: ABG is pretty good: stable:  3/30; seems to be doing ok : no SOB: Breathing wise is OK :  3/31> breathing wise stable: no wheezing  4/2: yesterdays x-ray noted: rpt chest x-ray : There is no sq emphysema on palpation:  4/3: no ptx or sq emphysema seen: pt is stable from pulm point of view:

## 2019-04-03 NOTE — BEHAVIORAL HEALTH ASSESSMENT NOTE - NSBHCHARTREVIEWVS_PSY_A_CORE FT
Vital Signs Last 24 Hrs  T(C): 36.2 (03 Apr 2019 06:38), Max: 36.8 (02 Apr 2019 17:05)  T(F): 97.2 (03 Apr 2019 06:38), Max: 98.2 (02 Apr 2019 17:05)  HR: 110 (03 Apr 2019 06:41) (90 - 117)  BP: 124/68 (03 Apr 2019 06:38) (120/65 - 136/84)  BP(mean): --  RR: 18 (03 Apr 2019 06:38) (16 - 18)  SpO2: 100% (03 Apr 2019 06:38) (98% - 100%)

## 2019-04-03 NOTE — PROGRESS NOTE ADULT - SUBJECTIVE AND OBJECTIVE BOX
[facility]      Seen earlier today, and examined.  - Today, patient is without complaints.           *****MEDICATIONS: Current medication reviewed and documented.    MEDICATIONS  (STANDING):  calcium carbonate   1250 mG (OsCal) 1 Tablet(s) Oral two times a day  cholecalciferol 1000 Unit(s) Oral daily  lactobacillus acidophilus 1 Tablet(s) Oral three times a day with meals  loperamide 2 milliGRAM(s) Oral five times a day  melatonin 3 milliGRAM(s) Oral <User Schedule>  mesalamine DR Capsule 800 milliGRAM(s) Oral three times a day  metoprolol succinate ER 25 milliGRAM(s) Oral daily  multivitamin 1 Tablet(s) Oral daily  multivitamin  Chewable 1 Tablet(s) Oral daily  multivitamin/thiamine/folic acid in sodium chloride 0.9% 1000 milliLiter(s) (60 mL/Hr) IV Continuous <Continuous>  pantoprazole    Tablet 40 milliGRAM(s) Oral before breakfast  potassium phosphate IVPB 15 milliMole(s) IV Intermittent once  sodium bicarbonate 650 milliGRAM(s) Oral two times a day  thiamine IVPB 500 milliGRAM(s) IV Intermittent daily  vancomycin  IVPB 500 milliGRAM(s) IV Intermittent every 12 hours    MEDICATIONS  (PRN):  dicyclomine 20 milliGRAM(s) Oral four times a day before meals PRN abd pain  QUEtiapine 25 milliGRAM(s) Oral at bedtime PRN for agitation/sleep  simethicone 80 milliGRAM(s) Chew four times a day PRN Gas          ***** VITAL SIGNS:  T(F): 97.7 (19 @ 13:54), Max: 98.2 (19 @ 22:39)  HR: 88 (19 @ 13:54) (88 - 117)  BP: 119/68 (19 @ 13:54) (119/68 - 136/84)  RR: 18 (19 @ 13:54) (16 - 18)  SpO2: 99% (19 @ 13:54) (99% - 100%)  Wt(kg): --  ,   I&O's Summary           *****PHYSICAL EXAM: Alert oriented x 2  able to follow 1 step commands.   able to recognize daughter in law by name.   falls asleep midsentence.     EOMI fundi not visualized  blinks to threat   No facial asymmetry   Tongue is midline    withdraws to pain x4   Gait : not assessed.       *****LAB AND IMAGIN.8    12.64 )-----------( 72       ( 2019 17:43 )             29.4               04-03    140  |  112<H>  |  15  ----------------------------<  108<H>  3.9   |  18<L>  |  1.22    Ca    7.7<L>      2019 17:43  Phos  2.0     04-03  Mg     1.6     04-03  Albumin, Serum: 1.8 g/dL (19 @ 05:30)        PT/INR - ( 2019 17:43 )   PT: 24.0 SEC;   INR: 2.11          PTT - ( 2019 17:43 )  PTT:52.0 SEC                     [All pertinent recent Imaging/Reports reviewed]  1. Mild to moderate nonspecific diffuse or multifocal cerebral dysfunction.   2. No epileptiform pattern or seizure seen.           *****A S S E S S M E N T   A N D   P L A N :    Mr. Miranda is a 74 yo man with history of Anemia, HTN, chronic diarrhea, and recent hospitalization at [facility] from 3/3/19-3/21/19 brought in by family for generalized weakness and inability to walk.    Patient was discharged home with home PT services on 3/21/19 after being hospitalized since 3/3/19. During his hospitalization, he was managed for chronic diarrhea presumed to be 2/2 IBD since infectious and autoimmune w/u was negative, anemia due to blood loss from GI tract 2/2 PUD, requiring blood transfusion, and ESBL E. coli UTI with ertapenem via PICC. He was also determined not to have the diagnosis of CLL after flow cytometry results were available.           Problem/Recommendations 1: multifactorial toxic metabolic encephalopathy due to renal insufficiency and severe nutritional deficiency    b12/ ammonia wnl   repeat thiamine 500 iv pb x 3 days  mvi banana bag      gi nutrition consult   given fluctuating mental status, eeg did not reveal any sz.       Problem/Recommendations 2:Diarrheal illness    defer to gi       +recent travel to [place]  4 mos 2018 to 2019   probiotics   nutrition for appropriate binding diet.       Thank you for allowing me to participate in the care of this patient. Please do not hesitate to call me if you have any  questions.        ________________  Yessica Benitez MD  [facility] [place] ([facility]  [phone]      30 minutes spent on total encounter; more than 50 % of the visit was  spent counseling about plan of care, compliance to diet/exercise and medication regimen and or  coordinating care by the attending physician.      It is advised that s stroke patients follow up with VINAYAK Cerrato @ [phone] in 1- 2 weeks.   Others please follow up with Dr. Michael Nissenbaum [phone]

## 2019-04-03 NOTE — PROGRESS NOTE ADULT - SUBJECTIVE AND OBJECTIVE BOX
Infectious Diseases progress note:    Subjective:  Appears weak, awake.  Still somewhat confused.  s/p paracentesis today.  No new fever or leukocytosis.  Pt pulled out rectal tube, on diaper.  Family member at bedside.     ROS:  Poor historian  CONSTITUTIONAL:  No fever, chills, rigors  CARDIOVASCULAR:  No chest pain or palpitations  RESPIRATORY:   No SOB, cough, dyspnea on exertion.  No wheezing  GASTROINTESTINAL:  No abd pain, N/V, diarrhea/constipation  EXTREMITIES:  No swelling or joint pain  GENITOURINARY:  No burning on urination, increased frequency or urgency.  No flank pain  NEUROLOGIC:  No HA, visual disturbances  SKIN: No rashes    Allergies    No Known Allergies    Intolerances        ANTIBIOTICS/RELEVANT:  antimicrobials  vancomycin  IVPB 500 milliGRAM(s) IV Intermittent every 12 hours    immunologic:    OTHER:  calcium carbonate   1250 mG (OsCal) 1 Tablet(s) Oral two times a day  cholecalciferol 1000 Unit(s) Oral daily  dicyclomine 20 milliGRAM(s) Oral four times a day before meals PRN  lactobacillus acidophilus 1 Tablet(s) Oral three times a day with meals  loperamide 2 milliGRAM(s) Oral five times a day  mesalamine DR Capsule 800 milliGRAM(s) Oral three times a day  metoprolol succinate ER 25 milliGRAM(s) Oral daily  multivitamin 1 Tablet(s) Oral daily  multivitamin  Chewable 1 Tablet(s) Oral daily  multivitamin/thiamine/folic acid in sodium chloride 0.9% 1000 milliLiter(s) IV Continuous <Continuous>  pantoprazole    Tablet 40 milliGRAM(s) Oral before breakfast  simethicone 80 milliGRAM(s) Chew four times a day PRN  sodium bicarbonate 650 milliGRAM(s) Oral two times a day  thiamine IVPB 500 milliGRAM(s) IV Intermittent daily      Objective:  Vital Signs Last 24 Hrs  T(C): 36.5 (03 Apr 2019 13:54), Max: 36.8 (02 Apr 2019 22:39)  T(F): 97.7 (03 Apr 2019 13:54), Max: 98.2 (02 Apr 2019 22:39)  HR: 88 (03 Apr 2019 13:54) (88 - 117)  BP: 119/68 (03 Apr 2019 13:54) (119/68 - 136/84)  BP(mean): --  RR: 18 (03 Apr 2019 13:54) (16 - 18)  SpO2: 99% (03 Apr 2019 13:54) (99% - 100%)    PHYSICAL EXAM:  Constitutional: apears weak, frail  Eyes:TODD, EOMI  Ear/Nose/Throat: no thrush, mucositis.  Moist mucous membranes	  Neck:no JVD, no lymphadenopathy, supple  Respiratory: CTA ruth  Cardiovascular: S1S2 RRR, no murmurs  Gastrointestinal:soft, nontender,  nondistended (+) BS  Extremities:no e/e/c  Skin:  no rashes, open wounds or ulcerations        LABS:                        9.6    9.77  )-----------( 71       ( 02 Apr 2019 06:00 )             31.4     04-02    140  |  108<H>  |  16  ----------------------------<  72  3.2<L>   |  19<L>  |  1.25    Ca    7.9<L>      02 Apr 2019 06:00  Phos  3.7     04-02  Mg     1.5     04-02                  Vancomycin Level, Trough: 22.2 ug/mL (04-03 @ 05:10)              MICROBIOLOGY:    Culture - Body Fluid with Gram Stain (04.03.19 @ 12:26)    Gram Stain:   NOS^No Organisms Seen  WBC^White Blood Cells  QNTY CELLS IN GRAM STAIN: RARE (1+)    Specimen Source: PERITONEAL FLUID    Culture - Urine (04.01.19 @ 19:23)    -  Ampicillin: R >8 FERNANDO    -  Ciprofloxacin: R >2 FERNANDO    Culture - Urine:   COLONY COUNT: > = 100,000 CFU/ML    -  Tetra/Doxy: S <=1 FERNANDO    -  Vancomycin: S 1 FERNANDO    -  Nitrofurantoin: S <=32 FERNANDO    Specimen Source: URINE CATHETER    Organism Identification: Enterococcus faecium    Organism: Enterococcus faecium    Method Type: POSITIVE FERNANDO 29    Culture - Blood (04.01.19 @ 15:53)    Culture - Blood:   NO ORGANISMS ISOLATED  NO ORGANISMS ISOLATED AT 48 HRS.    Specimen Source: BLOOD PERIPHERAL    Culture - Urine (03.27.19 @ 11:40)    -  Ciprofloxacin: R >2 FERNANDO    -  Ampicillin: R >8 FERNANDO    Culture - Urine:   CULTURE IN PROGRESS, FURTHER REPORT TO FOLLOW.  ENTFC^Enterococcus faecium  COLONY COUNT: > = 100,000 CFU/ML    Culture - Urine:   COLONY COUNT: > = 100,000 CFU/ML    -  Nitrofurantoin: S <=32 FERNANDO    -  Tetra/Doxy: S <=1 FERNANDO    -  Vancomycin: S 1 FERNANDO    Specimen Source: URINE CATHETER    Organism Identification: Enterococcus faecium    Organism: Enterococcus faecium    Method Type: POSITIVE FERNANDO 29          RADIOLOGY & ADDITIONAL STUDIES:    < from: Xray Chest 1 View- PORTABLE-Urgent (04.02.19 @ 13:09) >  INTERPRETATION:     The heart is not enlarged. The mediastinum is not accurately evaluated on   this projection.  There are low lung volumes.  A trace right pleural effusion is unchanged. The right lung is clear.   There is linear atelectasis in the left mid and lower lung. No left   pleural effusion is seen.  No pneumothorax or subcutaneous emphysema is seen.              IMPRESSION:  No pneumothorax or subcutaneous emphysema seen.    Trace right pleural effusion.    Linear atelectasis in the left mid and lower lung.    < end of copied text >

## 2019-04-03 NOTE — PROGRESS NOTE ADULT - ASSESSMENT
· Assessment	  Mr. Miranda is a 72 yo man with history of Anemia, HTN, PUD, chronic diarrhea, and recent hospitalization at [facility] from 3/3/19-3/21/19 brought in by family for generalized weakness and inability to walk admitted for severe malnutrition, mild hypernatremia and severe deconditioning. Exam relatively benign aside for significant LE edema and weakness of legs. Anemia at baseline.         ·  Problem: Chronic diarrhea.  Plan: GI f/up noted.  - S/p colonoscopy and biopsy.   - Biopsy report: no CMV    Pancytopenia:  Hem f/up  CBC    Dw pt's daughter.

## 2019-04-03 NOTE — PROGRESS NOTE ADULT - SUBJECTIVE AND OBJECTIVE BOX
Patient is a 73y old  Male who presents with a chief complaint of Generalized weakness and inability to walk (2019 08:13)      Any change in ROS: Doing ok : no SOB : no cough !    MEDICATIONS  (STANDING):  calcium carbonate   1250 mG (OsCal) 1 Tablet(s) Oral two times a day  cholecalciferol 1000 Unit(s) Oral daily  dextrose 5% + sodium chloride 0.45%. 1000 milliLiter(s) (60 mL/Hr) IV Continuous <Continuous>  lactobacillus acidophilus 1 Tablet(s) Oral daily  loperamide 2 milliGRAM(s) Oral five times a day  mesalamine DR Capsule 800 milliGRAM(s) Oral three times a day  metoprolol succinate ER 25 milliGRAM(s) Oral daily  multivitamin 1 Tablet(s) Oral daily  pantoprazole    Tablet 40 milliGRAM(s) Oral before breakfast  sodium bicarbonate 650 milliGRAM(s) Oral two times a day  vancomycin  IVPB 500 milliGRAM(s) IV Intermittent every 12 hours    MEDICATIONS  (PRN):  dicyclomine 20 milliGRAM(s) Oral four times a day before meals PRN abd pain  simethicone 80 milliGRAM(s) Chew four times a day PRN Gas    Vital Signs Last 24 Hrs  T(C): 36.5 (2019 13:54), Max: 36.8 (2019 17:05)  T(F): 97.7 (2019 13:54), Max: 98.2 (2019 17:05)  HR: 88 (2019 13:54) (88 - 117)  BP: 119/68 (2019 13:54) (119/68 - 136/84)  BP(mean): --  RR: 18 (2019 13:54) (16 - 18)  SpO2: 99% (2019 13:54) (98% - 100%)    I&O's Summary        Physical Exam:   GENERAL: NAD, well-groomed, well-developed  HEENT: TDOD/   Atraumatic, Normocephalic  ENMT: No tonsillar erythema, exudates, or enlargement; Moist mucous membranes, Good dentition, No lesions  NECK: Supple, No JVD, Normal thyroid  CHEST/LUNG: Clear to auscultaion  CVS: Regular rate and rhythm; No murmurs, rubs, or gallops  GI: : Soft, Nontender, Nondistended; Bowel sounds present  NERVOUS SYSTEM:  Alert & Oriented X2  EXTREMITIES:  2+ Peripheral Pulses, No clubbing, cyanosis, or edema  LYMPH: No lymphadenopathy noted  SKIN: No rashes or lesions  ENDOCRINOLOGY: No Thyromegaly  PSYCH: Appropriate    Labs:                              9.6    9.77  )-----------( 71       ( 2019 06:00 )             31.4                         9.6    7.26  )-----------( 63       ( 2019 07:25 )             32.2                         9.6    10.26 )-----------( 35       ( 31 Mar 2019 05:05 )             31.9     04-02    140  |  108<H>  |  16  ----------------------------<  72  3.2<L>   |  19<L>  |  1.25  04-    139  |  109<H>  |  15  ----------------------------<  63<L>  4.2   |  19<L>  |  1.19  03-31    134<L>  |  107  |  17  ----------------------------<  80  3.4<L>   |  17<L>  |  1.23    Ca    7.9<L>      2019 06:00  Phos  3.7     04-02  Mg     1.5     04-02      CAPILLARY BLOOD GLUCOSE              Urinalysis Basic - ( 2019 17:00 )    Color: YELLOW / Appearance: CLEAR / S.015 / pH: 6.0  Gluc: NEGATIVE / Ketone: NEGATIVE  / Bili: NEGATIVE / Urobili: NORMAL   Blood: NEGATIVE / Protein: 30 / Nitrite: NEGATIVE   Leuk Esterase: NEGATIVE / RBC: 0-2 / WBC 3-5   Sq Epi: OCC / Non Sq Epi: x / Bacteria: FEW      Fluid Source --  Albumin, Fluid0.1 g/dL  Glucose, Gmdlo245 mg/dL  Protein total, Fluid0.7 g/dL  Lacatate Dehydrogenase, Fluid50 U/L  pH, Fluid--  Cytopathology-Non Gyn Report--        RECENT CULTURES:   @ 19:23 URINE CATHETER                   @ 16:53 BLOOD VENOUS       < from: Xray Chest 1 View- PORTABLE-Urgent (19 @ 13:09) >  PROCEDURE DATE:  2019         INTERPRETATION:  TIME OF EXAM: 2019 at 12:55 PM.    CLINICAL INFORMATION: Question subcutaneous emphysema. Evaluate for   pneumothorax.    COMPARISON:  .    TECHNIQUE:   AP Portable chest x-ray. The chin obscures the left apex.    INTERPRETATION:     The heart is not enlarged. The mediastinum is not accurately evaluated on   this projection.  There are low lung volumes.  A trace right pleural effusion is unchanged. The right lung is clear.   There is linear atelectasis in the left mid and lower lung. No left   pleural effusion is seen.  No pneumothorax or subcutaneous emphysema is seen.              IMPRESSION:  No pneumothorax or subcutaneous emphysema seen.    Trace right pleural effusion.    Linear atelectasis in the left mid and lower lung.                  NORA HASKINS M.D., ATTENDING RADIOLOGIST  This document has been electronically signed. 2019  2:38PM        < end of copied text >             NO ORGANISMS ISOLATED  NO ORGANISMS ISOLATED AT 24 HOURS   @ 15:53 BLOOD PERIPHERAL                  NO ORGANISMS ISOLATED  NO ORGANISMS ISOLATED AT 24 HOURS   @ 18:54 FECES                   @ 14:21 FECES                        RESPIRATORY CULTURES:          Studies  Chest X-RAY  CT SCAN Chest   Venous Dopplers: LE:   CT Abdomen  Others

## 2019-04-03 NOTE — PROGRESS NOTE ADULT - SUBJECTIVE AND OBJECTIVE BOX
Chief Complaint:  Patient is a 73y old  Male who presents with a chief complaint of Generalized weakness and inability to walk (2019 20:28)      Interval Events:   Called about increased diarrhea in patient yesterday.  Patient is still pending portions of work up recommended in GI notes.  Loperimide increased    Allergies:  No Known Allergies      Hospital Medications:  calcium carbonate   1250 mG (OsCal) 1 Tablet(s) Oral two times a day  cholecalciferol 1000 Unit(s) Oral daily  dextrose 5% + sodium chloride 0.45%. 1000 milliLiter(s) IV Continuous <Continuous>  dicyclomine 20 milliGRAM(s) Oral four times a day before meals PRN  lactobacillus acidophilus 1 Tablet(s) Oral daily  loperamide 2 milliGRAM(s) Oral five times a day  mesalamine DR Capsule 800 milliGRAM(s) Oral three times a day  metoprolol succinate ER 25 milliGRAM(s) Oral daily  multivitamin 1 Tablet(s) Oral daily  pantoprazole    Tablet 40 milliGRAM(s) Oral before breakfast  simethicone 80 milliGRAM(s) Chew four times a day PRN  sodium bicarbonate 650 milliGRAM(s) Oral two times a day  vancomycin  IVPB 750 milliGRAM(s) IV Intermittent every 12 hours      PMHX/PSHX:  Chronic kidney disease (CKD)  Anemia, unspecified type  Essential hypertension  CLL (chronic lymphocytic leukemia)  No significant past surgical history  No significant past surgical history      Family history:  Family history of myocardial infarction (Sibling)          PHYSICAL EXAM:     GENERAL:  Appears stated age, well-groomed, well-nourished, no distress  HEENT:  NC/AT,  conjunctivae clear, sclera -anicteric  CHEST:  Full & symmetric excursion, no increased  HEART:  Regular rhythm  ABDOMEN:  Soft, non-tender, non-distended, normoactive bowel sounds,  no masses ,no hepato-splenomegaly,   EXTREMITIES:  no cyanosis,clubbing or edema  SKIN:  No rash/erythema/ecchymoses/petechiae/wounds/abscess/warm/dry  NEURO:  Alert, oriented    Vital Signs:  Vital Signs Last 24 Hrs  T(C): 36.2 (2019 06:38), Max: 36.8 (2019 17:05)  T(F): 97.2 (2019 06:38), Max: 98.2 (2019 17:05)  HR: 110 (2019 06:41) (89 - 117)  BP: 124/68 (2019 06:38) (103/66 - 136/84)  BP(mean): --  RR: 18 (2019 06:38) (16 - 18)  SpO2: 100% (2019 06:38) (98% - 100%)  Daily     Daily     LABS:                        9.6    9.77  )-----------( 71       ( 2019 06:00 )             31.4     04-02    140  |  108<H>  |  16  ----------------------------<  72  3.2<L>   |  19<L>  |  1.25    Ca    7.9<L>      2019 06:00  Phos  3.7     04-02  Mg     1.5     04-02          Urinalysis Basic - ( 2019 17:00 )    Color: YELLOW / Appearance: CLEAR / S.015 / pH: 6.0  Gluc: NEGATIVE / Ketone: NEGATIVE  / Bili: NEGATIVE / Urobili: NORMAL   Blood: NEGATIVE / Protein: 30 / Nitrite: NEGATIVE   Leuk Esterase: NEGATIVE / RBC: 0-2 / WBC 3-5   Sq Epi: OCC / Non Sq Epi: x / Bacteria: FEW          Imaging:

## 2019-04-03 NOTE — BEHAVIORAL HEALTH ASSESSMENT NOTE - HPI (INCLUDE ILLNESS QUALITY, SEVERITY, DURATION, TIMING, CONTEXT, MODIFYING FACTORS, ASSOCIATED SIGNS AND SYMPTOMS)
Briefly the patient is a 73 year old male, , lives at home with daughter, supportive family, does not have a history of mental health issues, no evident cognitive decline at baseline as per family, has a medical history notable for Anemia, HTN, Chronic diarrhea , was recent discharged from [facility] back home with home PT services, presents again from home with worsening weakness and confusion. Infectious studies are ongoing. Poor PO intake. Agitated, restless in bed, confused, and attempted to remove IV line, etc.     Met with the patient. Interview held in Malayalam language. Confused, disoriented, no distress, denies any paranoia or avh.   Care coordinated closely with daughter in law by bed side.   Also coordinated care with son Jonh ([phone]) over phone. Discussed ongoing delirium, agitation. Discussed need to check qtc as last ekg showed a prolongation. He is in agreement with prn use of Seroquel- if qtc is less than 500.

## 2019-04-03 NOTE — PROCEDURE NOTE - NSPOSTCAREGUIDE_GEN_A_CORE
Instructed patient/caregiver regarding signs and symptoms of infection/Keep the cast/splint/dressing clean and dry

## 2019-04-03 NOTE — PROVIDER CONTACT NOTE (OTHER) - ACTION/TREATMENT ORDERED:
Administer melatonin 3mg
Hold AM dose of Vancomycin
Will look into chart
Continue to monitor.
NP Mayte will remove and discontinue midline and maintain peripheral IV to complete IV antibiotic course
Place patient on the heating blanket and monitor patient
administer cathflow and continue to monitor

## 2019-04-03 NOTE — CHART NOTE - NSCHARTNOTEFT_GEN_A_CORE
as per Nutrition recommended alt route of nutrition d/t calorie count results.  pt agitated and pulling at lines.  neuro and psych following .  Meds added accordingly.  Pt at high risk of pulling ngt and appetite has improved will reorder calorie count x 3 .  D/w Dr. Antoine, pt, and family at bedside.

## 2019-04-03 NOTE — PROGRESS NOTE ADULT - ASSESSMENT
73M with recurrent and intractable diarrhea, has TCP (likely from chronic inflammation and possibly abx and anemia (of chronic disease), no bleeding, will recommend:  - aggressive Rx of dehydration and diarrhea  - monitor CBC  - transfusion of plts only if plts < 10K or plts ~ 50 but pt is bleeding  - no contraindication to DVT prophylaxis  - rest of the RX as per medicine, GI  - will review the blood smear again - requested the lab to make one

## 2019-04-04 LAB
ANION GAP SERPL CALC-SCNC: 9 MMO/L — SIGNIFICANT CHANGE UP (ref 7–14)
BUN SERPL-MCNC: 14 MG/DL — SIGNIFICANT CHANGE UP (ref 7–23)
CALCIUM SERPL-MCNC: 7.7 MG/DL — LOW (ref 8.4–10.5)
CHLORIDE SERPL-SCNC: 114 MMOL/L — HIGH (ref 98–107)
CO2 SERPL-SCNC: 15 MMOL/L — LOW (ref 22–31)
CREAT SERPL-MCNC: 1.1 MG/DL — SIGNIFICANT CHANGE UP (ref 0.5–1.3)
CULTURE - ACID FAST SMEAR CONCENTRATED: SIGNIFICANT CHANGE UP
GLUCOSE SERPL-MCNC: 70 MG/DL — SIGNIFICANT CHANGE UP (ref 70–99)
HCT VFR BLD CALC: 31.4 % — LOW (ref 39–50)
HGB BLD-MCNC: 9.6 G/DL — LOW (ref 13–17)
MAGNESIUM SERPL-MCNC: 1.5 MG/DL — LOW (ref 1.6–2.6)
MCHC RBC-ENTMCNC: 29.4 PG — SIGNIFICANT CHANGE UP (ref 27–34)
MCHC RBC-ENTMCNC: 30.6 % — LOW (ref 32–36)
MCV RBC AUTO: 96.3 FL — SIGNIFICANT CHANGE UP (ref 80–100)
NRBC # FLD: 0.06 K/UL — SIGNIFICANT CHANGE UP (ref 0–0)
PHOSPHATE SERPL-MCNC: 3.2 MG/DL — SIGNIFICANT CHANGE UP (ref 2.5–4.5)
PLATELET # BLD AUTO: 67 K/UL — LOW (ref 150–400)
PMV BLD: 13.8 FL — HIGH (ref 7–13)
POTASSIUM SERPL-MCNC: 4.7 MMOL/L — SIGNIFICANT CHANGE UP (ref 3.5–5.3)
POTASSIUM SERPL-SCNC: 4.7 MMOL/L — SIGNIFICANT CHANGE UP (ref 3.5–5.3)
RBC # BLD: 3.26 M/UL — LOW (ref 4.2–5.8)
RBC # FLD: 22 % — HIGH (ref 10.3–14.5)
SODIUM SERPL-SCNC: 138 MMOL/L — SIGNIFICANT CHANGE UP (ref 135–145)
SPECIMEN SOURCE: SIGNIFICANT CHANGE UP
WBC # BLD: 10.72 K/UL — HIGH (ref 3.8–10.5)
WBC # FLD AUTO: 10.72 K/UL — HIGH (ref 3.8–10.5)

## 2019-04-04 PROCEDURE — 99232 SBSQ HOSP IP/OBS MODERATE 35: CPT | Mod: GC

## 2019-04-04 PROCEDURE — 99233 SBSQ HOSP IP/OBS HIGH 50: CPT

## 2019-04-04 RX ORDER — SODIUM CHLORIDE 9 MG/ML
1000 INJECTION, SOLUTION INTRAVENOUS
Qty: 0 | Refills: 0 | Status: DISCONTINUED | OUTPATIENT
Start: 2019-04-04 | End: 2019-04-04

## 2019-04-04 RX ORDER — SPIRONOLACTONE 25 MG/1
50 TABLET, FILM COATED ORAL DAILY
Qty: 0 | Refills: 0 | Status: DISCONTINUED | OUTPATIENT
Start: 2019-04-04 | End: 2019-04-12

## 2019-04-04 RX ORDER — MAGNESIUM SULFATE 500 MG/ML
1 VIAL (ML) INJECTION ONCE
Qty: 0 | Refills: 0 | Status: COMPLETED | OUTPATIENT
Start: 2019-04-04 | End: 2019-04-04

## 2019-04-04 RX ORDER — ALBUMIN HUMAN 25 %
50 VIAL (ML) INTRAVENOUS EVERY 8 HOURS
Qty: 0 | Refills: 0 | Status: COMPLETED | OUTPATIENT
Start: 2019-04-04 | End: 2019-04-05

## 2019-04-04 RX ORDER — SPIRONOLACTONE 25 MG/1
50 TABLET, FILM COATED ORAL DAILY
Qty: 0 | Refills: 0 | Status: DISCONTINUED | OUTPATIENT
Start: 2019-04-04 | End: 2019-04-04

## 2019-04-04 RX ORDER — FUROSEMIDE 40 MG
20 TABLET ORAL DAILY
Qty: 0 | Refills: 0 | Status: DISCONTINUED | OUTPATIENT
Start: 2019-04-04 | End: 2019-04-04

## 2019-04-04 RX ORDER — LOPERAMIDE HCL 2 MG
2 TABLET ORAL THREE TIMES A DAY
Qty: 0 | Refills: 0 | Status: DISCONTINUED | OUTPATIENT
Start: 2019-04-04 | End: 2019-04-12

## 2019-04-04 RX ORDER — FUROSEMIDE 40 MG
20 TABLET ORAL DAILY
Qty: 0 | Refills: 0 | Status: DISCONTINUED | OUTPATIENT
Start: 2019-04-04 | End: 2019-04-12

## 2019-04-04 RX ORDER — SODIUM CHLORIDE 9 MG/ML
1000 INJECTION, SOLUTION INTRAVENOUS
Qty: 0 | Refills: 0 | Status: DISCONTINUED | OUTPATIENT
Start: 2019-04-04 | End: 2019-04-05

## 2019-04-04 RX ADMIN — Medication 105 MILLIGRAM(S): at 13:06

## 2019-04-04 RX ADMIN — Medication 20 MILLIGRAM(S): at 13:00

## 2019-04-04 RX ADMIN — Medication 1 TABLET(S): at 17:55

## 2019-04-04 RX ADMIN — Medication 1000 UNIT(S): at 13:00

## 2019-04-04 RX ADMIN — SODIUM CHLORIDE 60 MILLILITER(S): 9 INJECTION, SOLUTION INTRAVENOUS at 03:36

## 2019-04-04 RX ADMIN — PANTOPRAZOLE SODIUM 40 MILLIGRAM(S): 20 TABLET, DELAYED RELEASE ORAL at 05:43

## 2019-04-04 RX ADMIN — Medication 800 MILLIGRAM(S): at 14:09

## 2019-04-04 RX ADMIN — Medication 100 MILLIGRAM(S): at 05:42

## 2019-04-04 RX ADMIN — Medication 2 MILLIGRAM(S): at 09:27

## 2019-04-04 RX ADMIN — Medication 800 MILLIGRAM(S): at 05:44

## 2019-04-04 RX ADMIN — Medication 50 MILLILITER(S): at 21:58

## 2019-04-04 RX ADMIN — Medication 1 TABLET(S): at 13:00

## 2019-04-04 RX ADMIN — Medication 650 MILLIGRAM(S): at 05:42

## 2019-04-04 RX ADMIN — Medication 1 TABLET(S): at 05:43

## 2019-04-04 RX ADMIN — Medication 25 MILLIGRAM(S): at 05:43

## 2019-04-04 RX ADMIN — Medication 800 MILLIGRAM(S): at 21:57

## 2019-04-04 RX ADMIN — Medication 2 MILLIGRAM(S): at 01:27

## 2019-04-04 RX ADMIN — Medication 1 TABLET(S): at 17:54

## 2019-04-04 RX ADMIN — Medication 1 TABLET(S): at 09:27

## 2019-04-04 RX ADMIN — Medication 2 MILLIGRAM(S): at 13:00

## 2019-04-04 RX ADMIN — Medication 100 MILLIGRAM(S): at 17:56

## 2019-04-04 RX ADMIN — SODIUM CHLORIDE 60 MILLILITER(S): 9 INJECTION, SOLUTION INTRAVENOUS at 18:52

## 2019-04-04 RX ADMIN — Medication 650 MILLIGRAM(S): at 17:54

## 2019-04-04 RX ADMIN — Medication 3 MILLIGRAM(S): at 21:57

## 2019-04-04 RX ADMIN — Medication 100 GRAM(S): at 09:28

## 2019-04-04 RX ADMIN — Medication 20 MILLIGRAM(S): at 18:53

## 2019-04-04 NOTE — PROGRESS NOTE ADULT - SUBJECTIVE AND OBJECTIVE BOX
[facility]      Seen earlier today, and examined.  - Today, patient is without complaints.           *****MEDICATIONS: Current medication reviewed and documented.    MEDICATIONS  (STANDING):  calcium carbonate   1250 mG (OsCal) 1 Tablet(s) Oral two times a day  cholecalciferol 1000 Unit(s) Oral daily  dextrose 5% 1000 milliLiter(s) (60 mL/Hr) IV Continuous <Continuous>  lactobacillus acidophilus 1 Tablet(s) Oral three times a day with meals  loperamide 2 milliGRAM(s) Oral five times a day  melatonin 3 milliGRAM(s) Oral <User Schedule>  mesalamine DR Capsule 800 milliGRAM(s) Oral three times a day  metoprolol succinate ER 25 milliGRAM(s) Oral daily  multivitamin 1 Tablet(s) Oral daily  pantoprazole    Tablet 40 milliGRAM(s) Oral before breakfast  sodium bicarbonate 650 milliGRAM(s) Oral two times a day  thiamine IVPB 500 milliGRAM(s) IV Intermittent daily  vancomycin  IVPB 500 milliGRAM(s) IV Intermittent every 12 hours    MEDICATIONS  (PRN):  dicyclomine 20 milliGRAM(s) Oral four times a day before meals PRN abd pain  QUEtiapine 25 milliGRAM(s) Oral at bedtime PRN for agitation/sleep  simethicone 80 milliGRAM(s) Chew four times a day PRN Gas          ***** VITAL SIGNS:  T(F): 98.1 (19 @ 13:41), Max: 98.1 (19 @ 01:26)  HR: 75 (19 @ 13:41) (71 - 89)  BP: 118/75 (19 @ 13:41) (100/70 - 120/68)  RR: 16 (19 @ 13:41) (16 - 18)  SpO2: 100% (19 @ 13:41) (99% - 100%)  Wt(kg): --  ,   I&O's Summary           *****PHYSICAL EXAM: Alert oriented x 2  able to follow 1 step commands.   able to recognize daughter in law by name.   falls asleep midsentence.     EOMI fundi not visualized  blinks to threat   No facial asymmetry   Tongue is midline    withdraws to pain x4   Gait : not assessed.           *****LAB AND IMAGIN.6    10.72 )-----------( 67       ( 2019 08:07 )             31.4               04-04    138  |  114<H>  |  14  ----------------------------<  70  4.7   |  15<L>  |  1.10    Ca    7.7<L>      2019 06:15  Phos  3.2     04-04  Mg     1.5     04-04      PT/INR - ( 2019 17:43 )   PT: 24.0 SEC;   INR: 2.11          PTT - ( 2019 17:43 )  PTT:52.0 SEC                     [All pertinent recent Imaging/Reports reviewed]           *****A S S E S S M E N T   A N D   P L A N :      Mr. Miranda is a 72 yo man with history of Anemia, HTN, chronic diarrhea, and recent hospitalization at [facility] from 3/3/19-3/21/19 brought in by family for generalized weakness and inability to walk.    Patient was discharged home with home PT services on 3/21/19 after being hospitalized since 3/3/19. During his hospitalization, he was managed for chronic diarrhea presumed to be 2/2 IBD since infectious and autoimmune w/u was negative, anemia due to blood loss from GI tract 2/2 PUD, requiring blood transfusion, and ESBL E. coli UTI with ertapenem via PICC. He was also determined not to have the diagnosis of CLL after flow cytometry results were available.           Problem/Recommendations 1: multifactorial toxic metabolic encephalopathy due to renal insufficiency and severe nutritional deficiency    b12/ ammonia wnl   repeat thiamine 500 iv pb x 3 days  mvi banana bag  some improvement today       Problem/Recommendations 2:Diarrheal illness    defer to gi       +recent travel to [place]  4 mos 2018 to 2019   probiotics   nutrition for appropriate binding diet  can we change his diet to mechanical soft he is on dysphagia 2 not sure why ?  encourage po intake.       Thank you for allowing me to participate in the care of this patient. Please do not hesitate to call me if you have any  questions.        ________________  Yessica Benitez MD  [facility]  [phone]      30 minutes spent on total encounter; more than 50 % of the visit was  spent counseling about plan of care, compliance to diet/exercise and medication regimen and or  coordinating care by the attending physician.      It is advised that s stroke patients follow up with VINAYAK Cerrato @ [phone] in 1- 2 weeks.   Others please follow up with Dr. Michael Nissenbaum [phone]

## 2019-04-04 NOTE — CONSULT NOTE ADULT - SUBJECTIVE AND OBJECTIVE BOX
Chief Complaint:  Patient is a 73y old  Male who presents with a chief complaint of Generalized weakness and inability to walk (04 Apr 2019 13:13)      HPI:  This is a 73 year old man with a history of CLL, HTN who presented for evaluation of chronic diarrhea. He has undergone extensive GI workup  Allergies:  No Known Allergies      Home Medications:    Hospital Medications:  calcium carbonate   1250 mG (OsCal) 1 Tablet(s) Oral two times a day  cholecalciferol 1000 Unit(s) Oral daily  dextrose 5% + sodium chloride 0.45%. 1000 milliLiter(s) IV Continuous <Continuous>  dicyclomine 20 milliGRAM(s) Oral four times a day before meals PRN  lactobacillus acidophilus 1 Tablet(s) Oral three times a day with meals  loperamide 2 milliGRAM(s) Oral five times a day  melatonin 3 milliGRAM(s) Oral <User Schedule>  mesalamine DR Capsule 800 milliGRAM(s) Oral three times a day  metoprolol succinate ER 25 milliGRAM(s) Oral daily  multivitamin 1 Tablet(s) Oral daily  pantoprazole    Tablet 40 milliGRAM(s) Oral before breakfast  QUEtiapine 25 milliGRAM(s) Oral at bedtime PRN  simethicone 80 milliGRAM(s) Chew four times a day PRN  sodium bicarbonate 650 milliGRAM(s) Oral two times a day  thiamine IVPB 500 milliGRAM(s) IV Intermittent daily  vancomycin  IVPB 500 milliGRAM(s) IV Intermittent every 12 hours      PMHX/PSHX:  Chronic kidney disease (CKD)  Anemia, unspecified type  Essential hypertension  CLL (chronic lymphocytic leukemia)  No significant past surgical history  No significant past surgical history      Family history:  Family history of myocardial infarction (Sibling)      Social History:     ROS:     General:  No wt loss, fevers, chills, night sweats, fatigue,   Eyes:  Good vision, no reported pain  ENT:  No sore throat, pain, runny nose, dysphagia  CV:  No pain, palpitations, hypo/hypertension  Resp:  No dyspnea, cough, tachypnea, wheezing  GI:  See HPI  :  No pain, bleeding, incontinence, nocturia  Muscle:  No pain, weakness  Neuro:  No weakness, tingling, memory problems  Psych:  No fatigue, insomnia, mood problems, depression  Endocrine:  No polyuria, polydipsia, cold/heat intolerance  Heme:  No petechiae, ecchymosis, easy bruisability  Skin:  No rash, edema      PHYSICAL EXAM:     GENERAL:  Appears stated age, well-groomed, well-nourished, no distress  HEENT:  NC/AT,  conjunctivae clear and pink,  no JVD  CHEST:  Full & symmetric excursion, no increased effort, breath sounds clear  HEART:  Regular rhythm, S1, S2, no murmur/rub/S3/S4, no abdominal bruit, no edema  ABDOMEN:  Soft, non-tender, non-distended, normoactive bowel sounds,  no masses , no hepatosplenomegaly  EXTREMITIES:  no cyanosis,clubbing or edema  SKIN:  No rash/erythema/ecchymoses/petechiae/wounds/abscess/warm/dry  NEURO:  Alert, oriented    Vital Signs:  Vital Signs Last 24 Hrs  T(C): 36.7 (04 Apr 2019 13:41), Max: 36.7 (04 Apr 2019 01:26)  T(F): 98.1 (04 Apr 2019 13:41), Max: 98.1 (04 Apr 2019 01:26)  HR: 75 (04 Apr 2019 13:41) (71 - 89)  BP: 118/75 (04 Apr 2019 13:41) (100/70 - 120/68)  BP(mean): --  RR: 16 (04 Apr 2019 13:41) (16 - 18)  SpO2: 100% (04 Apr 2019 13:41) (99% - 100%)  Daily     Daily     LABS:                        9.6    10.72 )-----------( 67       ( 04 Apr 2019 08:07 )             31.4     04-04    138  |  114<H>  |  14  ----------------------------<  70  4.7   |  15<L>  |  1.10    Ca    7.7<L>      04 Apr 2019 06:15  Phos  3.2     04-04  Mg     1.5     04-04        PT/INR - ( 03 Apr 2019 17:43 )   PT: 24.0 SEC;   INR: 2.11          PTT - ( 03 Apr 2019 17:43 )  PTT:52.0 SEC        Imaging: Chief Complaint:  Patient is a 73y old  Male who presents with a chief complaint of Generalized weakness and inability to walk (04 Apr 2019 13:13)      HPI:  This is a 73 year old man with a history of CLL, HTN who presented for evaluation of chronic diarrhea. He has undergone extensive GI workup that has been nondiagnostic. It has been going on for about 4 months prior to a trip to [facility]. He has no known history of liver disease. He did drink ethanol excessively per his wife for several years and his last drink was about 1 year ago. He has no family history of liver disease. Per his daughter he has been more confused lately for the past 1 month. He has chronic UTI and has been on and off of abx since March. He also had GI bleeding in March with workup revealing proximal and mid small bowel ulcers.    Allergies:  No Known Allergies      Home Medications:    Hospital Medications:  calcium carbonate   1250 mG (OsCal) 1 Tablet(s) Oral two times a day  cholecalciferol 1000 Unit(s) Oral daily  dextrose 5% + sodium chloride 0.45%. 1000 milliLiter(s) IV Continuous <Continuous>  dicyclomine 20 milliGRAM(s) Oral four times a day before meals PRN  lactobacillus acidophilus 1 Tablet(s) Oral three times a day with meals  loperamide 2 milliGRAM(s) Oral five times a day  melatonin 3 milliGRAM(s) Oral <User Schedule>  mesalamine DR Capsule 800 milliGRAM(s) Oral three times a day  metoprolol succinate ER 25 milliGRAM(s) Oral daily  multivitamin 1 Tablet(s) Oral daily  pantoprazole    Tablet 40 milliGRAM(s) Oral before breakfast  QUEtiapine 25 milliGRAM(s) Oral at bedtime PRN  simethicone 80 milliGRAM(s) Chew four times a day PRN  sodium bicarbonate 650 milliGRAM(s) Oral two times a day  thiamine IVPB 500 milliGRAM(s) IV Intermittent daily  vancomycin  IVPB 500 milliGRAM(s) IV Intermittent every 12 hours      PMHX/PSHX:  Chronic kidney disease (CKD)  Anemia, unspecified type  Essential hypertension  CLL (chronic lymphocytic leukemia)  No significant past surgical history        Family history:  Family history of myocardial infarction (Sibling)      Social History:     ROS:     General:  No wt loss, fevers, chills, night sweats, fatigue,   Eyes:  Good vision, no reported pain  ENT:  No sore throat, pain, runny nose, dysphagia  CV:  No pain, palpitations, hypo/hypertension  Resp:  No dyspnea, cough, tachypnea, wheezing  GI:  See HPI  :  No pain, bleeding, incontinence, nocturia  Muscle:  No pain, weakness  Neuro:  No weakness, tingling, memory problems  Psych:  No fatigue, insomnia, mood problems, depression  Endocrine:  No polyuria, polydipsia, cold/heat intolerance  Heme:  No petechiae, ecchymosis, easy bruisability  Skin:  No rash, edema      PHYSICAL EXAM:     GENERAL:  Appears stated age, well-groomed, well-nourished, no distress  HEENT:  NC/AT,  conjunctivae clear and pink,  no JVD  CHEST:  Full & symmetric excursion, no increased effort, breath sounds clear  HEART:  Regular rhythm, S1, S2, no murmur/rub/S3/S4, no abdominal bruit, no edema  ABDOMEN:  Soft, non-tender, non-distended, normoactive bowel sounds,  no masses , no hepatosplenomegaly  EXTREMITIES:  no cyanosis,clubbing or edema  SKIN:  No rash/erythema/ecchymoses/petechiae/wounds/abscess/warm/dry  NEURO:  Alert, oriented    Vital Signs:  Vital Signs Last 24 Hrs  T(C): 36.7 (04 Apr 2019 13:41), Max: 36.7 (04 Apr 2019 01:26)  T(F): 98.1 (04 Apr 2019 13:41), Max: 98.1 (04 Apr 2019 01:26)  HR: 75 (04 Apr 2019 13:41) (71 - 89)  BP: 118/75 (04 Apr 2019 13:41) (100/70 - 120/68)  BP(mean): --  RR: 16 (04 Apr 2019 13:41) (16 - 18)  SpO2: 100% (04 Apr 2019 13:41) (99% - 100%)  Daily     Daily     LABS:                        9.6    10.72 )-----------( 67       ( 04 Apr 2019 08:07 )             31.4     04-04    138  |  114<H>  |  14  ----------------------------<  70  4.7   |  15<L>  |  1.10    Ca    7.7<L>      04 Apr 2019 06:15  Phos  3.2     04-04  Mg     1.5     04-04        PT/INR - ( 03 Apr 2019 17:43 )   PT: 24.0 SEC;   INR: 2.11          PTT - ( 03 Apr 2019 17:43 )  PTT:52.0 SEC        Imaging: Chief Complaint:  Patient is a 73y old  Male who presents with a chief complaint of Generalized weakness and inability to walk (04 Apr 2019 13:13)      HPI:  This is a 73 year old man with a history of CLL, HTN who presented for evaluation of chronic diarrhea. He has undergone extensive GI workup that has been nondiagnostic. It has been going on for about 4 months prior to a trip to [facility]. He has no known history of liver disease. He did drink ethanol excessively per his wife for several years and his last drink was about 1 year ago. He has no family history of liver disease. Per his daughter he has been more confused lately for the past 1 month. He has chronic UTI and has been on and off of abx since March. He also had GI bleeding in March with workup revealing proximal and mid small bowel ulcers.    Allergies:  No Known Allergies      Home Medications:    Hospital Medications:  calcium carbonate   1250 mG (OsCal) 1 Tablet(s) Oral two times a day  cholecalciferol 1000 Unit(s) Oral daily  dextrose 5% + sodium chloride 0.45%. 1000 milliLiter(s) IV Continuous <Continuous>  dicyclomine 20 milliGRAM(s) Oral four times a day before meals PRN  lactobacillus acidophilus 1 Tablet(s) Oral three times a day with meals  loperamide 2 milliGRAM(s) Oral five times a day  melatonin 3 milliGRAM(s) Oral <User Schedule>  mesalamine DR Capsule 800 milliGRAM(s) Oral three times a day  metoprolol succinate ER 25 milliGRAM(s) Oral daily  multivitamin 1 Tablet(s) Oral daily  pantoprazole    Tablet 40 milliGRAM(s) Oral before breakfast  QUEtiapine 25 milliGRAM(s) Oral at bedtime PRN  simethicone 80 milliGRAM(s) Chew four times a day PRN  sodium bicarbonate 650 milliGRAM(s) Oral two times a day  thiamine IVPB 500 milliGRAM(s) IV Intermittent daily  vancomycin  IVPB 500 milliGRAM(s) IV Intermittent every 12 hours      PMHX/PSHX:  Chronic kidney disease (CKD)  Anemia, unspecified type  Essential hypertension  CLL (chronic lymphocytic leukemia)  No significant past surgical history        Family history:  Family history of myocardial infarction (Sibling)      Social History:     ROS:     General:  No wt loss, fevers, chills, night sweats, fatigue,   Eyes:  Good vision, no reported pain  ENT:  No sore throat, pain, runny nose, dysphagia  CV:  No pain, palpitations, hypo/hypertension  Resp:  No dyspnea, cough, tachypnea, wheezing  GI:  See HPI  :  No pain, bleeding, incontinence, nocturia  Muscle:  No pain, weakness  Neuro:  No weakness, tingling, memory problems  Psych:  No fatigue, insomnia, mood problems, depression  Endocrine:  No polyuria, polydipsia, cold/heat intolerance  Heme:  No petechiae, ecchymosis, easy bruisability  Skin:  No rash, edema      PHYSICAL EXAM:     GENERAL:  Appears stated age, well-groomed, well-nourished, no distress  HEENT:  NC/AT,  conjunctivae clear and pink,  no JVD  CHEST:  Full & symmetric excursion, no increased effort, breath sounds clear  HEART:  Regular rhythm, S1, S2, no murmur/rub/S3/S4, no abdominal bruit, no edema  ABDOMEN:  Soft, non-tender, moderately-distended, normoactive bowel sounds,  no masses , no hepatosplenomegaly  EXTREMITIES:  no cyanosis,clubbing or edema  SKIN:  No rash/erythema/ecchymoses/petechiae/wounds/abscess/warm/dry  NEURO:  Alert, no asterixis    Vital Signs:  Vital Signs Last 24 Hrs  T(C): 36.7 (04 Apr 2019 13:41), Max: 36.7 (04 Apr 2019 01:26)  T(F): 98.1 (04 Apr 2019 13:41), Max: 98.1 (04 Apr 2019 01:26)  HR: 75 (04 Apr 2019 13:41) (71 - 89)  BP: 118/75 (04 Apr 2019 13:41) (100/70 - 120/68)  BP(mean): --  RR: 16 (04 Apr 2019 13:41) (16 - 18)  SpO2: 100% (04 Apr 2019 13:41) (99% - 100%)  Daily     Daily     LABS:                        9.6    10.72 )-----------( 67       ( 04 Apr 2019 08:07 )             31.4     04-04    138  |  114<H>  |  14  ----------------------------<  70  4.7   |  15<L>  |  1.10    Ca    7.7<L>      04 Apr 2019 06:15  Phos  3.2     04-04  Mg     1.5     04-04        PT/INR - ( 03 Apr 2019 17:43 )   PT: 24.0 SEC;   INR: 2.11          PTT - ( 03 Apr 2019 17:43 )  PTT:52.0 SEC        Imaging:

## 2019-04-04 NOTE — PROGRESS NOTE ADULT - ASSESSMENT
Mr. Miranda is a 74 yo man with history of Anemia, HTN, PUD, chronic diarrhea, and recent hospitalization at [facility] from 3/3/19-3/21/19 brought in by family for generalized weakness and inability to walk admitted for severe malnutrition, mild hypernatremia and severe deconditioning. Exam relatively benign aside for significant LE edema and weakness of legs. Anemia at baseline.

## 2019-04-04 NOTE — CONSULT NOTE ADULT - REASON FOR ADMISSION
Generalized weakness and inability to walk

## 2019-04-04 NOTE — CONSULT NOTE ADULT - CONSULT REQUESTED DATE/TIME
01-Apr-2019
04-Apr-2019 15:27
25-Mar-2019 15:00
25-Mar-2019 15:10
26-Mar-2019 11:26
27-Mar-2019 15:22
01-Apr-2019 20:28

## 2019-04-04 NOTE — PROGRESS NOTE ADULT - SUBJECTIVE AND OBJECTIVE BOX
[facility] NEPHROLOGY PRACTICE   MD MAGALY MORAN MD ANGELA WONG, PA    TEL:  OFFICE: [phone]  DR ANDERSON CELL: [phone]  DR. MONAE CELL: [phone]  HUBERT WELLINGTON CELL: [phone]        Patient is a 73y old  Male who presents with a chief complaint of Generalized weakness and inability to walk (04 Apr 2019 15:27)      Patient seen and examined at bedside. No chest pain/sob    VITALS:  T(F): 98.1 (04-04-19 @ 13:41), Max: 98.1 (04-04-19 @ 01:26)  HR: 75 (04-04-19 @ 13:41)  BP: 118/75 (04-04-19 @ 13:41)  RR: 16 (04-04-19 @ 13:41)  SpO2: 100% (04-04-19 @ 13:41)  Wt(kg): --        PHYSICAL EXAM:  Constitutional: NAD  Neck: No JVD  Respiratory: CTAB, no wheezes, rales or rhonchi  Cardiovascular: S1, S2, RRR  Gastrointestinal: BS+, soft, NT/ND  Extremities: No peripheral edema    Hospital Medications:   MEDICATIONS  (STANDING):  calcium carbonate   1250 mG (OsCal) 1 Tablet(s) Oral two times a day  cholecalciferol 1000 Unit(s) Oral daily  dextrose 5% + sodium chloride 0.45%. 1000 milliLiter(s) (60 mL/Hr) IV Continuous <Continuous>  lactobacillus acidophilus 1 Tablet(s) Oral three times a day with meals  loperamide 2 milliGRAM(s) Oral five times a day  melatonin 3 milliGRAM(s) Oral <User Schedule>  mesalamine DR Capsule 800 milliGRAM(s) Oral three times a day  metoprolol succinate ER 25 milliGRAM(s) Oral daily  multivitamin 1 Tablet(s) Oral daily  pantoprazole    Tablet 40 milliGRAM(s) Oral before breakfast  sodium bicarbonate 650 milliGRAM(s) Oral two times a day  thiamine IVPB 500 milliGRAM(s) IV Intermittent daily  vancomycin  IVPB 500 milliGRAM(s) IV Intermittent every 12 hours      LABS:  04-04    138  |  114<H>  |  14  ----------------------------<  70  4.7   |  15<L>  |  1.10    Ca    7.7<L>      04 Apr 2019 06:15  Phos  3.2     04-04  Mg     1.5     04-04      Creatinine Trend: 1.10 <--, 1.22 <--, 1.25 <--, 1.19 <--, 1.23 <--, 1.30 <--, 1.34 <--    Phosphorus Level, Serum: 3.2 mg/dL (04-04 @ 06:15)  Phosphorus Level, Serum: 2.0 mg/dL (04-03 @ 17:43)                              9.6    10.72 )-----------( 67       ( 04 Apr 2019 08:07 )             31.4     Urine Studies:  Urinalysis - [04-01-19 @ 17:00]      Color YELLOW / Appearance CLEAR / SG 1.015 / pH 6.0      Gluc NEGATIVE / Ketone NEGATIVE  / Bili NEGATIVE / Urobili NORMAL       Blood NEGATIVE / Protein 30 / Leuk Est NEGATIVE / Nitrite NEGATIVE      RBC 0-2 / WBC 3-5 / Hyaline NEGATIVE / Gran  / Sq Epi OCC / Non Sq Epi  / Bacteria FEW      Iron 48, TIBC 78, %sat --      [03-29-19 @ 05:20]  Ferritin 378.6      [03-29-19 @ 05:20]  Vitamin D (25OH) 25.6      [03-23-19 @ 18:15]  TSH 1.98      [03-05-19 @ 06:45]    HBsAb Nonreactive      [03-20-19 @ 05:35]  HBsAg NEGATIVE      [03-20-19 @ 05:35]    TODD: titer Negative, pattern --      [03-08-19 @ 06:30]    RADIOLOGY & ADDITIONAL STUDIES:

## 2019-04-04 NOTE — PROGRESS NOTE ADULT - ATTENDING COMMENTS
is thrombocytopenic:  4/2: reps wise he is stble for any kind og GI procedure:Rpt chest x-ray today  4/3: stable from resp point of view:  4/4: stable: no wheezing:

## 2019-04-04 NOTE — PROGRESS NOTE ADULT - ASSESSMENT
Mr. Miranda is a 74 yo man with history of Anemia, HTN, chronic diarrhea, and recent hospitalization at [facility] from 3/3/19-3/21/19 brought in by family for generalized weakness and inability to walk.    Patient was discharged home with home PT services on 3/21/19 after being hospitalized since 3/3/19. During his hospitalization, he was managed for chronic diarrhea presumed to be 2/2 IBD since infectious and autoimmune w/u was negative, anemia due to blood loss from GI tract 2/2 PUD, requiring blood transfusion, and ESBL E. coli UTI with ertapenem via PICC. He was also determined not to have the diagnosis of CLL after flow cytometry results were available.     HPI was obtained primarily from patient's daughter and caregiver, Bee. As per daughter, when patient came home he was very weak. She wanted to bathe him but patient was unable to stand or walk to the bathroom. Due to his severe weakness, his daughter brought him back to the hospital. (23 Mar 2019 08:22)      Recommend:    - s/p completion of  ertapenem 1 week course for ESBL e.coli UTI on 3/25.   Pt restarted on vancomycin for enterococcus faecium UTI.    - Pt with diarrhea, thus far infectious w/u negative including stool cx, O&P (including microsporidium, cyclospora, isospora, cryptosporidium), gi pcr, cdiff, strongyloides neg, giardia neg, cmv pcr (-), cmv igG (+), IgM (-), HIV (-).  Stool for AFB (-)    - Pt with moderate ascites, and ?cirrhosis on CT ap.  Hep A/B/C serologies neg.  s/p paracentesis today on 4/3.  SAAG elevated, low albumin.  Low Tnc, do not suspect SBP.  Hepatology consulted for evaluation of cirrhosis.      -GI eval noted, repeat stool studies ordered including repeat gi pcr, giardia, stool o&p, stool cx.   r/o autoimmune and celiac disease.       -  CMV IgG (+), recommend biopsy to r/o cmv colitis - pt s/p colonoscopy on 4/1.  path shows areas of focal active colitis, findings are nonspecific and differential remains broad.       - Pt with intermittent hypothermia and change in mental status/disoriented.   Repeat Ucx grew enterococcus faecium, UA was unremarkable at that time.  Given recurrent hypothermia, will opt to treat - start vancomycin to treat enterococcus: complete 7 day course through 4/7.               Will follow       Adeline Marques  [phone]

## 2019-04-04 NOTE — PROGRESS NOTE ADULT - PROBLEM SELECTOR PLAN 8
non AG  likely sec to GI lost patient has chronic diarrhea  on oral bicarb bid  worsen today, will change IVF d5+75meq bicarb  monitor

## 2019-04-04 NOTE — PROGRESS NOTE ADULT - ASSESSMENT
Impression:  1) Diarrhea - with previous negative work up inpatient.  Etiologies include infectious vs inflammatory vs  malabsorptive vs microscopic colitis.  Patient will likely ultimately need endoscopic evaluation but would first repeat stool work up prior to proceeding. Stool C. Diff negative, now with AMS. Stool fat, o and p and giardia normal. Celiac panel returned weakly positive.    Recommendations:   - f/u stool electrolytes (Na+, K+)   - f/u stool elastase   - f/u stool a-1 antitrypsin   - f/u repeat C.Diff and GI-PCR    -follow-up CBC; Fe++/TIBC. B12, foalte   - monitor stool output and volume   - gluten and lactose free LRD   - await pathology results

## 2019-04-04 NOTE — CONSULT NOTE ADULT - ATTENDING COMMENTS
Hepatology Staff: Rogelio Baron MD  I saw and examined the patient along with Dr. Arana on 4/4/2019.    Mr. Miranda is a 73 yrs old [place] male with hx of chronic diarrhea, now presenting with ascites and there is some concern for cirrhosis due to low platelet elevated INR, imaging suggestive of some irregular surface. 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   Ascites is high SAAG, but not reliable in the setting of severe hypoalbuminemia.  Will start lasix 20 and Aladctone 50 mg, and IV Albumin 25% 25 gm 100 ml q 8hrs.  Will discuss with radiology for feasibility to have MR Elastography or fibroscan.

## 2019-04-04 NOTE — PROGRESS NOTE ADULT - PROBLEM SELECTOR PLAN 1
He has resp alkalosis as well as some metabolic acidosis: He has had lot of diarrhea: before: His chest x-ray with poor inspiration with bibasilar atelectasis other wise lung are clear: I DOUBT HE H IS HAVING  PE: But would do vq scan as wellas dopplers: Could it be related to diarrhea: He is off antibiotics at this time. I don't seem much of pneumonia on chest x-ray: His last ct scan is reviewed too: had some TIB opacities in upper lobes suggestive of bronchiolitis: CHF and bronchomalacia:  3/28: vq scan could not be dome yesterday: will attempt today: his previos dopplers were negative: new ABG is awaited:  3/29: ABG is pretty good: stable:  3/30; seems to be doing ok : no SOB: Breathing wise is OK :  3/31> breathing wise stable: no wheezing  4/2: yesterdays x-ray noted: rpt chest x-ray : There is no sq emphysema on palpation:  4/3: no ptx or sq emphysema seen: pt is stable from pulm point of view:  4/4: stable: no wheezing!

## 2019-04-04 NOTE — PROGRESS NOTE ADULT - SUBJECTIVE AND OBJECTIVE BOX
Cardiovascular Disease Progress Note    Overnight events: No acute events overnight. Mr. Miranda is resting comfortably. He denies pain.   Otherwise review of systems negative    Objective Findings:  T(C): 36.4 (04-04-19 @ 05:37), Max: 36.7 (04-04-19 @ 01:26)  HR: 83 (04-04-19 @ 05:37) (74 - 89)  BP: 108/61 (04-04-19 @ 05:37) (108/61 - 120/68)  RR: 18 (04-04-19 @ 05:37) (18 - 18)  SpO2: 100% (04-04-19 @ 05:37) (99% - 100%)  Wt(kg): --  Daily     Daily       Physical Exam:  Gen: NAD  HEENT: EOMI  CV: RRR, normal S1 + S2, no m/r/g  Lungs: CTAB  Abd: soft, non-tender  Ext: No edema    Telemetry: n/a    Laboratory Data:                        8.8    12.64 )-----------( 72       ( 03 Apr 2019 17:43 )             29.4     04-03    140  |  112<H>  |  15  ----------------------------<  108<H>  3.9   |  18<L>  |  1.22    Ca    7.7<L>      03 Apr 2019 17:43  Phos  2.0     04-03  Mg     1.6     04-03      PT/INR - ( 03 Apr 2019 17:43 )   PT: 24.0 SEC;   INR: 2.11          PTT - ( 03 Apr 2019 17:43 )  PTT:52.0 SEC          Inpatient Medications:  MEDICATIONS  (STANDING):  calcium carbonate   1250 mG (OsCal) 1 Tablet(s) Oral two times a day  cholecalciferol 1000 Unit(s) Oral daily  lactobacillus acidophilus 1 Tablet(s) Oral three times a day with meals  loperamide 2 milliGRAM(s) Oral five times a day  melatonin 3 milliGRAM(s) Oral <User Schedule>  mesalamine DR Capsule 800 milliGRAM(s) Oral three times a day  metoprolol succinate ER 25 milliGRAM(s) Oral daily  multivitamin 1 Tablet(s) Oral daily  multivitamin/thiamine/folic acid in sodium chloride 0.9% 1000 milliLiter(s) (60 mL/Hr) IV Continuous <Continuous>  pantoprazole    Tablet 40 milliGRAM(s) Oral before breakfast  sodium bicarbonate 650 milliGRAM(s) Oral two times a day  thiamine IVPB 500 milliGRAM(s) IV Intermittent daily  vancomycin  IVPB 500 milliGRAM(s) IV Intermittent every 12 hours      Assessment:  73 year old man with HTN, anemia, chronic diarrhea, malnutrition and low voltage EKG presents with weakness.    Plan of Care:    #Post-operative evaluation-  Mr. Miranda tolerated colonoscopy well on 4/1.  He displays no signs or symptoms or post-procedural coronary ischemia, decompensated CHF or malignant arrythmia.  TTE from 3/10 reviewed- no significant structural heart disease.   Continue current cardiac management.      #HTN-  BP controlled on current regimen.     #Chronic diarrhea-  Awaiting pathology.  GI input noted.     Over 25 minutes spent on total encounter; more than 50% of the visit was spent counseling and/or coordinating care by the attending physician.      Darren Herring MD [facility]  Cardiovascular Disease  [phone]

## 2019-04-04 NOTE — PROGRESS NOTE ADULT - SUBJECTIVE AND OBJECTIVE BOX
Infectious Diseases progress note:    Subjective: NAD, afebrile, no acute o/n events.      ROS:  CONSTITUTIONAL:  No fever, chills, rigors  CARDIOVASCULAR:  No chest pain or palpitations  RESPIRATORY:   No SOB, cough, dyspnea on exertion.  No wheezing  GASTROINTESTINAL:  No abd pain, N/V, diarrhea/constipation  EXTREMITIES:  No swelling or joint pain  GENITOURINARY:  No burning on urination, increased frequency or urgency.  No flank pain  NEUROLOGIC:  No HA, visual disturbances  SKIN: No rashes    Allergies    No Known Allergies    Intolerances        ANTIBIOTICS/RELEVANT:  antimicrobials  vancomycin  IVPB 500 milliGRAM(s) IV Intermittent every 12 hours    immunologic:    OTHER:  albumin human 25% IVPB 50 milliLiter(s) IV Intermittent every 8 hours  calcium carbonate   1250 mG (OsCal) 1 Tablet(s) Oral two times a day  cholecalciferol 1000 Unit(s) Oral daily  dextrose 5% 1000 milliLiter(s) IV Continuous <Continuous>  dicyclomine 20 milliGRAM(s) Oral four times a day before meals PRN  furosemide    Tablet 20 milliGRAM(s) Oral daily  lactobacillus acidophilus 1 Tablet(s) Oral three times a day with meals  loperamide 2 milliGRAM(s) Oral three times a day PRN  melatonin 3 milliGRAM(s) Oral <User Schedule>  mesalamine DR Capsule 800 milliGRAM(s) Oral three times a day  metoprolol succinate ER 25 milliGRAM(s) Oral daily  multivitamin 1 Tablet(s) Oral daily  pantoprazole    Tablet 40 milliGRAM(s) Oral before breakfast  QUEtiapine 25 milliGRAM(s) Oral at bedtime PRN  simethicone 80 milliGRAM(s) Chew four times a day PRN  sodium bicarbonate 650 milliGRAM(s) Oral two times a day  spironolactone 50 milliGRAM(s) Oral daily  thiamine IVPB 500 milliGRAM(s) IV Intermittent daily      Objective:  Vital Signs Last 24 Hrs  T(C): 36.7 (04 Apr 2019 22:00), Max: 36.7 (04 Apr 2019 01:26)  T(F): 98 (04 Apr 2019 22:00), Max: 98.1 (04 Apr 2019 01:26)  HR: 81 (04 Apr 2019 22:00) (69 - 83)  BP: 98/62 (04 Apr 2019 22:00) (98/62 - 118/75)  BP(mean): --  RR: 18 (04 Apr 2019 22:00) (16 - 18)  SpO2: 98% (04 Apr 2019 22:00) (98% - 100%)    PHYSICAL EXAM:  Constitutional:NAD  Eyes:TODD, EOMI  Ear/Nose/Throat: no thrush, mucositis.  Moist mucous membranes	  Neck:no JVD, no lymphadenopathy, supple  Respiratory: CTA ruth  Cardiovascular: S1S2 RRR, no murmurs  Gastrointestinal:soft, nontender,  nondistended (+) BS  Extremities:no e/e/c  Skin:  no rashes, open wounds or ulcerations        LABS:                        9.6    10.72 )-----------( 67       ( 04 Apr 2019 08:07 )             31.4     04-04    138  |  114<H>  |  14  ----------------------------<  70  4.7   |  15<L>  |  1.10    Ca    7.7<L>      04 Apr 2019 06:15  Phos  3.2     04-04  Mg     1.5     04-04      PT/INR - ( 03 Apr 2019 17:43 )   PT: 24.0 SEC;   INR: 2.11          PTT - ( 03 Apr 2019 17:43 )  PTT:52.0 SEC            Vancomycin Level, Trough: 17.6 ug/mL (04-03 @ 17:43)  Vancomycin Level, Trough: 22.2 ug/mL (04-03 @ 05:10)              MICROBIOLOGY:    Culture - Acid Fast - Body Fluid w/Smear (04.03.19 @ 15:42)    Culture - Acid Fast Smear Concentrated:   AFB SMEAR= NO ACID FAST BACILLI SEEN    Specimen Source: PERITONEAL FLUID    Culture - Body Fluid with Gram Stain (04.03.19 @ 12:26)    Gram Stain:   NOS^No Organisms Seen  WBC^White Blood Cells  QNTY CELLS IN GRAM STAIN: RARE (1+)    Culture - Body Fluid:   NO GROWTH - PRELIMINARY RESULTS    Specimen Source: PERITONEAL FLUID    Culture - Urine (04.01.19 @ 19:23)    -  Ciprofloxacin: R >2 FERNANDO    Culture - Urine:   COLONY COUNT: > = 100,000 CFU/ML    -  Vancomycin: S 1 FERNANDO    -  Tetra/Doxy: S <=1 FERNANDO    -  Ampicillin: R >8 FERNANDO    -  Nitrofurantoin: S <=32 FERNANDO    Specimen Source: URINE CATHETER    Organism Identification: Enterococcus faecium    Organism: Enterococcus faecium    Method Type: POSITIVE FERNANDO 29          RADIOLOGY & ADDITIONAL STUDIES:    < from: CT Head No Cont (04.03.19 @ 20:22) >  FINDINGS:    VENTRICLES AND SULCI:  Prominent in size compatible with age-appropriate   volume loss    INTRA-AXIAL:  Areas of white matter hypodensity are seen within the   cerebral hemispheres bilaterally compatible with mild microvascular-type   changes. No mass, blood or abnormal attenuation is otherwise seen.    EXTRA-AXIAL:  No mass or collection is seen.    VISUALIZED SINUSES:  Mild ethmoid mucosal thickening    VISUALIZED MASTOIDS:  Clear.    CALVARIUM:  Normal.    MISCELLANEOUS:  None.      IMPRESSION:    Stable exam.    No mass effect, hemorrhage or evidence of acute pathology.    < end of copied text >

## 2019-04-04 NOTE — PROGRESS NOTE ADULT - ASSESSMENT
· Assessment	  Mr. Miranda is a 74 yo man with history of Anemia, HTN, PUD, chronic diarrhea, and recent hospitalization at [facility] from 3/3/19-3/21/19 brought in by family for generalized weakness and inability to walk admitted for severe malnutrition, mild hypernatremia and severe deconditioning. Exam relatively benign aside for significant LE edema and weakness of legs. Anemia at baseline.     Cirrhosis:  Hepatology eval noted.    ·  Problem: Chronic diarrhea.  Plan: GI f/up noted.  - S/p colonoscopy and biopsy.   - Biopsy report: no CMV    Pancytopenia:  Hem f/up  CBC    Dw pt's daughter.

## 2019-04-04 NOTE — CONSULT NOTE ADULT - ASSESSMENT
Impression:    1. Likely decompensated alcoholic cirrhosis: MELD Na 16. Prior workup including viral hepatitis, iron tests, SMA, LKM, TODD negative. Some of the coagulopathy may be nutritional  -ascites: moderate  -varices: none on EGD from 3/19  -HCC:none on imaging this admission  -PSE: some of patient's encephalopathy may be due to PSE vs. Vitamin deficiencies    2. Chronic diarrhea of unclear etiology    3. Pancytopenia, CLL    Recommendation: Impression:    1. Possible alcoholic cirrhosis, MR and US not suggestive of cirrhosis but CT suggestive. Unclear if low plt due to CLL: MELD Na would be 16. Prior workup including viral hepatitis, iron tests, SMA, LKM, TODD negative. Some of the coagulopathy may be nutritional.  -ascites: moderate, SAAG elevated but difficult to interpret in the setting of low albumin  -varices: none on EGD from 3/19  -HCC:none on imaging this admission  -PSE: some of patient's encephalopathy may be due to PSE vs. Vitamin deficiencies    2. Chronic diarrhea of unclear etiology, with hypoalbuminemia    3. Pancytopenia, CLL    Recommendation:  -give vit K 5mg X 3 days  -give albumin 50ml TID and lasix 20 daily to help with ascites  -nutrition c/s  -patient will need a definitive test for cirrhosis such as elastography vs. biopsy

## 2019-04-04 NOTE — PROGRESS NOTE ADULT - SUBJECTIVE AND OBJECTIVE BOX
Patient is a 73y old  Male who presents with a chief complaint of Generalized weakness and inability to walk (04 Apr 2019 23:07)      SUBJECTIVE / OVERNIGHT EVENTS:    Events noted.  Awake  No N/V  Diarrhea +    MEDICATIONS  (STANDING):  albumin human 25% IVPB 50 milliLiter(s) IV Intermittent every 8 hours  calcium carbonate   1250 mG (OsCal) 1 Tablet(s) Oral two times a day  cholecalciferol 1000 Unit(s) Oral daily  dextrose 5% 1000 milliLiter(s) (60 mL/Hr) IV Continuous <Continuous>  furosemide    Tablet 20 milliGRAM(s) Oral daily  lactobacillus acidophilus 1 Tablet(s) Oral three times a day with meals  melatonin 3 milliGRAM(s) Oral <User Schedule>  mesalamine DR Capsule 800 milliGRAM(s) Oral three times a day  metoprolol succinate ER 25 milliGRAM(s) Oral daily  multivitamin 1 Tablet(s) Oral daily  pantoprazole    Tablet 40 milliGRAM(s) Oral before breakfast  sodium bicarbonate 650 milliGRAM(s) Oral two times a day  spironolactone 50 milliGRAM(s) Oral daily  thiamine IVPB 500 milliGRAM(s) IV Intermittent daily  vancomycin  IVPB 500 milliGRAM(s) IV Intermittent every 12 hours    MEDICATIONS  (PRN):  dicyclomine 20 milliGRAM(s) Oral four times a day before meals PRN abd pain  loperamide 2 milliGRAM(s) Oral three times a day PRN Diarrhea  QUEtiapine 25 milliGRAM(s) Oral at bedtime PRN for agitation/sleep  simethicone 80 milliGRAM(s) Chew four times a day PRN Gas        CAPILLARY BLOOD GLUCOSE        I&O's Summary      PHYSICAL EXAM:  GENERAL: NAD  NECK: Supple, No JVD  CHEST/LUNG: Clear to auscultation bilaterally; No wheezing.  HEART: Regular rate and rhythm; No murmurs, rubs, or gallops  ABDOMEN: Soft, Nontender, Nondistended; Bowel sounds present  EXTREMITIES:   No edema  NEUROLOGY: Awake      LABS:                        9.6    10.72 )-----------( 67       ( 04 Apr 2019 08:07 )             31.4     04-04    138  |  114<H>  |  14  ----------------------------<  70  4.7   |  15<L>  |  1.10    Ca    7.7<L>      04 Apr 2019 06:15  Phos  3.2     04-04  Mg     1.5     04-04      PT/INR - ( 03 Apr 2019 17:43 )   PT: 24.0 SEC;   INR: 2.11          PTT - ( 03 Apr 2019 17:43 )  PTT:52.0 SEC        CAPILLARY BLOOD GLUCOSE        04-03 @ 15:42  Culture-urine --  Culture results --  method type --  Organism --  Organism Identification --  Specimen source PERITONEAL FLUID  04-03 @ 12:26  Culture-urine --  Culture results --  method type --  Organism --  Organism Identification --  Specimen source PERITONEAL FLUID  04-01 @ 19:23  Culture-urine   COLONY COUNT: > = 100,000 CFU/ML  Culture results --  method type POSITIVE FERNANDO 29  Organism Enterococcus faecium  Organism Identification Enterococcus faecium  Specimen source URINE CATHETER  04-01 @ 16:53  Culture-urine --  Culture results --  method type --  Organism --  Organism Identification --  Specimen source BLOOD VENOUS  04-01 @ 15:53  Culture-urine --  Culture results --  method type --  Organism --  Organism Identification --  Specimen source BLOOD PERIPHERAL           04-03 @ 15:42  Culture blood --  Culture results --  Gram stain --  Gram stain blood --  Method type --  Organism --  Organism identification --  Specimen source PERITONEAL FLUID   04-03 @ 12:26  Culture blood --  Culture results --  Gram stain   NOS^No Organisms Seen  WBC^White Blood Cells  QNTY CELLS IN GRAM STAIN: RARE (1+)  Gram stain blood --  Method type --  Organism --  Organism identification --  Specimen source PERITONEAL FLUID   04-01 @ 19:23  Culture blood --  Culture results --  Gram stain --  Gram stain blood --  Method type POSITIVE FERNANDO 29  Organism Enterococcus faecium  Organism identification Enterococcus faecium  Specimen source URINE CATHETER   04-01 @ 16:53  Culture blood   NO ORGANISMS ISOLATED  NO ORGANISMS ISOLATED AT 72 HRS.  Culture results --  Gram stain --  Gram stain blood --  Method type --  Organism --  Organism identification --  Specimen source BLOOD VENOUS   04-01 @ 15:53  Culture blood   NO ORGANISMS ISOLATED  NO ORGANISMS ISOLATED AT 72 HRS.  Culture results --  Gram stain --  Gram stain blood --  Method type --  Organism --  Organism identification --  Specimen source BLOOD PERIPHERAL      RADIOLOGY & ADDITIONAL TESTS:    Imaging Personally Reviewed:    Consultant(s) Notes Reviewed:      Care Discussed with Consultants/Other Providers:

## 2019-04-04 NOTE — PROGRESS NOTE ADULT - SUBJECTIVE AND OBJECTIVE BOX
Patient is a 73y old  Male who presents with a chief complaint of Generalized weakness and inability to walk (04 Apr 2019 08:12)      Any change in ROS: Family at bedside: He is doing ok : no SOB : no wheezing: no cough!    MEDICATIONS  (STANDING):  calcium carbonate   1250 mG (OsCal) 1 Tablet(s) Oral two times a day  cholecalciferol 1000 Unit(s) Oral daily  dextrose 5% + sodium chloride 0.45%. 1000 milliLiter(s) (60 mL/Hr) IV Continuous <Continuous>  lactobacillus acidophilus 1 Tablet(s) Oral three times a day with meals  loperamide 2 milliGRAM(s) Oral five times a day  melatonin 3 milliGRAM(s) Oral <User Schedule>  mesalamine DR Capsule 800 milliGRAM(s) Oral three times a day  metoprolol succinate ER 25 milliGRAM(s) Oral daily  multivitamin 1 Tablet(s) Oral daily  pantoprazole    Tablet 40 milliGRAM(s) Oral before breakfast  sodium bicarbonate 650 milliGRAM(s) Oral two times a day  thiamine IVPB 500 milliGRAM(s) IV Intermittent daily  vancomycin  IVPB 500 milliGRAM(s) IV Intermittent every 12 hours    MEDICATIONS  (PRN):  dicyclomine 20 milliGRAM(s) Oral four times a day before meals PRN abd pain  QUEtiapine 25 milliGRAM(s) Oral at bedtime PRN for agitation/sleep  simethicone 80 milliGRAM(s) Chew four times a day PRN Gas    Vital Signs Last 24 Hrs  T(C): 36.2 (04 Apr 2019 10:01), Max: 36.7 (04 Apr 2019 01:26)  T(F): 97.2 (04 Apr 2019 10:01), Max: 98.1 (04 Apr 2019 01:26)  HR: 71 (04 Apr 2019 10:01) (71 - 89)  BP: 100/70 (04 Apr 2019 10:01) (100/70 - 120/68)  BP(mean): --  RR: 17 (04 Apr 2019 10:01) (17 - 18)  SpO2: 99% (04 Apr 2019 10:01) (99% - 100%)    I&O's Summary        Physical Exam:   GENERAL: NAD, well-groomed, well-developed  HEENT: TODD/   Atraumatic, Normocephalic  ENMT: No tonsillar erythema, exudates, or enlargement; Moist mucous membranes, Good dentition, No lesions  NECK: Supple, No JVD, Normal thyroid  CHEST/LUNG: Clear to auscultaion  CVS: Regular rate and rhythm; No murmurs, rubs, or gallops  GI: : Soft, Nontender, Nondistended; Bowel sounds present  NERVOUS SYSTEM:  Alert & Oriented X2  EXTREMITIES:  2+ Peripheral Pulses, No clubbing, cyanosis, or edema  LYMPH: No lymphadenopathy noted  SKIN: No rashes or lesions  ENDOCRINOLOGY: No Thyromegaly  PSYCH: Appropriate    Labs:                              9.6    10.72 )-----------( 67       ( 04 Apr 2019 08:07 )             31.4                         8.8    12.64 )-----------( 72       ( 03 Apr 2019 17:43 )             29.4                         9.6    9.77  )-----------( 71       ( 02 Apr 2019 06:00 )             31.4                         9.6    7.26  )-----------( 63       ( 01 Apr 2019 07:25 )             32.2     04-04    138  |  114<H>  |  14  ----------------------------<  70  4.7   |  15<L>  |  1.10  04-03    140  |  112<H>  |  15  ----------------------------<  108<H>  3.9   |  18<L>  |  1.22  04-02    140  |  108<H>  |  16  ----------------------------<  72  3.2<L>   |  19<L>  |  1.25  04-01    139  |  109<H>  |  15  ----------------------------<  63<L>  4.2   |  19<L>  |  1.19    Ca    7.7<L>      04 Apr 2019 06:15  Ca    7.7<L>      03 Apr 2019 17:43  Phos  3.2     04-04  Phos  2.0     04-03  Mg     1.5     04-04  Mg     1.6     04-03      CAPILLARY BLOOD GLUCOSE            PT/INR - ( 03 Apr 2019 17:43 )   PT: 24.0 SEC;   INR: 2.11          PTT - ( 03 Apr 2019 17:43 )  PTT:52.0 SEC    Fluid Source --  Albumin, Fluid0.1 g/dL  Glucose, [id number] mg/dL  Protein total, Fluid0.7 g/dL  Lacatate Dehydrogenase, Fluid50 U/L  pH, Fluid--  Cytopathology-Non Gyn Report--        RECENT CULTURES:  04-03 @ 12:26 PERITONEAL FLUID       NOS^No Organisms Seen  WBC^White Blood Cells  QNTY CELLS IN GRAM STAIN: RARE (1+)             04-01 @ 19:23 URINE CATHETER   POSITIVE FERNANDO 29      Enterococcus faecium  Enterococcus faecium       04-01 @ 16:53 BLOOD VENOUS                  NO ORGANISMS ISOLATED  NO ORGANISMS ISOLATED AT 48 HRS.  04-01 @ 15:53 BLOOD PERIPHERAL         < from: CT Head No Cont (04.03.19 @ 20:22) >      PROCEDURE DATE:  Apr  3 2019         INTERPRETATION:  INDICATIONS:  confusion  .    TECHNIQUE:  Serial axial images were obtained from the skull base to the   vertex without intravenous contrast. Coronal and sagittal reformatted   images were obtained.    COMPARISON EXAMINATION: 3/27/2019    FINDINGS:    VENTRICLES AND SULCI:  Prominent in size compatible with age-appropriate   volume loss    INTRA-AXIAL:  Areas of white matter hypodensity are seen within the   cerebral hemispheres bilaterally compatible with mild microvascular-type   changes. No mass, blood or abnormal attenuation is otherwise seen.    EXTRA-AXIAL:  No mass or collection is seen.    VISUALIZED SINUSES:  Mild ethmoid mucosal thickening    VISUALIZED MASTOIDS:  Clear.    CALVARIUM:  Normal.    MISCELLANEOUS:  None.      IMPRESSION:    Stable exam.    No mass effect, hemorrhage or evidence of acute pathology.          < from: Xray Chest 1 View- PORTABLE-Urgent (04.02.19 @ 13:09) >  pneumothorax.    COMPARISON:  April 1,2018.    TECHNIQUE:   AP Portable chest x-ray. The chin obscures the left apex.    INTERPRETATION:     The heart is not enlarged. The mediastinum is not accurately evaluated on   this projection.  There are low lung volumes.  A trace right pleural effusion is unchanged. The right lung is clear.   There is linear atelectasis in the left mid and lower lung. No left   pleural effusion is seen.  No pneumothorax or subcutaneous emphysema is seen.              IMPRESSION:  No pneumothorax or subcutaneous emphysema seen.    Trace right pleural effusion.    Linear atelectasis in the left mid and lower lung.                  NORA HASKINS M.D., ATTENDING RADIOLOGIST  This document has been electronically signed. Apr 2 2019  2:38PM        < end of copied text >          NORA JULES M.D., ATTENDING RADIOLOGIST  This document has been electronically signed. Apr  3 2019  8:50PM              < end of copied text >           NO ORGANISMS ISOLATED  NO ORGANISMS ISOLATED AT 48 HRS.        RESPIRATORY CULTURES:          Studies  Chest X-RAY  CT SCAN Chest   Venous Dopplers: LE:   CT Abdomen  Others

## 2019-04-04 NOTE — PROGRESS NOTE ADULT - PROBLEM SELECTOR PLAN 1
renal function improving  PEDRO FEna<1 likely prerenal no hydro on US, renal function improved with IVF  again not eating much with diarrhea on IVF @ 50cc/hr  avoid nephrotoxic agents  monitor bmp.

## 2019-04-04 NOTE — PROGRESS NOTE ADULT - SUBJECTIVE AND OBJECTIVE BOX
Chief Complaint:  Patient is a 73y old  Male who presents with a chief complaint of Generalized weakness and inability to walk (03 Apr 2019 17:57)      Interval Events:   Pathology results of colonoscopy returned normal.  Stool studies still pending per assessment.  Patient had paracentesis negative for SBP    Allergies:  No Known Allergies      Hospital Medications:  calcium carbonate   1250 mG (OsCal) 1 Tablet(s) Oral two times a day  cholecalciferol 1000 Unit(s) Oral daily  dicyclomine 20 milliGRAM(s) Oral four times a day before meals PRN  lactobacillus acidophilus 1 Tablet(s) Oral three times a day with meals  loperamide 2 milliGRAM(s) Oral five times a day  melatonin 3 milliGRAM(s) Oral <User Schedule>  mesalamine DR Capsule 800 milliGRAM(s) Oral three times a day  metoprolol succinate ER 25 milliGRAM(s) Oral daily  multivitamin 1 Tablet(s) Oral daily  multivitamin/thiamine/folic acid in sodium chloride 0.9% 1000 milliLiter(s) IV Continuous <Continuous>  pantoprazole    Tablet 40 milliGRAM(s) Oral before breakfast  QUEtiapine 25 milliGRAM(s) Oral at bedtime PRN  simethicone 80 milliGRAM(s) Chew four times a day PRN  sodium bicarbonate 650 milliGRAM(s) Oral two times a day  thiamine IVPB 500 milliGRAM(s) IV Intermittent daily  vancomycin  IVPB 500 milliGRAM(s) IV Intermittent every 12 hours      PMHX/PSHX:  Chronic kidney disease (CKD)  Anemia, unspecified type  Essential hypertension  CLL (chronic lymphocytic leukemia)  No significant past surgical history  No significant past surgical history      Family history:  Family history of myocardial infarction (Sibling)          PHYSICAL EXAM:     GENERAL:  alert, confused  HEENT:  NC/AT,  conjunctivae clear, sclera -anicteric  CHEST:  Full & symmetric excursion, no increased  HEART:  Regular rhythm  ABDOMEN:  Soft, +distended, normoactive bowel sounds,  no masses ,no hepato-splenomegaly,   EXTREMITIES:  no cyanosis,clubbing or edema  SKIN:  No rash/erythema/ecchymoses/petechiae/wounds/abscess/warm/dry  NEURO:  Alert    Vital Signs:  Vital Signs Last 24 Hrs  T(C): 36.4 (04 Apr 2019 05:37), Max: 36.7 (04 Apr 2019 01:26)  T(F): 97.5 (04 Apr 2019 05:37), Max: 98.1 (04 Apr 2019 01:26)  HR: 83 (04 Apr 2019 05:37) (74 - 89)  BP: 108/61 (04 Apr 2019 05:37) (108/61 - 120/68)  BP(mean): --  RR: 18 (04 Apr 2019 05:37) (18 - 18)  SpO2: 100% (04 Apr 2019 05:37) (99% - 100%)  Daily     Daily     LABS:                        8.8    12.64 )-----------( 72       ( 03 Apr 2019 17:43 )             29.4     04-03    140  |  112<H>  |  15  ----------------------------<  108<H>  3.9   |  18<L>  |  1.22    Ca    7.7<L>      03 Apr 2019 17:43  Phos  2.0     04-03  Mg     1.6     04-03        PT/INR - ( 03 Apr 2019 17:43 )   PT: 24.0 SEC;   INR: 2.11          PTT - ( 03 Apr 2019 17:43 )  PTT:52.0 SEC        Imaging:

## 2019-04-05 LAB
ANION GAP SERPL CALC-SCNC: 10 MMO/L — SIGNIFICANT CHANGE UP (ref 7–14)
BUN SERPL-MCNC: 11 MG/DL — SIGNIFICANT CHANGE UP (ref 7–23)
C DIFF TOX GENS STL QL NAA+PROBE: SIGNIFICANT CHANGE UP
CALCIUM SERPL-MCNC: 7.5 MG/DL — LOW (ref 8.4–10.5)
CHLORIDE SERPL-SCNC: 110 MMOL/L — HIGH (ref 98–107)
CO2 SERPL-SCNC: 20 MMOL/L — LOW (ref 22–31)
CREAT SERPL-MCNC: 1.05 MG/DL — SIGNIFICANT CHANGE UP (ref 0.5–1.3)
GI PCR PANEL, STOOL: SIGNIFICANT CHANGE UP
GLUCOSE SERPL-MCNC: 91 MG/DL — SIGNIFICANT CHANGE UP (ref 70–99)
HCT VFR BLD CALC: 26.5 % — LOW (ref 39–50)
HGB BLD-MCNC: 8 G/DL — LOW (ref 13–17)
MAGNESIUM SERPL-MCNC: 1.5 MG/DL — LOW (ref 1.6–2.6)
MCHC RBC-ENTMCNC: 28.9 PG — SIGNIFICANT CHANGE UP (ref 27–34)
MCHC RBC-ENTMCNC: 30.2 % — LOW (ref 32–36)
MCV RBC AUTO: 95.7 FL — SIGNIFICANT CHANGE UP (ref 80–100)
NRBC # FLD: 0.02 K/UL — SIGNIFICANT CHANGE UP (ref 0–0)
PHOSPHATE SERPL-MCNC: 2.5 MG/DL — SIGNIFICANT CHANGE UP (ref 2.5–4.5)
PLATELET # BLD AUTO: 60 K/UL — LOW (ref 150–400)
PMV BLD: SIGNIFICANT CHANGE UP FL (ref 7–13)
POTASSIUM SERPL-MCNC: 3.2 MMOL/L — LOW (ref 3.5–5.3)
POTASSIUM SERPL-SCNC: 3.2 MMOL/L — LOW (ref 3.5–5.3)
RBC # BLD: 2.77 M/UL — LOW (ref 4.2–5.8)
RBC # FLD: 21.7 % — HIGH (ref 10.3–14.5)
SODIUM SERPL-SCNC: 140 MMOL/L — SIGNIFICANT CHANGE UP (ref 135–145)
SPECIMEN SOURCE: SIGNIFICANT CHANGE UP
VANCOMYCIN TROUGH SERPL-MCNC: 21.8 UG/ML — HIGH (ref 10–20)
WBC # BLD: 11.33 K/UL — HIGH (ref 3.8–10.5)
WBC # FLD AUTO: 11.33 K/UL — HIGH (ref 3.8–10.5)

## 2019-04-05 PROCEDURE — 99233 SBSQ HOSP IP/OBS HIGH 50: CPT

## 2019-04-05 PROCEDURE — 99232 SBSQ HOSP IP/OBS MODERATE 35: CPT

## 2019-04-05 PROCEDURE — 95819 EEG AWAKE AND ASLEEP: CPT | Mod: 26

## 2019-04-05 PROCEDURE — 99232 SBSQ HOSP IP/OBS MODERATE 35: CPT | Mod: GC

## 2019-04-05 RX ORDER — SODIUM CHLORIDE 9 MG/ML
1000 INJECTION, SOLUTION INTRAVENOUS
Qty: 0 | Refills: 0 | Status: DISCONTINUED | OUTPATIENT
Start: 2019-04-05 | End: 2019-04-05

## 2019-04-05 RX ORDER — PHYTONADIONE (VIT K1) 5 MG
5 TABLET ORAL DAILY
Qty: 0 | Refills: 0 | Status: COMPLETED | OUTPATIENT
Start: 2019-04-05 | End: 2019-04-07

## 2019-04-05 RX ORDER — ALBUMIN HUMAN 25 %
50 VIAL (ML) INTRAVENOUS EVERY 8 HOURS
Qty: 0 | Refills: 0 | Status: DISCONTINUED | OUTPATIENT
Start: 2019-04-05 | End: 2019-04-05

## 2019-04-05 RX ORDER — ALBUMIN HUMAN 25 %
50 VIAL (ML) INTRAVENOUS EVERY 8 HOURS
Qty: 0 | Refills: 0 | Status: COMPLETED | OUTPATIENT
Start: 2019-04-05 | End: 2019-04-08

## 2019-04-05 RX ORDER — MAGNESIUM SULFATE 500 MG/ML
1 VIAL (ML) INJECTION ONCE
Qty: 0 | Refills: 0 | Status: COMPLETED | OUTPATIENT
Start: 2019-04-05 | End: 2019-04-05

## 2019-04-05 RX ORDER — SODIUM CHLORIDE 9 MG/ML
1000 INJECTION, SOLUTION INTRAVENOUS
Qty: 0 | Refills: 0 | Status: DISCONTINUED | OUTPATIENT
Start: 2019-04-05 | End: 2019-04-08

## 2019-04-05 RX ORDER — POTASSIUM CHLORIDE 20 MEQ
10 PACKET (EA) ORAL
Qty: 0 | Refills: 0 | Status: COMPLETED | OUTPATIENT
Start: 2019-04-05 | End: 2019-04-05

## 2019-04-05 RX ADMIN — Medication 650 MILLIGRAM(S): at 05:23

## 2019-04-05 RX ADMIN — Medication 100 MILLIEQUIVALENT(S): at 11:52

## 2019-04-05 RX ADMIN — Medication 1 TABLET(S): at 05:23

## 2019-04-05 RX ADMIN — Medication 50 MILLILITER(S): at 21:20

## 2019-04-05 RX ADMIN — PANTOPRAZOLE SODIUM 40 MILLIGRAM(S): 20 TABLET, DELAYED RELEASE ORAL at 05:23

## 2019-04-05 RX ADMIN — Medication 800 MILLIGRAM(S): at 14:36

## 2019-04-05 RX ADMIN — Medication 105 MILLIGRAM(S): at 13:46

## 2019-04-05 RX ADMIN — Medication 650 MILLIGRAM(S): at 18:40

## 2019-04-05 RX ADMIN — SPIRONOLACTONE 50 MILLIGRAM(S): 25 TABLET, FILM COATED ORAL at 05:23

## 2019-04-05 RX ADMIN — Medication 100 MILLIEQUIVALENT(S): at 08:05

## 2019-04-05 RX ADMIN — Medication 20 MILLIGRAM(S): at 05:23

## 2019-04-05 RX ADMIN — Medication 1 TABLET(S): at 18:40

## 2019-04-05 RX ADMIN — Medication 1 TABLET(S): at 18:39

## 2019-04-05 RX ADMIN — SODIUM CHLORIDE 50 MILLILITER(S): 9 INJECTION, SOLUTION INTRAVENOUS at 14:35

## 2019-04-05 RX ADMIN — Medication 100 MILLIEQUIVALENT(S): at 10:42

## 2019-04-05 RX ADMIN — Medication 100 GRAM(S): at 09:40

## 2019-04-05 RX ADMIN — Medication 800 MILLIGRAM(S): at 05:23

## 2019-04-05 RX ADMIN — Medication 800 MILLIGRAM(S): at 21:01

## 2019-04-05 RX ADMIN — SODIUM CHLORIDE 60 MILLILITER(S): 9 INJECTION, SOLUTION INTRAVENOUS at 11:51

## 2019-04-05 RX ADMIN — Medication 1 TABLET(S): at 11:51

## 2019-04-05 RX ADMIN — Medication 1 TABLET(S): at 08:23

## 2019-04-05 RX ADMIN — Medication 5 MILLIGRAM(S): at 14:35

## 2019-04-05 RX ADMIN — Medication 3 MILLIGRAM(S): at 21:01

## 2019-04-05 RX ADMIN — Medication 50 MILLILITER(S): at 05:27

## 2019-04-05 RX ADMIN — Medication 25 MILLIGRAM(S): at 05:23

## 2019-04-05 RX ADMIN — Medication 1000 UNIT(S): at 11:51

## 2019-04-05 RX ADMIN — Medication 50 MILLILITER(S): at 14:34

## 2019-04-05 NOTE — PROGRESS NOTE ADULT - SUBJECTIVE AND OBJECTIVE BOX
Patient is a 73y old  Male who presents with a chief complaint of Generalized weakness and inability to walk (04 Apr 2019 23:07)      SUBJECTIVE / OVERNIGHT EVENTS:    Events noted.  Awake  No N/V  Diarrhea improving.    MEDICATIONS  (STANDING):  albumin human 25% IVPB 50 milliLiter(s) IV Intermittent every 8 hours  calcium carbonate   1250 mG (OsCal) 1 Tablet(s) Oral two times a day  cholecalciferol 1000 Unit(s) Oral daily  dextrose 5% 1000 milliLiter(s) (60 mL/Hr) IV Continuous <Continuous>  furosemide    Tablet 20 milliGRAM(s) Oral daily  lactobacillus acidophilus 1 Tablet(s) Oral three times a day with meals  melatonin 3 milliGRAM(s) Oral <User Schedule>  mesalamine DR Capsule 800 milliGRAM(s) Oral three times a day  metoprolol succinate ER 25 milliGRAM(s) Oral daily  multivitamin 1 Tablet(s) Oral daily  pantoprazole    Tablet 40 milliGRAM(s) Oral before breakfast  sodium bicarbonate 650 milliGRAM(s) Oral two times a day  spironolactone 50 milliGRAM(s) Oral daily  thiamine IVPB 500 milliGRAM(s) IV Intermittent daily  vancomycin  IVPB 500 milliGRAM(s) IV Intermittent every 12 hours    MEDICATIONS  (PRN):  dicyclomine 20 milliGRAM(s) Oral four times a day before meals PRN abd pain  loperamide 2 milliGRAM(s) Oral three times a day PRN Diarrhea  QUEtiapine 25 milliGRAM(s) Oral at bedtime PRN for agitation/sleep  simethicone 80 milliGRAM(s) Chew four times a day PRN Gas        CAPILLARY BLOOD GLUCOSE        I&O's Summary      PHYSICAL EXAM:  GENERAL: NAD  NECK: Supple, No JVD  CHEST/LUNG: Clear to auscultation bilaterally; No wheezing.  HEART: Regular rate and rhythm; No murmurs, rubs, or gallops  ABDOMEN: Soft, Nontender, Nondistended; Bowel sounds present  EXTREMITIES:   No edema  NEUROLOGY: Awake      LABS:                        9.6    10.72 )-----------( 67       ( 04 Apr 2019 08:07 )             31.4     04-04    138  |  114<H>  |  14  ----------------------------<  70  4.7   |  15<L>  |  1.10    Ca    7.7<L>      04 Apr 2019 06:15  Phos  3.2     04-04  Mg     1.5     04-04      PT/INR - ( 03 Apr 2019 17:43 )   PT: 24.0 SEC;   INR: 2.11          PTT - ( 03 Apr 2019 17:43 )  PTT:52.0 SEC        CAPILLARY BLOOD GLUCOSE        04-03 @ 15:42  Culture-urine --  Culture results --  method type --  Organism --  Organism Identification --  Specimen source PERITONEAL FLUID  04-03 @ 12:26  Culture-urine --  Culture results --  method type --  Organism --  Organism Identification --  Specimen source PERITONEAL FLUID  04-01 @ 19:23  Culture-urine   COLONY COUNT: > = 100,000 CFU/ML  Culture results --  method type POSITIVE FERNANDO 29  Organism Enterococcus faecium  Organism Identification Enterococcus faecium  Specimen source URINE CATHETER  04-01 @ 16:53  Culture-urine --  Culture results --  method type --  Organism --  Organism Identification --  Specimen source BLOOD VENOUS  04-01 @ 15:53  Culture-urine --  Culture results --  method type --  Organism --  Organism Identification --  Specimen source BLOOD PERIPHERAL           04-03 @ 15:42  Culture blood --  Culture results --  Gram stain --  Gram stain blood --  Method type --  Organism --  Organism identification --  Specimen source PERITONEAL FLUID   04-03 @ 12:26  Culture blood --  Culture results --  Gram stain   NOS^No Organisms Seen  WBC^White Blood Cells  QNTY CELLS IN GRAM STAIN: RARE (1+)  Gram stain blood --  Method type --  Organism --  Organism identification --  Specimen source PERITONEAL FLUID   04-01 @ 19:23  Culture blood --  Culture results --  Gram stain --  Gram stain blood --  Method type POSITIVE FERNANDO 29  Organism Enterococcus faecium  Organism identification Enterococcus faecium  Specimen source URINE CATHETER   04-01 @ 16:53  Culture blood   NO ORGANISMS ISOLATED  NO ORGANISMS ISOLATED AT 72 HRS.  Culture results --  Gram stain --  Gram stain blood --  Method type --  Organism --  Organism identification --  Specimen source BLOOD VENOUS   04-01 @ 15:53  Culture blood   NO ORGANISMS ISOLATED  NO ORGANISMS ISOLATED AT 72 HRS.  Culture results --  Gram stain --  Gram stain blood --  Method type --  Organism --  Organism identification --  Specimen source BLOOD PERIPHERAL      RADIOLOGY & ADDITIONAL TESTS:    Imaging Personally Reviewed:    Consultant(s) Notes Reviewed:      Care Discussed with Consultants/Other Providers:

## 2019-04-05 NOTE — PROGRESS NOTE ADULT - ASSESSMENT
Impression:  1) Diarrhea - with previous negative work up inpatient.  Etiologies include infectious vs inflammatory vs  malabsorptive vs microscopic colitis.  Patient will likely ultimately need endoscopic evaluation but would first repeat stool work up prior to proceeding. Stool C. Diff negative, now with AMS. Stool fat, o and p and giardia normal. Celiac panel returned weakly positive.  Patients stool osmolar gap suggests osmotic diarrhea so would remove all lactose and gluten from diet.    Recommendations:   - f/u stool elastase   - f/u stool a-1 antitrypsin   - monitor stool output and volume   - gluten and lactose free LRD   - await pathology results    [place], PGY-4  Gastroenterology Fellow  Pager x [phone] or [phone]  (After 5 pm or on weekends please page GI on call) Impression:  1) Diarrhea - with previous negative work up inpatient.  Etiologies include infectious vs inflammatory vs  malabsorptive vs microscopic colitis.  Stool C. Diff negative, now with AMS. Stool fat, o and p and giardia normal. Celiac panel returned weakly positive.  Patients stool osmolar gap suggests osmotic diarrhea so would remove all lactose and gluten from diet.    Recommendations:   - f/u stool elastase   - f/u stool a-1 antitrypsin   - monitor stool output and volume   - gluten and lactose free LRD   - await pathology results    [place], PGY-4  Gastroenterology Fellow  Pager x [phone] or [phone]  (After 5 pm or on weekends please page GI on call)

## 2019-04-05 NOTE — PROGRESS NOTE ADULT - ATTENDING COMMENTS
is thrombocytopenic:  4/: reps wise he is stble for any kind og GI procedure:Rpt chest x-ray today  4/3: stable from resp point of view:  : stable: no wheezin/5: stable resp wise:

## 2019-04-05 NOTE — PROGRESS NOTE ADULT - ASSESSMENT
73M with recurrent and intractable diarrhea, has TCP (likely from chronic inflammation and possibly abx and anemia (of chronic disease), no bleeding, will recommend:  - aggressive Rx of dehydration and diarrhea  - monitor CBC  - platelets and hgb low but still acceptable  - transfusion of plts only if plts < 10K or plts ~ 50 but pt is bleeding  - no contraindication to DVT prophylaxis  - rest of the RX as per medicine, GI

## 2019-04-05 NOTE — PROGRESS NOTE ADULT - ATTENDING COMMENTS
Hepatology Staff: Rogelio Baron MD  I saw and examined the patient along with Dr. Arana on 4/5/2019 .    Continue with supportive care.  Consider Fibroscan/MR Elastography for evaluation of degree hepatic fibrosis.

## 2019-04-05 NOTE — PROGRESS NOTE ADULT - SUBJECTIVE AND OBJECTIVE BOX
Patient is a 73y old  Male who presents with a chief complaint of Generalized weakness and inability to walk (05 Apr 2019 07:54)      SUBJECTIVE / OVERNIGHT EVENTS:  no events  MEDICATIONS  (STANDING):  albumin human 25% IVPB 50 milliLiter(s) IV Intermittent every 8 hours  calcium carbonate   1250 mG (OsCal) 1 Tablet(s) Oral two times a day  cholecalciferol 1000 Unit(s) Oral daily  dextrose 5% 1000 milliLiter(s) (60 mL/Hr) IV Continuous <Continuous>  furosemide    Tablet 20 milliGRAM(s) Oral daily  lactobacillus acidophilus 1 Tablet(s) Oral three times a day with meals  magnesium sulfate  IVPB 1 Gram(s) IV Intermittent once  melatonin 3 milliGRAM(s) Oral <User Schedule>  mesalamine DR Capsule 800 milliGRAM(s) Oral three times a day  metoprolol succinate ER 25 milliGRAM(s) Oral daily  multivitamin 1 Tablet(s) Oral daily  pantoprazole    Tablet 40 milliGRAM(s) Oral before breakfast  potassium chloride  10 mEq/100 mL IVPB 10 milliEquivalent(s) IV Intermittent every 1 hour  sodium bicarbonate 650 milliGRAM(s) Oral two times a day  spironolactone 50 milliGRAM(s) Oral daily  thiamine IVPB 500 milliGRAM(s) IV Intermittent daily  vancomycin  IVPB 500 milliGRAM(s) IV Intermittent every 12 hours    MEDICATIONS  (PRN):  dicyclomine 20 milliGRAM(s) Oral four times a day before meals PRN abd pain  loperamide 2 milliGRAM(s) Oral three times a day PRN Diarrhea  QUEtiapine 25 milliGRAM(s) Oral at bedtime PRN for agitation/sleep  simethicone 80 milliGRAM(s) Chew four times a day PRN Gas              PHYSICAL EXAM:  GENERAL: NAD, well-developed  HEAD:  Atraumatic, Normocephalic  EYES: EOMI, PERRLA, conjunctiva and sclera anicteric  NECK: Supple, No JVD  CHEST/LUNG: Clear to auscultation bilaterally; No wheeze  HEART: Regular rate and rhythm; No murmurs, rubs, or gallops  ABDOMEN: Soft, Nontender, distended; Bowel sounds present, no hepatosplenomegaly, no rebound or guarding  EXTREMITIES:  2+ Peripheral Pulses, No clubbing, cyanosis, or edema  PSYCH: AAOx3  NEUROLOGY: non-focal, no asterixis  SKIN: No rashes or lesion    LABS:                        8.0    11.33 )-----------( 60       ( 05 Apr 2019 05:15 )             26.5     04-05    140  |  110<H>  |  11  ----------------------------<  91  3.2<L>   |  20<L>  |  1.05    Ca    7.5<L>      05 Apr 2019 05:15  Phos  2.5     04-05  Mg     1.5     04-05        PT/INR - ( 03 Apr 2019 17:43 )   PT: 24.0 SEC;   INR: 2.11          PTT - ( 03 Apr 2019 17:43 )  PTT:52.0 SEC          RADIOLOGY & ADDITIONAL TESTS:

## 2019-04-05 NOTE — PROGRESS NOTE BEHAVIORAL HEALTH - SUMMARY
Briefly the patient is a 73 year old male, , lives at home with daughter, supportive family, does not have a history of mental health issues, no evident cognitive decline at baseline as per family, has a medical history notable for Anemia, HTN, Chronic diarrhea , was recent discharged from [facility] back home with home PT services, presents again from home with worsening weakness and confusion. Infectious studies are ongoing. Poor PO intake. Agitated, restless in bed, confused, and attempted to remove IV line, etc. Based on assessment done today and collateral obtained from family, at baseline patient does not show any evident cognitive difficulties, and now in the last 1 week has been more confused and disoriented. Symptoms consistent with delirium at this point.     4/5= improving mentation, no aggression or agitation, slept better last night.     Recommendations  - Routine observation. If added supervision is needed to ensure intact tubings, team to consider an EO  - Collaborate care with family  - Frequent orientation  - Avoid bzd, anticholinergics and antihistamines  - Preferably communicate in Malayalam  - Melatonin 3mg q 8pm po- for sleep wake reversal associated with delirium  - AGITATION= If qtc <=500, can try Seroquel 25mg q 6hrs prn po. IF PRN WERE TO BE NEEDED PLEASE ENSURE THAT SON IS CALLED TO ALERT HIM.

## 2019-04-05 NOTE — PROGRESS NOTE BEHAVIORAL HEALTH - NSBHCHARTREVIEWVS_PSY_A_CORE FT
Vital Signs Last 24 Hrs  T(C): 36.3 (05 Apr 2019 12:37), Max: 36.7 (04 Apr 2019 22:00)  T(F): 97.3 (05 Apr 2019 12:37), Max: 98 (04 Apr 2019 22:00)  HR: 74 (05 Apr 2019 12:37) (67 - 81)  BP: 108/57 (05 Apr 2019 12:37) (98/62 - 112/58)  BP(mean): --  RR: 17 (05 Apr 2019 12:37) (16 - 18)  SpO2: 100% (05 Apr 2019 12:37) (98% - 100%)

## 2019-04-05 NOTE — PROGRESS NOTE ADULT - PROBLEM SELECTOR PLAN 1
renal function improving  PEDRO FEna<1 likely prerenal no hydro on US, renal function improved with IVF   on IVF @ 60cc/hr  avoid nephrotoxic agents  monitor bmp. renal function improving  PEDRO FEna<1 likely prerenal no hydro on US, renal function improved with IVF   on IVF @ 50cc/hr  avoid nephrotoxic agents  monitor bmp.

## 2019-04-05 NOTE — PROGRESS NOTE ADULT - ASSESSMENT
· Assessment	  Mr. Miranda is a 74 yo man with history of Anemia, HTN, PUD, chronic diarrhea, and recent hospitalization at [facility] from 3/3/19-3/21/19 brought in by family for generalized weakness and inability to walk admitted for severe malnutrition, mild hypernatremia and severe deconditioning. Exam relatively benign aside for significant LE edema and weakness of legs. Anemia at baseline.     Cirrhosis:  Hepatology f/up    ·  Problem: Chronic diarrhea.  Plan: GI f/up noted.  - S/p colonoscopy and biopsy.   - Biopsy report: no CMV    Pancytopenia:  Hem f/up      Dw pt's daughter.

## 2019-04-05 NOTE — PROGRESS NOTE ADULT - PROBLEM SELECTOR PLAN 1
He has resp alkalosis as well as some metabolic acidosis: He has had lot of diarrhea: before: His chest x-ray with poor inspiration with bibasilar atelectasis other wise lung are clear: I DOUBT HE H IS HAVING  PE: But would do vq scan as wellas dopplers: Could it be related to diarrhea: He is off antibiotics at this time. I don't seem much of pneumonia on chest x-ray: His last ct scan is reviewed too: had some TIB opacities in upper lobes suggestive of bronchiolitis: CHF and bronchomalacia:  3/28: vq scan could not be dome yesterday: will attempt today: his previos dopplers were negative: new ABG is awaited:  3/29: ABG is pretty good: stable:  3/30; seems to be doing ok : no SOB: Breathing wise is OK :  3/31> breathing wise stable: no wheezing  4/2: yesterdays x-ray noted: rpt chest x-ray : There is no sq emphysema on palpation:  4/3: no ptx or sq emphysema seen: pt is stable from pulm point of view:  4/4: stable: no wheezing!  4/5: breathing wise he is stable: good oxygenation: not on any oxygen: Not isn any reps distress!

## 2019-04-05 NOTE — PROGRESS NOTE ADULT - SUBJECTIVE AND OBJECTIVE BOX
Patient is a 73y old  Male who presents with a chief complaint of Generalized weakness and inability to walk (05 Apr 2019 12:38)      Any change in ROS: Family at bedside: no SOB : no wheezing    MEDICATIONS  (STANDING):  albumin human 25% IVPB 50 milliLiter(s) IV Intermittent every 8 hours  calcium carbonate   1250 mG (OsCal) 1 Tablet(s) Oral two times a day  cholecalciferol 1000 Unit(s) Oral daily  dextrose 5% 1000 milliLiter(s) (50 mL/Hr) IV Continuous <Continuous>  furosemide    Tablet 20 milliGRAM(s) Oral daily  lactobacillus acidophilus 1 Tablet(s) Oral three times a day with meals  melatonin 3 milliGRAM(s) Oral <User Schedule>  mesalamine DR Capsule 800 milliGRAM(s) Oral three times a day  metoprolol succinate ER 25 milliGRAM(s) Oral daily  multivitamin 1 Tablet(s) Oral daily  pantoprazole    Tablet 40 milliGRAM(s) Oral before breakfast  phytonadione   Solution 5 milliGRAM(s) Oral daily  sodium bicarbonate 650 milliGRAM(s) Oral two times a day  spironolactone 50 milliGRAM(s) Oral daily  thiamine IVPB 500 milliGRAM(s) IV Intermittent daily  vancomycin  IVPB 500 milliGRAM(s) IV Intermittent every 12 hours    MEDICATIONS  (PRN):  dicyclomine 20 milliGRAM(s) Oral four times a day before meals PRN abd pain  loperamide 2 milliGRAM(s) Oral three times a day PRN Diarrhea  QUEtiapine 25 milliGRAM(s) Oral at bedtime PRN for agitation/sleep  simethicone 80 milliGRAM(s) Chew four times a day PRN Gas    Vital Signs Last 24 Hrs  T(C): 36.3 (05 Apr 2019 12:37), Max: 36.7 (04 Apr 2019 13:41)  T(F): 97.3 (05 Apr 2019 12:37), Max: 98.1 (04 Apr 2019 13:41)  HR: 74 (05 Apr 2019 12:37) (67 - 81)  BP: 108/57 (05 Apr 2019 12:37) (98/62 - 118/75)  BP(mean): --  RR: 17 (05 Apr 2019 12:37) (16 - 18)  SpO2: 100% (05 Apr 2019 12:37) (98% - 100%)    I&O's Summary        Physical Exam:   GENERAL: NAD, well-groomed, well-developed  HEENT: TODD/   Atraumatic, Normocephalic  ENMT: No tonsillar erythema, exudates, or enlargement; Moist mucous membranes, Good dentition, No lesions  NECK: Supple, No JVD, Normal thyroid  CHEST/LUNG: Clear to auscultaion  CVS: Regular rate and rhythm; No murmurs, rubs, or gallops  GI: : Soft, Nontender, Nondistended; Bowel sounds present  NERVOUS SYSTEM:  Alert & Oriented X2  EXTREMITIES:  2+ Peripheral Pulses, No clubbing, cyanosis, or edema  LYMPH: No lymphadenopathy noted  SKIN: No rashes or lesions  ENDOCRINOLOGY: No Thyromegaly  PSYCH: Appropriate    Labs:                              8.0    11.33 )-----------( 60       ( 05 Apr 2019 05:15 )             26.5                         9.6    10.72 )-----------( 67       ( 04 Apr 2019 08:07 )             31.4                         8.8    12.64 )-----------( 72       ( 03 Apr 2019 17:43 )             29.4                         9.6    9.77  )-----------( 71       ( 02 Apr 2019 06:00 )             31.4     04-05    140  |  110<H>  |  11  ----------------------------<  91  3.2<L>   |  20<L>  |  1.05  04-04    138  |  114<H>  |  14  ----------------------------<  70  4.7   |  15<L>  |  1.10  04-03    140  |  112<H>  |  15  ----------------------------<  108<H>  3.9   |  18<L>  |  1.22  04-02    140  |  108<H>  |  16  ----------------------------<  72  3.2<L>   |  19<L>  |  1.25    Ca    7.5<L>      05 Apr 2019 05:15  Ca    7.7<L>      04 Apr 2019 06:15  Ca    7.7<L>      03 Apr 2019 17:43  Phos  2.5     04-05  Phos  3.2     04-04  Phos  2.0     04-03  Mg     1.5     04-05  Mg     1.5     04-04  Mg     1.6     04-03      CAPILLARY BLOOD GLUCOSE            PT/INR - ( 03 Apr 2019 17:43 )   PT: 24.0 SEC;   INR: 2.11          PTT - ( 03 Apr 2019 17:43 )  PTT:52.0 SEC    Fluid Source --  Albumin, Fluid0.1 g/dL  Glucose, Gajfv650 mg/dL  Protein total, Fluid0.7 g/dL  Lacatate Dehydrogenase, Fluid50 U/L  pH, Fluid--  Cytopathology-Non Gyn Report--        RECENT CULTURES:  04-03 @ 15:42 PERITONEAL FLUID                  04-03 @ 12:26 PERITONEAL FLUID       NOS^No Organisms Seen  WBC^White Blood Cells  QNTY CELLS IN GRAM STAIN: RARE (1+)         < from: CT Head No Cont (04.03.19 @ 20:22) >    PROCEDURE DATE:  Apr  3 2019         INTERPRETATION:  INDICATIONS:  confusion  .    TECHNIQUE:  Serial axial images were obtained from the skull base to the   vertex without intravenous contrast. Coronal and sagittal reformatted   images were obtained.    COMPARISON EXAMINATION: 3/27/2019    FINDINGS:    VENTRICLES AND SULCI:  Prominent in size compatible with age-appropriate   volume loss    INTRA-AXIAL:  Areas of white matter hypodensity are seen within the   cerebral hemispheres bilaterally compatible with mild microvascular-type   changes. No mass, blood or abnormal attenuation is otherwise seen.    EXTRA-AXIAL:  No mass or collection is seen.    VISUALIZED SINUSES:  Mild ethmoid mucosal thickening    VISUALIZED MASTOIDS:  Clear.    CALVARIUM:  Normal.    MISCELLANEOUS:  None.      IMPRESSION:    Stable exam.    No mass effect, hemorrhage or evidence of acute pathology.                  NORA JULES M.D., ATTENDING RADIOLOGIST  This document has been electronically signed. Apr  3 2019  8:50PM              < from: Xray Chest 1 View- PORTABLE-Urgent (04.02.19 @ 13:09) >  URE DATE:  Apr 2 2019         INTERPRETATION:  TIME OF EXAM: April 2, 2019 at 12:55 PM.    CLINICAL INFORMATION: Question subcutaneous emphysema. Evaluate for   pneumothorax.    COMPARISON:  April 1,2018.    TECHNIQUE:   AP Portable chest x-ray. The chin obscures the left apex.    INTERPRETATION:     The heart is not enlarged. The mediastinum is not accurately evaluated on   this projection.  There are low lung volumes.  A trace right pleural effusion is unchanged. The right lung is clear.   There is linear atelectasis in the left mid and lower lung. No left   pleural effusion is seen.  No pneumothorax or subcutaneous emphysema is seen.              IMPRESSION:  No pneumothorax or subcutaneous emphysema seen.    Trace right pleural effusion.    Linear atelectasis in the left mid and lower lung.                  NORA HASKINS M.D., ATTENDING RADIOLOGIST  This document has been electronically signed. Apr 2 2019  2:38PM        < end of copied text >    < end of copied text >      04-01 @ 19:23 URINE CATHETER   POSITIVE FERNANDO 29      Enterococcus faecium  Enterococcus faecium       04-01 @ 16:53 BLOOD VENOUS                  NO ORGANISMS ISOLATED  NO ORGANISMS ISOLATED AT 72 HRS.  04-01 @ 15:53 BLOOD PERIPHERAL                  NO ORGANISMS ISOLATED  NO ORGANISMS ISOLATED AT 72 HRS.        RESPIRATORY CULTURES:          Studies  Chest X-RAY  CT SCAN Chest   Venous Dopplers: LE:   CT Abdomen  Others

## 2019-04-05 NOTE — PROGRESS NOTE ADULT - SUBJECTIVE AND OBJECTIVE BOX
Cardiovascular Disease Progress Note    Overnight events: No acute events overnight. Mr. Miranda is resting comfortably. He denies pain.   Otherwise review of systems negative    Objective Findings:  T(C): 36.3 (04-05-19 @ 05:20), Max: 36.7 (04-04-19 @ 13:41)  HR: 67 (04-05-19 @ 05:20) (67 - 81)  BP: 104/50 (04-05-19 @ 05:20) (98/62 - 118/75)  RR: 18 (04-05-19 @ 05:20) (16 - 18)  SpO2: 100% (04-05-19 @ 05:20) (98% - 100%)  Wt(kg): --  Daily     Daily       Physical Exam:  Gen: NAD  HEENT: EOMI  CV: RRR, normal S1 + S2, no m/r/g  Lungs: CTAB  Abd: soft, non-tender  Ext: No edema    Telemetry: n/a    Laboratory Data:                        8.0    11.33 )-----------( 60       ( 05 Apr 2019 05:15 )             26.5     04-05    140  |  110<H>  |  11  ----------------------------<  91  3.2<L>   |  20<L>  |  1.05    Ca    7.5<L>      05 Apr 2019 05:15  Phos  2.5     04-05  Mg     1.5     04-05      PT/INR - ( 03 Apr 2019 17:43 )   PT: 24.0 SEC;   INR: 2.11          PTT - ( 03 Apr 2019 17:43 )  PTT:52.0 SEC          Inpatient Medications:  MEDICATIONS  (STANDING):  albumin human 25% IVPB 50 milliLiter(s) IV Intermittent every 8 hours  calcium carbonate   1250 mG (OsCal) 1 Tablet(s) Oral two times a day  cholecalciferol 1000 Unit(s) Oral daily  dextrose 5% 1000 milliLiter(s) (60 mL/Hr) IV Continuous <Continuous>  furosemide    Tablet 20 milliGRAM(s) Oral daily  lactobacillus acidophilus 1 Tablet(s) Oral three times a day with meals  magnesium sulfate  IVPB 1 Gram(s) IV Intermittent once  melatonin 3 milliGRAM(s) Oral <User Schedule>  mesalamine DR Capsule 800 milliGRAM(s) Oral three times a day  metoprolol succinate ER 25 milliGRAM(s) Oral daily  multivitamin 1 Tablet(s) Oral daily  pantoprazole    Tablet 40 milliGRAM(s) Oral before breakfast  potassium chloride  10 mEq/100 mL IVPB 10 milliEquivalent(s) IV Intermittent every 1 hour  sodium bicarbonate 650 milliGRAM(s) Oral two times a day  spironolactone 50 milliGRAM(s) Oral daily  thiamine IVPB 500 milliGRAM(s) IV Intermittent daily  vancomycin  IVPB 500 milliGRAM(s) IV Intermittent every 12 hours      Assessment: 73 year old man with HTN, anemia, chronic diarrhea, malnutrition and low voltage EKG presents with weakness.    Plan of Care:    #Post-operative evaluation-  Mr. Miranda tolerated colonoscopy well on 4/1.  He displays no signs or symptoms or post-procedural coronary ischemia, decompensated CHF or malignant arrythmia.  TTE from 3/10 reviewed- no significant structural heart disease.   Continue current cardiac management.      #HTN-  BP controlled on current regimen.     #Chronic diarrhea-  Awaiting pathology.  GI input noted.   Treatment of cirrhosis as per primary team.     Over 25 minutes spent on total encounter; more than 50% of the visit was spent counseling and/or coordinating care by the attending physician.      Darren Herring MD [facility]  Cardiovascular Disease  [phone]

## 2019-04-05 NOTE — PROGRESS NOTE ADULT - SUBJECTIVE AND OBJECTIVE BOX
Infectious Diseases progress note:    Subjective: NAD, asleep, arousable.  Had formed BM today.  No fevers.  No abd pain, dysuria, cough    ROS:  CONSTITUTIONAL:  No fever, chills, rigors  CARDIOVASCULAR:  No chest pain or palpitations  RESPIRATORY:   No SOB, cough, dyspnea on exertion.  No wheezing  GASTROINTESTINAL:  No abd pain, N/V, diarrhea/constipation  EXTREMITIES:  No swelling or joint pain  GENITOURINARY:  No burning on urination, increased frequency or urgency.  No flank pain  NEUROLOGIC:  No HA, visual disturbances  SKIN: No rashes    Allergies    No Known Allergies    Intolerances        ANTIBIOTICS/RELEVANT:  antimicrobials    immunologic:    OTHER:  calcium carbonate   1250 mG (OsCal) 1 Tablet(s) Oral two times a day  cholecalciferol 1000 Unit(s) Oral daily  dextrose 5% 1000 milliLiter(s) IV Continuous <Continuous>  dicyclomine 20 milliGRAM(s) Oral four times a day before meals PRN  furosemide    Tablet 20 milliGRAM(s) Oral daily  lactobacillus acidophilus 1 Tablet(s) Oral three times a day with meals  loperamide 2 milliGRAM(s) Oral three times a day PRN  melatonin 3 milliGRAM(s) Oral <User Schedule>  mesalamine DR Capsule 800 milliGRAM(s) Oral three times a day  metoprolol succinate ER 25 milliGRAM(s) Oral daily  multivitamin 1 Tablet(s) Oral daily  pantoprazole    Tablet 40 milliGRAM(s) Oral before breakfast  phytonadione   Solution 5 milliGRAM(s) Oral daily  QUEtiapine 25 milliGRAM(s) Oral at bedtime PRN  simethicone 80 milliGRAM(s) Chew four times a day PRN  sodium bicarbonate 650 milliGRAM(s) Oral two times a day  spironolactone 50 milliGRAM(s) Oral daily  thiamine IVPB 500 milliGRAM(s) IV Intermittent daily      Objective:  Vital Signs Last 24 Hrs  T(C): 36.3 (05 Apr 2019 16:32), Max: 36.7 (04 Apr 2019 22:00)  T(F): 97.3 (05 Apr 2019 16:32), Max: 98 (04 Apr 2019 22:00)  HR: 72 (05 Apr 2019 16:32) (67 - 81)  BP: 108/60 (05 Apr 2019 16:32) (98/62 - 112/58)  BP(mean): --  RR: 16 (05 Apr 2019 16:32) (16 - 18)  SpO2: 100% (05 Apr 2019 16:32) (98% - 100%)    PHYSICAL EXAM:  Constitutional:NAD  Eyes:TODD, EOMI  Ear/Nose/Throat: no thrush, mucositis.  Moist mucous membranes	  Neck:no JVD, no lymphadenopathy, supple  Respiratory: CTA ruth  Cardiovascular: S1S2 RRR, no murmurs  Gastrointestinal:soft, nontender,  nondistended (+) BS  Extremities:no e/e/c  Skin:  no rashes, open wounds or ulcerations        LABS:                        8.0    11.33 )-----------( 60       ( 05 Apr 2019 05:15 )             26.5     04-05    140  |  110<H>  |  11  ----------------------------<  91  3.2<L>   |  20<L>  |  1.05    Ca    7.5<L>      05 Apr 2019 05:15  Phos  2.5     04-05  Mg     1.5     04-05      PT/INR - ( 03 Apr 2019 17:43 )   PT: 24.0 SEC;   INR: 2.11          PTT - ( 03 Apr 2019 17:43 )  PTT:52.0 SEC            Vancomycin Level, Trough: 21.8 ug/mL (04-05 @ 05:15)  Vancomycin Level, Trough: 17.6 ug/mL (04-03 @ 17:43)  Vancomycin Level, Trough: 22.2 ug/mL (04-03 @ 05:10)              MICROBIOLOGY:          RADIOLOGY & ADDITIONAL STUDIES:    < from: CT Head No Cont (04.03.19 @ 20:22) >    EXAM:  CT BRAIN        PROCEDURE DATE:  Apr  3 2019         INTERPRETATION:  INDICATIONS:  confusion  .    TECHNIQUE:  Serial axial images were obtained from the skull base to the   vertex without intravenous contrast. Coronal and sagittal reformatted   images were obtained.    COMPARISON EXAMINATION: 3/27/2019    FINDINGS:    VENTRICLES AND SULCI:  Prominent in size compatible with age-appropriate   volume loss    INTRA-AXIAL:  Areas of white matter hypodensity are seen within the   cerebral hemispheres bilaterally compatible with mild microvascular-type   changes. No mass, blood or abnormal attenuation is otherwise seen.    EXTRA-AXIAL:  No mass or collection is seen.    VISUALIZED SINUSES:  Mild ethmoid mucosal thickening    VISUALIZED MASTOIDS:  Clear.    CALVARIUM:  Normal.    MISCELLANEOUS:  None.      IMPRESSION:    Stable exam.    No mass effect, hemorrhage or evidence of acute pathology.    < end of copied text >

## 2019-04-05 NOTE — PROGRESS NOTE ADULT - SUBJECTIVE AND OBJECTIVE BOX
[facility]   MD MAGALY MORAN MD ANGELA WONG, PA    TEL:  OFFICE: [phone]  DR ANDERSON CELL: [phone]  DR. MONAE CELL: [phone]  HUBERT WELLINGTON CELL: [phone]        Patient is a 73y old  Male who presents with a chief complaint of Generalized weakness and inability to walk (05 Apr 2019 09:46)      Patient seen and examined at bedside. No chest pain/sob    VITALS:  T(F): 97.8 (04-05-19 @ 09:05), Max: 98.1 (04-04-19 @ 13:41)  HR: 68 (04-05-19 @ 09:05)  BP: 106/60 (04-05-19 @ 09:05)  RR: 16 (04-05-19 @ 09:05)  SpO2: 99% (04-05-19 @ 09:05)  Wt(kg): --        PHYSICAL EXAM:  Constitutional: NAD  Neck: No JVD  Respiratory: CTAB, no wheezes, rales or rhonchi  Cardiovascular: S1, S2, RRR  Gastrointestinal: BS+, soft, NT/ND  Extremities: No peripheral edema    Hospital Medications:   MEDICATIONS  (STANDING):  albumin human 25% IVPB 50 milliLiter(s) IV Intermittent every 8 hours  calcium carbonate   1250 mG (OsCal) 1 Tablet(s) Oral two times a day  cholecalciferol 1000 Unit(s) Oral daily  dextrose 5% 1000 milliLiter(s) (60 mL/Hr) IV Continuous <Continuous>  furosemide    Tablet 20 milliGRAM(s) Oral daily  lactobacillus acidophilus 1 Tablet(s) Oral three times a day with meals  melatonin 3 milliGRAM(s) Oral <User Schedule>  mesalamine DR Capsule 800 milliGRAM(s) Oral three times a day  metoprolol succinate ER 25 milliGRAM(s) Oral daily  multivitamin 1 Tablet(s) Oral daily  pantoprazole    Tablet 40 milliGRAM(s) Oral before breakfast  phytonadione   Solution 5 milliGRAM(s) Oral daily  sodium bicarbonate 650 milliGRAM(s) Oral two times a day  spironolactone 50 milliGRAM(s) Oral daily  thiamine IVPB 500 milliGRAM(s) IV Intermittent daily  vancomycin  IVPB 500 milliGRAM(s) IV Intermittent every 12 hours      LABS:  04-05    140  |  110<H>  |  11  ----------------------------<  91  3.2<L>   |  20<L>  |  1.05    Ca    7.5<L>      05 Apr 2019 05:15  Phos  2.5     04-05  Mg     1.5     04-05      Creatinine Trend: 1.05 <--, 1.10 <--, 1.22 <--, 1.25 <--, 1.19 <--, 1.23 <--, 1.30 <--    Phosphorus Level, Serum: 2.5 mg/dL (04-05 @ 05:15)                              8.0    11.33 )-----------( 60       ( 05 Apr 2019 05:15 )             26.5     Urine Studies:  Urinalysis - [04-01-19 @ 17:00]      Color YELLOW / Appearance CLEAR / SG 1.015 / pH 6.0      Gluc NEGATIVE / Ketone NEGATIVE  / Bili NEGATIVE / Urobili NORMAL       Blood NEGATIVE / Protein 30 / Leuk Est NEGATIVE / Nitrite NEGATIVE      RBC 0-2 / WBC 3-5 / Hyaline NEGATIVE / Gran  / Sq Epi OCC / Non Sq Epi  / Bacteria FEW      Iron 48, TIBC 78, %sat --      [03-29-19 @ 05:20]  Ferritin 378.6      [03-29-19 @ 05:20]  Vitamin D (25OH) 25.6      [03-23-19 @ 18:15]  TSH 1.98      [03-05-19 @ 06:45]    HBsAb Nonreactive      [03-20-19 @ 05:35]  HBsAg NEGATIVE      [03-20-19 @ 05:35]    TODD: titer Negative, pattern --      [03-08-19 @ 06:30]    RADIOLOGY & ADDITIONAL STUDIES:

## 2019-04-05 NOTE — PROGRESS NOTE ADULT - SUBJECTIVE AND OBJECTIVE BOX
Chief Complaint:  Patient is a 73y old  Male who presents with a chief complaint of Generalized weakness and inability to walk (04 Apr 2019 23:07)      Interval Events:   Hepatology consulted given patients paracentesis results.    Allergies:  No Known Allergies      Hospital Medications:  albumin human 25% IVPB 50 milliLiter(s) IV Intermittent every 8 hours  calcium carbonate   1250 mG (OsCal) 1 Tablet(s) Oral two times a day  cholecalciferol 1000 Unit(s) Oral daily  dextrose 5% 1000 milliLiter(s) IV Continuous <Continuous>  dicyclomine 20 milliGRAM(s) Oral four times a day before meals PRN  furosemide    Tablet 20 milliGRAM(s) Oral daily  lactobacillus acidophilus 1 Tablet(s) Oral three times a day with meals  loperamide 2 milliGRAM(s) Oral three times a day PRN  melatonin 3 milliGRAM(s) Oral <User Schedule>  mesalamine DR Capsule 800 milliGRAM(s) Oral three times a day  metoprolol succinate ER 25 milliGRAM(s) Oral daily  multivitamin 1 Tablet(s) Oral daily  pantoprazole    Tablet 40 milliGRAM(s) Oral before breakfast  QUEtiapine 25 milliGRAM(s) Oral at bedtime PRN  simethicone 80 milliGRAM(s) Chew four times a day PRN  sodium bicarbonate 650 milliGRAM(s) Oral two times a day  spironolactone 50 milliGRAM(s) Oral daily  thiamine IVPB 500 milliGRAM(s) IV Intermittent daily  vancomycin  IVPB 500 milliGRAM(s) IV Intermittent every 12 hours      PMHX/PSHX:  Chronic kidney disease (CKD)  Anemia, unspecified type  Essential hypertension  CLL (chronic lymphocytic leukemia)  No significant past surgical history  No significant past surgical history      Family history:  Family history of myocardial infarction (Sibling)          PHYSICAL EXAM:     GENERAL:  Appears stated age, well-groomed, well-nourished, no distress  HEENT:  NC/AT,  conjunctivae clear, sclera -anicteric  CHEST:  Full & symmetric excursion, no increased  HEART:  Regular rhythm  ABDOMEN:  Soft, non-tender, non-distended, normoactive bowel sounds,  no masses ,no hepato-splenomegaly,   EXTREMITIES:  no cyanosis,clubbing or edema  SKIN:  No rash/erythema/ecchymoses/petechiae/wounds/abscess/warm/dry  NEURO:  Alert, oriented    Vital Signs:  Vital Signs Last 24 Hrs  T(C): 36.3 (05 Apr 2019 05:20), Max: 36.7 (04 Apr 2019 13:41)  T(F): 97.3 (05 Apr 2019 05:20), Max: 98.1 (04 Apr 2019 13:41)  HR: 67 (05 Apr 2019 05:20) (67 - 81)  BP: 104/50 (05 Apr 2019 05:20) (98/62 - 118/75)  BP(mean): --  RR: 18 (05 Apr 2019 05:20) (16 - 18)  SpO2: 100% (05 Apr 2019 05:20) (98% - 100%)  Daily     Daily     LABS:                        8.0    11.33 )-----------( 60       ( 05 Apr 2019 05:15 )             26.5     04-05    140  |  110<H>  |  11  ----------------------------<  91  3.2<L>   |  20<L>  |  1.05    Ca    7.5<L>      05 Apr 2019 05:15  Phos  2.5     04-05  Mg     1.5     04-05        PT/INR - ( 03 Apr 2019 17:43 )   PT: 24.0 SEC;   INR: 2.11          PTT - ( 03 Apr 2019 17:43 )  PTT:52.0 SEC        Imaging:

## 2019-04-05 NOTE — PROGRESS NOTE ADULT - ASSESSMENT
72 yo man with history of Anemia, HTN, chronic diarrhea, and recent hospitalization at [facility] from 3/3/19-3/21/19 brought in by family for generalized weakness and inability to walk found to have james and hypernatremia

## 2019-04-05 NOTE — PROGRESS NOTE ADULT - ASSESSMENT
Impression:    1. Possible alcoholic cirrhosis, MR and US not suggestive of cirrhosis but CT suggestive. Unclear if low plt due to CLL: MELD Na would be 16. Prior workup including viral hepatitis, iron tests, SMA, LKM, TODD negative. Some of the coagulopathy may be nutritional.  -ascites: moderate, SAAG elevated but difficult to interpret in the setting of low albumin, could be hypoalbumin related  -varices: none on EGD from 3/19  -HCC:none on imaging this admission  -PSE: some of patient's encephalopathy may be due to PSE vs. Vitamin deficiencies    2. Chronic diarrhea of unclear etiology, with hypoalbuminemia    3. Pancytopenia, CLL    Recommendation:  -give vit K 5mg X 3 days  -give albumin 50ml TID and lasix 20 daily to help with ascites  -nutrition c/s  -patient will need a definitive test for cirrhosis such as elastography vs. biopsy  -chronic diarrhea w/u per GI  -send daily MELD labs

## 2019-04-05 NOTE — PROGRESS NOTE ADULT - ASSESSMENT
Mr. Miranda is a 74 yo man with history of Anemia, HTN, chronic diarrhea, and recent hospitalization at [facility] from 3/3/19-3/21/19 brought in by family for generalized weakness and inability to walk.    Patient was discharged home with home PT services on 3/21/19 after being hospitalized since 3/3/19. During his hospitalization, he was managed for chronic diarrhea presumed to be 2/2 IBD since infectious and autoimmune w/u was negative, anemia due to blood loss from GI tract 2/2 PUD, requiring blood transfusion, and ESBL E. coli UTI with ertapenem via PICC. He was also determined not to have the diagnosis of CLL after flow cytometry results were available.     HPI was obtained primarily from patient's daughter and caregiver, Bee. As per daughter, when patient came home he was very weak. She wanted to bathe him but patient was unable to stand or walk to the bathroom. Due to his severe weakness, his daughter brought him back to the hospital. (23 Mar 2019 08:22)      Recommend:    - s/p completion of  ertapenem 1 week course for ESBL e.coli UTI on 3/25.   Pt restarted on vancomycin for enterococcus faecium UTI.    - Pt with diarrhea, thus far infectious w/u negative including stool cx, O&P (including microsporidium, cyclospora, isospora, cryptosporidium), gi pcr, cdiff, strongyloides neg, giardia neg, cmv pcr (-), cmv igG (+), IgM (-), HIV (-).  Stool for AFB (-)    - Pt with moderate ascites, and ?cirrhosis on CT ap.  Hep A/B/C serologies neg.  s/p paracentesis today on 4/3.  SAAG elevated, low albumin.  Low Tnc, do not suspect SBP.  Hepatology consulted for evaluation of cirrhosis - recs appreciated     -GI eval noted, repeat stool studies ordered including repeat gi pcr, giardia, stool o&p, stool cx.   r/o autoimmune and celiac disease.       -  CMV IgG (+), recommend biopsy to r/o cmv colitis - pt s/p colonoscopy on 4/1.  path shows areas of focal active colitis, findings are nonspecific and differential remains broad.       - Pt with intermittent hypothermia and change in mental status/disoriented.   Repeat Ucx grew enterococcus faecium, UA was unremarkable at that time.  Given recurrent hypothermia, will opt to treat - start vancomycin to treat enterococcus: complete 7 day course through 4/7.  Hold vanco today.  Repeat trough tomorrow, can restart at 750mg IV qdaily if level <15              Will follow       Adeline Marques  [phone]

## 2019-04-05 NOTE — PROGRESS NOTE BEHAVIORAL HEALTH - NSBHADMITCOUNSEL_PSY_A_CORE
client/family/caregiver education/risks and benefits of treatment options/importance of adherence to chosen treatment/instructions for management, treatment and follow up/risk factor reduction

## 2019-04-05 NOTE — PROGRESS NOTE BEHAVIORAL HEALTH - NSBHFUPINTERVALHXFT_PSY_A_CORE
Met with the patient. Much alert, and better oriented today. Asks about the events during this week, and explained to him the working diagnosis of delirium. He understands. He denies any depressive symptoms, denies any si or hi, denies any a/vh or paranoia when asked.

## 2019-04-05 NOTE — PROGRESS NOTE BEHAVIORAL HEALTH - NSBHCHARTREVIEWLAB_PSY_A_CORE FT
CBC Full  -  ( 05 Apr 2019 05:15 )  WBC Count : 11.33 K/uL  RBC Count : 2.77 M/uL  Hemoglobin : 8.0 g/dL  Hematocrit : 26.5 %  Platelet Count - Automated : 60 K/uL  Mean Cell Volume : 95.7 fL  Mean Cell Hemoglobin : 28.9 pg  Mean Cell Hemoglobin Concentration : 30.2 %  Auto Neutrophil # : x  Auto Lymphocyte # : x  Auto Monocyte # : x  Auto Eosinophil # : x  Auto Basophil # : x  Auto Neutrophil % : x  Auto Lymphocyte % : x  Auto Monocyte % : x  Auto Eosinophil % : x  Auto Basophil % : x  04-05    140  |  110<H>  |  11  ----------------------------<  91  3.2<L>   |  20<L>  |  1.05    Ca    7.5<L>      05 Apr 2019 05:15  Phos  2.5     04-05  Mg     1.5     04-05

## 2019-04-05 NOTE — PROGRESS NOTE ADULT - SUBJECTIVE AND OBJECTIVE BOX
[facility]      Seen earlier today, and examined.  - Today, patient is without complaints.           *****MEDICATIONS: Current medication reviewed and documented.    MEDICATIONS  (STANDING):  calcium carbonate   1250 mG (OsCal) 1 Tablet(s) Oral two times a day  cholecalciferol 1000 Unit(s) Oral daily  dextrose 5% 1000 milliLiter(s) (50 mL/Hr) IV Continuous <Continuous>  furosemide    Tablet 20 milliGRAM(s) Oral daily  lactobacillus acidophilus 1 Tablet(s) Oral three times a day with meals  melatonin 3 milliGRAM(s) Oral <User Schedule>  mesalamine DR Capsule 800 milliGRAM(s) Oral three times a day  metoprolol succinate ER 25 milliGRAM(s) Oral daily  multivitamin 1 Tablet(s) Oral daily  pantoprazole    Tablet 40 milliGRAM(s) Oral before breakfast  phytonadione   Solution 5 milliGRAM(s) Oral daily  sodium bicarbonate 650 milliGRAM(s) Oral two times a day  spironolactone 50 milliGRAM(s) Oral daily  thiamine IVPB 500 milliGRAM(s) IV Intermittent daily  vancomycin  IVPB 500 milliGRAM(s) IV Intermittent every 12 hours    MEDICATIONS  (PRN):  dicyclomine 20 milliGRAM(s) Oral four times a day before meals PRN abd pain  loperamide 2 milliGRAM(s) Oral three times a day PRN Diarrhea  QUEtiapine 25 milliGRAM(s) Oral at bedtime PRN for agitation/sleep  simethicone 80 milliGRAM(s) Chew four times a day PRN Gas          ***** VITAL SIGNS:  T(F): 97.3 (19 @ 12:37), Max: 98 (19 @ 22:00)  HR: 74 (19 @ 12:37) (67 - 81)  BP: 108/57 (19 @ 12:37) (98/62 - 112/58)  RR: 17 (19 @ 12:37) (16 - 18)  SpO2: 100% (19 @ 12:37) (98% - 100%)  Wt(kg): --  ,   I&O's Summary           *****PHYSICAL EXAM: Alert oriented x 2  able to follow 1 step commands.   able to recognize wife/son  more awake today.       EOMI fundi not visualized  blinks to threat   No facial asymmetry   Tongue is midline    withdraws to pain x4   Gait : not assessed.           *****LAB AND IMAGIN.0    11.33 )-----------( 60       ( 2019 05:15 )             26.5               04-05    140  |  110<H>  |  11  ----------------------------<  91  3.2<L>   |  20<L>  |  1.05    Ca    7.5<L>      2019 05:15  Phos  2.5     04-05  Mg     1.5     04-05      PT/INR - ( 2019 17:43 )   PT: 24.0 SEC;   INR: 2.11          PTT - ( 2019 17:43 )  PTT:52.0 SEC                     [All pertinent recent Imaging/Reports reviewed]           *****A S S E S S M E N T   A N D   P L A N :        Mr. Miranda is a 72 yo man with history of Anemia, HTN, chronic diarrhea, and recent hospitalization at [facility] from 3/3/19-3/21/19 brought in by family for generalized weakness and inability to walk.    Patient was discharged home with home PT services on 3/21/19 after being hospitalized since 3/3/19. During his hospitalization, he was managed for chronic diarrhea presumed to be 2/2 IBD since infectious and autoimmune w/u was negative, anemia due to blood loss from GI tract 2/2 PUD, requiring blood transfusion, and ESBL E. coli UTI with ertapenem via PICC. He was also determined not to have the diagnosis of CLL after flow cytometry results were available.           Problem/Recommendations 1: multifactorial toxic metabolic encephalopathy due to renal insufficiency and severe nutritional deficiency    b12/ ammonia wnl   repeat thiamine 500 iv pb x 3 days  mvi banana bag  some improvement today       Problem/Recommendations 2:Diarrheal illness    defer to gi       +recent travel to [place]  4 mos 2018 to 2019   probiotics   nutrition for appropriate binding diet  can we change his diet to mechanical soft he is on dysphagia 2 not sure why ?  encourage po intake.         Thank you for allowing me to participate in the care of this patient. Please do not hesitate to call me if you have any  questions.        ________________  Yessica Benitez MD  [facility] ([facility]  [phone]      30 minutes spent on total encounter; more than 50 % of the visit was  spent counseling about plan of care, compliance to diet/exercise and medication regimen and or  coordinating care by the attending physician.      It is advised that s stroke patients follow up with VINAYAK Cerrato @ [phone] in 1- 2 weeks.   Others please follow up with Dr. Michael Nissenbaum [phone]

## 2019-04-05 NOTE — EEG REPORT - NS EEG TEXT BOX
[facility]   COMPREHENSIVE EPILEPSY CENTER   REPORT OF ROUTINE VIDEO EEG     [facility]: [address], [place] [address], Ph#: [phone]  [facility]: [address], [place] [address], Ph#: [phone]  Office: [address] [place] [address], [facility] [address] [facility] [address], [place] [address] Ph#: [phone]    Patient Name: BONIFACIO GUERRERO  Age and : 73y ([birth year])  MRN #: [id number]  Location: [place] [address] A  Referring Physician: Cesario Antoine    Study Date: 19    _____________________________________________________________  TECHNICAL INFORMATION    Placement and Labeling of Electrodes:  The EEG was performed utilizing 20 channels referential EEG connections (coronal over temporal over parasagittal montage) using all standard 10-20 electrode placements with EKG.  Recording was at a sampling rate of 256 samples per second per channel.  Time synchronized digital video recording was done simultaneously with EEG recording.  A low light infrared camera was used for low light recording.  Lopez and seizure detection algorithms were utilized.    _____________________________________________________________  HISTORY    Patient is a 73y old  Male who presents with a chief complaint of Generalized weakness and inability to walk (2019 13:35)      PERTINENT MEDICATION:  _____________________________________________________________  STUDY INTERPRETATION    Findings: The background was continuous, spontaneously variable and reactive. During wakefulness, the posterior dominant rhythm consisted of symmetric, non sustained 7Hz activity, with amplitude to 30 uV, that attenuated to eye opening.  Low amplitude frontal beta was noted in wakefulness. Background predominantly consisted of theta, delta and faster activities.     Focal Slowing:   None were present.    Sleep Background:  Drowsiness was characterized by fragmentation, attenuation, and slowing of the background activity.    Stage II sleep transients were not recorded.    Other Non-Epileptiform Findings:  None were present.    Interictal Epileptiform Activity:   None were present.    Events:  Clinical events: None recorded.  Seizures: None recorded.    Activation Procedures:   Hyperventilation was not performed.    Photic stimulation was not performed.       Artifacts:  Intermittent myogenic and movement artifacts were noted.    ECG:  The heart rate on single channel ECG was predominantly between  BPM.    _____________________________________________________________  EEG SUMMARY/CLASSIFICATION     Abnormal EEG in an altered  - Mild-moderate generalized slowing.    _____________________________________________________________  EEG IMPRESSION/CLINICAL CORRELATE      Abnormal EEG study.  1.Mild-moderate nonspecific diffuse or multifocal cerebral dysfunction.   2. No epileptiform pattern or seizure seen.    Celeste Lopez MD  Clinical Neurophysiology [facility]   COMPREHENSIVE EPILEPSY CENTER   REPORT OF ROUTINE VIDEO EEG     [facility]: [address], [place] [address], Ph#: [phone]  [facility]: [address], [place] [address], Ph#: [phone]  Office: [address] [place] [address], [facility] [address], [place] [address] Ph#: [phone]    Patient Name: BONIFACIO GUERRERO  Age and : 73y ([birth year])  MRN #: [id number]  Location: [place] [address] A  Referring Physician: Cesario Antoine    Study Date: 19    _____________________________________________________________  TECHNICAL INFORMATION    Placement and Labeling of Electrodes:  The EEG was performed utilizing 20 channels referential EEG connections (coronal over temporal over parasagittal montage) using all standard 10-20 electrode placements with EKG.  Recording was at a sampling rate of 256 samples per second per channel.  Time synchronized digital video recording was done simultaneously with EEG recording.  A low light infrared camera was used for low light recording.  Lopez and seizure detection algorithms were utilized.    _____________________________________________________________  HISTORY    Patient is a 73y old  Male who presents with a chief complaint of Generalized weakness and inability to walk (2019 13:35)      PERTINENT MEDICATION:  none  _____________________________________________________________  STUDY INTERPRETATION    Findings: The background was continuous, spontaneously variable and reactive. During wakefulness, the posterior dominant rhythm consisted of symmetric, non sustained 7-8 Hz activity, with amplitude to 30 uV, that attenuated to eye opening.  Low amplitude frontal beta was noted in wakefulness. Background predominantly consisted of theta, delta and faster activities.     Focal Slowing:   None were present.    Sleep Background:  Drowsiness was characterized by fragmentation, attenuation, and slowing of the background activity.    Stage II sleep transients were not recorded.    Other Non-Epileptiform Findings:  None were present.    Interictal Epileptiform Activity:   None were present.    Events:  Clinical events: None recorded.  Seizures: None recorded.    Activation Procedures:   Hyperventilation was not performed.    Photic stimulation was performed and did not elicit any abnormality.     Artifacts:  Intermittent myogenic and movement artifacts were noted.    ECG:  The heart rate on single channel ECG was predominantly between 80-90 BPM.    _____________________________________________________________  EEG SUMMARY/CLASSIFICATION     Abnormal EEG in an altered patient.  - Mild-moderate generalized slowing.    _____________________________________________________________  EEG IMPRESSION/CLINICAL CORRELATE      Abnormal EEG study.  1. Mild-moderate nonspecific diffuse or multifocal cerebral dysfunction.   2. No epileptiform pattern or seizure seen.        Celeste Lopez MD  Clinical Neurophysiology    Destiny Olea MD  Attending Physician, [facility] [place]

## 2019-04-05 NOTE — PROGRESS NOTE ADULT - SUBJECTIVE AND OBJECTIVE BOX
Pt feels slightly better, no diarrhea, nausea, pain, bleeding and rest of the detailed ROS unremarkable.       Meds:  albumin human 25% IVPB 50 milliLiter(s) IV Intermittent every 8 hours  calcium carbonate   1250 mG (OsCal) 1 Tablet(s) Oral two times a day  cholecalciferol 1000 Unit(s) Oral daily  dextrose 5% 1000 milliLiter(s) IV Continuous <Continuous>  dicyclomine 20 milliGRAM(s) Oral four times a day before meals PRN  furosemide    Tablet 20 milliGRAM(s) Oral daily  lactobacillus acidophilus 1 Tablet(s) Oral three times a day with meals  loperamide 2 milliGRAM(s) Oral three times a day PRN  melatonin 3 milliGRAM(s) Oral <User Schedule>  mesalamine DR Capsule 800 milliGRAM(s) Oral three times a day  metoprolol succinate ER 25 milliGRAM(s) Oral daily  multivitamin 1 Tablet(s) Oral daily  pantoprazole    Tablet 40 milliGRAM(s) Oral before breakfast  potassium chloride  10 mEq/100 mL IVPB 10 milliEquivalent(s) IV Intermittent every 1 hour  QUEtiapine 25 milliGRAM(s) Oral at bedtime PRN  simethicone 80 milliGRAM(s) Chew four times a day PRN  sodium bicarbonate 650 milliGRAM(s) Oral two times a day  spironolactone 50 milliGRAM(s) Oral daily  thiamine IVPB 500 milliGRAM(s) IV Intermittent daily  vancomycin  IVPB 500 milliGRAM(s) IV Intermittent every 12 hours      Vital Signs Last 24 Hrs  T(C): 36.3 (05 Apr 2019 05:20), Max: 36.7 (04 Apr 2019 13:41)  T(F): 97.3 (05 Apr 2019 05:20), Max: 98.1 (04 Apr 2019 13:41)  HR: 67 (05 Apr 2019 05:20) (67 - 81)  BP: 104/50 (05 Apr 2019 05:20) (98/62 - 118/75)  BP(mean): --  RR: 18 (05 Apr 2019 05:20) (16 - 18)  SpO2: 100% (05 Apr 2019 05:20) (98% - 100%)                          8.0    11.33 )-----------( 60       ( 05 Apr 2019 05:15 )             26.5       04-05    140  |  110<H>  |  11  ----------------------------<  91  3.2<L>   |  20<L>  |  1.05    Ca    7.5<L>      05 Apr 2019 05:15  Phos  2.5     04-05  Mg     1.5     04-05                PT/INR - ( 03 Apr 2019 17:43 )   PT: 24.0 SEC;   INR: 2.11          PTT - ( 03 Apr 2019 17:43 )  PTT:52.0 SEC

## 2019-04-05 NOTE — PROGRESS NOTE ADULT - PROBLEM SELECTOR PLAN 8
non AG  likely sec to GI lost patient has chronic diarrhea  on oral bicarb bid  on IVF d5+75meq bicarb+20kcl  monitor non AG  likely sec to GI lost patient has chronic diarrhea, diarrhea improved today per patient   on oral bicarb bid  on IVF d5+75meq bicarb+20kcl  monitor

## 2019-04-06 LAB
ALBUMIN SERPL ELPH-MCNC: 2.1 G/DL — LOW (ref 3.3–5)
ALP SERPL-CCNC: 215 U/L — HIGH (ref 40–120)
ALT FLD-CCNC: 19 U/L — SIGNIFICANT CHANGE UP (ref 4–41)
ANION GAP SERPL CALC-SCNC: 11 MMO/L — SIGNIFICANT CHANGE UP (ref 7–14)
ANISOCYTOSIS BLD QL: SLIGHT — SIGNIFICANT CHANGE UP
AST SERPL-CCNC: 21 U/L — SIGNIFICANT CHANGE UP (ref 4–40)
BACTERIA BLD CULT: SIGNIFICANT CHANGE UP
BACTERIA BLD CULT: SIGNIFICANT CHANGE UP
BASOPHILS # BLD AUTO: 0.01 K/UL — SIGNIFICANT CHANGE UP (ref 0–0.2)
BASOPHILS NFR BLD AUTO: 0.1 % — SIGNIFICANT CHANGE UP (ref 0–2)
BASOPHILS NFR SPEC: 0 % — SIGNIFICANT CHANGE UP (ref 0–2)
BILIRUB SERPL-MCNC: 0.7 MG/DL — SIGNIFICANT CHANGE UP (ref 0.2–1.2)
BLASTS # FLD: 0 % — SIGNIFICANT CHANGE UP (ref 0–0)
BUN SERPL-MCNC: 8 MG/DL — SIGNIFICANT CHANGE UP (ref 7–23)
CALCIUM SERPL-MCNC: 7.9 MG/DL — LOW (ref 8.4–10.5)
CHLORIDE SERPL-SCNC: 105 MMOL/L — SIGNIFICANT CHANGE UP (ref 98–107)
CO2 SERPL-SCNC: 21 MMOL/L — LOW (ref 22–31)
CREAT SERPL-MCNC: 1.01 MG/DL — SIGNIFICANT CHANGE UP (ref 0.5–1.3)
EOSINOPHIL # BLD AUTO: 0.94 K/UL — HIGH (ref 0–0.5)
EOSINOPHIL NFR BLD AUTO: 9 % — HIGH (ref 0–6)
EOSINOPHIL NFR FLD: 11.1 % — HIGH (ref 0–6)
FUNGUS SPEC QL CULT: SIGNIFICANT CHANGE UP
GIANT PLATELETS BLD QL SMEAR: PRESENT — SIGNIFICANT CHANGE UP
GLUCOSE SERPL-MCNC: 80 MG/DL — SIGNIFICANT CHANGE UP (ref 70–99)
HCT VFR BLD CALC: 27.9 % — LOW (ref 39–50)
HGB BLD-MCNC: 8.6 G/DL — LOW (ref 13–17)
HYPOCHROMIA BLD QL: SLIGHT — SIGNIFICANT CHANGE UP
IMM GRANULOCYTES NFR BLD AUTO: 1 % — SIGNIFICANT CHANGE UP (ref 0–1.5)
LYMPHOCYTES # BLD AUTO: 2.33 K/UL — SIGNIFICANT CHANGE UP (ref 1–3.3)
LYMPHOCYTES # BLD AUTO: 22.2 % — SIGNIFICANT CHANGE UP (ref 13–44)
LYMPHOCYTES NFR SPEC AUTO: 9.3 % — LOW (ref 13–44)
MACROCYTES BLD QL: SIGNIFICANT CHANGE UP
MCHC RBC-ENTMCNC: 29.2 PG — SIGNIFICANT CHANGE UP (ref 27–34)
MCHC RBC-ENTMCNC: 30.8 % — LOW (ref 32–36)
MCV RBC AUTO: 94.6 FL — SIGNIFICANT CHANGE UP (ref 80–100)
METAMYELOCYTES # FLD: 0 % — SIGNIFICANT CHANGE UP (ref 0–1)
MONOCYTES # BLD AUTO: 3.88 K/UL — HIGH (ref 0–0.9)
MONOCYTES NFR BLD AUTO: 37 % — HIGH (ref 2–14)
MONOCYTES NFR BLD: 17.6 % — HIGH (ref 2–9)
MYELOCYTES NFR BLD: 0 % — SIGNIFICANT CHANGE UP (ref 0–0)
NEUTROPHIL AB SER-ACNC: 60.2 % — SIGNIFICANT CHANGE UP (ref 43–77)
NEUTROPHILS # BLD AUTO: 3.24 K/UL — SIGNIFICANT CHANGE UP (ref 1.8–7.4)
NEUTROPHILS NFR BLD AUTO: 30.7 % — LOW (ref 43–77)
NEUTS BAND # BLD: 0 % — SIGNIFICANT CHANGE UP (ref 0–6)
NRBC # BLD: 1 /100WBC — SIGNIFICANT CHANGE UP
NRBC # FLD: 0.03 K/UL — SIGNIFICANT CHANGE UP (ref 0–0)
OTHER - HEMATOLOGY %: 0 — SIGNIFICANT CHANGE UP
OVALOCYTES BLD QL SMEAR: SLIGHT — SIGNIFICANT CHANGE UP
PLATELET # BLD AUTO: 30 K/UL — LOW (ref 150–400)
PLATELET COUNT - ESTIMATE: SIGNIFICANT CHANGE UP
PMV BLD: SIGNIFICANT CHANGE UP FL (ref 7–13)
POIKILOCYTOSIS BLD QL AUTO: SLIGHT — SIGNIFICANT CHANGE UP
POTASSIUM SERPL-MCNC: 3.7 MMOL/L — SIGNIFICANT CHANGE UP (ref 3.5–5.3)
POTASSIUM SERPL-SCNC: 3.7 MMOL/L — SIGNIFICANT CHANGE UP (ref 3.5–5.3)
PROMYELOCYTES # FLD: 0 % — SIGNIFICANT CHANGE UP (ref 0–0)
PROT SERPL-MCNC: 6.2 G/DL — SIGNIFICANT CHANGE UP (ref 6–8.3)
RBC # BLD: 2.95 M/UL — LOW (ref 4.2–5.8)
RBC # FLD: 21.9 % — HIGH (ref 10.3–14.5)
SCHISTOCYTES BLD QL AUTO: SLIGHT — SIGNIFICANT CHANGE UP
SMUDGE CELLS # BLD: PRESENT — SIGNIFICANT CHANGE UP
SODIUM SERPL-SCNC: 137 MMOL/L — SIGNIFICANT CHANGE UP (ref 135–145)
SPECIMEN SOURCE: SIGNIFICANT CHANGE UP
TARGETS BLD QL SMEAR: SLIGHT — SIGNIFICANT CHANGE UP
VANCOMYCIN FLD-MCNC: 16.6 UG/ML — SIGNIFICANT CHANGE UP
VARIANT LYMPHS # BLD: 1.8 % — SIGNIFICANT CHANGE UP
WBC # BLD: 10.5 K/UL — SIGNIFICANT CHANGE UP (ref 3.8–10.5)
WBC # FLD AUTO: 10.5 K/UL — SIGNIFICANT CHANGE UP (ref 3.8–10.5)

## 2019-04-06 RX ORDER — ALBUTEROL 90 UG/1
2 AEROSOL, METERED ORAL EVERY 6 HOURS
Qty: 0 | Refills: 0 | Status: DISCONTINUED | OUTPATIENT
Start: 2019-04-06 | End: 2019-04-12

## 2019-04-06 RX ADMIN — Medication 650 MILLIGRAM(S): at 17:41

## 2019-04-06 RX ADMIN — PANTOPRAZOLE SODIUM 40 MILLIGRAM(S): 20 TABLET, DELAYED RELEASE ORAL at 05:13

## 2019-04-06 RX ADMIN — Medication 1 TABLET(S): at 05:12

## 2019-04-06 RX ADMIN — Medication 800 MILLIGRAM(S): at 21:28

## 2019-04-06 RX ADMIN — Medication 5 MILLIGRAM(S): at 12:48

## 2019-04-06 RX ADMIN — Medication 50 MILLILITER(S): at 05:11

## 2019-04-06 RX ADMIN — Medication 1 TABLET(S): at 17:41

## 2019-04-06 RX ADMIN — Medication 25 MILLIGRAM(S): at 05:13

## 2019-04-06 RX ADMIN — Medication 1000 UNIT(S): at 12:48

## 2019-04-06 RX ADMIN — Medication 1 TABLET(S): at 12:48

## 2019-04-06 RX ADMIN — SPIRONOLACTONE 50 MILLIGRAM(S): 25 TABLET, FILM COATED ORAL at 05:13

## 2019-04-06 RX ADMIN — Medication 50 MILLILITER(S): at 21:27

## 2019-04-06 RX ADMIN — Medication 1 TABLET(S): at 08:57

## 2019-04-06 RX ADMIN — Medication 650 MILLIGRAM(S): at 05:13

## 2019-04-06 RX ADMIN — Medication 800 MILLIGRAM(S): at 12:47

## 2019-04-06 RX ADMIN — Medication 3 MILLIGRAM(S): at 21:28

## 2019-04-06 RX ADMIN — Medication 105 MILLIGRAM(S): at 12:47

## 2019-04-06 RX ADMIN — SODIUM CHLORIDE 50 MILLILITER(S): 9 INJECTION, SOLUTION INTRAVENOUS at 05:18

## 2019-04-06 RX ADMIN — Medication 50 MILLILITER(S): at 14:41

## 2019-04-06 RX ADMIN — Medication 20 MILLIGRAM(S): at 05:12

## 2019-04-06 RX ADMIN — Medication 800 MILLIGRAM(S): at 05:12

## 2019-04-06 NOTE — PROGRESS NOTE ADULT - SUBJECTIVE AND OBJECTIVE BOX
Patient is a 73y old  Male who presents with a chief complaint of Generalized weakness and inability to walk (06 Apr 2019 09:37)    Any change in ROS: awake, alert, denies sob, cough, sputum, on room air    MEDICATIONS  (STANDING):  albumin human 25% IVPB 50 milliLiter(s) IV Intermittent every 8 hours  calcium carbonate   1250 mG (OsCal) 1 Tablet(s) Oral two times a day  cholecalciferol 1000 Unit(s) Oral daily  dextrose 5% 1000 milliLiter(s) (50 mL/Hr) IV Continuous <Continuous>  furosemide    Tablet 20 milliGRAM(s) Oral daily  lactobacillus acidophilus 1 Tablet(s) Oral three times a day with meals  melatonin 3 milliGRAM(s) Oral <User Schedule>  mesalamine DR Capsule 800 milliGRAM(s) Oral three times a day  metoprolol succinate ER 25 milliGRAM(s) Oral daily  multivitamin 1 Tablet(s) Oral daily  pantoprazole    Tablet 40 milliGRAM(s) Oral before breakfast  phytonadione   Solution 5 milliGRAM(s) Oral daily  sodium bicarbonate 650 milliGRAM(s) Oral two times a day  spironolactone 50 milliGRAM(s) Oral daily    MEDICATIONS  (PRN):  dicyclomine 20 milliGRAM(s) Oral four times a day before meals PRN abd pain  loperamide 2 milliGRAM(s) Oral three times a day PRN Diarrhea  QUEtiapine 25 milliGRAM(s) Oral at bedtime PRN for agitation/sleep  simethicone 80 milliGRAM(s) Chew four times a day PRN Gas    Vital Signs Last 24 Hrs  T(C): 36.6 (06 Apr 2019 12:58), Max: 37 (06 Apr 2019 01:00)  T(F): 97.8 (06 Apr 2019 12:58), Max: 98.6 (06 Apr 2019 01:00)  HR: 75 (06 Apr 2019 12:58) (72 - 86)  BP: 99/58 (06 Apr 2019 12:58) (99/58 - 112/57)  BP(mean): --  RR: 16 (06 Apr 2019 12:58) (16 - 18)  SpO2: 100% (06 Apr 2019 12:58) (100% - 100%)    I&O's Summary        Physical Exam:   GENERAL: The patient comfortable with no apparent distress.   HEENT: Head is normocephalic and atraumatic.    NECK: Supple with no elevated JVP.  LUNGS: soft expiratory wheezing anterior lobes   HEART: S1 and S2 present without murmur.  ABDOMEN: Soft, nontender, and nondistended.   EXTREMITIES: No edema or calf tenderness.  NEUROLOGIC: Grossly intact.    Labs:                              8.6    10.50 )-----------( 30       ( 06 Apr 2019 05:40 )             27.9                         8.0    11.33 )-----------( 60       ( 05 Apr 2019 05:15 )             26.5                         9.6    10.72 )-----------( 67       ( 04 Apr 2019 08:07 )             31.4                         8.8    12.64 )-----------( 72       ( 03 Apr 2019 17:43 )             29.4     04-06    137  |  105  |  8   ----------------------------<  80  3.7   |  21<L>  |  1.01  04-05    140  |  110<H>  |  11  ----------------------------<  91  3.2<L>   |  20<L>  |  1.05  04-04    138  |  114<H>  |  14  ----------------------------<  70  4.7   |  15<L>  |  1.10  04-03    140  |  112<H>  |  15  ----------------------------<  108<H>  3.9   |  18<L>  |  1.22    Ca    7.9<L>      06 Apr 2019 05:40  Ca    7.5<L>      05 Apr 2019 05:15  Phos  2.5     04-05  Mg     1.5     04-05    TPro  6.2  /  Alb  2.1<L>  /  TBili  0.7  /  DBili  x   /  AST  21  /  ALT  19  /  AlkPhos  215<H>  04-06    CAPILLARY BLOOD GLUCOSE          LIVER FUNCTIONS - ( 06 Apr 2019 05:40 )  Alb: 2.1 g/dL / Pro: 6.2 g/dL / ALK PHOS: 215 u/L / ALT: 19 u/L / AST: 21 u/L / GGT: x               Fluid Source --  Albumin, Fluid0.1 g/dL  Glucose, [id number] mg/dL  Protein total, Fluid0.7 g/dL  Lacatate Dehydrogenase, Fluid50 U/L  pH, Fluid--  Cytopathology-Non Gyn Report--      Studies  Chest X-RAY  < from: Xray Chest 1 View- PORTABLE-Urgent (04.02.19 @ 13:09) >  EXAM:  XR CHEST PORTABLE URGENT 1V        PROCEDURE DATE:  Apr 2 2019         INTERPRETATION:  TIME OF EXAM: April 2, 2019 at 12:55 PM.    CLINICAL INFORMATION: Question subcutaneous emphysema. Evaluate for   pneumothorax.    COMPARISON:  April 1,2018.    TECHNIQUE:   AP Portable chest x-ray. The chin obscures the left apex.    INTERPRETATION:     The heart is not enlarged. The mediastinum is not accurately evaluated on   this projection.  There are low lung volumes.  A trace right pleural effusion is unchanged. The right lung is clear.   There is linear atelectasis in the left mid and lower lung. No left   pleural effusion is seen.  No pneumothorax or subcutaneous emphysema is seen.              IMPRESSION:  No pneumothorax or subcutaneous emphysema seen.    Trace right pleural effusion.    Linear atelectasis in the left mid and lower lung.    < end of copied text >    CT SCAN Chest   < from: CT Chest No Cont (03.28.19 @ 19:51) >    EXAM:  CT ABDOMEN AND PELVIS      EXAM:  CT CHEST        PROCEDURE DATE:  Mar 28 2019         INTERPRETATION:  h CLINICAL INFORMATION: Hypothermia, ascites, altered   mental status.    COMPARISON: CT chest 3/15/2019 and CT abdomen and pelvis 3/4/2019.    PROCEDURE:   CT of the Chest, Abdomen and Pelvis was performed without intravenous   contrast.   Intravenous contrast: None.  Oral contrast: None.  Sagittal and coronal reformats were performed.    FINDINGS:    CHEST:     LUNGS AND LARGE AIRWAYS: Mild narrowing of the bilateral bronchi as   before. Interval improvement in appearance of the parenchyma since the   prior exam with minimal residual groundglass opacities bilaterally.  PLEURA: Small bilateral pleural effusions, new on the right.    VESSELS: Mild calcifications of thoracic aorta and moderate coronary   artery calcifications.  MEDIASTINUM: Heart size is normal. No pericardial effusion. No   mediastinal, hilar, axillary or supraclavicular lymphadenopathy.  CHEST WALL AND LOWERNECK: Mild bilateral gynecomastia.        ABDOMEN AND PELVIS:    LIVER: Marked steatosis. Question of cirrhosis.  BILE DUCTS: Normal caliber.  GALLBLADDER: Gallstone.  SPLEEN: Unremarkable.  PANCREAS: Unremarkable.  ADRENALS: Unremarkable.  KIDNEYS/URETERS: No hydronephrosis.  No renal or ureteral stone.    BLADDER: Within normal limits.  REPRODUCTIVE ORGANS: The prostate and seminal vesicles are normal.    BOWEL: Normal in course and caliber without obstruction. Appendix is   normal. Rectal catheter in place.  PERITONEUM/RETROPERITONEUM: No pneumoperitoneum. Moderate free fluid in   the abdomen and pelvis.   VESSELS:  No evidence of aortic aneurysm. Marked vascular calcifications.  BONES/SOFT TISSUES: Anasarca. Bilateral healing anterolateral rib   fractures.    IMPRESSION: Marked interval improvement in appearance of the lungs with   minimal scattered groundglass opacities remaining.  Moderate ascites. Question of cirrhosis.    < end of copied text >    Venous Dopplers: LE:   < from: VA Duplex Ext Veins Lower Comp, Bilat. (03.21.19 @ 12:24) >    Patient name: BONIFACIO GUERRERO  Date of test: 3/21/2019  MR#: [id number]  [facility] #: [id number]    Location: [facility] Physician(s): , Mike Fine MD  Interpreted by: Khushi Martinez M.D. [facility]  Tech: Willa Fitch RVT  Type of Test: LowerExtremity Venous  ------------------------------------------------------------------------  Procedure: Real-time grayscale and color Duplex  ultrasonography was used to interrogate the deep veins of  the bilateral lower extremities.  Indications: Localized swelling, mass and lump, lower  limb, bilateral (R22.43)  ------------------------------------------------------------------------  RESULT:  ------------------------------------------------------------------------  RIGHT:  Deep venous thrombosis: No  Superficial venous thrombosis: No  Deep venous insufficiency: No  Superficial venous insufficiency: No  ------------------------------------------------------------------------  LEFT:  Deep venous thrombosis: No  Superficial venous thrombosis: No  Deep venous insufficiency: No  Superficial venous insufficiency: No  ------------------------------------------------------------------------  Right Findings: No evidence of deep venous thrombosis in  the right lower extremity.  The right common femoral,  femoral, popliteal, gastrocnemius, posterior tibial, and  peroneal veins appear sonographically normal and  compressible without evidence of thrombosis.  Normal  phasic Doppler waveforms noted in the right common femoral  vein.  No evidence of superficial venous thrombosis.  The right  great saphenous and short saphenous veins are patent, and  demonstrate full compressibility.  No evidence of deep and superficial venous insufficiency  in the right lower extremity.  Normal valve closure times  (VCT) with proximal and distal augmentation noted within  the common femoral, femoral, popliteal, great saphenous,  and short saphenous veins.  Left Findings: No evidence of deep venous thrombosis in  the left lower extremity.  The left common femoral,  femoral, popliteal, gastrocnemius, posterior tibial, and  peroneal veins appear sonographically normal and  compressible without evidence of thrombosis. Normal phasic  Doppler waveforms noted in the left common femoral vein.  No evidence of superficial venous thrombosis.  The left  great saphenous and short saphenous veins are patent, and  demonstrate full compressibility.  No evidence of deep and superficial venous insufficiency  in the left lower extremity.  Normal valve closure times  (VCT) with proximal and distal augmentation noted within  the common femoral, femoral, popliteal, great saphenous,  and short saphenous veins.  ------------------------------------------------------------------------  Summary/Impressions:  1.  No evidence of deep venous thrombosis in the right and  left lower extremities.  2.  No evidence of deep and superficial venous  insufficiency noted in the right and left lower  extremities.  ------------------------------------------------------------------------  Confirmed on  3/21/2019 - 3:14 PM by Khushi Martinez M.D. RPVI  By signing this report, the attending physician certifies  that he or she has personally supervised and interpreted  the vascular study and has reviewed and or edited and  agrees with the written comments contained within the  report.    < end of copied text >    CT Abdomen    Others  < from: Transthoracic Echocardiogram (03.10.19 @ 12:24) >    Patient name: BONIFACIO GUERRERO  YOB: 1945   Age: 73 (M)   MR#: [id number]  Study Date: 3/10/2019  Location: [place] [id number]: Rika Gonzalez  Study quality: Technically Difficult/Limited  Referring Physician: Mike Fine MD  Blood Pressure: 105/56 mmHg  Height: 160 cm  Weight: 53 kg  BSA: 1.5 m2  ------------------------------------------------------------------------  PROCEDURE: Transthoracic echocardiogram with 2-D, M-Mode  and complete spectral and color flow Doppler.  INDICATION: Edema, unspecified (R60.9)  ------------------------------------------------------------------------  DIMENSIONS:  Dimensions:     Normal Values:  LA:     2.7 cm    2.0 - 4.0 cm  Ao:     3.2 cm    2.0 - 3.8 cm  SEPTUM: 0.6 cm    0.6 - 1.2cm  PWT:    0.6 cm    0.6 - 1.1 cm  LVIDd:  4.5 cm    3.0 - 5.6 cm  LVIDs:  3.0 cm    1.8 - 4.0 cm  Derived Variables:  LVMI: 52 g/m2  RWT: 0.26  Fractional short: 33 %  Ejection Fraction (Teicholtz): 62 %  ------------------------------------------------------------------------  OBSERVATIONS:  Mitral Valve: Mitral annular calcification, otherwise  normal mitral valve. Minimal mitral regurgitation.  Aortic Root: Normal aortic root.  Aortic Valve: Aortic valve leaflet morphology not well  visualized.  Left Atrium: Normal left atrium.  Left Ventricle: Endocardium not well visualized; grossly  normal left ventricular systolic function. Normal left  ventricular internal dimensions and wall thicknesses.  Right Heart: Normal right atrium. Normal right ventricular  size and function. Normal tricuspid valve. Minimal  tricuspid regurgitation. Normal pulmonic valve.  Pericardium/PleuraNormal pericardium with no pericardial  effusion.  ------------------------------------------------------------------------  CONCLUSIONS:  1. Mitral annular calcification, otherwise normal mitral  valve. Minimal mitral regurgitation.  2. Normal left ventricular internal dimensions and wall  thicknesses.  3. Endocardium not well visualized; grossly normal left  ventricular systolic function.  4. Normal right ventricular size and function.  ---------------------------------------------------------    < end of copied text >

## 2019-04-06 NOTE — PROGRESS NOTE ADULT - SUBJECTIVE AND OBJECTIVE BOX
Patient is a 73y old  Male who presents with a chief complaint of Generalized weakness and inability to walk (06 Apr 2019 14:50)      SUBJECTIVE / OVERNIGHT EVENTS:    Events noted.  CONSTITUTIONAL: No fever,  or fatigue  RESPIRATORY: No cough, wheezing,  No shortness of breath  CARDIOVASCULAR: No chest pain, palpitations, dizziness, or leg swelling  GASTROINTESTINAL: Diarrhea improved.  NEUROLOGICAL: No headaches,     MEDICATIONS  (STANDING):  albumin human 25% IVPB 50 milliLiter(s) IV Intermittent every 8 hours  calcium carbonate   1250 mG (OsCal) 1 Tablet(s) Oral two times a day  cholecalciferol 1000 Unit(s) Oral daily  dextrose 5% 1000 milliLiter(s) (50 mL/Hr) IV Continuous <Continuous>  furosemide    Tablet 20 milliGRAM(s) Oral daily  lactobacillus acidophilus 1 Tablet(s) Oral three times a day with meals  melatonin 3 milliGRAM(s) Oral <User Schedule>  mesalamine DR Capsule 800 milliGRAM(s) Oral three times a day  metoprolol succinate ER 25 milliGRAM(s) Oral daily  multivitamin 1 Tablet(s) Oral daily  pantoprazole    Tablet 40 milliGRAM(s) Oral before breakfast  phytonadione   Solution 5 milliGRAM(s) Oral daily  sodium bicarbonate 650 milliGRAM(s) Oral two times a day  spironolactone 50 milliGRAM(s) Oral daily    MEDICATIONS  (PRN):  ALBUTerol    90 MICROgram(s) HFA Inhaler 2 Puff(s) Inhalation every 6 hours PRN Wheezing  dicyclomine 20 milliGRAM(s) Oral four times a day before meals PRN abd pain  loperamide 2 milliGRAM(s) Oral three times a day PRN Diarrhea  QUEtiapine 25 milliGRAM(s) Oral at bedtime PRN for agitation/sleep  simethicone 80 milliGRAM(s) Chew four times a day PRN Gas        CAPILLARY BLOOD GLUCOSE        I&O's Summary      PHYSICAL EXAM:  GENERAL: NAD  NECK: Supple, No JVD  CHEST/LUNG: Clear to auscultation bilaterally; No wheezing.  HEART: Regular rate and rhythm; No murmurs, rubs, or gallops  ABDOMEN: Soft, Nontender, Nondistended; Bowel sounds present  EXTREMITIES:   No edema  NEUROLOGY: AAO X 3      LABS:                        8.6    10.50 )-----------( 30       ( 06 Apr 2019 05:40 )             27.9     04-06    137  |  105  |  8   ----------------------------<  80  3.7   |  21<L>  |  1.01    Ca    7.9<L>      06 Apr 2019 05:40  Phos  2.5     04-05  Mg     1.5     04-05    TPro  6.2  /  Alb  2.1<L>  /  TBili  0.7  /  DBili  x   /  AST  21  /  ALT  19  /  AlkPhos  215<H>  04-06            CAPILLARY BLOOD GLUCOSE        04-04 @ 01:56  Culture-urine --  Culture results --  method type --  Organism --  Organism Identification --  Specimen source FECES  04-03 @ 15:42  Culture-urine --  Culture results --  method type --  Organism --  Organism Identification --  Specimen source PERITONEAL FLUID  04-03 @ 12:26  Culture-urine --  Culture results --  method type --  Organism --  Organism Identification --  Specimen source PERITONEAL FLUID  04-01 @ 19:23  Culture-urine   COLONY COUNT: > = 100,000 CFU/ML  Culture results --  method type POSITIVE FERNANDO 29  Organism Enterococcus faecium  Organism Identification Enterococcus faecium  Specimen source URINE CATHETER  04-01 @ 16:53  Culture-urine --  Culture results --  method type --  Organism --  Organism Identification --  Specimen source BLOOD VENOUS  04-01 @ 15:53  Culture-urine --  Culture results --  method type --  Organism --  Organism Identification --  Specimen source BLOOD PERIPHERAL           04-04 @ 01:56  Culture blood --  Culture results --  Gram stain --  Gram stain blood --  Method type --  Organism --  Organism identification --  Specimen source FECES   04-03 @ 15:42  Culture blood --  Culture results --  Gram stain --  Gram stain blood --  Method type --  Organism --  Organism identification --  Specimen source PERITONEAL FLUID   04-03 @ 12:26  Culture blood --  Culture results --  Gram stain   NOS^No Organisms Seen  WBC^White Blood Cells  QNTY CELLS IN GRAM STAIN: RARE (1+)  Gram stain blood --  Method type --  Organism --  Organism identification --  Specimen source PERITONEAL FLUID   04-01 @ 19:23  Culture blood --  Culture results --  Gram stain --  Gram stain blood --  Method type POSITIVE FERNANDO 29  Organism Enterococcus faecium  Organism identification Enterococcus faecium  Specimen source URINE CATHETER   04-01 @ 16:53  Culture blood   NO ORGANISMS ISOLATED  Culture results --  Gram stain --  Gram stain blood --  Method type --  Organism --  Organism identification --  Specimen source BLOOD VENOUS   04-01 @ 15:53  Culture blood   NO ORGANISMS ISOLATED  Culture results --  Gram stain --  Gram stain blood --  Method type --  Organism --  Organism identification --  Specimen source BLOOD PERIPHERAL      RADIOLOGY & ADDITIONAL TESTS:    Imaging Personally Reviewed:    Consultant(s) Notes Reviewed:      Care Discussed with Consultants/Other Providers:

## 2019-04-06 NOTE — PROGRESS NOTE ADULT - ASSESSMENT
· Assessment	  Mr. Miranda is a 74 yo man with history of Anemia, HTN, PUD, chronic diarrhea, and recent hospitalization at [facility] from 3/3/19-3/21/19 brought in by family for generalized weakness and inability to walk admitted for severe malnutrition, mild hypernatremia and severe deconditioning. Exam relatively benign aside for significant LE edema and weakness of legs. Anemia at baseline.     Cirrhosis:  Hepatology f/up    ·  Problem: Chronic diarrhea.  Plan: GI f/up noted.  - S/p colonoscopy and biopsy.   - Biopsy report: no CMV    Pancytopenia:  Hem f/up      Dw pt's daughter.

## 2019-04-06 NOTE — PROGRESS NOTE ADULT - PROBLEM SELECTOR PLAN 1
He has resp alkalosis as well as some metabolic acidosis: He has had lot of diarrhea: before: His chest x-ray with poor inspiration with bibasilar atelectasis other wise lung are clear: I DOUBT HE H IS HAVING  PE: But would do vq scan as wellas dopplers: Could it be related to diarrhea: He is off antibiotics at this time. I don't seem much of pneumonia on chest x-ray: His last ct scan is reviewed too: had some TIB opacities in upper lobes suggestive of bronchiolitis: CHF and bronchomalacia:  3/28: vq scan could not be dome yesterday: will attempt today: his previos dopplers were negative: new ABG is awaited:  3/29: ABG is pretty good: stable:  3/30; seems to be doing ok : no SOB: Breathing wise is OK :  3/31> breathing wise stable: no wheezing  4/2: yesterdays x-ray noted: rpt chest x-ray : There is no sq emphysema on palpation:  4/3: no ptx or sq emphysema seen: pt is stable from pulm point of view:  4/4: stable: no wheezing!  4/5: breathing wise he is stable: good oxygenation: not on any oxygen: Not isn any reps distress!  4/6 soft expiratory wheezing without distress. will add ONUR. encourage to use incentive spirometer.

## 2019-04-06 NOTE — PROGRESS NOTE ADULT - SUBJECTIVE AND OBJECTIVE BOX
Cardiovascular Disease Progress Note    Overnight events: No acute events overnight. Mr. Miranda is resting comfortably. No chest pain or SOB.   Otherwise review of systems negative    Objective Findings:  T(C): 36.7 (04-06-19 @ 05:00), Max: 37 (04-06-19 @ 01:00)  HR: 80 (04-06-19 @ 05:00) (72 - 86)  BP: 112/57 (04-06-19 @ 05:00) (103/47 - 112/57)  RR: 18 (04-06-19 @ 05:00) (16 - 18)  SpO2: 100% (04-06-19 @ 05:00) (100% - 100%)  Wt(kg): --  Daily     Daily       Physical Exam:  Gen: NAD  HEENT: EOMI  CV: RRR, normal S1 + S2, no m/r/g  Lungs: CTAB  Abd: soft, non-tender  Ext: No edema    Telemetry: n/a    Laboratory Data:                        8.6    10.50 )-----------( 30       ( 06 Apr 2019 05:40 )             27.9     04-06    137  |  105  |  8   ----------------------------<  80  3.7   |  21<L>  |  1.01    Ca    7.9<L>      06 Apr 2019 05:40  Phos  2.5     04-05  Mg     1.5     04-05    TPro  6.2  /  Alb  2.1<L>  /  TBili  0.7  /  DBili  x   /  AST  21  /  ALT  19  /  AlkPhos  215<H>  04-06              Inpatient Medications:  MEDICATIONS  (STANDING):  albumin human 25% IVPB 50 milliLiter(s) IV Intermittent every 8 hours  calcium carbonate   1250 mG (OsCal) 1 Tablet(s) Oral two times a day  cholecalciferol 1000 Unit(s) Oral daily  dextrose 5% 1000 milliLiter(s) (50 mL/Hr) IV Continuous <Continuous>  furosemide    Tablet 20 milliGRAM(s) Oral daily  lactobacillus acidophilus 1 Tablet(s) Oral three times a day with meals  melatonin 3 milliGRAM(s) Oral <User Schedule>  mesalamine DR Capsule 800 milliGRAM(s) Oral three times a day  metoprolol succinate ER 25 milliGRAM(s) Oral daily  multivitamin 1 Tablet(s) Oral daily  pantoprazole    Tablet 40 milliGRAM(s) Oral before breakfast  phytonadione   Solution 5 milliGRAM(s) Oral daily  sodium bicarbonate 650 milliGRAM(s) Oral two times a day  spironolactone 50 milliGRAM(s) Oral daily  thiamine IVPB 500 milliGRAM(s) IV Intermittent daily      Assessment: 73 year old man with HTN, anemia, chronic diarrhea, malnutrition and low voltage EKG presents with weakness.    Plan of Care:    #Post-operative evaluation-  Mr. Miranda tolerated colonoscopy well on 4/1.  He displays no signs or symptoms or post-procedural coronary ischemia, decompensated CHF or malignant arrythmia.  TTE from 3/10 reviewed- no significant structural heart disease.   Continue current cardiac management.      #HTN-  BP controlled on current regimen.     #Chronic diarrhea-  Pathology consistent with colitis.  GI input noted.   Treatment of cirrhosis as per primary team.     Over 25 minutes spent on total encounter; more than 50% of the visit was spent counseling and/or coordinating care by the attending physician.      Darren Herring MD [facility]  Cardiovascular Disease  [phone]

## 2019-04-06 NOTE — PROGRESS NOTE ADULT - ASSESSMENT
Mr. Miranda is a 72 yo man with history of Anemia, HTN, PUD, chronic diarrhea, and recent hospitalization at [facility] from 3/3/19-3/21/19 brought in by family for generalized weakness and inability to walk admitted for severe malnutrition, mild hypernatremia and severe deconditioning. Exam relatively benign aside for significant LE edema and weakness of legs. Anemia at baseline.

## 2019-04-06 NOTE — PROGRESS NOTE ADULT - SUBJECTIVE AND OBJECTIVE BOX
Infectious Diseases progress note:    Subjective: Pt appears more awake, alert, less confused.  No watery stools.  On immodium.  Daughter at bedside.  States she noticed dad with mild cough since yesterday.      ROS:  CONSTITUTIONAL:  No fever, chills, rigors  CARDIOVASCULAR:  No chest pain or palpitations  RESPIRATORY:   No SOB, cough, dyspnea on exertion.  No wheezing  GASTROINTESTINAL:  No abd pain, N/V, diarrhea/constipation  EXTREMITIES:  No swelling or joint pain  GENITOURINARY:  No burning on urination, increased frequency or urgency.  No flank pain  NEUROLOGIC:  No HA, visual disturbances  SKIN: No rashes    Allergies    No Known Allergies    Intolerances        ANTIBIOTICS/RELEVANT:  antimicrobials    immunologic:    OTHER:  albumin human 25% IVPB 50 milliLiter(s) IV Intermittent every 8 hours  ALBUTerol    90 MICROgram(s) HFA Inhaler 2 Puff(s) Inhalation every 6 hours PRN  calcium carbonate   1250 mG (OsCal) 1 Tablet(s) Oral two times a day  cholecalciferol 1000 Unit(s) Oral daily  dextrose 5% 1000 milliLiter(s) IV Continuous <Continuous>  dicyclomine 20 milliGRAM(s) Oral four times a day before meals PRN  furosemide    Tablet 20 milliGRAM(s) Oral daily  lactobacillus acidophilus 1 Tablet(s) Oral three times a day with meals  loperamide 2 milliGRAM(s) Oral three times a day PRN  melatonin 3 milliGRAM(s) Oral <User Schedule>  mesalamine DR Capsule 800 milliGRAM(s) Oral three times a day  metoprolol succinate ER 25 milliGRAM(s) Oral daily  multivitamin 1 Tablet(s) Oral daily  pantoprazole    Tablet 40 milliGRAM(s) Oral before breakfast  phytonadione   Solution 5 milliGRAM(s) Oral daily  QUEtiapine 25 milliGRAM(s) Oral at bedtime PRN  simethicone 80 milliGRAM(s) Chew four times a day PRN  sodium bicarbonate 650 milliGRAM(s) Oral two times a day  spironolactone 50 milliGRAM(s) Oral daily      Objective:  Vital Signs Last 24 Hrs  T(C): 36.6 (06 Apr 2019 12:58), Max: 37 (06 Apr 2019 01:00)  T(F): 97.8 (06 Apr 2019 12:58), Max: 98.6 (06 Apr 2019 01:00)  HR: 75 (06 Apr 2019 12:58) (72 - 86)  BP: 99/58 (06 Apr 2019 12:58) (99/58 - 112/57)  BP(mean): --  RR: 16 (06 Apr 2019 12:58) (16 - 18)  SpO2: 100% (06 Apr 2019 12:58) (100% - 100%)    PHYSICAL EXAM:  Constitutional:NAD  Eyes:TODD, EOMI  Ear/Nose/Throat: no thrush, mucositis.  Moist mucous membranes	  Neck:no JVD, no lymphadenopathy, supple  Respiratory: CTA ruth  Cardiovascular: S1S2 RRR, no murmurs  Gastrointestinal:soft, nontender,  nondistended (+) BS  Extremities:no e/e/c  Skin:  no rashes, open wounds or ulcerations        LABS:                        8.6    10.50 )-----------( 30       ( 06 Apr 2019 05:40 )             27.9     04-06    137  |  105  |  8   ----------------------------<  80  3.7   |  21<L>  |  1.01      Ca    7.9<L>      06 Apr 2019 05:40  Phos  2.5     04-05  Mg     1.5     04-05        TPro  6.2  /  Alb  2.1<L>  /  TBili  0.7  /  DBili  x   /  AST  21  /  ALT  19  /  AlkPhos  215<H>  04-06            Vancomycin Level, Random:  ug/mL (04-06 @ 05:40)      Vancomycin Level, Trough: 21.8 ug/mL (04-05 @ 05:15)  Vancomycin Level, Trough: 17.6 ug/mL (04-03 @ 17:43)  Vancomycin Level, Trough: 22.2 ug/mL (04-03 @ 05:10)              MICROBIOLOGY:    Culture - Acid Fast - Body Fluid w/Smear (04.03.19 @ 15:42)    Culture - Acid Fast Smear Concentrated:   AFB SMEAR= NO ACID FAST BACILLI SEEN    Specimen Source: PERITONEAL FLUID      Culture - Yeast and Fungus (04.03.19 @ 12:26)    Culture - Yeast and Fungus:   CULTURE NEGATIVE FOR YEASTS AND MOLDS AFTER 2 DAYS    Specimen Source: PERITONEAL FLUID        RADIOLOGY & ADDITIONAL STUDIES:    < from: CT Head No Cont (04.03.19 @ 20:22) >  IMPRESSION:    Stable exam.    No mass effect, hemorrhage or evidence of acute pathology.    < end of copied text >

## 2019-04-06 NOTE — PROGRESS NOTE ADULT - ASSESSMENT
Mr. Miranda is a 74 yo man with history of Anemia, HTN, chronic diarrhea, and recent hospitalization at [facility] from 3/3/19-3/21/19 brought in by family for generalized weakness and inability to walk.    Patient was discharged home with home PT services on 3/21/19 after being hospitalized since 3/3/19. During his hospitalization, he was managed for chronic diarrhea presumed to be 2/2 IBD since infectious and autoimmune w/u was negative, anemia due to blood loss from GI tract 2/2 PUD, requiring blood transfusion, and ESBL E. coli UTI with ertapenem via PICC. He was also determined not to have the diagnosis of CLL after flow cytometry results were available.     HPI was obtained primarily from patient's daughter and caregiver, Bee. As per daughter, when patient came home he was very weak. She wanted to bathe him but patient was unable to stand or walk to the bathroom. Due to his severe weakness, his daughter brought him back to the hospital. (23 Mar 2019 08:22)      Recommend:    - s/p completion of  ertapenem 1 week course for ESBL e.coli UTI on 3/25.   Pt restarted on vancomycin for enterococcus faecium UTI.    - Pt with diarrhea, thus far infectious w/u negative including stool cx, O&P (including microsporidium, cyclospora, isospora, cryptosporidium), gi pcr, cdiff, strongyloides neg, giardia neg, cmv pcr (-), cmv igG (+), IgM (-), HIV (-).  Stool for AFB (-)    - Pt with moderate ascites, and ?cirrhosis on CT ap.  Hep A/B/C serologies neg.  s/p paracentesis today on 4/3.  SAAG elevated, low albumin.  Low Tnc, do not suspect SBP.  Hepatology consulted for evaluation of cirrhosis - recs appreciated     -GI eval noted, repeat stool studies ordered including repeat gi pcr, giardia, stool o&p, stool cx negative.    r/o autoimmune and celiac disease.       -  CMV IgG (+), recommend biopsy to r/o cmv colitis - pt s/p colonoscopy on 4/1.  path shows areas of focal active colitis, findings are nonspecific and differential remains broad.       - Pt with intermittent hypothermia and change in mental status/disoriented.   Repeat Ucx grew enterococcus faecium, UA was unremarkable at that time.  Given recurrent hypothermia, will opt to treat for upper tract infection - start vancomycin to treat enterococcus: complete 14 day course through 4/14 (correction from last note).  Hold vanco today.  Repeat trough tomorrow, can restart at 750mg IV qdaily if level <15      d/w daughter at bedside        Will follow       Adeline Marques  [phone]

## 2019-04-06 NOTE — PROGRESS NOTE ADULT - SUBJECTIVE AND OBJECTIVE BOX
[facility]      Seen earlier today, and examined.  - Today, patient is without complaints.awake, very alert today. daughter at bedside reports coughing intermittently          *****MEDICATIONS: Current medication reviewed and documented.    MEDICATIONS  (STANDING):  albumin human 25% IVPB 50 milliLiter(s) IV Intermittent every 8 hours  calcium carbonate   1250 mG (OsCal) 1 Tablet(s) Oral two times a day  cholecalciferol 1000 Unit(s) Oral daily  dextrose 5% 1000 milliLiter(s) (50 mL/Hr) IV Continuous <Continuous>  furosemide    Tablet 20 milliGRAM(s) Oral daily  lactobacillus acidophilus 1 Tablet(s) Oral three times a day with meals  melatonin 3 milliGRAM(s) Oral <User Schedule>  mesalamine DR Capsule 800 milliGRAM(s) Oral three times a day  metoprolol succinate ER 25 milliGRAM(s) Oral daily  multivitamin 1 Tablet(s) Oral daily  pantoprazole    Tablet 40 milliGRAM(s) Oral before breakfast  phytonadione   Solution 5 milliGRAM(s) Oral daily  sodium bicarbonate 650 milliGRAM(s) Oral two times a day  spironolactone 50 milliGRAM(s) Oral daily    MEDICATIONS  (PRN):  ALBUTerol    90 MICROgram(s) HFA Inhaler 2 Puff(s) Inhalation every 6 hours PRN Wheezing  dicyclomine 20 milliGRAM(s) Oral four times a day before meals PRN abd pain  loperamide 2 milliGRAM(s) Oral three times a day PRN Diarrhea  QUEtiapine 25 milliGRAM(s) Oral at bedtime PRN for agitation/sleep  simethicone 80 milliGRAM(s) Chew four times a day PRN Gas          ***** VITAL SIGNS:  T(F): 97.5 (19 @ 21:26), Max: 98 (19 @ 05:00)  HR: 81 (19 @ 21:26) (75 - 81)  BP: 106/49 (19 @ 21:26) (99/58 - 112/57)  RR: 18 (19 @ 21:26) (16 - 18)  SpO2: 100% (19 @ 21:26) (100% - 100%)  Wt(kg): --  ,   I&O's Summary           *****PHYSICAL EXAM: Alert oriented x 2  able to follow 1 step commands.   able to recognize wife/son  more awake today.       EOMI fundi not visualized  blinks to threat   No facial asymmetry   Tongue is midline    withdraws to pain x4          *****LAB AND IMAGIN.6    10.50 )-----------( 30       ( 2019 05:40 )             27.9               04-06    137  |  105  |  8   ----------------------------<  80  3.7   |  21<L>  |  1.01    Ca    7.9<L>      2019 05:40  Phos  2.5     04-05  Mg     1.5     04-05    TPro  6.2  /  Alb  2.1<L>  /  TBili  0.7  /  DBili  x   /  AST  21  /  ALT  19  /  AlkPhos  215<H>  04-06                         [All pertinent recent Imaging/Reports reviewed]           *****A S S E S S M E N T   A N D   P L A N :      Mr. Miranda is a 72 yo man with history of Anemia, HTN, chronic diarrhea, and recent hospitalization at [facility] from 3/3/19-3/21/19 brought in by family for generalized weakness and inability to walk.    Patient was discharged home with home PT services on 3/21/19 after being hospitalized since 3/3/19. During his hospitalization, he was managed for chronic diarrhea presumed to be 2/2 IBD since infectious and autoimmune w/u was negative, anemia due to blood loss from GI tract 2/2 PUD, requiring blood transfusion, and ESBL E. coli UTI with ertapenem via PICC. He was also determined not to have the diagnosis of CLL after flow cytometry results were available.          Problem/Recommendations 1: multifactorial toxic metabolic encephalopathy improving    b12/ ammonia wnl   repeat thiamine 500 iv pb x 3 days  mvi banana bag  some improvement today       Problem/Recommendations 2:Diarrheal illness    defer to gi       +recent travel to [place]  4 mos 2018 to 2019   probiotics   nutrition for appropriate binding diet  can we change his diet to mechanical soft he is on dysphagia 2 not sure why ?  encourage po intake.   tolerating po intake. no diarrhea    cough ? atelectasis, spoke to nurse about instituting incentive spirometer.    Thank you for allowing me to participate in the care of this patient. Please do not hesitate to call me if you have any  questions.        ________________  Yessica Benitez MD  [facility] ([facility]  [phone]      30 minutes spent on total encounter; more than 50 % of the visit was  spent counseling about plan of care, compliance to diet/exercise and medication regimen and or  coordinating care by the attending physician.      It is advised that s stroke patients follow up with VINAYAK Cerrato @ [phone] in 1- 2 weeks.   Others please follow up with Dr. Michael Nissenbaum [phone]

## 2019-04-07 LAB
ALBUMIN SERPL ELPH-MCNC: 2.1 G/DL — LOW (ref 3.3–5)
ALP SERPL-CCNC: 208 U/L — HIGH (ref 40–120)
ALT FLD-CCNC: 20 U/L — SIGNIFICANT CHANGE UP (ref 4–41)
ANION GAP SERPL CALC-SCNC: 9 MMO/L — SIGNIFICANT CHANGE UP (ref 7–14)
AST SERPL-CCNC: 29 U/L — SIGNIFICANT CHANGE UP (ref 4–40)
BASOPHILS # BLD AUTO: 0 K/UL — SIGNIFICANT CHANGE UP (ref 0–0.2)
BASOPHILS NFR BLD AUTO: 0 % — SIGNIFICANT CHANGE UP (ref 0–2)
BILIRUB SERPL-MCNC: 0.6 MG/DL — SIGNIFICANT CHANGE UP (ref 0.2–1.2)
BUN SERPL-MCNC: 7 MG/DL — SIGNIFICANT CHANGE UP (ref 7–23)
CALCIUM SERPL-MCNC: 7.7 MG/DL — LOW (ref 8.4–10.5)
CHLORIDE SERPL-SCNC: 103 MMOL/L — SIGNIFICANT CHANGE UP (ref 98–107)
CHLORIDE STL-SCNC: SIGNIFICANT CHANGE UP
CO2 SERPL-SCNC: 23 MMOL/L — SIGNIFICANT CHANGE UP (ref 22–31)
CREAT SERPL-MCNC: 0.96 MG/DL — SIGNIFICANT CHANGE UP (ref 0.5–1.3)
EOSINOPHIL # BLD AUTO: 0.79 K/UL — HIGH (ref 0–0.5)
EOSINOPHIL NFR BLD AUTO: 6.2 % — HIGH (ref 0–6)
GLUCOSE SERPL-MCNC: 68 MG/DL — LOW (ref 70–99)
HCT VFR BLD CALC: 25.6 % — LOW (ref 39–50)
HGB BLD-MCNC: 8 G/DL — LOW (ref 13–17)
IMM GRANULOCYTES NFR BLD AUTO: 1 % — SIGNIFICANT CHANGE UP (ref 0–1.5)
LYMPHOCYTES # BLD AUTO: 17.8 % — SIGNIFICANT CHANGE UP (ref 13–44)
LYMPHOCYTES # BLD AUTO: 2.27 K/UL — SIGNIFICANT CHANGE UP (ref 1–3.3)
MCHC RBC-ENTMCNC: 29.3 PG — SIGNIFICANT CHANGE UP (ref 27–34)
MCHC RBC-ENTMCNC: 31.3 % — LOW (ref 32–36)
MCV RBC AUTO: 93.8 FL — SIGNIFICANT CHANGE UP (ref 80–100)
MONOCYTES # BLD AUTO: 6.57 K/UL — HIGH (ref 0–0.9)
MONOCYTES NFR BLD AUTO: 51.7 % — HIGH (ref 2–14)
NEUTROPHILS # BLD AUTO: 2.96 K/UL — SIGNIFICANT CHANGE UP (ref 1.8–7.4)
NEUTROPHILS NFR BLD AUTO: 23.3 % — LOW (ref 43–77)
NRBC # FLD: 0 K/UL — SIGNIFICANT CHANGE UP (ref 0–0)
PLATELET # BLD AUTO: 67 K/UL — LOW (ref 150–400)
PMV BLD: 12.8 FL — SIGNIFICANT CHANGE UP (ref 7–13)
POTASSIUM SERPL-MCNC: 3.6 MMOL/L — SIGNIFICANT CHANGE UP (ref 3.5–5.3)
POTASSIUM SERPL-SCNC: 3.6 MMOL/L — SIGNIFICANT CHANGE UP (ref 3.5–5.3)
POTASSIUM STL-SCNC: SIGNIFICANT CHANGE UP
PROT SERPL-MCNC: 5.9 G/DL — LOW (ref 6–8.3)
RBC # BLD: 2.73 M/UL — LOW (ref 4.2–5.8)
RBC # FLD: 21.9 % — HIGH (ref 10.3–14.5)
SODIUM SERPL-SCNC: 135 MMOL/L — SIGNIFICANT CHANGE UP (ref 135–145)
SODIUM STL-SCNC: SIGNIFICANT CHANGE UP
VANCOMYCIN FLD-MCNC: 10.4 UG/ML — SIGNIFICANT CHANGE UP
WBC # BLD: 12.72 K/UL — HIGH (ref 3.8–10.5)
WBC # FLD AUTO: 12.72 K/UL — HIGH (ref 3.8–10.5)

## 2019-04-07 RX ORDER — VANCOMYCIN HCL 1 G
750 VIAL (EA) INTRAVENOUS DAILY
Qty: 0 | Refills: 0 | Status: DISCONTINUED | OUTPATIENT
Start: 2019-04-07 | End: 2019-04-12

## 2019-04-07 RX ADMIN — Medication 50 MILLILITER(S): at 06:08

## 2019-04-07 RX ADMIN — Medication 1 TABLET(S): at 12:52

## 2019-04-07 RX ADMIN — Medication 800 MILLIGRAM(S): at 06:08

## 2019-04-07 RX ADMIN — Medication 800 MILLIGRAM(S): at 14:32

## 2019-04-07 RX ADMIN — Medication 50 MILLILITER(S): at 14:34

## 2019-04-07 RX ADMIN — PANTOPRAZOLE SODIUM 40 MILLIGRAM(S): 20 TABLET, DELAYED RELEASE ORAL at 06:08

## 2019-04-07 RX ADMIN — Medication 3 MILLIGRAM(S): at 22:30

## 2019-04-07 RX ADMIN — Medication 1 TABLET(S): at 09:13

## 2019-04-07 RX ADMIN — Medication 1 TABLET(S): at 17:43

## 2019-04-07 RX ADMIN — Medication 5 MILLIGRAM(S): at 12:52

## 2019-04-07 RX ADMIN — Medication 650 MILLIGRAM(S): at 17:43

## 2019-04-07 RX ADMIN — Medication 25 MILLIGRAM(S): at 06:09

## 2019-04-07 RX ADMIN — Medication 800 MILLIGRAM(S): at 22:31

## 2019-04-07 RX ADMIN — Medication 1 TABLET(S): at 06:09

## 2019-04-07 RX ADMIN — SODIUM CHLORIDE 50 MILLILITER(S): 9 INJECTION, SOLUTION INTRAVENOUS at 09:13

## 2019-04-07 RX ADMIN — SPIRONOLACTONE 50 MILLIGRAM(S): 25 TABLET, FILM COATED ORAL at 06:09

## 2019-04-07 RX ADMIN — Medication 50 MILLILITER(S): at 22:30

## 2019-04-07 RX ADMIN — Medication 250 MILLIGRAM(S): at 17:41

## 2019-04-07 RX ADMIN — Medication 1000 UNIT(S): at 12:52

## 2019-04-07 RX ADMIN — SODIUM CHLORIDE 50 MILLILITER(S): 9 INJECTION, SOLUTION INTRAVENOUS at 17:41

## 2019-04-07 RX ADMIN — Medication 650 MILLIGRAM(S): at 06:08

## 2019-04-07 RX ADMIN — Medication 20 MILLIGRAM(S): at 06:09

## 2019-04-07 NOTE — PROGRESS NOTE ADULT - ASSESSMENT
· Assessment	  Mr. Miranda is a 72 yo man with history of Anemia, HTN, PUD, chronic diarrhea, and recent hospitalization at [facility] from 3/3/19-3/21/19 brought in by family for generalized weakness and inability to walk admitted for severe malnutrition, mild hypernatremia and severe deconditioning. Exam relatively benign aside for significant LE edema and weakness of legs. Anemia at baseline.     Cirrhosis:  Hepatology f/up    ·  Problem: Chronic diarrhea.  Plan: GI f/up noted.  - S/p colonoscopy and biopsy.   - Biopsy report: no CMV    Pancytopenia:  Hem f/up      Dw pt's daughter.

## 2019-04-07 NOTE — PROGRESS NOTE ADULT - SUBJECTIVE AND OBJECTIVE BOX
Cardiovascular Disease Progress Note    Overnight events: No acute events overnight. Mr. Miranda denies chest pain or SOB.   Otherwise review of systems negative    Objective Findings:  T(C): 36.3 (04-07-19 @ 05:30), Max: 36.7 (04-06-19 @ 17:36)  HR: 76 (04-07-19 @ 05:30) (75 - 81)  BP: 104/60 (04-07-19 @ 05:30) (99/58 - 108/60)  RR: 18 (04-07-19 @ 05:30) (16 - 18)  SpO2: 110% (04-07-19 @ 05:30) (100% - 110%)  Wt(kg): --  Daily     Daily       Physical Exam:  Gen: NAD  HEENT: EOMI  CV: RRR, normal S1 + S2, no m/r/g  Lungs: CTAB  Abd: soft, non-tender  Ext: No edema    Telemetry: n/a    Laboratory Data:                        8.0    12.72 )-----------( 67       ( 07 Apr 2019 05:42 )             25.6     04-07    135  |  103  |  7   ----------------------------<  68<L>  3.6   |  23  |  0.96    Ca    7.7<L>      07 Apr 2019 05:42    TPro  5.9<L>  /  Alb  2.1<L>  /  TBili  0.6  /  DBili  x   /  AST  29  /  ALT  20  /  AlkPhos  208<H>  04-07              Inpatient Medications:  MEDICATIONS  (STANDING):  albumin human 25% IVPB 50 milliLiter(s) IV Intermittent every 8 hours  calcium carbonate   1250 mG (OsCal) 1 Tablet(s) Oral two times a day  cholecalciferol 1000 Unit(s) Oral daily  dextrose 5% 1000 milliLiter(s) (50 mL/Hr) IV Continuous <Continuous>  furosemide    Tablet 20 milliGRAM(s) Oral daily  lactobacillus acidophilus 1 Tablet(s) Oral three times a day with meals  melatonin 3 milliGRAM(s) Oral <User Schedule>  mesalamine DR Capsule 800 milliGRAM(s) Oral three times a day  metoprolol succinate ER 25 milliGRAM(s) Oral daily  multivitamin 1 Tablet(s) Oral daily  pantoprazole    Tablet 40 milliGRAM(s) Oral before breakfast  phytonadione   Solution 5 milliGRAM(s) Oral daily  sodium bicarbonate 650 milliGRAM(s) Oral two times a day  spironolactone 50 milliGRAM(s) Oral daily      Assessment:  73 year old man with HTN, anemia, chronic diarrhea, malnutrition and low voltage EKG presents with weakness.    Plan of Care:    #Post-operative evaluation-  Mr. Miranda tolerated colonoscopy well on 4/1.  He displays no signs or symptoms or post-procedural coronary ischemia, decompensated CHF or malignant arrythmia.  TTE from 3/10 reviewed- no significant structural heart disease.   Continue current cardiac management.      #HTN-  BP controlled on current regimen.     #Chronic diarrhea-  Pathology consistent with colitis.  GI input noted.   Treatment of cirrhosis as per primary team.     Over 25 minutes spent on total encounter; more than 50% of the visit was spent counseling and/or coordinating care by the attending physician.      Darren Herring MD [facility]  Cardiovascular Disease  [phone]

## 2019-04-07 NOTE — PROGRESS NOTE ADULT - SUBJECTIVE AND OBJECTIVE BOX
Patient is a 73y old  Male who presents with a chief complaint of Generalized weakness and inability to walk (07 Apr 2019 08:46)    Any change in ROS: doing ok. denies sob, cough, sputum, wheezing. family at bedside. on RA    MEDICATIONS  (STANDING):  albumin human 25% IVPB 50 milliLiter(s) IV Intermittent every 8 hours  calcium carbonate   1250 mG (OsCal) 1 Tablet(s) Oral two times a day  cholecalciferol 1000 Unit(s) Oral daily  dextrose 5% 1000 milliLiter(s) (50 mL/Hr) IV Continuous <Continuous>  furosemide    Tablet 20 milliGRAM(s) Oral daily  lactobacillus acidophilus 1 Tablet(s) Oral three times a day with meals  melatonin 3 milliGRAM(s) Oral <User Schedule>  mesalamine DR Capsule 800 milliGRAM(s) Oral three times a day  metoprolol succinate ER 25 milliGRAM(s) Oral daily  multivitamin 1 Tablet(s) Oral daily  pantoprazole    Tablet 40 milliGRAM(s) Oral before breakfast  sodium bicarbonate 650 milliGRAM(s) Oral two times a day  spironolactone 50 milliGRAM(s) Oral daily    MEDICATIONS  (PRN):  ALBUTerol    90 MICROgram(s) HFA Inhaler 2 Puff(s) Inhalation every 6 hours PRN Wheezing  dicyclomine 20 milliGRAM(s) Oral four times a day before meals PRN abd pain  loperamide 2 milliGRAM(s) Oral three times a day PRN Diarrhea  QUEtiapine 25 milliGRAM(s) Oral at bedtime PRN for agitation/sleep  simethicone 80 milliGRAM(s) Chew four times a day PRN Gas    Vital Signs Last 24 Hrs  T(C): 36.4 (07 Apr 2019 13:43), Max: 36.7 (06 Apr 2019 17:36)  T(F): 97.5 (07 Apr 2019 13:43), Max: 98 (06 Apr 2019 17:36)  HR: 78 (07 Apr 2019 13:43) (76 - 81)  BP: 104/52 (07 Apr 2019 13:43) (102/60 - 106/49)  BP(mean): --  RR: 18 (07 Apr 2019 13:43) (16 - 18)  SpO2: 110% (07 Apr 2019 13:43) (100% - 110%)    I&O's Summary        Physical Exam:   GENERAL: The patient comfortable with no apparent distress.   HEENT: Head is normocephalic and atraumatic.    NECK: Supple with no elevated JVP.  LUNGS: Crackles right posterior lobes. no wheezing   HEART: S1 and S2 present without murmur.  ABDOMEN: Soft, nontender, and slightly distended. ascites   EXTREMITIES: No edema or calf tenderness.  NEUROLOGIC: Grossly intact.    Labs:                              8.0    12.72 )-----------( 67       ( 07 Apr 2019 05:42 )             25.6                         8.6    10.50 )-----------( 30       ( 06 Apr 2019 05:40 )             27.9                         8.0    11.33 )-----------( 60       ( 05 Apr 2019 05:15 )             26.5                         9.6    10.72 )-----------( 67       ( 04 Apr 2019 08:07 )             31.4                         8.8    12.64 )-----------( 72       ( 03 Apr 2019 17:43 )             29.4     04-07    135  |  103  |  7   ----------------------------<  68<L>  3.6   |  23  |  0.96  04-06    137  |  105  |  8   ----------------------------<  80  3.7   |  21<L>  |  1.01  04-05    140  |  110<H>  |  11  ----------------------------<  91  3.2<L>   |  20<L>  |  1.05  04-04    138  |  114<H>  |  14  ----------------------------<  70  4.7   |  15<L>  |  1.10  04-03    140  |  112<H>  |  15  ----------------------------<  108<H>  3.9   |  18<L>  |  1.22    Ca    7.7<L>      07 Apr 2019 05:42  Ca    7.9<L>      06 Apr 2019 05:40    TPro  5.9<L>  /  Alb  2.1<L>  /  TBili  0.6  /  DBili  x   /  AST  29  /  ALT  20  /  AlkPhos  208<H>  04-07  TPro  6.2  /  Alb  2.1<L>  /  TBili  0.7  /  DBili  x   /  AST  21  /  ALT  19  /  AlkPhos  215<H>  04-06    CAPILLARY BLOOD GLUCOSE          LIVER FUNCTIONS - ( 07 Apr 2019 05:42 )  Alb: 2.1 g/dL / Pro: 5.9 g/dL / ALK PHOS: 208 u/L / ALT: 20 u/L / AST: 29 u/L / GGT: x               Fluid Source --  Albumin, Fluid0.1 g/dL  Glucose, Eumvf236 mg/dL  Protein total, Fluid0.7 g/dL  Lacatate Dehydrogenase, Fluid50 U/L  pH, Fluid--  Cytopathology-Non Gyn Report--      Studies  Chest X-RAY  < from: Xray Chest 1 View- PORTABLE-Urgent (04.02.19 @ 13:09) >    EXAM:  XR CHEST PORTABLE URGENT 1V        PROCEDURE DATE:  Apr 2 2019         INTERPRETATION:  TIME OF EXAM: April 2, 2019 at 12:55 PM.    CLINICAL INFORMATION: Question subcutaneous emphysema. Evaluate for   pneumothorax.    COMPARISON:  April 1,2018.    TECHNIQUE:   AP Portable chest x-ray. The chin obscures the left apex.    INTERPRETATION:     The heart is not enlarged. The mediastinum is not accurately evaluated on   this projection.  There are low lung volumes.  A trace right pleural effusion is unchanged. The right lung is clear.   There is linear atelectasis in the left mid and lower lung. No left   pleural effusion is seen.  No pneumothorax or subcutaneous emphysema is seen.              IMPRESSION:  No pneumothorax or subcutaneous emphysema seen.    Trace right pleural effusion.    Linear atelectasis in the left mid and lower lung.    < end of copied text >    CT SCAN Chest   < from: CT Chest No Cont (03.28.19 @ 19:51) >    EXAM:  CT ABDOMEN AND PELVIS      EXAM:  CT CHEST        PROCEDURE DATE:  Mar 28 2019         INTERPRETATION:  h CLINICAL INFORMATION: Hypothermia, ascites, altered   mental status.    COMPARISON: CT chest 3/15/2019 and CT abdomen and pelvis 3/4/2019.    PROCEDURE:   CT of the Chest, Abdomen and Pelvis was performed without intravenous   contrast.   Intravenous contrast: None.  Oral contrast: None.  Sagittal and coronal reformats were performed.    FINDINGS:    CHEST:     LUNGS AND LARGE AIRWAYS: Mild narrowing of the bilateral bronchi as   before. Interval improvement in appearance of the parenchyma since the   prior exam with minimal residual groundglass opacities bilaterally.  PLEURA: Small bilateral pleural effusions, new on the right.    VESSELS: Mild calcifications of thoracic aorta and moderate coronary   artery calcifications.  MEDIASTINUM: Heart size is normal. No pericardial effusion. No   mediastinal, hilar, axillary or supraclavicular lymphadenopathy.  CHEST WALL AND LOWERNECK: Mild bilateral gynecomastia.        ABDOMEN AND PELVIS:    LIVER: Marked steatosis. Question of cirrhosis.  BILE DUCTS: Normal caliber.  GALLBLADDER: Gallstone.  SPLEEN: Unremarkable.  PANCREAS: Unremarkable.  ADRENALS: Unremarkable.  KIDNEYS/URETERS: No hydronephrosis.  No renal or ureteral stone.    BLADDER: Within normal limits.  REPRODUCTIVE ORGANS: The prostate and seminal vesicles are normal.    BOWEL: Normal in course and caliber without obstruction. Appendix is   normal. Rectal catheter in place.  PERITONEUM/RETROPERITONEUM: No pneumoperitoneum. Moderate free fluid in   the abdomen and pelvis.   VESSELS:  No evidence of aortic aneurysm. Marked vascular calcifications.  BONES/SOFT TISSUES: Anasarca. Bilateral healing anterolateral rib   fractures.    IMPRESSION: Marked interval improvement in appearance of the lungs with   minimal scattered groundglass opacities remaining.  Moderate ascites. Question of cirrhosis.    < end of copied text >    Venous Dopplers: LE: < from: VA Duplex Ext Veins Lower Comp, Bilat. (03.21.19 @ 12:24) >  Patient name: BONIFACIO GUERRERO  Date of test: 3/21/2019  MR#: [id number]  [facility] #: [id number]    Location: [facility] Physician(s): , Mike Fine MD  Interpreted by: Khushi Martinez M.D. BIENVENIDO  Tech: Willa Fitch RVT  Type of Test: LowerExtremity Venous  ------------------------------------------------------------------------  Procedure: Real-time grayscale and color Duplex  ultrasonography was used to interrogate the deep veins of  the bilateral lower extremities.  Indications: Localized swelling, mass and lump, lower  limb, bilateral (R22.43)  ------------------------------------------------------------------------  RESULT:  ------------------------------------------------------------------------  RIGHT:  Deep venous thrombosis: No  Superficial venous thrombosis: No  Deep venous insufficiency: No  Superficial venous insufficiency: No  ------------------------------------------------------------------------  LEFT:  Deep venous thrombosis: No  Superficial venous thrombosis: No  Deep venous insufficiency: No  Superficial venous insufficiency: No  ------------------------------------------------------------------------  Right Findings: No evidence of deep venous thrombosis in  the right lower extremity.  The right common femoral,  femoral, popliteal, gastrocnemius, posterior tibial, and  peroneal veins appear sonographically normal and  compressible without evidence of thrombosis.  Normal  phasic Doppler waveforms noted in the right common femoral  vein.  No evidence of superficial venous thrombosis.  The right  great saphenous and short saphenous veins are patent, and  demonstrate full compressibility.  No evidence of deep and superficial venous insufficiency  in the right lower extremity.  Normal valve closure times  (VCT) with proximal and distal augmentation noted within  the common femoral, femoral, popliteal, great saphenous,  and short saphenous veins.  Left Findings: No evidence of deep venous thrombosis in  the left lower extremity.  The left common femoral,  femoral, popliteal, gastrocnemius, posterior tibial, and  peroneal veins appear sonographically normal and  compressible without evidence of thrombosis. Normal phasic  Doppler waveforms noted in the left common femoral vein.  No evidence of superficial venous thrombosis.  The left  great saphenous and short saphenous veins are patent, and  demonstrate full compressibility.  No evidence of deep and superficial venous insufficiency  in the left lower extremity.  Normal valve closure times  (VCT) with proximal and distal augmentation noted within  the common femoral, femoral, popliteal, great saphenous,  and short saphenous veins.  ------------------------------------------------------------------------  Summary/Impressions:  1.  No evidence of deep venous thrombosis in the right and  left lower extremities.  2.  No evidence of deep and superficial venous  insufficiency noted in the right and left lower  extremities.    < end of copied text >    CT Abdomen  Others  < from: Transthoracic Echocardiogram (03.10.19 @ 12:24) >    Patient name: BONIFACIO GUERRERO  YOB: 1945   Age: 73 (M)   MR#: [id number]  Study Date: 3/10/2019  Location: [place] [id number]: Rika Gonzalez  Study quality: Technically Difficult/Limited  Referring Physician: Mike Fine MD  Blood Pressure: 105/56 mmHg  Height: 160 cm  Weight: 53 kg  BSA: 1.5 m2  ------------------------------------------------------------------------  PROCEDURE: Transthoracic echocardiogram with 2-D, M-Mode  and complete spectral and color flow Doppler.  INDICATION: Edema, unspecified (R60.9)  ------------------------------------------------------------------------  DIMENSIONS:  Dimensions:     Normal Values:  LA:     2.7 cm    2.0 - 4.0 cm  Ao:     3.2 cm    2.0 - 3.8 cm  SEPTUM: 0.6 cm    0.6 - 1.2cm  PWT:    0.6 cm    0.6 - 1.1 cm  LVIDd:  4.5 cm    3.0 - 5.6 cm  LVIDs:  3.0 cm    1.8 - 4.0 cm  Derived Variables:  LVMI: 52 g/m2  RWT: 0.26  Fractional short: 33 %  Ejection Fraction (Teicholtz): 62 %  ------------------------------------------------------------------------  OBSERVATIONS:  Mitral Valve: Mitral annular calcification, otherwise  normal mitral valve. Minimal mitral regurgitation.  Aortic Root: Normal aortic root.  Aortic Valve: Aortic valve leaflet morphology not well  visualized.  Left Atrium: Normal left atrium.  Left Ventricle: Endocardium not well visualized; grossly  normal left ventricular systolic function. Normal left  ventricular internal dimensions and wall thicknesses.  Right Heart: Normal right atrium. Normal right ventricular  size and function. Normal tricuspid valve. Minimal  tricuspid regurgitation. Normal pulmonic valve.  Pericardium/PleuraNormal pericardium with no pericardial  effusion.  ------------------------------------------------------------------------  CONCLUSIONS:  1. Mitral annular calcification, otherwise normal mitral  valve. Minimal mitral regurgitation.  2. Normal left ventricular internal dimensions and wall  thicknesses.  3. Endocardium not well visualized; grossly normal left  ventricular systolic function.  4. Normal right ventricular size and function.    < end of copied text >

## 2019-04-07 NOTE — PROGRESS NOTE ADULT - PROBLEM SELECTOR PLAN 1
He has resp alkalosis as well as some metabolic acidosis: He has had lot of diarrhea: before: His chest x-ray with poor inspiration with bibasilar atelectasis other wise lung are clear: I DOUBT HE H IS HAVING  PE: But would do vq scan as wellas dopplers: Could it be related to diarrhea: He is off antibiotics at this time. I don't seem much of pneumonia on chest x-ray: His last ct scan is reviewed too: had some TIB opacities in upper lobes suggestive of bronchiolitis: CHF and bronchomalacia:  3/28: vq scan could not be dome yesterday: will attempt today: his previos dopplers were negative: new ABG is awaited:  3/29: ABG is pretty good: stable:  3/30; seems to be doing ok : no SOB: Breathing wise is OK :  3/31> breathing wise stable: no wheezing  4/2: yesterdays x-ray noted: rpt chest x-ray : There is no sq emphysema on palpation:  4/3: no ptx or sq emphysema seen: pt is stable from pulm point of view:  4/4: stable: no wheezing!  4/5: breathing wise he is stable: good oxygenation: not on any oxygen: Not isn any reps distress!  4/6 soft expiratory wheezing without distress. will add ONUR. encourage to use incentive spirometer.  4/7 no wheezing. some crackles. encourage to use incentive spirometer, encourage to expectorate. prn ONUR for wheezing or SOB

## 2019-04-07 NOTE — PROGRESS NOTE ADULT - ASSESSMENT
Mr. Miranda is a 74 yo man with history of Anemia, HTN, PUD, chronic diarrhea, and recent hospitalization at [facility] from 3/3/19-3/21/19 brought in by family for generalized weakness and inability to walk admitted for severe malnutrition, mild hypernatremia and severe deconditioning. Exam relatively benign aside for significant LE edema and weakness of legs. Anemia at baseline.

## 2019-04-07 NOTE — PROGRESS NOTE ADULT - SUBJECTIVE AND OBJECTIVE BOX
Pt feels OK, a little better and stronger but still quite weak, able to eat somewhat. No pain, bleeding, fevers or any other active symptoms on ROS.       Meds:  albumin human 25% IVPB 50 milliLiter(s) IV Intermittent every 8 hours  ALBUTerol    90 MICROgram(s) HFA Inhaler 2 Puff(s) Inhalation every 6 hours PRN  calcium carbonate   1250 mG (OsCal) 1 Tablet(s) Oral two times a day  cholecalciferol 1000 Unit(s) Oral daily  dextrose 5% 1000 milliLiter(s) IV Continuous <Continuous>  dicyclomine 20 milliGRAM(s) Oral four times a day before meals PRN  furosemide    Tablet 20 milliGRAM(s) Oral daily  lactobacillus acidophilus 1 Tablet(s) Oral three times a day with meals  loperamide 2 milliGRAM(s) Oral three times a day PRN  melatonin 3 milliGRAM(s) Oral <User Schedule>  mesalamine DR Capsule 800 milliGRAM(s) Oral three times a day  metoprolol succinate ER 25 milliGRAM(s) Oral daily  multivitamin 1 Tablet(s) Oral daily  pantoprazole    Tablet 40 milliGRAM(s) Oral before breakfast  phytonadione   Solution 5 milliGRAM(s) Oral daily  QUEtiapine 25 milliGRAM(s) Oral at bedtime PRN  simethicone 80 milliGRAM(s) Chew four times a day PRN  sodium bicarbonate 650 milliGRAM(s) Oral two times a day  spironolactone 50 milliGRAM(s) Oral daily      Vital Signs Last 24 Hrs  T(C): 36.3 (07 Apr 2019 05:30), Max: 36.7 (06 Apr 2019 17:36)  T(F): 97.3 (07 Apr 2019 05:30), Max: 98 (06 Apr 2019 17:36)  HR: 76 (07 Apr 2019 05:30) (75 - 81)  BP: 104/60 (07 Apr 2019 05:30) (99/58 - 108/60)  BP(mean): --  RR: 18 (07 Apr 2019 05:30) (16 - 18)  SpO2: 110% (07 Apr 2019 05:30) (100% - 110%)                          8.0    12.72 )-----------( 67       ( 07 Apr 2019 05:42 )             25.6       04-07    135  |  103  |  7   ----------------------------<  68<L>  3.6   |  23  |  0.96    Ca    7.7<L>      07 Apr 2019 05:42    TPro  5.9<L>  /  Alb  2.1<L>  /  TBili  0.6  /  DBili  x   /  AST  29  /  ALT  20  /  AlkPhos  208<H>  04-07            PBS reviewed again: PLts low, no clumps, several giant plts seen. RBCs mainly normocytic, normochromic, moderate rouleoux formation, mild polychromasia, none other abnormal red cells, occasional nucleated red cell seen. WBCs increased, mainly PMNs and some increase in monocytes, no immature cells.

## 2019-04-07 NOTE — PROGRESS NOTE ADULT - ASSESSMENT
73M with recurrent and intractable diarrhea, has TCP (likely from chronic inflammation and possibly abx and anemia (of chronic disease), no bleeding, will recommend:  - aggressive Rx of dehydration and diarrhea  - monitor CBC  - platelets and hgb low but still acceptable  - hgb also acceptable  - transfusion of plts only if plts < 10K or plts ~ 50 but pt is bleeding  - no contraindication to DVT prophylaxis  - rest of the RX as per medicine, GI, ID  - PT/rehab evaluation

## 2019-04-08 LAB
ALBUMIN SERPL ELPH-MCNC: 2.6 G/DL — LOW (ref 3.3–5)
ALP SERPL-CCNC: 206 U/L — HIGH (ref 40–120)
ALT FLD-CCNC: 19 U/L — SIGNIFICANT CHANGE UP (ref 4–41)
ANION GAP SERPL CALC-SCNC: 8 MMO/L — SIGNIFICANT CHANGE UP (ref 7–14)
AST SERPL-CCNC: 24 U/L — SIGNIFICANT CHANGE UP (ref 4–40)
BASOPHILS # BLD AUTO: 0.01 K/UL — SIGNIFICANT CHANGE UP (ref 0–0.2)
BASOPHILS NFR BLD AUTO: 0.1 % — SIGNIFICANT CHANGE UP (ref 0–2)
BILIRUB SERPL-MCNC: 0.7 MG/DL — SIGNIFICANT CHANGE UP (ref 0.2–1.2)
BUN SERPL-MCNC: 6 MG/DL — LOW (ref 7–23)
CALCIUM SERPL-MCNC: 7.7 MG/DL — LOW (ref 8.4–10.5)
CHLORIDE SERPL-SCNC: 98 MMOL/L — SIGNIFICANT CHANGE UP (ref 98–107)
CO2 SERPL-SCNC: 29 MMOL/L — SIGNIFICANT CHANGE UP (ref 22–31)
CREAT SERPL-MCNC: 0.9 MG/DL — SIGNIFICANT CHANGE UP (ref 0.5–1.3)
EOSINOPHIL # BLD AUTO: 0.89 K/UL — HIGH (ref 0–0.5)
EOSINOPHIL NFR BLD AUTO: 6.9 % — HIGH (ref 0–6)
GLUCOSE SERPL-MCNC: 86 MG/DL — SIGNIFICANT CHANGE UP (ref 70–99)
HCT VFR BLD CALC: 23.2 % — LOW (ref 39–50)
HCT VFR BLD CALC: 28 % — LOW (ref 39–50)
HGB BLD-MCNC: 7.2 G/DL — LOW (ref 13–17)
HGB BLD-MCNC: 8.5 G/DL — LOW (ref 13–17)
IMM GRANULOCYTES NFR BLD AUTO: 1 % — SIGNIFICANT CHANGE UP (ref 0–1.5)
LYMPHOCYTES # BLD AUTO: 1.98 K/UL — SIGNIFICANT CHANGE UP (ref 1–3.3)
LYMPHOCYTES # BLD AUTO: 15.4 % — SIGNIFICANT CHANGE UP (ref 13–44)
MCHC RBC-ENTMCNC: 28.5 PG — SIGNIFICANT CHANGE UP (ref 27–34)
MCHC RBC-ENTMCNC: 29.2 PG — SIGNIFICANT CHANGE UP (ref 27–34)
MCHC RBC-ENTMCNC: 30.4 % — LOW (ref 32–36)
MCHC RBC-ENTMCNC: 31 % — LOW (ref 32–36)
MCV RBC AUTO: 91.7 FL — SIGNIFICANT CHANGE UP (ref 80–100)
MCV RBC AUTO: 96.2 FL — SIGNIFICANT CHANGE UP (ref 80–100)
MONOCYTES # BLD AUTO: 7.13 K/UL — HIGH (ref 0–0.9)
MONOCYTES NFR BLD AUTO: 55.4 % — HIGH (ref 2–14)
NEUTROPHILS # BLD AUTO: 2.74 K/UL — SIGNIFICANT CHANGE UP (ref 1.8–7.4)
NEUTROPHILS NFR BLD AUTO: 21.2 % — LOW (ref 43–77)
NRBC # FLD: 0.03 K/UL — SIGNIFICANT CHANGE UP (ref 0–0)
NRBC # FLD: 0.05 K/UL — SIGNIFICANT CHANGE UP (ref 0–0)
PLATELET # BLD AUTO: 69 K/UL — LOW (ref 150–400)
PLATELET # BLD AUTO: 75 K/UL — LOW (ref 150–400)
PMV BLD: 12.9 FL — SIGNIFICANT CHANGE UP (ref 7–13)
PMV BLD: 13.7 FL — HIGH (ref 7–13)
POTASSIUM SERPL-MCNC: 3.1 MMOL/L — LOW (ref 3.5–5.3)
POTASSIUM SERPL-SCNC: 3.1 MMOL/L — LOW (ref 3.5–5.3)
PROT SERPL-MCNC: 5.8 G/DL — LOW (ref 6–8.3)
RBC # BLD: 2.53 M/UL — LOW (ref 4.2–5.8)
RBC # BLD: 2.91 M/UL — LOW (ref 4.2–5.8)
RBC # FLD: 21.7 % — HIGH (ref 10.3–14.5)
RBC # FLD: 21.9 % — HIGH (ref 10.3–14.5)
SODIUM SERPL-SCNC: 135 MMOL/L — SIGNIFICANT CHANGE UP (ref 135–145)
WBC # BLD: 12.88 K/UL — HIGH (ref 3.8–10.5)
WBC # BLD: 14.38 K/UL — HIGH (ref 3.8–10.5)
WBC # FLD AUTO: 12.88 K/UL — HIGH (ref 3.8–10.5)
WBC # FLD AUTO: 14.38 K/UL — HIGH (ref 3.8–10.5)

## 2019-04-08 PROCEDURE — 99232 SBSQ HOSP IP/OBS MODERATE 35: CPT | Mod: GC

## 2019-04-08 RX ORDER — POTASSIUM CHLORIDE 20 MEQ
40 PACKET (EA) ORAL ONCE
Qty: 0 | Refills: 0 | Status: COMPLETED | OUTPATIENT
Start: 2019-04-08 | End: 2019-04-08

## 2019-04-08 RX ADMIN — Medication 25 MILLIGRAM(S): at 05:10

## 2019-04-08 RX ADMIN — Medication 800 MILLIGRAM(S): at 23:00

## 2019-04-08 RX ADMIN — Medication 650 MILLIGRAM(S): at 17:18

## 2019-04-08 RX ADMIN — Medication 1 TABLET(S): at 13:11

## 2019-04-08 RX ADMIN — SPIRONOLACTONE 50 MILLIGRAM(S): 25 TABLET, FILM COATED ORAL at 05:10

## 2019-04-08 RX ADMIN — Medication 650 MILLIGRAM(S): at 05:10

## 2019-04-08 RX ADMIN — SODIUM CHLORIDE 50 MILLILITER(S): 9 INJECTION, SOLUTION INTRAVENOUS at 08:13

## 2019-04-08 RX ADMIN — Medication 1000 UNIT(S): at 13:11

## 2019-04-08 RX ADMIN — Medication 50 MILLILITER(S): at 14:46

## 2019-04-08 RX ADMIN — Medication 800 MILLIGRAM(S): at 05:09

## 2019-04-08 RX ADMIN — Medication 250 MILLIGRAM(S): at 13:11

## 2019-04-08 RX ADMIN — Medication 1 TABLET(S): at 08:54

## 2019-04-08 RX ADMIN — Medication 800 MILLIGRAM(S): at 13:11

## 2019-04-08 RX ADMIN — Medication 1 TABLET(S): at 17:19

## 2019-04-08 RX ADMIN — Medication 40 MILLIEQUIVALENT(S): at 13:11

## 2019-04-08 RX ADMIN — Medication 40 MILLIEQUIVALENT(S): at 17:18

## 2019-04-08 RX ADMIN — Medication 3 MILLIGRAM(S): at 23:00

## 2019-04-08 RX ADMIN — PANTOPRAZOLE SODIUM 40 MILLIGRAM(S): 20 TABLET, DELAYED RELEASE ORAL at 05:11

## 2019-04-08 RX ADMIN — Medication 20 MILLIGRAM(S): at 05:09

## 2019-04-08 RX ADMIN — Medication 1 TABLET(S): at 05:08

## 2019-04-08 RX ADMIN — Medication 50 MILLILITER(S): at 05:06

## 2019-04-08 NOTE — CHART NOTE - NSCHARTNOTEFT_GEN_A_CORE
NUTRITION FOLLOW-UP:  Pt. somnolent @ time of RDN f/u visit.  Observed 0% consumption of breakfast or Ensure Clear supplement.  Per nursing, Pt. refused to eat this morning and generally has poor intake of hospital food.  It has also been reported that Pt. eating food provided by family.  Repeat KCal count ordered/initiated on 4/4/19.  Approximate estimate of calorie/protein intake as follows:  (4/4) ~845Kcals & ~27g protein, (4/5) ~930Kcals & ~34g protein, (4/6) ~655Kcals & 25g protein... these calculations do not include foods provided from home, thus it is difficult to determine if Pt. adequately meeting estimated energy/nutrient needs via PO at this time.  However, Pt. does seem to have increase in PO intake since prior KCal count earlier on admission.  Pt. with persistent diarrhea, although improving per chart & RN---> deemed osmotic in nature, per GI note.  Although Pt. prescribed fiber, lactose and gluten/gliadin restrictions, if Pt. eating foods from home it is unclear as to if these prescribed modifications are directly contributing to improvement in GI symptoms.  No nausea/vomiting or issues chewing/swallowing stated at this time.  Attempted to contact family to inquire about specific foods being brought in, as well as to provide diet education.        Weight:  (obtain current)               57.8kg (4/2/19)               59.3kg (3/23/19)    Edema:  None noted.    Skin:  No noted pressure injuries.      Pertinent Medications: MEDICATIONS  (STANDING):  albumin human 25% IVPB 50 milliLiter(s) IV Intermittent every 8 hours  calcium carbonate   1250 mG (OsCal) 1 Tablet(s) Oral two times a day  cholecalciferol 1000 Unit(s) Oral daily  furosemide    Tablet 20 milliGRAM(s) Oral daily  lactobacillus acidophilus 1 Tablet(s) Oral three times a day with meals  melatonin 3 milliGRAM(s) Oral <User Schedule>  mesalamine DR Capsule 800 milliGRAM(s) Oral three times a day  metoprolol succinate ER 25 milliGRAM(s) Oral daily  multivitamin 1 Tablet(s) Oral daily  pantoprazole    Tablet 40 milliGRAM(s) Oral before breakfast  potassium chloride   Powder 40 milliEquivalent(s) Oral once  sodium bicarbonate 650 milliGRAM(s) Oral two times a day  spironolactone 50 milliGRAM(s) Oral daily  vancomycin  IVPB 750 milliGRAM(s) IV Intermittent daily    MEDICATIONS  (PRN):  ALBUTerol    90 MICROgram(s) HFA Inhaler 2 Puff(s) Inhalation every 6 hours PRN Wheezing  dicyclomine 20 milliGRAM(s) Oral four times a day before meals PRN abd pain  loperamide 2 milliGRAM(s) Oral three times a day PRN Diarrhea  QUEtiapine 25 milliGRAM(s) Oral at bedtime PRN for agitation/sleep  simethicone 80 milliGRAM(s) Chew four times a day PRN Gas    Pertinent Labs:  04-08 Na135 mmol/L Glu 86 mg/dL K+ 3.1 mmol/L<L> Cr  0.90 mg/dL BUN 6 mg/dL<L> 04-05 Phos 2.5 mg/dL 04-08 Alb 2.6 g/dL<L>    Current Diet:  Dysphagia II (mechanical soft) w thin liquids, Fiber/Lactose/Gluten & Gliadin restricted, Low Sodium + Ensure Clear 8oz PO 3x daily       NUTRITION Dx:  Pt. remains severely malnourished.        PLAN/RECOMMENDATIONS:    1) Continue current diet.  2) Obtain current & daily weights  3) RDN remains available and will f/u PRN.          Yoon Ayala RDN, CDN pager [phone]

## 2019-04-08 NOTE — PROGRESS NOTE ADULT - SUBJECTIVE AND OBJECTIVE BOX
Cardiovascular Disease Progress Note    Overnight events: No acute events overnight. Mr. Miranda is resting comfortably. He denies pain.    Otherwise review of systems negative    Objective Findings:  T(C): 37 (04-08-19 @ 05:02), Max: 37 (04-08-19 @ 05:02)  HR: 80 (04-08-19 @ 05:02) (78 - 86)  BP: 123/71 (04-08-19 @ 05:02) (102/63 - 123/71)  RR: 17 (04-08-19 @ 05:02) (17 - 18)  SpO2: 100% (04-08-19 @ 05:02) (100% - 110%)  Wt(kg): --  Daily     Daily       Physical Exam:  Gen: NAD  HEENT: EOMI  CV: RRR, normal S1 + S2, no m/r/g  Lungs: CTAB  Abd: soft, non-tender  Ext: No edema    Telemetry: n/a    Laboratory Data:                        7.2    12.88 )-----------( 69       ( 08 Apr 2019 06:21 )             23.2     04-08    135  |  98  |  6<L>  ----------------------------<  86  3.1<L>   |  29  |  0.90    Ca    7.7<L>      08 Apr 2019 06:21    TPro  5.8<L>  /  Alb  2.6<L>  /  TBili  0.7  /  DBili  x   /  AST  24  /  ALT  19  /  AlkPhos  206<H>  04-08              Inpatient Medications:  MEDICATIONS  (STANDING):  albumin human 25% IVPB 50 milliLiter(s) IV Intermittent every 8 hours  calcium carbonate   1250 mG (OsCal) 1 Tablet(s) Oral two times a day  cholecalciferol 1000 Unit(s) Oral daily  dextrose 5% 1000 milliLiter(s) (50 mL/Hr) IV Continuous <Continuous>  furosemide    Tablet 20 milliGRAM(s) Oral daily  lactobacillus acidophilus 1 Tablet(s) Oral three times a day with meals  melatonin 3 milliGRAM(s) Oral <User Schedule>  mesalamine DR Capsule 800 milliGRAM(s) Oral three times a day  metoprolol succinate ER 25 milliGRAM(s) Oral daily  multivitamin 1 Tablet(s) Oral daily  pantoprazole    Tablet 40 milliGRAM(s) Oral before breakfast  sodium bicarbonate 650 milliGRAM(s) Oral two times a day  spironolactone 50 milliGRAM(s) Oral daily  vancomycin  IVPB 750 milliGRAM(s) IV Intermittent daily      Assessment:  73 year old man with HTN, anemia, chronic diarrhea, malnutrition and low voltage EKG presents with weakness.    Plan of Care:    #Post-operative evaluation-  Mr. Miranda tolerated colonoscopy well on 4/1.  He displays no signs or symptoms or post-procedural coronary ischemia, decompensated CHF or malignant arrythmia.  TTE from 3/10 reviewed- no significant structural heart disease.   Continue current cardiac management.      #HTN-  BP controlled on current regimen.     #Chronic diarrhea-  Pathology consistent with colitis.  GI input noted.   Treatment of cirrhosis as per primary team.     Over 25 minutes spent on total encounter; more than 50% of the visit was spent counseling and/or coordinating care by the attending physician.      Darren Herring MD [facility]  Cardiovascular Disease  [phone]

## 2019-04-08 NOTE — PROGRESS NOTE ADULT - SUBJECTIVE AND OBJECTIVE BOX
[facility] NEPHROLOGY PRACTICE   MD MAGALY MORAN MD ANGELA WONG, PA    TEL:  OFFICE: [phone]  DR ANDERSON CELL: [phone]  DR. MONAE CELL: [phone]  HUBERT WELLINGTON CELL: [phone]        Patient is a 73y old  Male who presents with a chief complaint of Generalized weakness and inability to walk (08 Apr 2019 08:22)      Patient seen and examined at bedside. No chest pain/sob    VITALS:  T(F): 97.8 (04-08-19 @ 12:10), Max: 98.6 (04-08-19 @ 05:02)  HR: 74 (04-08-19 @ 12:10)  BP: 103/54 (04-08-19 @ 12:10)  RR: 18 (04-08-19 @ 12:10)  SpO2: 99% (04-08-19 @ 12:10)  Wt(kg): --        PHYSICAL EXAM:  Constitutional: NAD  Neck: No JVD  Respiratory: CTAB, no wheezes, rales or rhonchi  Cardiovascular: S1, S2, RRR  Gastrointestinal: BS+, soft, NT/ND  Extremities: 1+ peripheral edema    Hospital Medications:   MEDICATIONS  (STANDING):  albumin human 25% IVPB 50 milliLiter(s) IV Intermittent every 8 hours  calcium carbonate   1250 mG (OsCal) 1 Tablet(s) Oral two times a day  cholecalciferol 1000 Unit(s) Oral daily  furosemide    Tablet 20 milliGRAM(s) Oral daily  lactobacillus acidophilus 1 Tablet(s) Oral three times a day with meals  melatonin 3 milliGRAM(s) Oral <User Schedule>  mesalamine DR Capsule 800 milliGRAM(s) Oral three times a day  metoprolol succinate ER 25 milliGRAM(s) Oral daily  multivitamin 1 Tablet(s) Oral daily  pantoprazole    Tablet 40 milliGRAM(s) Oral before breakfast  sodium bicarbonate 650 milliGRAM(s) Oral two times a day  spironolactone 50 milliGRAM(s) Oral daily  vancomycin  IVPB 750 milliGRAM(s) IV Intermittent daily      LABS:  04-08    135  |  98  |  6<L>  ----------------------------<  86  3.1<L>   |  29  |  0.90    Ca    7.7<L>      08 Apr 2019 06:21    TPro  5.8<L>  /  Alb  2.6<L>  /  TBili  0.7  /  DBili      /  AST  24  /  ALT  19  /  AlkPhos  206<H>  04-08    Creatinine Trend: 0.90 <--, 0.96 <--, 1.01 <--, 1.05 <--, 1.10 <--, 1.22 <--, 1.25 <--    Albumin, Serum: 2.6 g/dL (04-08 @ 06:21)                              8.5    14.38 )-----------( 75       ( 08 Apr 2019 12:45 )             28.0     Urine Studies:  Urinalysis - [04-01-19 @ 17:00]      Color YELLOW / Appearance CLEAR / SG 1.015 / pH 6.0      Gluc NEGATIVE / Ketone NEGATIVE  / Bili NEGATIVE / Urobili NORMAL       Blood NEGATIVE / Protein 30 / Leuk Est NEGATIVE / Nitrite NEGATIVE      RBC 0-2 / WBC 3-5 / Hyaline NEGATIVE / Gran  / Sq Epi OCC / Non Sq Epi  / Bacteria FEW      Iron 48, TIBC 78, %sat --      [03-29-19 @ 05:20]  Ferritin 378.6      [03-29-19 @ 05:20]  Vitamin D (25OH) 25.6      [03-23-19 @ 18:15]  TSH 1.98      [03-05-19 @ 06:45]    HBsAb Nonreactive      [03-20-19 @ 05:35]  HBsAg NEGATIVE      [03-20-19 @ 05:35]      RADIOLOGY & ADDITIONAL STUDIES:

## 2019-04-08 NOTE — PROGRESS NOTE ADULT - ASSESSMENT
Impression:  1) Diarrhea - with previous negative work up inpatient.  Work up negative to date: Cdiff, GI PCR, stool fat, O&P, giardia. Celiac panel returned weakly positive. Pt's stool osmolar gap is consistent with osmotic diarrhea. Currently improved on lactose and gluten free diet.    Recommendations:   - monitor stool output and volume   - gluten and lactose free LRD Impression:  1) Diarrhea - previous negative work up inpatient.  Work up negative to date: Cdiff, GI PCR, stool fat, O&P, giardia. Celiac panel returned weakly positive. Pt's stool osmolar gap is consistent with osmotic diarrhea. Currently improved on lactose and gluten free diet.    Recommendations:   - monitor stool output and volume   - gluten and lactose free LRD

## 2019-04-08 NOTE — PROGRESS NOTE ADULT - PROBLEM SELECTOR PLAN 1
renal function improving  PEDRO FEna<1 likely prerenal no hydro on US, renal function improved with IVF  avoid nephrotoxic agents  monitor bmp.

## 2019-04-08 NOTE — PROGRESS NOTE ADULT - SUBJECTIVE AND OBJECTIVE BOX
Patient is a 73y old  Male who presents with a chief complaint of Generalized weakness and inability to walk (08 Apr 2019 14:35)      SUBJECTIVE / OVERNIGHT EVENTS:    Events noted.  CONSTITUTIONAL: No fever,  or fatigue  RESPIRATORY: No cough, wheezing,  No shortness of breath  CARDIOVASCULAR: No chest pain, palpitations.   GASTROINTESTINAL: No abdominal or epigastric pain.  NEUROLOGICAL: No headaches,     MEDICATIONS  (STANDING):  calcium carbonate   1250 mG (OsCal) 1 Tablet(s) Oral two times a day  cholecalciferol 1000 Unit(s) Oral daily  furosemide    Tablet 20 milliGRAM(s) Oral daily  lactobacillus acidophilus 1 Tablet(s) Oral three times a day with meals  melatonin 3 milliGRAM(s) Oral <User Schedule>  mesalamine DR Capsule 800 milliGRAM(s) Oral three times a day  metoprolol succinate ER 25 milliGRAM(s) Oral daily  multivitamin 1 Tablet(s) Oral daily  pantoprazole    Tablet 40 milliGRAM(s) Oral before breakfast  sodium bicarbonate 650 milliGRAM(s) Oral two times a day  spironolactone 50 milliGRAM(s) Oral daily  vancomycin  IVPB 750 milliGRAM(s) IV Intermittent daily    MEDICATIONS  (PRN):  ALBUTerol    90 MICROgram(s) HFA Inhaler 2 Puff(s) Inhalation every 6 hours PRN Wheezing  dicyclomine 20 milliGRAM(s) Oral four times a day before meals PRN abd pain  loperamide 2 milliGRAM(s) Oral three times a day PRN Diarrhea  QUEtiapine 25 milliGRAM(s) Oral at bedtime PRN for agitation/sleep  simethicone 80 milliGRAM(s) Chew four times a day PRN Gas        CAPILLARY BLOOD GLUCOSE        I&O's Summary      PHYSICAL EXAM:  GENERAL: NAD  NECK: Supple, No JVD  CHEST/LUNG: Clear to auscultation bilaterally; No wheezing.  HEART: Regular rate and rhythm; No murmurs, rubs, or gallops  ABDOMEN: Soft, Nontender, Nondistended; Bowel sounds present  EXTREMITIES:   No edema  NEUROLOGY: AAO X 3      LABS:                        8.5    14.38 )-----------( 75       ( 08 Apr 2019 12:45 )             28.0     04-08    135  |  98  |  6<L>  ----------------------------<  86  3.1<L>   |  29  |  0.90    Ca    7.7<L>      08 Apr 2019 06:21    TPro  5.8<L>  /  Alb  2.6<L>  /  TBili  0.7  /  DBili  x   /  AST  24  /  ALT  19  /  AlkPhos  206<H>  04-08            CAPILLARY BLOOD GLUCOSE        04-04 @ 01:56  Culture-urine --  Culture results --  method type --  Organism --  Organism Identification --  Specimen source FECES  04-03 @ 15:42  Culture-urine --  Culture results --  method type --  Organism --  Organism Identification --  Specimen source PERITONEAL FLUID  04-03 @ 12:26  Culture-urine --  Culture results --  method type --  Organism --  Organism Identification --  Specimen source PERITONEAL FLUID           04-04 @ 01:56  Culture blood --  Culture results --  Gram stain --  Gram stain blood --  Method type --  Organism --  Organism identification --  Specimen source FECES   04-03 @ 15:42  Culture blood --  Culture results --  Gram stain --  Gram stain blood --  Method type --  Organism --  Organism identification --  Specimen source PERITONEAL FLUID   04-03 @ 12:26  Culture blood --  Culture results --  Gram stain   NOS^No Organisms Seen  WBC^White Blood Cells  QNTY CELLS IN GRAM STAIN: RARE (1+)  Gram stain blood --  Method type --  Organism --  Organism identification --  Specimen source PERITONEAL FLUID      RADIOLOGY & ADDITIONAL TESTS:    Imaging Personally Reviewed:    Consultant(s) Notes Reviewed:      Care Discussed with Consultants/Other Providers:

## 2019-04-08 NOTE — PROGRESS NOTE ADULT - ASSESSMENT
· Assessment	  Mr. Miranda is a 72 yo man with history of Anemia, HTN, PUD, chronic diarrhea, and recent hospitalization at [facility] from 3/3/19-3/21/19 brought in by family for generalized weakness and inability to walk admitted for severe malnutrition, mild hypernatremia and severe deconditioning. Exam relatively benign aside for significant LE edema and weakness of legs. Anemia at baseline.     Cirrhosis:  Hepatology f/up    ·  Problem: Chronic diarrhea.  Plan: GI f/up noted.  - S/p colonoscopy and biopsy.   - Biopsy report: no CMV    Pancytopenia:  Hem f/up      Dw pt's daughter.

## 2019-04-08 NOTE — PROGRESS NOTE ADULT - SUBJECTIVE AND OBJECTIVE BOX
Chief Complaint:  Patient is a 73y old  Male who presents with a chief complaint of Generalized weakness and inability to walk (08 Apr 2019 08:11)      Interval Events: Pt endorses improvement in his diarrhea. Pt has about 2 soft BMs this past weekend.   ROS: All 12 point system except listed above were otherwise negative.    Allergies:  No Known Allergies        Hospital Medications:  albumin human 25% IVPB 50 milliLiter(s) IV Intermittent every 8 hours  ALBUTerol    90 MICROgram(s) HFA Inhaler 2 Puff(s) Inhalation every 6 hours PRN  calcium carbonate   1250 mG (OsCal) 1 Tablet(s) Oral two times a day  cholecalciferol 1000 Unit(s) Oral daily  dextrose 5% 1000 milliLiter(s) IV Continuous <Continuous>  dicyclomine 20 milliGRAM(s) Oral four times a day before meals PRN  furosemide    Tablet 20 milliGRAM(s) Oral daily  lactobacillus acidophilus 1 Tablet(s) Oral three times a day with meals  loperamide 2 milliGRAM(s) Oral three times a day PRN  melatonin 3 milliGRAM(s) Oral <User Schedule>  mesalamine DR Capsule 800 milliGRAM(s) Oral three times a day  metoprolol succinate ER 25 milliGRAM(s) Oral daily  multivitamin 1 Tablet(s) Oral daily  pantoprazole    Tablet 40 milliGRAM(s) Oral before breakfast  QUEtiapine 25 milliGRAM(s) Oral at bedtime PRN  simethicone 80 milliGRAM(s) Chew four times a day PRN  sodium bicarbonate 650 milliGRAM(s) Oral two times a day  spironolactone 50 milliGRAM(s) Oral daily  vancomycin  IVPB 750 milliGRAM(s) IV Intermittent daily      PMHX/PSHX:  Chronic kidney disease (CKD)  Anemia, unspecified type  Essential hypertension  CLL (chronic lymphocytic leukemia)  No significant past surgical history  No significant past surgical history      Family history:  Family history of myocardial infarction (Sibling)    There is no family history of peptic ulcer disease, gastric cancer, colon polyps, colon cancer, celiac disease, biliary, hepatic, or pancreatic disease.  None of the female relatives have breast, uterine, or ovarian cancer.     PHYSICAL EXAM:   Vital Signs:  Vital Signs Last 24 Hrs  T(C): 37 (08 Apr 2019 05:02), Max: 37 (08 Apr 2019 05:02)  T(F): 98.6 (08 Apr 2019 05:02), Max: 98.6 (08 Apr 2019 05:02)  HR: 80 (08 Apr 2019 05:02) (78 - 86)  BP: 123/71 (08 Apr 2019 05:02) (102/63 - 123/71)  BP(mean): --  RR: 17 (08 Apr 2019 05:02) (17 - 18)  SpO2: 100% (08 Apr 2019 05:02) (100% - 110%)  Daily     Daily       PHYSICAL EXAM:     GENERAL:  Appears stated age, well-groomed, well-nourished, no distress  HEENT:  NC/AT,  conjunctivae clear, sclera -anicteric  CHEST:  Full & symmetric excursion, no increased  HEART:  Regular rhythm  ABDOMEN:  Soft, non-tender, non-distended, normoactive bowel sounds,  no masses ,no hepato-splenomegaly,   EXTREMITIES:  no cyanosis,clubbing or edema  SKIN:  No rash/erythema/ecchymoses/petechiae/wounds/abscess/warm/dry  NEURO:  Alert, oriented    LABS:                        7.2    12.88 )-----------( 69       ( 08 Apr 2019 06:21 )             23.2     Mean Cell Volume: 91.7 fL (04-08-19 @ 06:21)    04-08    135  |  98  |  6<L>  ----------------------------<  86  3.1<L>   |  29  |  0.90    Ca    7.7<L>      08 Apr 2019 06:21    TPro  5.8<L>  /  Alb  2.6<L>  /  TBili  0.7  /  DBili  x   /  AST  24  /  ALT  19  /  AlkPhos  206<H>  04-08    LIVER FUNCTIONS - ( 08 Apr 2019 06:21 )  Alb: 2.6 g/dL / Pro: 5.8 g/dL / ALK PHOS: 206 u/L / ALT: 19 u/L / AST: 24 u/L / GGT: x                                       7.2    12.88 )-----------( 69       ( 08 Apr 2019 06:21 )             23.2                         8.0    12.72 )-----------( 67       ( 07 Apr 2019 05:42 )             25.6                         8.6    10.50 )-----------( 30 ( 06 Apr 2019 05:40 )             27.9     Imaging:

## 2019-04-09 DIAGNOSIS — D64.9 ANEMIA, UNSPECIFIED: ICD-10-CM

## 2019-04-09 LAB
ALBUMIN SERPL ELPH-MCNC: 2.8 G/DL — LOW (ref 3.3–5)
ALP SERPL-CCNC: 252 U/L — HIGH (ref 40–120)
ALT FLD-CCNC: 22 U/L — SIGNIFICANT CHANGE UP (ref 4–41)
ANION GAP SERPL CALC-SCNC: 8 MMO/L — SIGNIFICANT CHANGE UP (ref 7–14)
ANISOCYTOSIS BLD QL: SLIGHT — SIGNIFICANT CHANGE UP
AST SERPL-CCNC: 31 U/L — SIGNIFICANT CHANGE UP (ref 4–40)
BACTERIA FLD CULT: SIGNIFICANT CHANGE UP
BASOPHILS # BLD AUTO: 0.01 K/UL — SIGNIFICANT CHANGE UP (ref 0–0.2)
BASOPHILS NFR BLD AUTO: 0.1 % — SIGNIFICANT CHANGE UP (ref 0–2)
BASOPHILS NFR SPEC: 0 % — SIGNIFICANT CHANGE UP (ref 0–2)
BILIRUB DIRECT SERPL-MCNC: 0.4 MG/DL — HIGH (ref 0.1–0.2)
BILIRUB SERPL-MCNC: 0.9 MG/DL — SIGNIFICANT CHANGE UP (ref 0.2–1.2)
BLASTS # FLD: 0 % — SIGNIFICANT CHANGE UP (ref 0–0)
BUN SERPL-MCNC: 6 MG/DL — LOW (ref 7–23)
CALCIUM SERPL-MCNC: 8.2 MG/DL — LOW (ref 8.4–10.5)
CHLORIDE SERPL-SCNC: 100 MMOL/L — SIGNIFICANT CHANGE UP (ref 98–107)
CO2 SERPL-SCNC: 29 MMOL/L — SIGNIFICANT CHANGE UP (ref 22–31)
CREAT SERPL-MCNC: 1 MG/DL — SIGNIFICANT CHANGE UP (ref 0.5–1.3)
EOSINOPHIL # BLD AUTO: 1.19 K/UL — HIGH (ref 0–0.5)
EOSINOPHIL NFR BLD AUTO: 8.1 % — HIGH (ref 0–6)
EOSINOPHIL NFR FLD: 9.9 % — HIGH (ref 0–6)
GIANT PLATELETS BLD QL SMEAR: PRESENT — SIGNIFICANT CHANGE UP
GLUCOSE SERPL-MCNC: 83 MG/DL — SIGNIFICANT CHANGE UP (ref 70–99)
HCT VFR BLD CALC: 26.2 % — LOW (ref 39–50)
HGB BLD-MCNC: 8.1 G/DL — LOW (ref 13–17)
HYPOCHROMIA BLD QL: SLIGHT — SIGNIFICANT CHANGE UP
IMM GRANULOCYTES NFR BLD AUTO: 1.4 % — SIGNIFICANT CHANGE UP (ref 0–1.5)
LYMPHOCYTES # BLD AUTO: 2.94 K/UL — SIGNIFICANT CHANGE UP (ref 1–3.3)
LYMPHOCYTES # BLD AUTO: 20 % — SIGNIFICANT CHANGE UP (ref 13–44)
LYMPHOCYTES NFR SPEC AUTO: 5.9 % — LOW (ref 13–44)
MACROCYTES BLD QL: SLIGHT — SIGNIFICANT CHANGE UP
MAGNESIUM SERPL-MCNC: 1.3 MG/DL — LOW (ref 1.6–2.6)
MCHC RBC-ENTMCNC: 28.6 PG — SIGNIFICANT CHANGE UP (ref 27–34)
MCHC RBC-ENTMCNC: 30.9 % — LOW (ref 32–36)
MCV RBC AUTO: 92.6 FL — SIGNIFICANT CHANGE UP (ref 80–100)
METAMYELOCYTES # FLD: 0 % — SIGNIFICANT CHANGE UP (ref 0–1)
MONOCYTES # BLD AUTO: 7.17 K/UL — HIGH (ref 0–0.9)
MONOCYTES NFR BLD AUTO: 48.7 % — HIGH (ref 2–14)
MONOCYTES NFR BLD: 38.6 % — HIGH (ref 2–9)
MYELOCYTES NFR BLD: 1 % — HIGH (ref 0–0)
NEUTROPHIL AB SER-ACNC: 40.6 % — LOW (ref 43–77)
NEUTROPHILS # BLD AUTO: 3.2 K/UL — SIGNIFICANT CHANGE UP (ref 1.8–7.4)
NEUTROPHILS NFR BLD AUTO: 21.7 % — LOW (ref 43–77)
NEUTS BAND # BLD: 2 % — SIGNIFICANT CHANGE UP (ref 0–6)
NRBC # FLD: 0.05 K/UL — SIGNIFICANT CHANGE UP (ref 0–0)
OTHER - HEMATOLOGY %: 0 — SIGNIFICANT CHANGE UP
PHOSPHATE SERPL-MCNC: 2.1 MG/DL — LOW (ref 2.5–4.5)
PLATELET # BLD AUTO: 80 K/UL — LOW (ref 150–400)
PLATELET COUNT - ESTIMATE: SIGNIFICANT CHANGE UP
PMV BLD: 13.4 FL — HIGH (ref 7–13)
POIKILOCYTOSIS BLD QL AUTO: SLIGHT — SIGNIFICANT CHANGE UP
POTASSIUM SERPL-MCNC: 4.5 MMOL/L — SIGNIFICANT CHANGE UP (ref 3.5–5.3)
POTASSIUM SERPL-SCNC: 4.5 MMOL/L — SIGNIFICANT CHANGE UP (ref 3.5–5.3)
PROMYELOCYTES # FLD: 0 % — SIGNIFICANT CHANGE UP (ref 0–0)
PROT SERPL-MCNC: 6.5 G/DL — SIGNIFICANT CHANGE UP (ref 6–8.3)
RBC # BLD: 2.83 M/UL — LOW (ref 4.2–5.8)
RBC # FLD: 22 % — HIGH (ref 10.3–14.5)
SMUDGE CELLS # BLD: PRESENT — SIGNIFICANT CHANGE UP
SODIUM SERPL-SCNC: 137 MMOL/L — SIGNIFICANT CHANGE UP (ref 135–145)
TARGETS BLD QL SMEAR: SLIGHT — SIGNIFICANT CHANGE UP
VARIANT LYMPHS # BLD: 2 % — SIGNIFICANT CHANGE UP
WBC # BLD: 14.71 K/UL — HIGH (ref 3.8–10.5)
WBC # FLD AUTO: 14.71 K/UL — HIGH (ref 3.8–10.5)

## 2019-04-09 RX ORDER — MAGNESIUM SULFATE 500 MG/ML
1 VIAL (ML) INJECTION ONCE
Qty: 0 | Refills: 0 | Status: COMPLETED | OUTPATIENT
Start: 2019-04-09 | End: 2019-04-09

## 2019-04-09 RX ORDER — POTASSIUM PHOSPHATE, MONOBASIC POTASSIUM PHOSPHATE, DIBASIC 236; 224 MG/ML; MG/ML
15 INJECTION, SOLUTION INTRAVENOUS ONCE
Qty: 0 | Refills: 0 | Status: COMPLETED | OUTPATIENT
Start: 2019-04-09 | End: 2019-04-09

## 2019-04-09 RX ADMIN — Medication 250 MILLIGRAM(S): at 14:33

## 2019-04-09 RX ADMIN — Medication 1 TABLET(S): at 09:09

## 2019-04-09 RX ADMIN — POTASSIUM PHOSPHATE, MONOBASIC POTASSIUM PHOSPHATE, DIBASIC 62.5 MILLIMOLE(S): 236; 224 INJECTION, SOLUTION INTRAVENOUS at 17:24

## 2019-04-09 RX ADMIN — Medication 650 MILLIGRAM(S): at 05:40

## 2019-04-09 RX ADMIN — Medication 800 MILLIGRAM(S): at 05:40

## 2019-04-09 RX ADMIN — Medication 1 TABLET(S): at 17:24

## 2019-04-09 RX ADMIN — Medication 1 TABLET(S): at 17:25

## 2019-04-09 RX ADMIN — Medication 1 TABLET(S): at 14:33

## 2019-04-09 RX ADMIN — Medication 25 MILLIGRAM(S): at 05:40

## 2019-04-09 RX ADMIN — Medication 800 MILLIGRAM(S): at 14:35

## 2019-04-09 RX ADMIN — Medication 20 MILLIGRAM(S): at 05:40

## 2019-04-09 RX ADMIN — Medication 1 TABLET(S): at 14:34

## 2019-04-09 RX ADMIN — Medication 800 MILLIGRAM(S): at 21:50

## 2019-04-09 RX ADMIN — Medication 100 GRAM(S): at 14:34

## 2019-04-09 RX ADMIN — Medication 1 TABLET(S): at 05:40

## 2019-04-09 RX ADMIN — Medication 650 MILLIGRAM(S): at 17:24

## 2019-04-09 RX ADMIN — Medication 3 MILLIGRAM(S): at 21:50

## 2019-04-09 RX ADMIN — PANTOPRAZOLE SODIUM 40 MILLIGRAM(S): 20 TABLET, DELAYED RELEASE ORAL at 05:49

## 2019-04-09 RX ADMIN — SPIRONOLACTONE 50 MILLIGRAM(S): 25 TABLET, FILM COATED ORAL at 05:40

## 2019-04-09 RX ADMIN — Medication 1000 UNIT(S): at 14:34

## 2019-04-09 NOTE — PROGRESS NOTE ADULT - ASSESSMENT
· Assessment	  Mr. Miranda is a 74 yo man with history of Anemia, HTN, PUD, chronic diarrhea, and recent hospitalization at [facility] from 3/3/19-3/21/19 brought in by family for generalized weakness and inability to walk admitted for severe malnutrition, mild hypernatremia and severe deconditioning. Exam relatively benign aside for significant LE edema and weakness of legs. Anemia at baseline.         ·  Problem: Chronic diarrhea.  Plan: GI f/up.  - S/p colonoscopy and biopsy.   - Biopsy report: no CMV        Dw pt's daughter.

## 2019-04-09 NOTE — PROGRESS NOTE ADULT - SUBJECTIVE AND OBJECTIVE BOX
[facility]   MD MAGALY MORAN MD ANGELA WONG, PA    TEL:  OFFICE: [phone]  DR ANDERSON CELL: [phone]  DR. MONAE CELL: [phone]  HUBERT WELLINGTON CELL: [phone]        Patient is a 73y old  Male who presents with a chief complaint of Generalized weakness and inability to walk (09 Apr 2019 13:03)      Patient seen and examined at bedside. No chest pain/sob, diarrhea improving, still c/o no appetite    VITALS:  T(F): 97.6 (04-09-19 @ 05:36), Max: 98.1 (04-08-19 @ 22:59)  HR: 81 (04-09-19 @ 05:36)  BP: 115/61 (04-09-19 @ 05:36)  RR: 18 (04-09-19 @ 05:36)  SpO2: 100% (04-09-19 @ 05:36)  Wt(kg): --        PHYSICAL EXAM:  Constitutional: NAD  Neck: No JVD  Respiratory: CTAB, no wheezes, rales or rhonchi  Cardiovascular: S1, S2, RRR  Gastrointestinal: BS+, soft, NT/ND  Extremities: 1-2+ peripheral edema    Hospital Medications:   MEDICATIONS  (STANDING):  calcium carbonate   1250 mG (OsCal) 1 Tablet(s) Oral two times a day  cholecalciferol 1000 Unit(s) Oral daily  furosemide    Tablet 20 milliGRAM(s) Oral daily  lactobacillus acidophilus 1 Tablet(s) Oral three times a day with meals  magnesium sulfate  IVPB 1 Gram(s) IV Intermittent once  melatonin 3 milliGRAM(s) Oral <User Schedule>  mesalamine DR Capsule 800 milliGRAM(s) Oral three times a day  metoprolol succinate ER 25 milliGRAM(s) Oral daily  multivitamin 1 Tablet(s) Oral daily  pantoprazole    Tablet 40 milliGRAM(s) Oral before breakfast  potassium phosphate IVPB 15 milliMole(s) IV Intermittent once  sodium bicarbonate 650 milliGRAM(s) Oral two times a day  spironolactone 50 milliGRAM(s) Oral daily  vancomycin  IVPB 750 milliGRAM(s) IV Intermittent daily      LABS:  04-09    137  |  100  |  6<L>  ----------------------------<  83  4.5   |  29  |  1.00    Ca    8.2<L>      09 Apr 2019 06:00  Phos  2.1     04-09  Mg     1.3     04-09    TPro  6.5  /  Alb  2.8<L>  /  TBili  0.9  /  DBili  0.4<H>  /  AST  31  /  ALT  22  /  AlkPhos  252<H>  04-09    Creatinine Trend: 1.00 <--, 0.90 <--, 0.96 <--, 1.01 <--, 1.05 <--, 1.10 <--, 1.22 <--    Phosphorus Level, Serum: 2.1 mg/dL (04-09 @ 06:00)  Albumin, Serum: 2.8 g/dL (04-09 @ 06:00)                              8.1    14.71 )-----------( 80       ( 09 Apr 2019 06:00 )             26.2     Urine Studies:  Urinalysis - [04-01-19 @ 17:00]      Color YELLOW / Appearance CLEAR / SG 1.015 / pH 6.0      Gluc NEGATIVE / Ketone NEGATIVE  / Bili NEGATIVE / Urobili NORMAL       Blood NEGATIVE / Protein 30 / Leuk Est NEGATIVE / Nitrite NEGATIVE      RBC 0-2 / WBC 3-5 / Hyaline NEGATIVE / Gran  / Sq Epi OCC / Non Sq Epi  / Bacteria FEW      Iron 48, TIBC 78, %sat --      [03-29-19 @ 05:20]  Ferritin 378.6      [03-29-19 @ 05:20]  Vitamin D (25OH) 25.6      [03-23-19 @ 18:15]  TSH 1.98      [03-05-19 @ 06:45]    HBsAb Nonreactive      [03-20-19 @ 05:35]  HBsAg NEGATIVE      [03-20-19 @ 05:35]      RADIOLOGY & ADDITIONAL STUDIES:

## 2019-04-09 NOTE — PROGRESS NOTE ADULT - PROBLEM SELECTOR PLAN 8
non AG  improving, diarrhea improving   will hold oral bicarb   on oral bicarb bid  continue to monitor

## 2019-04-09 NOTE — PROGRESS NOTE ADULT - ASSESSMENT
Mr. Miranda is a 72 yo man with history of Anemia, HTN, chronic diarrhea, and recent hospitalization at [facility] from 3/3/19-3/21/19 brought in by family for generalized weakness and inability to walk.    Patient was discharged home with home PT services on 3/21/19 after being hospitalized since 3/3/19. During his hospitalization, he was managed for chronic diarrhea presumed to be 2/2 IBD since infectious and autoimmune w/u was negative, anemia due to blood loss from GI tract 2/2 PUD, requiring blood transfusion, and ESBL E. coli UTI with ertapenem via PICC. He was also determined not to have the diagnosis of CLL after flow cytometry results were available.     HPI was obtained primarily from patient's daughter and caregiver, Bee. As per daughter, when patient came home he was very weak. She wanted to bathe him but patient was unable to stand or walk to the bathroom. Due to his severe weakness, his daughter brought him back to the hospital. (23 Mar 2019 08:22)      Recommend:    - s/p completion of  ertapenem 1 week course for ESBL e.coli UTI on 3/25.   Pt restarted on vancomycin for enterococcus faecium UTI.    - Pt with diarrhea, thus far infectious w/u negative including stool cx, O&P (including microsporidium, cyclospora, isospora, cryptosporidium), gi pcr, cdiff, strongyloides neg, giardia neg, cmv pcr (-), cmv igG (+), IgM (-), HIV (-).  Stool for AFB (-)    - Pt with moderate ascites, and ?cirrhosis on CT ap.  Hep A/B/C serologies neg.  s/p paracentesis  on 4/3.  SAAG elevated, low albumin.  Low Tnc, do not suspect SBP.  Hepatology consulted for evaluation of cirrhosis - recs appreciated     -GI eval noted, repeat stool studies ordered including repeat gi pcr, giardia, stool o&p, stool cx negative.    r/o autoimmune and celiac disease.       -  CMV IgG (+), recommend biopsy to r/o cmv colitis - pt s/p colonoscopy on 4/1.  path shows areas of focal active colitis, findings are nonspecific and differential remains broad.  Diarrhea somewhat improved on immodium.       - Pt with intermittent hypothermia and change in mental status/disoriented.   Repeat Ucx grew enterococcus faecium, UA was unremarkable at that time.  Given recurrent hypothermia, will opt to treat for upper tract infection - cont vancomycin to treat enterococcus: complete 14 day course through 4/14.      - Pt with leukocytosis.  Cont to monitor, no new focal signs/symptoms on clinical exam.  Still with elevated eosinophils.  Strongyloides (-).  Recommend sending trichinella, toxocara, schistosomiasis        Will follow       Adeline Marques  [phone]

## 2019-04-09 NOTE — PROGRESS NOTE ADULT - SUBJECTIVE AND OBJECTIVE BOX
Infectious Diseases progress note:    Subjective:  Pt awake, alert.  States had some soft stools today.  No abd pain or cramping, afebrile.  WBC elevated at 14.  No cough, fever, chills, dysuria    ROS:  CONSTITUTIONAL:  No fever, chills, rigors  CARDIOVASCULAR:  No chest pain or palpitations  RESPIRATORY:   No SOB, cough, dyspnea on exertion.  No wheezing  GASTROINTESTINAL:  No abd pain, N/V, diarrhea/constipation  EXTREMITIES:  No swelling or joint pain  GENITOURINARY:  No burning on urination, increased frequency or urgency.  No flank pain  NEUROLOGIC:  No HA, visual disturbances  SKIN: No rashes    Allergies    No Known Allergies    Intolerances        ANTIBIOTICS/RELEVANT:  antimicrobials  vancomycin  IVPB 750 milliGRAM(s) IV Intermittent daily    immunologic:    OTHER:  ALBUTerol    90 MICROgram(s) HFA Inhaler 2 Puff(s) Inhalation every 6 hours PRN  calcium carbonate   1250 mG (OsCal) 1 Tablet(s) Oral two times a day  cholecalciferol 1000 Unit(s) Oral daily  dicyclomine 20 milliGRAM(s) Oral four times a day before meals PRN  furosemide    Tablet 20 milliGRAM(s) Oral daily  lactobacillus acidophilus 1 Tablet(s) Oral three times a day with meals  loperamide 2 milliGRAM(s) Oral three times a day PRN  melatonin 3 milliGRAM(s) Oral <User Schedule>  mesalamine DR Capsule 800 milliGRAM(s) Oral three times a day  metoprolol succinate ER 25 milliGRAM(s) Oral daily  multivitamin 1 Tablet(s) Oral daily  pantoprazole    Tablet 40 milliGRAM(s) Oral before breakfast  QUEtiapine 25 milliGRAM(s) Oral at bedtime PRN  simethicone 80 milliGRAM(s) Chew four times a day PRN  sodium bicarbonate 650 milliGRAM(s) Oral two times a day  spironolactone 50 milliGRAM(s) Oral daily      Objective:  Vital Signs Last 24 Hrs  T(C): 36.4 (09 Apr 2019 20:33), Max: 36.4 (09 Apr 2019 05:36)  T(F): 97.5 (09 Apr 2019 20:33), Max: 97.6 (09 Apr 2019 05:36)  HR: 88 (09 Apr 2019 20:33) (77 - 88)  BP: 129/59 (09 Apr 2019 20:33) (115/61 - 129/59)  BP(mean): --  RR: 18 (09 Apr 2019 20:33) (18 - 18)  SpO2: 100% (09 Apr 2019 20:33) (99% - 100%)    PHYSICAL EXAM:  Constitutional:NAD  Eyes:TODD, EOMI  Ear/Nose/Throat: no thrush, mucositis.  Moist mucous membranes	  Neck:no JVD, no lymphadenopathy, supple  Respiratory: CTA ruth  Cardiovascular: S1S2 RRR, no murmurs  Gastrointestinal:soft, nontender,  nondistended (+) BS  Extremities:no e/e/c  Skin:  no rashes, open wounds or ulcerations        LABS:                        8.1    14.71 )-----------( 80       ( 09 Apr 2019 06:00 )             26.2     04-09    137  |  100  |  6<L>  ----------------------------<  83  4.5   |  29  |  1.00    Ca    8.2<L>      09 Apr 2019 06:00  Phos  2.1     04-09  Mg     1.3     04-09    TPro  6.5  /  Alb  2.8<L>  /  TBili  0.9  /  DBili  0.4<H>  /  AST  31  /  ALT  22  /  AlkPhos  252<H>  04-09            Vancomycin Level, Random:  ug/mL (04-07 @ 12:20)  Vancomycin Level, Random:  ug/mL (04-06 @ 05:40)      Vancomycin Level, Trough: 21.8 ug/mL (04-05 @ 05:15)              MICROBIOLOGY:          RADIOLOGY & ADDITIONAL STUDIES:

## 2019-04-09 NOTE — PROGRESS NOTE ADULT - PROBLEM SELECTOR PLAN 1
He has resp alkalosis as well as some metabolic acidosis: He has had lot of diarrhea: before: His chest x-ray with poor inspiration with bibasilar atelectasis other wise lung are clear: I DOUBT HE H IS HAVING  PE: But would do vq scan as wellas dopplers: Could it be related to diarrhea: He is off antibiotics at this time. I don't seem much of pneumonia on chest x-ray: His last ct scan is reviewed too: had some TIB opacities in upper lobes suggestive of bronchiolitis: CHF and bronchomalacia:  3/28: vq scan could not be dome yesterday: will attempt today: his previos dopplers were negative: new ABG is awaited:  3/29: ABG is pretty good: stable:  3/30; seems to be doing ok : no SOB: Breathing wise is OK :  3/31> breathing wise stable: no wheezing  4/2: yesterdays x-ray noted: rpt chest x-ray : There is no sq emphysema on palpation:  4/3: no ptx or sq emphysema seen: pt is stable from pulm point of view:  4/4: stable: no wheezing!  4/5: breathing wise he is stable: good oxygenation: not on any oxygen: Not isn any reps distress!  4/6 soft expiratory wheezing without distress. will add ONUR. encourage to use incentive spirometer.  4/7 no wheezing. some crackles. encourage to use incentive spirometer, encourage to expectorate. prn ONUR for wheezing or SOB  4/9: Stable

## 2019-04-09 NOTE — PROGRESS NOTE ADULT - SUBJECTIVE AND OBJECTIVE BOX
Patient is a 73y old  Male who presents with a chief complaint of Generalized weakness and inability to walk (09 Apr 2019 07:27)      Any change in ROS: No resp issues:   no SOB:       MEDICATIONS  (STANDING):  calcium carbonate   1250 mG (OsCal) 1 Tablet(s) Oral two times a day  cholecalciferol 1000 Unit(s) Oral daily  furosemide    Tablet 20 milliGRAM(s) Oral daily  lactobacillus acidophilus 1 Tablet(s) Oral three times a day with meals  magnesium sulfate  IVPB 1 Gram(s) IV Intermittent once  melatonin 3 milliGRAM(s) Oral <User Schedule>  mesalamine DR Capsule 800 milliGRAM(s) Oral three times a day  metoprolol succinate ER 25 milliGRAM(s) Oral daily  multivitamin 1 Tablet(s) Oral daily  pantoprazole    Tablet 40 milliGRAM(s) Oral before breakfast  potassium phosphate IVPB 15 milliMole(s) IV Intermittent once  sodium bicarbonate 650 milliGRAM(s) Oral two times a day  spironolactone 50 milliGRAM(s) Oral daily  vancomycin  IVPB 750 milliGRAM(s) IV Intermittent daily    MEDICATIONS  (PRN):  ALBUTerol    90 MICROgram(s) HFA Inhaler 2 Puff(s) Inhalation every 6 hours PRN Wheezing  dicyclomine 20 milliGRAM(s) Oral four times a day before meals PRN abd pain  loperamide 2 milliGRAM(s) Oral three times a day PRN Diarrhea  QUEtiapine 25 milliGRAM(s) Oral at bedtime PRN for agitation/sleep  simethicone 80 milliGRAM(s) Chew four times a day PRN Gas    Vital Signs Last 24 Hrs  T(C): 36.4 (09 Apr 2019 05:36), Max: 36.7 (08 Apr 2019 22:59)  T(F): 97.6 (09 Apr 2019 05:36), Max: 98.1 (08 Apr 2019 22:59)  HR: 81 (09 Apr 2019 05:36) (74 - 87)  BP: 115/61 (09 Apr 2019 05:36) (103/54 - 119/62)  BP(mean): --  RR: 18 (09 Apr 2019 05:36) (18 - 18)  SpO2: 100% (09 Apr 2019 05:36) (99% - 100%)    I&O's Summary        Physical Exam:   GENERAL: NAD, well-groomed, well-developed  HEENT: TODD/   Atraumatic, Normocephalic  ENMT: No tonsillar erythema, exudates, or enlargement; Moist mucous membranes, Good dentition, No lesions  NECK: Supple, No JVD, Normal thyroid  CHEST/LUNG: Clear to auscultaion  CVS: Regular rate and rhythm; No murmurs, rubs, or gallops  GI: : Soft, Nontender, Nondistended; Bowel sounds present  NERVOUS SYSTEM:  Alert & Oriented X3: to me he responds appropriately   EXTREMITIES:  -edema  LYMPH: No lymphadenopathy noted  SKIN: No rashes or lesions  ENDOCRINOLOGY: No Thyromegaly  PSYCH: Appropriate    Labs:                              8.1    14.71 )-----------( 80       ( 09 Apr 2019 06:00 )             26.2                         8.5    14.38 )-----------( 75       ( 08 Apr 2019 12:45 )             28.0                         7.2    12.88 )-----------( 69       ( 08 Apr 2019 06:21 )             23.2                         8.0    12.72 )-----------( 67       ( 07 Apr 2019 05:42 )             25.6                         8.6    10.50 )-----------( 30       ( 06 Apr 2019 05:40 )             27.9     04-09    137  |  100  |  6<L>  ----------------------------<  83  4.5   |  29  |  1.00  04-08    135  |  98  |  6<L>  ----------------------------<  86  3.1<L>   |  29  |  0.90  04-07    135  |  103  |  7   ----------------------------<  68<L>  3.6   |  23  |  0.96  04-06    137  |  105  |  8   ----------------------------<  80  3.7   |  21<L>  |  1.01    Ca    8.2<L>      09 Apr 2019 06:00  Ca    7.7<L>      08 Apr 2019 06:21  Phos  2.1     04-09  Mg     1.3     04-09    TPro  6.5  /  Alb  2.8<L>  /  TBili  0.9  /  DBili  0.4<H>  /  AST  31  /  ALT  22  /  AlkPhos  252<H>  04-09  TPro  5.8<L>  /  Alb  2.6<L>  /  TBili  0.7  /  DBili  x   /  AST  24  /  ALT  19  /  AlkPhos  206<H>  04-08  TPro  5.9<L>  /  Alb  2.1<L>  /  TBili  0.6  /  DBili  x   /  AST  29  /  ALT  20  /  AlkPhos  208<H>  04-07  TPro  6.2  /  Alb  2.1<L>  /  TBili  0.7  /  DBili  x   /  AST  21  /  ALT  19  /  AlkPhos  215<H>  04-06    CAPILLARY BLOOD GLUCOSE          LIVER FUNCTIONS - ( 09 Apr 2019 06:00 )  Alb: 2.8 g/dL / Pro: 6.5 g/dL / ALK PHOS: 252 u/L / ALT: 22 u/L / AST: 31 u/L / GGT: x               Fluid Source --  Albumin, Fluid0.1 g/dL  Glucose, [id number] mg/dL  Protein total, Fluid0.7 g/dL  Lacatate Dehydrogenase, Fluid50 U/L  pH, Fluid--  Cytopathology-Non Gyn Report--        RECENT CULTURES:  04-04 @ 01:56 FECES         < from: Xray Chest 1 View- PORTABLE-Urgent (04.02.19 @ 13:09) >      INTERPRETATION:  TIME OF EXAM: April 2, 2019 at 12:55 PM.    CLINICAL INFORMATION: Question subcutaneous emphysema. Evaluate for   pneumothorax.    COMPARISON:  April 1,2018.    TECHNIQUE:   AP Portable chest x-ray. The chin obscures the left apex.    INTERPRETATION:     The heart is not enlarged. The mediastinum is not accurately evaluated on   this projection.  There are low lung volumes.  A trace right pleural effusion is unchanged. The right lung is clear.   There is linear atelectasis in the left mid and lower lung. No left   pleural effusion is seen.  No pneumothorax or subcutaneous emphysema is seen.              IMPRESSION:  No pneumothorax or subcutaneous emphysema seen.    Trace right pleural effusion.    Linear atelectasis in the left mid and lower lung.    < end of copied text >           04-03 @ 15:42 PERITONEAL FLUID                  04-03 @ 12:26 PERITONEAL FLUID       NOS^No Organisms Seen  WBC^White Blood Cells  QNTY CELLS IN GRAM STAIN: RARE (1+)                   RESPIRATORY CULTURES:          Studies  Chest X-RAY  CT SCAN Chest   Venous Dopplers: LE:   CT Abdomen  Others

## 2019-04-09 NOTE — PROGRESS NOTE ADULT - SUBJECTIVE AND OBJECTIVE BOX
Cardiovascular Disease Progress Note    Overnight events: No acute events overnight. Mr. Miranda is resting comfortably. He denies pain.    Otherwise review of systems negative    Objective Findings:  T(C): 36.4 (04-09-19 @ 05:36), Max: 36.7 (04-08-19 @ 22:59)  HR: 81 (04-09-19 @ 05:36) (74 - 87)  BP: 115/61 (04-09-19 @ 05:36) (103/54 - 119/62)  RR: 18 (04-09-19 @ 05:36) (18 - 18)  SpO2: 100% (04-09-19 @ 05:36) (99% - 100%)  Wt(kg): --  Daily     Daily       Physical Exam:  Gen: NAD  HEENT: EOMI  CV: RRR, normal S1 + S2, no m/r/g  Lungs: CTAB  Abd: soft, non-tender. + ascites  Ext: No edema    Telemetry:    Laboratory Data:                        8.1    14.71 )-----------( 80       ( 09 Apr 2019 06:00 )             26.2     04-09    137  |  100  |  6<L>  ----------------------------<  83  4.5   |  29  |  1.00    Ca    8.2<L>      09 Apr 2019 06:00  Phos  2.1     04-09  Mg     1.3     04-09    TPro  5.8<L>  /  Alb  2.6<L>  /  TBili  0.7  /  DBili  x   /  AST  24  /  ALT  19  /  AlkPhos  206<H>  04-08              Inpatient Medications:  MEDICATIONS  (STANDING):  calcium carbonate   1250 mG (OsCal) 1 Tablet(s) Oral two times a day  cholecalciferol 1000 Unit(s) Oral daily  furosemide    Tablet 20 milliGRAM(s) Oral daily  lactobacillus acidophilus 1 Tablet(s) Oral three times a day with meals  melatonin 3 milliGRAM(s) Oral <User Schedule>  mesalamine DR Capsule 800 milliGRAM(s) Oral three times a day  metoprolol succinate ER 25 milliGRAM(s) Oral daily  multivitamin 1 Tablet(s) Oral daily  pantoprazole    Tablet 40 milliGRAM(s) Oral before breakfast  sodium bicarbonate 650 milliGRAM(s) Oral two times a day  spironolactone 50 milliGRAM(s) Oral daily  vancomycin  IVPB 750 milliGRAM(s) IV Intermittent daily      Assessment: 73 year old man with HTN, anemia, chronic diarrhea, malnutrition and low voltage EKG presents with weakness.    Plan of Care:    #Abnormal EKG-  Low voltage.  TTE from 3/10 reviewed- no significant structural heart disease.   Patient tolerated colonoscopy well on 4/1.   Continue current cardiac management.      #HTN-  BP controlled on current regimen.     #Chronic diarrhea-  Pathology consistent with colitis.  GI input noted.   Treatment of cirrhosis as per primary team.       Over 25 minutes spent on total encounter; more than 50% of the visit was spent counseling and/or coordinating care by the attending physician.      Darren Herring MD [facility]  Cardiovascular Disease  [phone]

## 2019-04-09 NOTE — PROGRESS NOTE ADULT - ASSESSMENT
Mr. Miranda is a 74 yo man with history of Anemia, HTN, PUD, chronic diarrhea, and recent hospitalization at [facility] from 3/3/19-3/21/19 brought in by family for generalized weakness and inability to walk admitted for severe malnutrition, mild hypernatremia and severe deconditioning. Exam relatively benign aside for significant LE edema and weakness of legs. Anemia at baseline.

## 2019-04-09 NOTE — PROGRESS NOTE ADULT - SUBJECTIVE AND OBJECTIVE BOX
Patient is a 73y old  Male who presents with a chief complaint of Generalized weakness and inability to walk (09 Apr 2019 13:25)      SUBJECTIVE / OVERNIGHT EVENTS:    Events noted.  CONSTITUTIONAL: No fever,  or fatigue  RESPIRATORY: No cough, wheezing,  No shortness of breath  CARDIOVASCULAR: No chest pain, palpitations, dizziness, or leg swelling  GASTROINTESTINAL: No abdominal or epigastric pain. No nausea, vomiting.  NEUROLOGICAL: No headaches,     MEDICATIONS  (STANDING):  calcium carbonate   1250 mG (OsCal) 1 Tablet(s) Oral two times a day  cholecalciferol 1000 Unit(s) Oral daily  furosemide    Tablet 20 milliGRAM(s) Oral daily  lactobacillus acidophilus 1 Tablet(s) Oral three times a day with meals  melatonin 3 milliGRAM(s) Oral <User Schedule>  mesalamine DR Capsule 800 milliGRAM(s) Oral three times a day  metoprolol succinate ER 25 milliGRAM(s) Oral daily  multivitamin 1 Tablet(s) Oral daily  pantoprazole    Tablet 40 milliGRAM(s) Oral before breakfast  sodium bicarbonate 650 milliGRAM(s) Oral two times a day  spironolactone 50 milliGRAM(s) Oral daily  vancomycin  IVPB 750 milliGRAM(s) IV Intermittent daily    MEDICATIONS  (PRN):  ALBUTerol    90 MICROgram(s) HFA Inhaler 2 Puff(s) Inhalation every 6 hours PRN Wheezing  dicyclomine 20 milliGRAM(s) Oral four times a day before meals PRN abd pain  loperamide 2 milliGRAM(s) Oral three times a day PRN Diarrhea  QUEtiapine 25 milliGRAM(s) Oral at bedtime PRN for agitation/sleep  simethicone 80 milliGRAM(s) Chew four times a day PRN Gas        CAPILLARY BLOOD GLUCOSE        I&O's Summary    09 Apr 2019 07:01  -  09 Apr 2019 23:24  --------------------------------------------------------  IN: 537 mL / OUT: 0 mL / NET: 537 mL        PHYSICAL EXAM:  GENERAL: NAD  NECK: Supple, No JVD  CHEST/LUNG: Clear to auscultation bilaterally; No wheezing.  HEART: Regular rate and rhythm; No murmurs, rubs, or gallops  ABDOMEN: Soft, Nontender, Nondistended; Bowel sounds present  EXTREMITIES:   No edema  NEUROLOGY: AAO X 3      LABS:                        8.1    14.71 )-----------( 80       ( 09 Apr 2019 06:00 )             26.2     04-09    137  |  100  |  6<L>  ----------------------------<  83  4.5   |  29  |  1.00    Ca    8.2<L>      09 Apr 2019 06:00  Phos  2.1     04-09  Mg     1.3     04-09    TPro  6.5  /  Alb  2.8<L>  /  TBili  0.9  /  DBili  0.4<H>  /  AST  31  /  ALT  22  /  AlkPhos  252<H>  04-09            CAPILLARY BLOOD GLUCOSE        04-04 @ 01:56  Culture-urine --  Culture results --  method type --  Organism --  Organism Identification --  Specimen source FECES  04-03 @ 15:42  Culture-urine --  Culture results --  method type --  Organism --  Organism Identification --  Specimen source PERITONEAL FLUID  04-03 @ 12:26  Culture-urine --  Culture results --  method type --  Organism --  Organism Identification --  Specimen source PERITONEAL FLUID           04-04 @ 01:56  Culture blood --  Culture results --  Gram stain --  Gram stain blood --  Method type --  Organism --  Organism identification --  Specimen source FECES   04-03 @ 15:42  Culture blood --  Culture results --  Gram stain --  Gram stain blood --  Method type --  Organism --  Organism identification --  Specimen source PERITONEAL FLUID   04-03 @ 12:26  Culture blood --  Culture results --  Gram stain   NOS^No Organisms Seen  WBC^White Blood Cells  QNTY CELLS IN GRAM STAIN: RARE (1+)  Gram stain blood --  Method type --  Organism --  Organism identification --  Specimen source PERITONEAL FLUID      RADIOLOGY & ADDITIONAL TESTS:    Imaging Personally Reviewed:    Consultant(s) Notes Reviewed:      Care Discussed with Consultants/Other Providers:

## 2019-04-09 NOTE — PROGRESS NOTE ADULT - ASSESSMENT
74 yo man with history of Anemia, HTN, chronic diarrhea, and recent hospitalization at [facility] from 3/3/19-3/21/19 brought in by family for generalized weakness and inability to walk found to have james and hypernatremia

## 2019-04-10 LAB
ALBUMIN SERPL ELPH-MCNC: 2.5 G/DL — LOW (ref 3.3–5)
ALP SERPL-CCNC: 252 U/L — HIGH (ref 40–120)
ALT FLD-CCNC: 21 U/L — SIGNIFICANT CHANGE UP (ref 4–41)
ANION GAP SERPL CALC-SCNC: 10 MMO/L — SIGNIFICANT CHANGE UP (ref 7–14)
ANION GAP SERPL CALC-SCNC: 10 MMO/L — SIGNIFICANT CHANGE UP (ref 7–14)
AST SERPL-CCNC: 22 U/L — SIGNIFICANT CHANGE UP (ref 4–40)
BASOPHILS # BLD AUTO: 0.02 K/UL — SIGNIFICANT CHANGE UP (ref 0–0.2)
BASOPHILS NFR BLD AUTO: 0.1 % — SIGNIFICANT CHANGE UP (ref 0–2)
BILIRUB SERPL-MCNC: 0.8 MG/DL — SIGNIFICANT CHANGE UP (ref 0.2–1.2)
BUN SERPL-MCNC: 7 MG/DL — SIGNIFICANT CHANGE UP (ref 7–23)
BUN SERPL-MCNC: 7 MG/DL — SIGNIFICANT CHANGE UP (ref 7–23)
CALCIUM SERPL-MCNC: 8.1 MG/DL — LOW (ref 8.4–10.5)
CALCIUM SERPL-MCNC: 8.1 MG/DL — LOW (ref 8.4–10.5)
CHLORIDE SERPL-SCNC: 98 MMOL/L — SIGNIFICANT CHANGE UP (ref 98–107)
CHLORIDE SERPL-SCNC: 98 MMOL/L — SIGNIFICANT CHANGE UP (ref 98–107)
CO2 SERPL-SCNC: 28 MMOL/L — SIGNIFICANT CHANGE UP (ref 22–31)
CO2 SERPL-SCNC: 28 MMOL/L — SIGNIFICANT CHANGE UP (ref 22–31)
CREAT SERPL-MCNC: 0.95 MG/DL — SIGNIFICANT CHANGE UP (ref 0.5–1.3)
CREAT SERPL-MCNC: 0.95 MG/DL — SIGNIFICANT CHANGE UP (ref 0.5–1.3)
EOSINOPHIL # BLD AUTO: 1.35 K/UL — HIGH (ref 0–0.5)
EOSINOPHIL NFR BLD AUTO: 8.9 % — HIGH (ref 0–6)
GLUCOSE SERPL-MCNC: 75 MG/DL — SIGNIFICANT CHANGE UP (ref 70–99)
GLUCOSE SERPL-MCNC: 75 MG/DL — SIGNIFICANT CHANGE UP (ref 70–99)
HCT VFR BLD CALC: 27.2 % — LOW (ref 39–50)
HGB BLD-MCNC: 8.4 G/DL — LOW (ref 13–17)
IMM GRANULOCYTES NFR BLD AUTO: 1.6 % — HIGH (ref 0–1.5)
LYMPHOCYTES # BLD AUTO: 17.8 % — SIGNIFICANT CHANGE UP (ref 13–44)
LYMPHOCYTES # BLD AUTO: 2.7 K/UL — SIGNIFICANT CHANGE UP (ref 1–3.3)
MAGNESIUM SERPL-MCNC: 1.3 MG/DL — LOW (ref 1.6–2.6)
MCHC RBC-ENTMCNC: 29 PG — SIGNIFICANT CHANGE UP (ref 27–34)
MCHC RBC-ENTMCNC: 30.9 % — LOW (ref 32–36)
MCV RBC AUTO: 93.8 FL — SIGNIFICANT CHANGE UP (ref 80–100)
MONOCYTES # BLD AUTO: 6.84 K/UL — HIGH (ref 0–0.9)
MONOCYTES NFR BLD AUTO: 45 % — HIGH (ref 2–14)
NEUTROPHILS # BLD AUTO: 4.05 K/UL — SIGNIFICANT CHANGE UP (ref 1.8–7.4)
NEUTROPHILS NFR BLD AUTO: 26.6 % — LOW (ref 43–77)
NRBC # FLD: 0.06 K/UL — SIGNIFICANT CHANGE UP (ref 0–0)
PHOSPHATE SERPL-MCNC: 3.2 MG/DL — SIGNIFICANT CHANGE UP (ref 2.5–4.5)
PLATELET # BLD AUTO: 74 K/UL — LOW (ref 150–400)
PMV BLD: 13.2 FL — HIGH (ref 7–13)
POTASSIUM SERPL-MCNC: 3.8 MMOL/L — SIGNIFICANT CHANGE UP (ref 3.5–5.3)
POTASSIUM SERPL-MCNC: 3.8 MMOL/L — SIGNIFICANT CHANGE UP (ref 3.5–5.3)
POTASSIUM SERPL-SCNC: 3.8 MMOL/L — SIGNIFICANT CHANGE UP (ref 3.5–5.3)
POTASSIUM SERPL-SCNC: 3.8 MMOL/L — SIGNIFICANT CHANGE UP (ref 3.5–5.3)
PROT SERPL-MCNC: 6.5 G/DL — SIGNIFICANT CHANGE UP (ref 6–8.3)
RBC # BLD: 2.9 M/UL — LOW (ref 4.2–5.8)
RBC # FLD: 21.5 % — HIGH (ref 10.3–14.5)
SODIUM SERPL-SCNC: 136 MMOL/L — SIGNIFICANT CHANGE UP (ref 135–145)
SODIUM SERPL-SCNC: 136 MMOL/L — SIGNIFICANT CHANGE UP (ref 135–145)
WBC # BLD: 15.2 K/UL — HIGH (ref 3.8–10.5)
WBC # FLD AUTO: 15.2 K/UL — HIGH (ref 3.8–10.5)

## 2019-04-10 RX ORDER — MAGNESIUM SULFATE 500 MG/ML
1 VIAL (ML) INJECTION ONCE
Qty: 0 | Refills: 0 | Status: COMPLETED | OUTPATIENT
Start: 2019-04-10 | End: 2019-04-10

## 2019-04-10 RX ADMIN — Medication 650 MILLIGRAM(S): at 18:55

## 2019-04-10 RX ADMIN — Medication 1 TABLET(S): at 18:55

## 2019-04-10 RX ADMIN — Medication 100 GRAM(S): at 11:19

## 2019-04-10 RX ADMIN — Medication 25 MILLIGRAM(S): at 06:12

## 2019-04-10 RX ADMIN — Medication 250 MILLIGRAM(S): at 11:24

## 2019-04-10 RX ADMIN — Medication 20 MILLIGRAM(S): at 06:12

## 2019-04-10 RX ADMIN — Medication 800 MILLIGRAM(S): at 21:52

## 2019-04-10 RX ADMIN — Medication 1 TABLET(S): at 13:09

## 2019-04-10 RX ADMIN — Medication 1000 UNIT(S): at 11:24

## 2019-04-10 RX ADMIN — Medication 800 MILLIGRAM(S): at 13:09

## 2019-04-10 RX ADMIN — Medication 1 TABLET(S): at 11:24

## 2019-04-10 RX ADMIN — PANTOPRAZOLE SODIUM 40 MILLIGRAM(S): 20 TABLET, DELAYED RELEASE ORAL at 06:12

## 2019-04-10 RX ADMIN — Medication 800 MILLIGRAM(S): at 06:12

## 2019-04-10 RX ADMIN — SPIRONOLACTONE 50 MILLIGRAM(S): 25 TABLET, FILM COATED ORAL at 06:13

## 2019-04-10 RX ADMIN — Medication 1 TABLET(S): at 08:56

## 2019-04-10 RX ADMIN — Medication 3 MILLIGRAM(S): at 21:52

## 2019-04-10 RX ADMIN — Medication 650 MILLIGRAM(S): at 06:12

## 2019-04-10 RX ADMIN — Medication 1 TABLET(S): at 06:12

## 2019-04-10 NOTE — PROGRESS NOTE ADULT - SUBJECTIVE AND OBJECTIVE BOX
[facility] [place] [facility]   MD MAGALY MORAN MD ANGELA WONG, PA    TEL:  OFFICE: [phone]  DR ANDERSON CELL: [phone]  DR. MONAE CELL: [phone]  HUBERT WELLINGTON CELL: [phone]        Patient is a 73y old  Male who presents with a chief complaint of Generalized weakness and inability to walk (10 Apr 2019 11:41)      Patient seen and examined at bedside. No chest pain/sob    VITALS:  T(F): 97.8 (04-10-19 @ 13:07), Max: 97.8 (04-10-19 @ 13:07)  HR: 78 (04-10-19 @ 13:07)  BP: 109/58 (04-10-19 @ 13:07)  RR: 18 (04-10-19 @ 13:07)  SpO2: 100% (04-10-19 @ 13:07)  Wt(kg): --    04-09 @ 07:01  -  04-10 @ 07:00  --------------------------------------------------------  IN: 537 mL / OUT: 0 mL / NET: 537 mL          PHYSICAL EXAM:  Constitutional: NAD  Neck: No JVD  Respiratory: CTAB, no wheezes, rales or rhonchi  Cardiovascular: S1, S2, RRR  Gastrointestinal: BS+, soft, NT/ND  Extremities: 1+peripheral edema    Hospital Medications:   MEDICATIONS  (STANDING):  calcium carbonate   1250 mG (OsCal) 1 Tablet(s) Oral two times a day  cholecalciferol 1000 Unit(s) Oral daily  furosemide    Tablet 20 milliGRAM(s) Oral daily  lactobacillus acidophilus 1 Tablet(s) Oral three times a day with meals  melatonin 3 milliGRAM(s) Oral <User Schedule>  mesalamine DR Capsule 800 milliGRAM(s) Oral three times a day  metoprolol succinate ER 25 milliGRAM(s) Oral daily  multivitamin 1 Tablet(s) Oral daily  pantoprazole    Tablet 40 milliGRAM(s) Oral before breakfast  sodium bicarbonate 650 milliGRAM(s) Oral two times a day  spironolactone 50 milliGRAM(s) Oral daily  vancomycin  IVPB 750 milliGRAM(s) IV Intermittent daily      LABS:  04-10    136  |  98  |  7   ----------------------------<  75  3.8   |  28  |  0.95    Ca    8.1<L>      10 Apr 2019 05:45  Phos  3.2     04-10  Mg     1.3     04-10    TPro  6.5  /  Alb  2.5<L>  /  TBili  0.8  /  DBili      /  AST  22  /  ALT  21  /  AlkPhos  252<H>  04-10    Creatinine Trend: 0.95 <--, 1.00 <--, 0.90 <--, 0.96 <--, 1.01 <--, 1.05 <--, 1.10 <--, 1.22 <--    Phosphorus Level, Serum: 3.2 mg/dL (04-10 @ 05:45)  Albumin, Serum: 2.5 g/dL (04-10 @ 05:45)                              8.4    15.20 )-----------( 74       ( 10 Apr 2019 05:45 )             27.2     Urine Studies:  Urinalysis - [04-01-19 @ 17:00]      Color YELLOW / Appearance CLEAR / SG 1.015 / pH 6.0      Gluc NEGATIVE / Ketone NEGATIVE  / Bili NEGATIVE / Urobili NORMAL       Blood NEGATIVE / Protein 30 / Leuk Est NEGATIVE / Nitrite NEGATIVE      RBC 0-2 / WBC 3-5 / Hyaline NEGATIVE / Gran  / Sq Epi OCC / Non Sq Epi  / Bacteria FEW      Iron 48, TIBC 78, %sat --      [03-29-19 @ 05:20]  Ferritin 378.6      [03-29-19 @ 05:20]  Vitamin D (25OH) 25.6      [03-23-19 @ 18:15]  TSH 1.98      [03-05-19 @ 06:45]    HBsAb Nonreactive      [03-20-19 @ 05:35]  HBsAg NEGATIVE      [03-20-19 @ 05:35]      RADIOLOGY & ADDITIONAL STUDIES:

## 2019-04-10 NOTE — PROGRESS NOTE ADULT - SUBJECTIVE AND OBJECTIVE BOX
Cardiovascular Disease Progress Note    Overnight events: No acute events overnight. Mr. Miranda denies chest pain or SOB.   Otherwise review of systems negative    Objective Findings:  T(C): 36.3 (04-10-19 @ 06:11), Max: 36.4 (04-09-19 @ 15:40)  HR: 85 (04-10-19 @ 06:11) (77 - 88)  BP: 107/60 (04-10-19 @ 06:11) (107/60 - 129/59)  RR: 18 (04-10-19 @ 06:11) (18 - 18)  SpO2: 98% (04-10-19 @ 06:11) (98% - 100%)  Wt(kg): --  Daily     Daily       Physical Exam:  Gen: NAD  HEENT: EOMI  CV: RRR, normal S1 + S2, no m/r/g  Lungs: CTAB  Abd: soft, non-tender  Ext: No edema    Telemetry: n/a     Laboratory Data:                        8.4    15.20 )-----------( 74       ( 10 Apr 2019 05:45 )             27.2     04-10    136  |  98  |  7   ----------------------------<  75  3.8   |  28  |  0.95    Ca    8.1<L>      10 Apr 2019 05:45  Phos  3.2     04-10  Mg     1.3     04-10    TPro  6.5  /  Alb  2.5<L>  /  TBili  0.8  /  DBili  x   /  AST  22  /  ALT  21  /  AlkPhos  252<H>  04-10              Inpatient Medications:  MEDICATIONS  (STANDING):  calcium carbonate   1250 mG (OsCal) 1 Tablet(s) Oral two times a day  cholecalciferol 1000 Unit(s) Oral daily  furosemide    Tablet 20 milliGRAM(s) Oral daily  lactobacillus acidophilus 1 Tablet(s) Oral three times a day with meals  melatonin 3 milliGRAM(s) Oral <User Schedule>  mesalamine DR Capsule 800 milliGRAM(s) Oral three times a day  metoprolol succinate ER 25 milliGRAM(s) Oral daily  multivitamin 1 Tablet(s) Oral daily  pantoprazole    Tablet 40 milliGRAM(s) Oral before breakfast  sodium bicarbonate 650 milliGRAM(s) Oral two times a day  spironolactone 50 milliGRAM(s) Oral daily  vancomycin  IVPB 750 milliGRAM(s) IV Intermittent daily      Assessment: 73 year old man with HTN, anemia, chronic diarrhea, malnutrition and low voltage EKG presents with weakness.    Plan of Care:    #Abnormal EKG-  Low voltage.  TTE from 3/10 reviewed- no significant structural heart disease.   Patient tolerated colonoscopy well on 4/1.   Continue current cardiac management.      #HTN-  BP controlled on current regimen.     #Chronic diarrhea-  Pathology consistent with colitis.  GI input noted.   Treatment of cirrhosis as per primary team.     Over 25 minutes spent on total encounter; more than 50% of the visit was spent counseling and/or coordinating care by the attending physician.      Darren Herring MD [facility]  Cardiovascular Disease  [phone]

## 2019-04-10 NOTE — DISCHARGE NOTE NURSING/CASE MANAGEMENT/SOCIAL WORK - NSDCDPATPORTLINK_GEN_ALL_CORE
You can access the [facility] Patient Portal, offered by [facility], by registering with the following website: [email or web address]

## 2019-04-10 NOTE — PROGRESS NOTE ADULT - ASSESSMENT
Mr. Miranda is a 74 yo man with history of Anemia, HTN, chronic diarrhea, and recent hospitalization at [facility] from 3/3/19-3/21/19 brought in by family for generalized weakness and inability to walk.    Patient was discharged home with home PT services on 3/21/19 after being hospitalized since 3/3/19. During his hospitalization, he was managed for chronic diarrhea presumed to be 2/2 IBD since infectious and autoimmune w/u was negative, anemia due to blood loss from GI tract 2/2 PUD, requiring blood transfusion, and ESBL E. coli UTI with ertapenem via PICC. He was also determined not to have the diagnosis of CLL after flow cytometry results were available.     HPI was obtained primarily from patient's daughter and caregiver, Bee. As per daughter, when patient came home he was very weak. She wanted to bathe him but patient was unable to stand or walk to the bathroom. Due to his severe weakness, his daughter brought him back to the hospital. (23 Mar 2019 08:22)      Recommend:    - s/p completion of  ertapenem 1 week course for ESBL e.coli UTI on 3/25.   Pt restarted on vancomycin for enterococcus faecium UTI.    - Pt with diarrhea, thus far infectious w/u negative including stool cx, O&P (including microsporidium, cyclospora, isospora, cryptosporidium), gi pcr, cdiff, strongyloides neg, giardia neg, cmv pcr (-), cmv igG (+), IgM (-), HIV (-).  Stool for AFB (-)    - Pt with moderate ascites, and ?cirrhosis on CT ap.  Hep A/B/C serologies neg.  s/p paracentesis  on 4/3.  SAAG elevated, low albumin.  Low Tnc, do not suspect SBP.  Hepatology consulted for evaluation of cirrhosis - recs appreciated     -GI eval noted, repeat stool studies ordered including repeat gi pcr, giardia, stool o&p, stool cx negative.    r/o autoimmune and celiac disease.       -  CMV IgG (+), recommend biopsy to r/o cmv colitis - pt s/p colonoscopy on 4/1.  path shows areas of focal active colitis, findings are nonspecific and differential remains broad.  Diarrhea somewhat improved on immodium.       - Pt with intermittent hypothermia and change in mental status/disoriented.   Repeat Ucx grew enterococcus faecium, UA was unremarkable at that time.  Given recurrent hypothermia, will opt to treat for upper tract infection - cont vancomycin to treat enterococcus: complete 14 day course through 4/14.      - Pt with leukocytosis.  Cont to monitor, no new focal signs/symptoms on clinical exam.  Still with elevated eosinophils.  Strongyloides (-).  Recommend sending trichinella, toxocara, schistosomiasis.  Also spoke with pathology to re-review slides specifically for infectious/parasitic pathology.          Will follow       Adeline Marques  [phone]

## 2019-04-10 NOTE — PROGRESS NOTE ADULT - ASSESSMENT
74 yo man with history of Anemia, HTN, chronic diarrhea, and recent hospitalization at [facility] from 3/3/19-3/21/19 brought in by family for generalized weakness and inability to walk found to have james and hypernatremia

## 2019-04-10 NOTE — PROGRESS NOTE ADULT - SUBJECTIVE AND OBJECTIVE BOX
Infectious Diseases progress note:    Subjective: NAD, afebrile.  Pt awake, alert, NAD.  c/o b/l LE weakness.  No dysuria.  States stools are somewhat loose.  No abd pain.  No cough.  WBC trending upwards.      ROS:  CONSTITUTIONAL:  No fever, chills, rigors  CARDIOVASCULAR:  No chest pain or palpitations  RESPIRATORY:   No SOB, cough, dyspnea on exertion.  No wheezing  GASTROINTESTINAL:  No abd pain, N/V, diarrhea/constipation  EXTREMITIES:  No swelling or joint pain  GENITOURINARY:  No burning on urination, increased frequency or urgency.  No flank pain  NEUROLOGIC:  No HA, visual disturbances  SKIN: No rashes    Allergies    No Known Allergies    Intolerances        ANTIBIOTICS/RELEVANT:  antimicrobials  vancomycin  IVPB 750 milliGRAM(s) IV Intermittent daily    immunologic:    OTHER:  ALBUTerol    90 MICROgram(s) HFA Inhaler 2 Puff(s) Inhalation every 6 hours PRN  calcium carbonate   1250 mG (OsCal) 1 Tablet(s) Oral two times a day  cholecalciferol 1000 Unit(s) Oral daily  dicyclomine 20 milliGRAM(s) Oral four times a day before meals PRN  furosemide    Tablet 20 milliGRAM(s) Oral daily  lactobacillus acidophilus 1 Tablet(s) Oral three times a day with meals  loperamide 2 milliGRAM(s) Oral three times a day PRN  melatonin 3 milliGRAM(s) Oral <User Schedule>  mesalamine DR Capsule 800 milliGRAM(s) Oral three times a day  metoprolol succinate ER 25 milliGRAM(s) Oral daily  multivitamin 1 Tablet(s) Oral daily  pantoprazole    Tablet 40 milliGRAM(s) Oral before breakfast  QUEtiapine 25 milliGRAM(s) Oral at bedtime PRN  simethicone 80 milliGRAM(s) Chew four times a day PRN  sodium bicarbonate 650 milliGRAM(s) Oral two times a day  spironolactone 50 milliGRAM(s) Oral daily      Objective:  Vital Signs Last 24 Hrs  T(C): 36.6 (10 Apr 2019 17:30), Max: 36.6 (10 Apr 2019 13:07)  T(F): 97.8 (10 Apr 2019 17:30), Max: 97.8 (10 Apr 2019 13:07)  HR: 76 (10 Apr 2019 17:30) (76 - 88)  BP: 136/76 (10 Apr 2019 17:30) (107/60 - 136/76)  BP(mean): --  RR: 18 (10 Apr 2019 17:30) (18 - 18)  SpO2: 100% (10 Apr 2019 17:30) (98% - 100%)    PHYSICAL EXAM:  Constitutional:NAD  Eyes:TODD, EOMI  Ear/Nose/Throat: no thrush, mucositis.  Moist mucous membranes	  Neck:no JVD, no lymphadenopathy, supple  Respiratory: CTA ruth  Cardiovascular: S1S2 RRR, no murmurs  Gastrointestinal:soft, nontender,  nondistended (+) BS  Extremities:no e/e/c  Skin:  no rashes, open wounds or ulcerations        LABS:                        8.4    15.20 )-----------( 74       ( 10 Apr 2019 05:45 )             27.2     04-10    136  |  98  |  7   ----------------------------<  75  3.8   |  28  |  0.95    Ca    8.1<L>      10 Apr 2019 05:45  Phos  3.2     04-10  Mg     1.3     04-10    TPro  6.5  /  Alb  2.5<L>  /  TBili  0.8  /  DBili  x   /  AST  22  /  ALT  21  /  AlkPhos  252<H>  04-10            Vancomycin Level, Random:  ug/mL (04-07 @ 12:20)  Vancomycin Level, Random:  ug/mL (04-06 @ 05:40)                  MICROBIOLOGY:    Culture - Acid Fast - Body Fluid w/Smear (04.03.19 @ 15:42)    Culture - Acid Fast Smear Concentrated:   AFB SMEAR= NO ACID FAST BACILLI SEEN    Culture - Acid Fast - Body Fluid w/Smear:   ------------------ PRELIMINARY --------------------             NO ACID FAST BACILLI ISOLATED  AFTER ONE WEEK'S INCUBATION    Specimen Source: PERITONEAL FLUID    Culture - Yeast and Fungus (04.03.19 @ 12:26)    Culture - Yeast and Fungus:   CULTURE NEGATIVE FOR YEASTS AND MOLDS AFTER 2 DAYS  CULTURE NEGATIVE FOR YEASTS AND MOLDS   AFTER 4 DAYS    Specimen Source: PERITONEAL FLUID          RADIOLOGY & ADDITIONAL STUDIES:    < from: CT Head No Cont (04.03.19 @ 20:22) >  IMPRESSION:    Stable exam.    No mass effect, hemorrhage or evidence of acute pathology.    < end of copied text >

## 2019-04-10 NOTE — PROGRESS NOTE ADULT - ASSESSMENT
· Assessment	  Mr. Miranda is a 74 yo man with history of Anemia, HTN, PUD, chronic diarrhea, and recent hospitalization at [facility] from 3/3/19-3/21/19 brought in by family for generalized weakness and inability to walk admitted for severe malnutrition, mild hypernatremia and severe deconditioning. Exam relatively benign aside for significant LE edema and weakness of legs. Anemia at baseline.         ·  Problem: Chronic diarrhea.  Plan: GI f/up.  - S/p colonoscopy and biopsy.   - Biopsy report: no CMV        Dw pt's daughter.

## 2019-04-10 NOTE — PROGRESS NOTE ADULT - PROBLEM SELECTOR PLAN 1
renal function improving  PEDRO FEna<1 likely prerenal no hydro on US, renal function improved with IVF  off IVF  avoid nephrotoxic agents  monitor bmp.

## 2019-04-10 NOTE — PROGRESS NOTE ADULT - SUBJECTIVE AND OBJECTIVE BOX
[facility]      Seen earlier today, and examined.  - Today, patient is without complaints.           *****MEDICATIONS: Current medication reviewed and documented.    MEDICATIONS  (STANDING):  calcium carbonate   1250 mG (OsCal) 1 Tablet(s) Oral two times a day  cholecalciferol 1000 Unit(s) Oral daily  furosemide    Tablet 20 milliGRAM(s) Oral daily  lactobacillus acidophilus 1 Tablet(s) Oral three times a day with meals  melatonin 3 milliGRAM(s) Oral <User Schedule>  mesalamine DR Capsule 800 milliGRAM(s) Oral three times a day  metoprolol succinate ER 25 milliGRAM(s) Oral daily  multivitamin 1 Tablet(s) Oral daily  pantoprazole    Tablet 40 milliGRAM(s) Oral before breakfast  sodium bicarbonate 650 milliGRAM(s) Oral two times a day  spironolactone 50 milliGRAM(s) Oral daily  vancomycin  IVPB 750 milliGRAM(s) IV Intermittent daily    MEDICATIONS  (PRN):  ALBUTerol    90 MICROgram(s) HFA Inhaler 2 Puff(s) Inhalation every 6 hours PRN Wheezing  dicyclomine 20 milliGRAM(s) Oral four times a day before meals PRN abd pain  loperamide 2 milliGRAM(s) Oral three times a day PRN Diarrhea  QUEtiapine 25 milliGRAM(s) Oral at bedtime PRN for agitation/sleep  simethicone 80 milliGRAM(s) Chew four times a day PRN Gas          ***** VITAL SIGNS:  T(F): 97.4 (04-10-19 @ 06:11), Max: 97.5 (19 @ 15:40)  HR: 85 (04-10-19 @ 06:11) (77 - 88)  BP: 107/60 (04-10-19 @ 06:11) (107/60 - 129/59)  RR: 18 (04-10-19 @ 06:11) (18 - 18)  SpO2: 98% (04-10-19 @ 06:11) (98% - 100%)  Wt(kg): --  ,   I&O's Summary    2019 07:01  -  10 Apr 2019 07:00  --------------------------------------------------------  IN: 537 mL / OUT: 0 mL / NET: 537 mL          Alert oriented x 2  able to follow 1 step commands.   able to recognize wife/son  more awake today.       EOMI fundi not visualized  blinks to threat   No facial asymmetry   Tongue is midline    withdraws to pain x4     Gait not assessed.            *****LAB AND IMAGIN.4    15.20 )-----------( 74       ( 10 Apr 2019 05:45 )             27.2               04-10    136  |  98  |  7   ----------------------------<  75  3.8   |  28  |  0.95    Ca    8.1<L>      10 Apr 2019 05:45  Phos  3.2     04-10  Mg     1.3     04-10    TPro  6.5  /  Alb  2.5<L>  /  TBili  0.8  /  DBili  x   /  AST  22  /  ALT  21  /  AlkPhos  252<H>  04-10                         [All pertinent recent Imaging/Reports reviewed]           *****A S S E S S M E N T   A N D   P L A N :      Mr. Miranda is a 72 yo man with history of Anemia, HTN, chronic diarrhea, and recent hospitalization at [facility] from 3/3/19-3/21/19 brought in by family for generalized weakness and inability to walk.    Patient was discharged home with home PT services on 3/21/19 after being hospitalized since 3/3/19. During his hospitalization, he was managed for chronic diarrhea presumed to be 2/2 IBD since infectious and autoimmune w/u was negative, anemia due to blood loss from GI tract 2/2 PUD, requiring blood transfusion, and ESBL E. coli UTI with ertapenem via PICC. He was also determined not to have the diagnosis of CLL after flow cytometry results were available.          Problem/Recommendations 1: multifactorial toxic metabolic encephalopathy improving    b12/ ammonia wnl   repeat thiamine 500 iv pb x 3 days        Problem/Recommendations 2:Diarrheal illness    defer to gi       +recent travel to [place]  4 mos 2018 to 2019   probiotics   nutrition for appropriate binding diet  encourage po intake.   tolerating po intake. no diarrhea    cough ? atelectasis, spoke to nurse about instituting incentive spirometer.    Thank you for allowing me to participate in the care of this patient. Please do not hesitate to call me if you have any  questions.        ________________  Yessica Benitez MD  [facility]  [phone]      30 minutes spent on total encounter; more than 50 % of the visit was  spent counseling about plan of care, compliance to diet/exercise and medication regimen and or  coordinating care by the attending physician.      It is advised that s stroke patients follow up with VINAYAK Cerrato @ [phone] in 1- 2 weeks.   Others please follow up with Dr. Michael Nissenbaum [phone]

## 2019-04-11 LAB
ALBUMIN SERPL ELPH-MCNC: 2.3 G/DL — LOW (ref 3.3–5)
ALP SERPL-CCNC: 248 U/L — HIGH (ref 40–120)
ALT FLD-CCNC: 18 U/L — SIGNIFICANT CHANGE UP (ref 4–41)
ANION GAP SERPL CALC-SCNC: 9 MMO/L — SIGNIFICANT CHANGE UP (ref 7–14)
AST SERPL-CCNC: 22 U/L — SIGNIFICANT CHANGE UP (ref 4–40)
BASOPHILS # BLD AUTO: 0.02 K/UL — SIGNIFICANT CHANGE UP (ref 0–0.2)
BASOPHILS NFR BLD AUTO: 0.1 % — SIGNIFICANT CHANGE UP (ref 0–2)
BILIRUB SERPL-MCNC: 0.7 MG/DL — SIGNIFICANT CHANGE UP (ref 0.2–1.2)
BUN SERPL-MCNC: 8 MG/DL — SIGNIFICANT CHANGE UP (ref 7–23)
CALCIUM SERPL-MCNC: 7.7 MG/DL — LOW (ref 8.4–10.5)
CHLORIDE SERPL-SCNC: 97 MMOL/L — LOW (ref 98–107)
CO2 SERPL-SCNC: 29 MMOL/L — SIGNIFICANT CHANGE UP (ref 22–31)
CREAT SERPL-MCNC: 0.98 MG/DL — SIGNIFICANT CHANGE UP (ref 0.5–1.3)
EOSINOPHIL # BLD AUTO: 1.63 K/UL — HIGH (ref 0–0.5)
EOSINOPHIL NFR BLD AUTO: 11.3 % — HIGH (ref 0–6)
GLUCOSE BLDC GLUCOMTR-MCNC: 100 MG/DL — HIGH (ref 70–99)
GLUCOSE SERPL-MCNC: 65 MG/DL — LOW (ref 70–99)
HCT VFR BLD CALC: 25.9 % — LOW (ref 39–50)
HGB BLD-MCNC: 8 G/DL — LOW (ref 13–17)
IMM GRANULOCYTES NFR BLD AUTO: 1.2 % — SIGNIFICANT CHANGE UP (ref 0–1.5)
LYMPHOCYTES # BLD AUTO: 18.3 % — SIGNIFICANT CHANGE UP (ref 13–44)
LYMPHOCYTES # BLD AUTO: 2.65 K/UL — SIGNIFICANT CHANGE UP (ref 1–3.3)
MAGNESIUM SERPL-MCNC: 1.6 MG/DL — SIGNIFICANT CHANGE UP (ref 1.6–2.6)
MANUAL SMEAR VERIFICATION: SIGNIFICANT CHANGE UP
MCHC RBC-ENTMCNC: 28.8 PG — SIGNIFICANT CHANGE UP (ref 27–34)
MCHC RBC-ENTMCNC: 30.9 % — LOW (ref 32–36)
MCV RBC AUTO: 93.2 FL — SIGNIFICANT CHANGE UP (ref 80–100)
MONOCYTES # BLD AUTO: 6.09 K/UL — HIGH (ref 0–0.9)
MONOCYTES NFR BLD AUTO: 42.1 % — HIGH (ref 2–14)
NEUTROPHILS # BLD AUTO: 3.91 K/UL — SIGNIFICANT CHANGE UP (ref 1.8–7.4)
NEUTROPHILS NFR BLD AUTO: 27 % — LOW (ref 43–77)
NRBC # FLD: 0.07 K/UL — SIGNIFICANT CHANGE UP (ref 0–0)
PHOSPHATE SERPL-MCNC: 3.1 MG/DL — SIGNIFICANT CHANGE UP (ref 2.5–4.5)
PLATELET # BLD AUTO: 70 K/UL — LOW (ref 150–400)
PMV BLD: SIGNIFICANT CHANGE UP FL (ref 7–13)
POTASSIUM SERPL-MCNC: 3.8 MMOL/L — SIGNIFICANT CHANGE UP (ref 3.5–5.3)
POTASSIUM SERPL-SCNC: 3.8 MMOL/L — SIGNIFICANT CHANGE UP (ref 3.5–5.3)
PROT SERPL-MCNC: 6.1 G/DL — SIGNIFICANT CHANGE UP (ref 6–8.3)
RBC # BLD: 2.78 M/UL — LOW (ref 4.2–5.8)
RBC # FLD: 21.5 % — HIGH (ref 10.3–14.5)
SODIUM SERPL-SCNC: 135 MMOL/L — SIGNIFICANT CHANGE UP (ref 135–145)
WBC # BLD: 14.47 K/UL — HIGH (ref 3.8–10.5)
WBC # FLD AUTO: 14.47 K/UL — HIGH (ref 3.8–10.5)

## 2019-04-11 RX ADMIN — PANTOPRAZOLE SODIUM 40 MILLIGRAM(S): 20 TABLET, DELAYED RELEASE ORAL at 05:47

## 2019-04-11 RX ADMIN — Medication 1 TABLET(S): at 09:26

## 2019-04-11 RX ADMIN — Medication 3 MILLIGRAM(S): at 21:31

## 2019-04-11 RX ADMIN — Medication 800 MILLIGRAM(S): at 13:17

## 2019-04-11 RX ADMIN — Medication 1 TABLET(S): at 05:46

## 2019-04-11 RX ADMIN — Medication 250 MILLIGRAM(S): at 13:51

## 2019-04-11 RX ADMIN — Medication 1 TABLET(S): at 17:58

## 2019-04-11 RX ADMIN — Medication 1 TABLET(S): at 13:17

## 2019-04-11 RX ADMIN — Medication 25 MILLIGRAM(S): at 05:46

## 2019-04-11 RX ADMIN — Medication 20 MILLIGRAM(S): at 05:47

## 2019-04-11 RX ADMIN — SPIRONOLACTONE 50 MILLIGRAM(S): 25 TABLET, FILM COATED ORAL at 05:47

## 2019-04-11 RX ADMIN — Medication 800 MILLIGRAM(S): at 05:47

## 2019-04-11 RX ADMIN — Medication 1 TABLET(S): at 18:02

## 2019-04-11 RX ADMIN — Medication 1000 UNIT(S): at 13:18

## 2019-04-11 RX ADMIN — Medication 800 MILLIGRAM(S): at 21:31

## 2019-04-11 NOTE — PROGRESS NOTE ADULT - SUBJECTIVE AND OBJECTIVE BOX
Patient is a 73y old  Male who presents with a chief complaint of Generalized weakness and inability to walk (11 Apr 2019 21:09)      SUBJECTIVE / OVERNIGHT EVENTS:    Events noted.  CONSTITUTIONAL: No fever,  or fatigue  RESPIRATORY: No cough, wheezing,  No shortness of breath  CARDIOVASCULAR: No chest pain, palpitations, dizziness, or leg swelling  GASTROINTESTINAL: No abdominal or epigastric pain. No nausea, vomiting.  NEUROLOGICAL: No headaches,     MEDICATIONS  (STANDING):  calcium carbonate   1250 mG (OsCal) 1 Tablet(s) Oral two times a day  cholecalciferol 1000 Unit(s) Oral daily  furosemide    Tablet 20 milliGRAM(s) Oral daily  lactobacillus acidophilus 1 Tablet(s) Oral three times a day with meals  melatonin 3 milliGRAM(s) Oral <User Schedule>  mesalamine DR Capsule 800 milliGRAM(s) Oral three times a day  metoprolol succinate ER 25 milliGRAM(s) Oral daily  multivitamin 1 Tablet(s) Oral daily  pantoprazole    Tablet 40 milliGRAM(s) Oral before breakfast  spironolactone 50 milliGRAM(s) Oral daily  vancomycin  IVPB 750 milliGRAM(s) IV Intermittent daily    MEDICATIONS  (PRN):  ALBUTerol    90 MICROgram(s) HFA Inhaler 2 Puff(s) Inhalation every 6 hours PRN Wheezing  dicyclomine 20 milliGRAM(s) Oral four times a day before meals PRN abd pain  loperamide 2 milliGRAM(s) Oral three times a day PRN Diarrhea  QUEtiapine 25 milliGRAM(s) Oral at bedtime PRN for agitation/sleep  simethicone 80 milliGRAM(s) Chew four times a day PRN Gas        CAPILLARY BLOOD GLUCOSE      POCT Blood Glucose.: 100 mg/dL (11 Apr 2019 12:26)    I&O's Summary      PHYSICAL EXAM:  GENERAL: NAD  NECK: Supple, No JVD  CHEST/LUNG: Clear to auscultation bilaterally; No wheezing.  HEART: Regular rate and rhythm; No murmurs, rubs, or gallops  ABDOMEN: Soft, Nontender, Nondistended; Bowel sounds present  EXTREMITIES:   No edema  NEUROLOGY: AAO       LABS:                        8.0    14.47 )-----------( 70       ( 11 Apr 2019 06:30 )             25.9     04-11    135  |  97<L>  |  8   ----------------------------<  65<L>  3.8   |  29  |  0.98    Ca    7.7<L>      11 Apr 2019 06:39  Phos  3.1     04-11  Mg     1.6     04-11    TPro  6.1  /  Alb  2.3<L>  /  TBili  0.7  /  DBili  x   /  AST  22  /  ALT  18  /  AlkPhos  248<H>  04-11            CAPILLARY BLOOD GLUCOSE      POCT Blood Glucose.: 100 mg/dL (11 Apr 2019 12:26)                RADIOLOGY & ADDITIONAL TESTS:    Imaging Personally Reviewed:    Consultant(s) Notes Reviewed:      Care Discussed with Consultants/Other Providers:

## 2019-04-11 NOTE — PROGRESS NOTE ADULT - SUBJECTIVE AND OBJECTIVE BOX
[facility]   MD MAGALY MORAN MD ANGELA WONG, PA    TEL:  OFFICE: [phone]  DR ANDERSON CELL: [phone]  DR. MONAE CELL: [phone]  HUBERT WELLINGTON CELL: [phone]        Patient is a 73y old  Male who presents with a chief complaint of Generalized weakness and inability to walk (11 Apr 2019 14:43)      Patient seen and examined at bedside. No chest pain/sob    VITALS:  T(F): 97.8 (04-11-19 @ 13:15), Max: 97.8 (04-10-19 @ 17:30)  HR: 75 (04-11-19 @ 13:15)  BP: 103/52 (04-11-19 @ 13:15)  RR: 18 (04-11-19 @ 13:15)  SpO2: 100% (04-11-19 @ 13:15)  Wt(kg): --        PHYSICAL EXAM:  Constitutional: NAD  Neck: No JVD  Respiratory: CTAB, no wheezes, rales or rhonchi  Cardiovascular: S1, S2, RRR  Gastrointestinal: BS+, soft, NT/ND  Extremities: No peripheral edema    Hospital Medications:   MEDICATIONS  (STANDING):  calcium carbonate   1250 mG (OsCal) 1 Tablet(s) Oral two times a day  cholecalciferol 1000 Unit(s) Oral daily  furosemide    Tablet 20 milliGRAM(s) Oral daily  lactobacillus acidophilus 1 Tablet(s) Oral three times a day with meals  melatonin 3 milliGRAM(s) Oral <User Schedule>  mesalamine DR Capsule 800 milliGRAM(s) Oral three times a day  metoprolol succinate ER 25 milliGRAM(s) Oral daily  multivitamin 1 Tablet(s) Oral daily  pantoprazole    Tablet 40 milliGRAM(s) Oral before breakfast  spironolactone 50 milliGRAM(s) Oral daily  vancomycin  IVPB 750 milliGRAM(s) IV Intermittent daily      LABS:  04-11    135  |  97<L>  |  8   ----------------------------<  65<L>  3.8   |  29  |  0.98    Ca    7.7<L>      11 Apr 2019 06:39  Phos  3.1     04-11  Mg     1.6     04-11    TPro  6.1  /  Alb  2.3<L>  /  TBili  0.7  /  DBili      /  AST  22  /  ALT  18  /  AlkPhos  248<H>  04-11    Creatinine Trend: 0.98 <--, 0.95 <--, 1.00 <--, 0.90 <--, 0.96 <--, 1.01 <--, 1.05 <--    Phosphorus Level, Serum: 3.1 mg/dL (04-11 @ 06:39)  Albumin, Serum: 2.3 g/dL (04-11 @ 06:39)                              8.0    14.47 )-----------( 70       ( 11 Apr 2019 06:30 )             25.9     Urine Studies:  Urinalysis - [04-01-19 @ 17:00]      Color YELLOW / Appearance CLEAR / SG 1.015 / pH 6.0      Gluc NEGATIVE / Ketone NEGATIVE  / Bili NEGATIVE / Urobili NORMAL       Blood NEGATIVE / Protein 30 / Leuk Est NEGATIVE / Nitrite NEGATIVE      RBC 0-2 / WBC 3-5 / Hyaline NEGATIVE / Gran  / Sq Epi OCC / Non Sq Epi  / Bacteria FEW      Iron 48, TIBC 78, %sat --      [03-29-19 @ 05:20]  Ferritin 378.6      [03-29-19 @ 05:20]  Vitamin D (25OH) 25.6      [03-23-19 @ 18:15]  TSH 1.98      [03-05-19 @ 06:45]    HBsAb Nonreactive      [03-20-19 @ 05:35]  HBsAg NEGATIVE      [03-20-19 @ 05:35]      RADIOLOGY & ADDITIONAL STUDIES:

## 2019-04-11 NOTE — PROGRESS NOTE ADULT - ASSESSMENT
74 yo man with history of Anemia, HTN, chronic diarrhea, and recent hospitalization at [facility] from 3/3/19-3/21/19 brought in by family for generalized weakness and inability to walk found to have james and hypernatremia

## 2019-04-11 NOTE — PROGRESS NOTE ADULT - SUBJECTIVE AND OBJECTIVE BOX
Cardiovascular Disease Progress Note    Overnight events: No acute events overnight. Mr. Miranda is resting comfortably. He denies pain.     Otherwise review of systems negative    Objective Findings:  T(C): 36.4 (04-11-19 @ 05:44), Max: 36.6 (04-10-19 @ 13:07)  HR: 75 (04-11-19 @ 05:44) (75 - 88)  BP: 100/65 (04-11-19 @ 05:44) (100/65 - 136/76)  RR: 18 (04-11-19 @ 05:44) (18 - 18)  SpO2: 100% (04-11-19 @ 05:44) (100% - 100%)  Wt(kg): --  Daily     Daily       Physical Exam:  Gen: NAD  HEENT: EOMI  CV: RRR, normal S1 + S2, no m/r/g  Lungs: CTAB  Abd: soft, non-tender  Ext: No edema    Telemetry: n/a    Laboratory Data:                        8.0    14.47 )-----------( 70       ( 11 Apr 2019 06:30 )             25.9     04-10    136  |  98  |  7   ----------------------------<  75  3.8   |  28  |  0.95    Ca    8.1<L>      10 Apr 2019 05:45  Phos  3.2     04-10  Mg     1.3     04-10    TPro  6.5  /  Alb  2.5<L>  /  TBili  0.8  /  DBili  x   /  AST  22  /  ALT  21  /  AlkPhos  252<H>  04-10              Inpatient Medications:  MEDICATIONS  (STANDING):  calcium carbonate   1250 mG (OsCal) 1 Tablet(s) Oral two times a day  cholecalciferol 1000 Unit(s) Oral daily  furosemide    Tablet 20 milliGRAM(s) Oral daily  lactobacillus acidophilus 1 Tablet(s) Oral three times a day with meals  melatonin 3 milliGRAM(s) Oral <User Schedule>  mesalamine DR Capsule 800 milliGRAM(s) Oral three times a day  metoprolol succinate ER 25 milliGRAM(s) Oral daily  multivitamin 1 Tablet(s) Oral daily  pantoprazole    Tablet 40 milliGRAM(s) Oral before breakfast  spironolactone 50 milliGRAM(s) Oral daily  vancomycin  IVPB 750 milliGRAM(s) IV Intermittent daily      Assessment: 73 year old man with HTN, anemia, chronic diarrhea, malnutrition and low voltage EKG presents with weakness.    Plan of Care:    #Abnormal EKG-  Low voltage.  TTE from 3/10 reviewed- no significant structural heart disease.   Patient tolerated colonoscopy well on 4/1.   Continue current cardiac management.      #HTN-  BP controlled on current regimen.     #Chronic diarrhea-  Pathology consistent with colitis.  GI input noted.   Treatment of cirrhosis as per primary team.     Over 25 minutes spent on total encounter; more than 50% of the visit was spent counseling and/or coordinating care by the attending physician.      Darren Herring MD [facility]  Cardiovascular Disease  [phone]

## 2019-04-11 NOTE — PROGRESS NOTE ADULT - SUBJECTIVE AND OBJECTIVE BOX
[facility]      Seen earlier today, and examined.  - Today, patient is without complaints.           *****MEDICATIONS: Current medication reviewed and documented.    MEDICATIONS  (STANDING):  calcium carbonate   1250 mG (OsCal) 1 Tablet(s) Oral two times a day  cholecalciferol 1000 Unit(s) Oral daily  furosemide    Tablet 20 milliGRAM(s) Oral daily  lactobacillus acidophilus 1 Tablet(s) Oral three times a day with meals  melatonin 3 milliGRAM(s) Oral <User Schedule>  mesalamine DR Capsule 800 milliGRAM(s) Oral three times a day  metoprolol succinate ER 25 milliGRAM(s) Oral daily  multivitamin 1 Tablet(s) Oral daily  pantoprazole    Tablet 40 milliGRAM(s) Oral before breakfast  spironolactone 50 milliGRAM(s) Oral daily  vancomycin  IVPB 750 milliGRAM(s) IV Intermittent daily    MEDICATIONS  (PRN):  ALBUTerol    90 MICROgram(s) HFA Inhaler 2 Puff(s) Inhalation every 6 hours PRN Wheezing  dicyclomine 20 milliGRAM(s) Oral four times a day before meals PRN abd pain  loperamide 2 milliGRAM(s) Oral three times a day PRN Diarrhea  QUEtiapine 25 milliGRAM(s) Oral at bedtime PRN for agitation/sleep  simethicone 80 milliGRAM(s) Chew four times a day PRN Gas          ***** VITAL SIGNS:  T(F): 97.8 (19 @ 13:15), Max: 97.8 (04-10-19 @ 17:30)  HR: 75 (19 @ 13:15) (75 - 88)  BP: 103/52 (19 @ 13:15) (100/65 - 136/76)  RR: 18 (19 @ 13:15) (18 - 18)  SpO2: 100% (19 @ 13:15) (100% - 100%)  Wt(kg): --  ,   I&O's Summary           *****PHYSICAL EXAM:        Alert oriented x 2  able to follow 1 step commands.      more awake today.       EOMI fundi not visualized  blinks to threat   No facial asymmetry   Tongue is midline    withdraws to pain x4     Gait not assessed.          *****LAB AND IMAGIN.0    14.47 )-----------( 70       ( 2019 06:30 )             25.9               -    135  |  97<L>  |  8   ----------------------------<  65<L>  3.8   |  29  |  0.98    Ca    7.7<L>      2019 06:39  Phos  3.1       Mg     1.6         TPro  6.1  /  Alb  2.3<L>  /  TBili  0.7  /  DBili  x   /  AST  22  /  ALT  18  /  AlkPhos  248<H>                           [All pertinent recent Imaging/Reports reviewed]           *****A S S E S S M E N T   A N D   P L A N :      Mr. Miranda is a 74 yo man with history of Anemia, HTN, chronic diarrhea, and recent hospitalization at [facility] from 3/3/19-3/21/19 brought in by family for generalized weakness and inability to walk.    Patient was discharged home with home PT services on 3/21/19 after being hospitalized since 3/3/19. During his hospitalization, he was managed for chronic diarrhea presumed to be 2/2 IBD since infectious and autoimmune w/u was negative, anemia due to blood loss from GI tract 2/2 PUD, requiring blood transfusion, and ESBL E. coli UTI with ertapenem via PICC. He was also determined not to have the diagnosis of CLL after flow cytometry results were available.          Problem/Recommendations 1: multifactorial toxic metabolic encephalopathy improving    b12/ ammonia wnl    completed thiamine         Problem/Recommendations 2:Diarrheal illness  resolved  defer to gi       +recent travel to [place]  4 mos 2018 to 2019   probiotics   nutrition for appropriate binding diet  encourage po intake.   tolerating po intake.      cough ? atelectasis, spoke to nurse about instituting incentive spirometer.      Thank you for allowing me to participate in the care of this patient. Please do not hesitate to call me if you have any  questions.        ________________  Yessica Benitez MD  [facility] ([facility]  [phone]      30 minutes spent on total encounter; more than 50 % of the visit was  spent counseling about plan of care, compliance to diet/exercise and medication regimen and or  coordinating care by the attending physician.      It is advised that s stroke patients follow up with VINAYAK Cerarto @ [phone] in 1- 2 weeks.   Others please follow up with Dr. Michael Nissenbaum [phone]

## 2019-04-11 NOTE — PROGRESS NOTE ADULT - SUBJECTIVE AND OBJECTIVE BOX
Infectious Diseases progress note:    Subjective: Awake, alert, mentating well.  Several family members at bedside including pt's daughter.  States pt's memory is getting better.  No watery diarrhea, had one formed stool (and has been having consitent, formed BMs for past few days).  Afebrile, no dysuria.  WBC slightly improved today.      ROS:  CONSTITUTIONAL:  No fever, chills, rigors  CARDIOVASCULAR:  No chest pain or palpitations  RESPIRATORY:   No SOB, cough, dyspnea on exertion.  No wheezing  GASTROINTESTINAL:  No abd pain, N/V, diarrhea/constipation  EXTREMITIES:  No swelling or joint pain  GENITOURINARY:  No burning on urination, increased frequency or urgency.  No flank pain  NEUROLOGIC:  No HA, visual disturbances  SKIN: No rashes    Allergies    No Known Allergies    Intolerances        ANTIBIOTICS/RELEVANT:  antimicrobials  vancomycin  IVPB 750 milliGRAM(s) IV Intermittent daily    immunologic:    OTHER:  ALBUTerol    90 MICROgram(s) HFA Inhaler 2 Puff(s) Inhalation every 6 hours PRN  calcium carbonate   1250 mG (OsCal) 1 Tablet(s) Oral two times a day  cholecalciferol 1000 Unit(s) Oral daily  dicyclomine 20 milliGRAM(s) Oral four times a day before meals PRN  furosemide    Tablet 20 milliGRAM(s) Oral daily  lactobacillus acidophilus 1 Tablet(s) Oral three times a day with meals  loperamide 2 milliGRAM(s) Oral three times a day PRN  melatonin 3 milliGRAM(s) Oral <User Schedule>  mesalamine DR Capsule 800 milliGRAM(s) Oral three times a day  metoprolol succinate ER 25 milliGRAM(s) Oral daily  multivitamin 1 Tablet(s) Oral daily  pantoprazole    Tablet 40 milliGRAM(s) Oral before breakfast  QUEtiapine 25 milliGRAM(s) Oral at bedtime PRN  simethicone 80 milliGRAM(s) Chew four times a day PRN  spironolactone 50 milliGRAM(s) Oral daily      Objective:  Vital Signs Last 24 Hrs  T(C): 36.8 (11 Apr 2019 20:55), Max: 36.8 (11 Apr 2019 20:55)  T(F): 98.2 (11 Apr 2019 20:55), Max: 98.2 (11 Apr 2019 20:55)  HR: 85 (11 Apr 2019 20:55) (75 - 88)  BP: 100/55 (11 Apr 2019 20:55) (100/55 - 120/53)  BP(mean): --  RR: 18 (11 Apr 2019 20:55) (18 - 18)  SpO2: 100% (11 Apr 2019 20:55) (100% - 100%)    PHYSICAL EXAM:  Constitutional:NAD  Eyes:TODD, EOMI  Ear/Nose/Throat: no thrush, mucositis.  Moist mucous membranes	  Neck:no JVD, no lymphadenopathy, supple  Respiratory: CTA ruth  Cardiovascular: S1S2 RRR, no murmurs  Gastrointestinal:soft, nontender,  nondistended (+) BS  Extremities:no e/e/c  Skin:  no rashes, open wounds or ulcerations        LABS:                        8.0    14.47 )-----------( 70       ( 11 Apr 2019 06:30 )             25.9     04-11    135  |  97<L>  |  8   ----------------------------<  65<L>  3.8   |  29  |  0.98    Ca    7.7<L>      11 Apr 2019 06:39  Phos  3.1     04-11  Mg     1.6     04-11    TPro  6.1  /  Alb  2.3<L>  /  TBili  0.7  /  DBili  x   /  AST  22  /  ALT  18  /  AlkPhos  248<H>  04-11            Vancomycin Level, Random:  ug/mL (04-07 @ 12:20)                  MICROBIOLOGY:          RADIOLOGY & ADDITIONAL STUDIES:

## 2019-04-11 NOTE — PROGRESS NOTE ADULT - ASSESSMENT
Mr. Miranda is a 74 yo man with history of Anemia, HTN, chronic diarrhea, and recent hospitalization at [facility] from 3/3/19-3/21/19 brought in by family for generalized weakness and inability to walk.    Patient was discharged home with home PT services on 3/21/19 after being hospitalized since 3/3/19. During his hospitalization, he was managed for chronic diarrhea presumed to be 2/2 IBD since infectious and autoimmune w/u was negative, anemia due to blood loss from GI tract 2/2 PUD, requiring blood transfusion, and ESBL E. coli UTI with ertapenem via PICC. He was also determined not to have the diagnosis of CLL after flow cytometry results were available.     HPI was obtained primarily from patient's daughter and caregiver, Bee. As per daughter, when patient came home he was very weak. She wanted to bathe him but patient was unable to stand or walk to the bathroom. Due to his severe weakness, his daughter brought him back to the hospital. (23 Mar 2019 08:22)      Recommend:    - s/p completion of  ertapenem 1 week course for ESBL e.coli UTI on 3/25.   Pt restarted on vancomycin for enterococcus faecium UTI.    - Pt with diarrhea, thus far infectious w/u negative including stool cx, O&P (including microsporidium, cyclospora, isospora, cryptosporidium), gi pcr, cdiff, strongyloides neg, giardia neg, cmv pcr (-), cmv igG (+), IgM (-), HIV (-).  Stool for AFB (-)    - Pt with moderate ascites, and ?cirrhosis on CT ap.  Hep A/B/C serologies neg.  s/p paracentesis  on 4/3.  SAAG elevated, low albumin.  Low Tnc, do not suspect SBP.  Hepatology consulted for evaluation of cirrhosis - recs appreciated     -GI eval noted, repeat stool studies ordered including repeat gi pcr, giardia, stool o&p, stool cx negative.    r/o autoimmune and celiac disease.       -  CMV IgG (+), recommend biopsy to r/o cmv colitis - pt s/p colonoscopy on 4/1.  path shows areas of focal active colitis, findings are nonspecific and differential remains broad.  Diarrhea somewhat improved on immodium.       - Pt with intermittent hypothermia and change in mental status/disoriented.   Repeat Ucx grew enterococcus faecium, UA was unremarkable at that time.  Given recurrent hypothermia, will opt to treat for upper tract infection - cont vancomycin to treat enterococcus: complete 14 day course, can change to nitrofurantoin 100mg PO bid to complete the course.      - Pt with leukocytosis.  Cont to monitor, no new focal signs/symptoms on clinical exam.  Still with elevated eosinophils.  Strongyloides (-).  Recommend sending trichinella, toxocara, schistosomiasis - can be followed as outpt.  Also spoke with pathology to re-review slides specifically for infectious/parasitic pathology - no CMV colitis or parasites seen on slides - d/w Dr. More.        OK to d/c from ID standpoint.      (Dr. Tessie Arroyo covering 4/12 through 4/15.  [phone])

## 2019-04-11 NOTE — PROGRESS NOTE ADULT - ASSESSMENT
· Assessment	  Mr. Miranda is a 72 yo man with history of Anemia, HTN, PUD, chronic diarrhea, and recent hospitalization at [facility] from 3/3/19-3/21/19 brought in by family for generalized weakness and inability to walk admitted for severe malnutrition, mild hypernatremia and severe deconditioning. Exam relatively benign aside for significant LE edema and weakness of legs. Anemia at baseline.         ·  Problem: Chronic diarrhea.  Plan: GI f/up.  - S/p colonoscopy and biopsy.   - Biopsy report: no CMV   Disp: [facility]

## 2019-04-12 VITALS
SYSTOLIC BLOOD PRESSURE: 110 MMHG | DIASTOLIC BLOOD PRESSURE: 52 MMHG | TEMPERATURE: 97 F | HEART RATE: 79 BPM | RESPIRATION RATE: 18 BRPM | OXYGEN SATURATION: 100 %

## 2019-04-12 PROBLEM — N18.9 CHRONIC KIDNEY DISEASE, UNSPECIFIED: Chronic | Status: ACTIVE | Noted: 2019-03-23

## 2019-04-12 LAB
ALBUMIN SERPL ELPH-MCNC: 2.3 G/DL — LOW (ref 3.3–5)
ALP SERPL-CCNC: 258 U/L — HIGH (ref 40–120)
ALT FLD-CCNC: 20 U/L — SIGNIFICANT CHANGE UP (ref 4–41)
ANION GAP SERPL CALC-SCNC: 10 MMO/L — SIGNIFICANT CHANGE UP (ref 7–14)
AST SERPL-CCNC: 22 U/L — SIGNIFICANT CHANGE UP (ref 4–40)
BASOPHILS # BLD AUTO: 0.01 K/UL — SIGNIFICANT CHANGE UP (ref 0–0.2)
BASOPHILS NFR BLD AUTO: 0.1 % — SIGNIFICANT CHANGE UP (ref 0–2)
BILIRUB SERPL-MCNC: 0.7 MG/DL — SIGNIFICANT CHANGE UP (ref 0.2–1.2)
BUN SERPL-MCNC: 9 MG/DL — SIGNIFICANT CHANGE UP (ref 7–23)
CALCIUM SERPL-MCNC: 8 MG/DL — LOW (ref 8.4–10.5)
CHLORIDE SERPL-SCNC: 99 MMOL/L — SIGNIFICANT CHANGE UP (ref 98–107)
CO2 SERPL-SCNC: 26 MMOL/L — SIGNIFICANT CHANGE UP (ref 22–31)
CREAT SERPL-MCNC: 1.02 MG/DL — SIGNIFICANT CHANGE UP (ref 0.5–1.3)
EOSINOPHIL # BLD AUTO: 1.55 K/UL — HIGH (ref 0–0.5)
EOSINOPHIL NFR BLD AUTO: 11.5 % — HIGH (ref 0–6)
GLUCOSE SERPL-MCNC: 117 MG/DL — HIGH (ref 70–99)
HCT VFR BLD CALC: 27.4 % — LOW (ref 39–50)
HGB BLD-MCNC: 8.7 G/DL — LOW (ref 13–17)
IMM GRANULOCYTES NFR BLD AUTO: 1.8 % — HIGH (ref 0–1.5)
LYMPHOCYTES # BLD AUTO: 17.2 % — SIGNIFICANT CHANGE UP (ref 13–44)
LYMPHOCYTES # BLD AUTO: 2.33 K/UL — SIGNIFICANT CHANGE UP (ref 1–3.3)
MAGNESIUM SERPL-MCNC: 1.5 MG/DL — LOW (ref 1.6–2.6)
MCHC RBC-ENTMCNC: 29.6 PG — SIGNIFICANT CHANGE UP (ref 27–34)
MCHC RBC-ENTMCNC: 31.8 % — LOW (ref 32–36)
MCV RBC AUTO: 93.2 FL — SIGNIFICANT CHANGE UP (ref 80–100)
MONOCYTES # BLD AUTO: 5.46 K/UL — HIGH (ref 0–0.9)
MONOCYTES NFR BLD AUTO: 40.4 % — HIGH (ref 2–14)
NEUTROPHILS # BLD AUTO: 3.93 K/UL — SIGNIFICANT CHANGE UP (ref 1.8–7.4)
NEUTROPHILS NFR BLD AUTO: 29 % — LOW (ref 43–77)
NRBC # FLD: 0.04 K/UL — SIGNIFICANT CHANGE UP (ref 0–0)
PHOSPHATE SERPL-MCNC: 3 MG/DL — SIGNIFICANT CHANGE UP (ref 2.5–4.5)
PLATELET # BLD AUTO: 64 K/UL — LOW (ref 150–400)
PMV BLD: SIGNIFICANT CHANGE UP FL (ref 7–13)
POTASSIUM SERPL-MCNC: 3.4 MMOL/L — LOW (ref 3.5–5.3)
POTASSIUM SERPL-SCNC: 3.4 MMOL/L — LOW (ref 3.5–5.3)
PROT SERPL-MCNC: 6.3 G/DL — SIGNIFICANT CHANGE UP (ref 6–8.3)
RBC # BLD: 2.94 M/UL — LOW (ref 4.2–5.8)
RBC # FLD: 21.4 % — HIGH (ref 10.3–14.5)
SODIUM SERPL-SCNC: 135 MMOL/L — SIGNIFICANT CHANGE UP (ref 135–145)
WBC # BLD: 13.53 K/UL — HIGH (ref 3.8–10.5)
WBC # FLD AUTO: 13.53 K/UL — HIGH (ref 3.8–10.5)

## 2019-04-12 RX ORDER — LACTOBACILLUS ACIDOPHILUS 100MM CELL
1 CAPSULE ORAL
Qty: 0 | Refills: 0 | COMMUNITY
Start: 2019-04-12

## 2019-04-12 RX ORDER — FUROSEMIDE 40 MG
1 TABLET ORAL
Qty: 0 | Refills: 0 | COMMUNITY
Start: 2019-04-12

## 2019-04-12 RX ORDER — SPIRONOLACTONE 25 MG/1
2 TABLET, FILM COATED ORAL
Qty: 0 | Refills: 0 | COMMUNITY
Start: 2019-04-12

## 2019-04-12 RX ORDER — ALBUTEROL 90 UG/1
2 AEROSOL, METERED ORAL
Qty: 0 | Refills: 0 | COMMUNITY
Start: 2019-04-12

## 2019-04-12 RX ORDER — POTASSIUM CHLORIDE 20 MEQ
40 PACKET (EA) ORAL ONCE
Qty: 0 | Refills: 0 | Status: COMPLETED | OUTPATIENT
Start: 2019-04-12 | End: 2019-04-12

## 2019-04-12 RX ORDER — LANOLIN ALCOHOL/MO/W.PET/CERES
1 CREAM (GRAM) TOPICAL
Qty: 0 | Refills: 0 | COMMUNITY
Start: 2019-04-12

## 2019-04-12 RX ORDER — LOPERAMIDE HCL 2 MG
1 TABLET ORAL
Qty: 0 | Refills: 0 | COMMUNITY
Start: 2019-04-12

## 2019-04-12 RX ORDER — QUETIAPINE FUMARATE 200 MG/1
1 TABLET, FILM COATED ORAL
Qty: 0 | Refills: 0 | COMMUNITY
Start: 2019-04-12

## 2019-04-12 RX ORDER — CALCIUM CARBONATE 500(1250)
1 TABLET ORAL
Qty: 0 | Refills: 0 | COMMUNITY
Start: 2019-04-12

## 2019-04-12 RX ORDER — SIMETHICONE 80 MG/1
1 TABLET, CHEWABLE ORAL
Qty: 0 | Refills: 0 | COMMUNITY
Start: 2019-04-12

## 2019-04-12 RX ORDER — MAGNESIUM SULFATE 500 MG/ML
1 VIAL (ML) INJECTION ONCE
Qty: 0 | Refills: 0 | Status: COMPLETED | OUTPATIENT
Start: 2019-04-12 | End: 2019-04-12

## 2019-04-12 RX ORDER — CHOLECALCIFEROL (VITAMIN D3) 125 MCG
1000 CAPSULE ORAL
Qty: 0 | Refills: 0 | COMMUNITY
Start: 2019-04-12

## 2019-04-12 RX ORDER — NITROFURANTOIN MACROCRYSTAL 50 MG
1 CAPSULE ORAL
Qty: 0 | Refills: 0 | COMMUNITY
Start: 2019-04-12

## 2019-04-12 RX ADMIN — PANTOPRAZOLE SODIUM 40 MILLIGRAM(S): 20 TABLET, DELAYED RELEASE ORAL at 05:37

## 2019-04-12 RX ADMIN — SPIRONOLACTONE 50 MILLIGRAM(S): 25 TABLET, FILM COATED ORAL at 05:37

## 2019-04-12 RX ADMIN — Medication 25 MILLIGRAM(S): at 05:37

## 2019-04-12 RX ADMIN — Medication 1 TABLET(S): at 13:05

## 2019-04-12 RX ADMIN — Medication 250 MILLIGRAM(S): at 13:07

## 2019-04-12 RX ADMIN — Medication 20 MILLIGRAM(S): at 05:37

## 2019-04-12 RX ADMIN — Medication 800 MILLIGRAM(S): at 13:05

## 2019-04-12 RX ADMIN — Medication 1000 UNIT(S): at 13:05

## 2019-04-12 RX ADMIN — Medication 1 TABLET(S): at 05:37

## 2019-04-12 RX ADMIN — Medication 1 TABLET(S): at 09:21

## 2019-04-12 RX ADMIN — Medication 800 MILLIGRAM(S): at 05:37

## 2019-04-12 RX ADMIN — Medication 40 MILLIEQUIVALENT(S): at 13:06

## 2019-04-12 RX ADMIN — Medication 100 GRAM(S): at 13:07

## 2019-04-12 NOTE — PROGRESS NOTE ADULT - RS GEN PE MLT RESP DETAILS PC
no rales/breath sounds equal/no rhonchi
no rales/no rhonchi/breath sounds equal
breath sounds equal/no rhonchi/no rales
breath sounds equal/no rhonchi/no rales

## 2019-04-12 NOTE — PROGRESS NOTE ADULT - PROBLEM SELECTOR PLAN 4
FINISHED ANTIBIOTICS
FINISHED ANTIBIOTICS  4/4: stable:
improved  Will monitor
improving   encourage PO free water  has ascites and pl. effusion  on D5W @60cc/hr  monitor bmp  avoid over correction
BP currently normotensive.   - continue metoprolol succinate 25mg daily
FINISHED ANTIBIOTICS
FINISHED ANTIBIOTICS  4/4: stable:  4/5: stable
FINISHED ANTIBIOTICS  4/4: stable:  4/5: stable  4/6
FINISHED ANTIBIOTICS  4/4: stable:  4/5: stable  4/6 afebrile. Defer to primary
FINISHED ANTIBTIOCS
FINISHED ANTIBTIOCS  3/*28: off antibiotics:
FINISHED ANTIBTIOCS  3/*28: off antibiotics:  3/29: off antibiotics:
Now hyponatremia  Patients started to eat and drink. But, no the full tray  Will monitor
improved  IVF d/c'd  Will monitor
improved  IVF d/c'd  Will monitor
improved  Will monitor
improving   encourage PO free water  has ascites and pl. effusion  decrease D5W @60cc/hr  monitor bmp  avoid over correction
improving   encourage PO free water  has ascites and pl. effusion  on D5W @60cc/hr  monitor bmp  avoid over correction
improved  Will monitor
improving   encourage PO free water  has ascites and pl. effusion  on D5W @60cc/hr  monitor bmp  avoid over correction
improving   encourage PO free water  has ascites and pl. effusion  on D5W @60cc/hr  monitor bmp  avoid over correction
FINISHED ANTIBIOTICS
improved  Will monitor

## 2019-04-12 NOTE — PROGRESS NOTE ADULT - PROVIDER SPECIALTY LIST ADULT
Cardiology
Gastroenterology
Heme/Onc
Hepatology
Infectious Disease
Internal Medicine
Nephrology
Neurology
Pulmonology
Gastroenterology
Nephrology
Gastroenterology
Infectious Disease
Neurology
Infectious Disease
Internal Medicine
Internal Medicine
Nephrology
Pulmonology
Nephrology
Pulmonology
Pulmonology

## 2019-04-12 NOTE — PROGRESS NOTE ADULT - PROBLEM SELECTOR PLAN 2
HCO3 is decreasing: his breathing seems stable: Oxygenation is pretty good: He is on nahco3:
HCO3 is decreasing: his breathing seems stable: Oxygenation is pretty good: He is on nahco3:  4/2: f/u celiac panel including tissue trans glutaminase IgA and IgA will attempt. for  un-sedated flexible sigmoidoscopy  4/3: per gi :  4/4: per gi
baseline ~ 1.1-1.4  possible sec to HTN.
baseline ~ 1.1-1.4.   possible sec to HTN.
HCO3 is decreasing: his breathing seems stable: Oxygenation is pretty good: He is on nahco3:  4/2: f/u celiac panel including tissue trans glutaminase IgA and IgA will attempt. for  un-sedated flexible sigmoidoscopy
HCO3 is decreasing: his breathing seems stable: Oxygenation is pretty good: He is on nahco3:  4/2: f/u celiac panel including tissue trans glutaminase IgA and IgA will attempt. for  un-sedated flexible sigmoidoscopy  4/3: per gi :
HCO3 is decreasing: his breathing seems stable: Oxygenation is pretty good: He is on nahco3:  4/2: f/u celiac panel including tissue trans glutaminase IgA and IgA will attempt. for  un-sedated flexible sigmoidoscopy  4/3: per gi :  4/4: per gi  4/5: resolved: per gi : being worked up for cirrhosis
HCO3 is decreasing: his breathing seems stable: Oxygenation is pretty good: He is on nahco3:  4/2: f/u celiac panel including tissue trans glutaminase IgA and IgA will attempt. for  un-sedated flexible sigmoidoscopy  4/3: per gi :  4/4: per gi  4/5: resolved: per gi : being worked up for cirrhosis  4/6 defer to GI
HCO3 is decreasing: his breathing seems stable: Oxygenation is pretty good: He is on nahco3:  4/2: f/u celiac panel including tissue trans glutaminase IgA and IgA will attempt. for  un-sedated flexible sigmoidoscopy  4/3: per gi :  4/4: per gi  4/5: resolved: per gi : being worked up for cirrhosis  4/6 defer to GI  4/7 defer to GI
HCO3 is decreasing: his breathing seems stable: Oxygenation is pretty good: He is on nahco3:  4/2: f/u celiac panel including tissue trans glutaminase IgA and IgA will attempt. for  un-sedated flexible sigmoidoscopy  4/3: per gi :  4/4: per gi  4/5: resolved: per gi : being worked up for cirrhosis  4/6 defer to GI  4/7 defer to GI  4/9: resolved:per gi
Pt with hypoalbuminemia and significant peripheral edema c/w with severe protein-calorie malnutrition. Swelling in feet b/l may be contributing to inability to walk compared to prior.   - regular lactose-free diet  - check Vit B12 and Vit D 25-OH levels.  - assistance with meals  - PT evaluation  - compression stockings if available
baseline ~ 1.1-1.4  possible sec to HTN.
baseline ~ 1.1-1.4  possible sec to HTN.
baseline ~ 1.1-1.4.   possible sec to HTN.
baseline ~ 1.1-1.4. Cr: 1.2 today. Better than baseline  possible sec to HTN.
per gi: unclear etiology :
per gi: unclear etiology :  3/28: has rectal tube: per gi
per gi: unclear etiology :  3/28: has rectal tube: per gi  3/29: per gi
baseline ~ 1.1-1.4.   possible sec to HTN.
baseline ~ 1.1-1.4  possible sec to HTN.
baseline ~ 1.1-1.4  possible sec to HTN.
per gi: unclear etiology :  3/28: has rectal tube: per gi  3/29: per gi  3/30: per gi
baseline ~ 1.1-1.4.   possible sec to HTN.

## 2019-04-12 NOTE — PROGRESS NOTE ADULT - PROBLEM SELECTOR PROBLEM 7
Hypomagnesemia
Anemia, unspecified type
Chronic diarrhea
Hypomagnesemia

## 2019-04-12 NOTE — PROGRESS NOTE ADULT - PROBLEM SELECTOR PLAN 5
sec to low alb  on vit d supplement  monitor.
sec to low alb  better  on vit d supplement  monitor.
PER pmd
sec to low alb  better  on vit d supplement  monitor.
Hb/Hct stable. Suspect due to anemia of chronic disease, especially in the setting of underlying CKD. Patient not iron deficient based on prior testing. No signs of current/active bleeding  - no further w/u  - routine blood count checks.
PER pmd
PER pmd  4/6 defer
PER pmd  4/6 defer  4/7 defer
PER pmd  4/6 defer  4/7 defer
sec to low alb  better  on vit d supplement  monitor.
sec to low alb  getting alb  on vit d supplement  monitor.
sec to low alb  getting alb  on vit d supplement  monitor.
sec to low alb  on vit d supplement  monitor.
sec to low alb  better  on vit d supplement  monitor.
sec to low alb  on vit d supplement  monitor.

## 2019-04-12 NOTE — PROGRESS NOTE ADULT - PROBLEM SELECTOR PROBLEM 8
Acidosis

## 2019-04-12 NOTE — PROGRESS NOTE ADULT - SUBJECTIVE AND OBJECTIVE BOX
Cardiovascular Disease Progress Note    Overnight events: No acute events overnight. Mr. Miranda is resting comfortably. He denies pain.    Otherwise review of systems negative    Objective Findings:  T(C): 36.3 (04-12-19 @ 05:34), Max: 36.8 (04-11-19 @ 20:55)  HR: 72 (04-12-19 @ 05:34) (72 - 85)  BP: 116/58 (04-12-19 @ 05:34) (100/55 - 116/58)  RR: 18 (04-12-19 @ 05:34) (18 - 18)  SpO2: 100% (04-12-19 @ 05:34) (100% - 100%)  Wt(kg): --  Daily     Daily       Physical Exam:  Gen: NAD  HEENT: EOMI  CV: RRR, normal S1 + S2, no m/r/g  Lungs: CTAB  Abd: soft, non-tender  Ext: No edema    Telemetry: n/a    Laboratory Data:                        8.0    14.47 )-----------( 70       ( 11 Apr 2019 06:30 )             25.9     04-11    135  |  97<L>  |  8   ----------------------------<  65<L>  3.8   |  29  |  0.98    Ca    7.7<L>      11 Apr 2019 06:39  Phos  3.1     04-11  Mg     1.6     04-11    TPro  6.1  /  Alb  2.3<L>  /  TBili  0.7  /  DBili  x   /  AST  22  /  ALT  18  /  AlkPhos  248<H>  04-11              Inpatient Medications:  MEDICATIONS  (STANDING):  calcium carbonate   1250 mG (OsCal) 1 Tablet(s) Oral two times a day  cholecalciferol 1000 Unit(s) Oral daily  furosemide    Tablet 20 milliGRAM(s) Oral daily  lactobacillus acidophilus 1 Tablet(s) Oral three times a day with meals  melatonin 3 milliGRAM(s) Oral <User Schedule>  mesalamine DR Capsule 800 milliGRAM(s) Oral three times a day  metoprolol succinate ER 25 milliGRAM(s) Oral daily  multivitamin 1 Tablet(s) Oral daily  pantoprazole    Tablet 40 milliGRAM(s) Oral before breakfast  spironolactone 50 milliGRAM(s) Oral daily  vancomycin  IVPB 750 milliGRAM(s) IV Intermittent daily      Assessment: 73 year old man with HTN, anemia, chronic diarrhea, malnutrition and low voltage EKG presents with weakness.    Plan of Care:    #Abnormal EKG-  Low voltage.  TTE from 3/10 reviewed- no significant structural heart disease.   Patient tolerated colonoscopy well on 4/1.   Continue current cardiac management.      #HTN-  BP controlled on current regimen.     #Chronic diarrhea-  Pathology consistent with colitis.  GI input noted.   Treatment of cirrhosis as per primary team.     Over 25 minutes spent on total encounter; more than 50% of the visit was spent counseling and/or coordinating care by the attending physician.      Darren Herring MD [facility]  Cardiovascular Disease  [phone]

## 2019-04-12 NOTE — PROGRESS NOTE ADULT - ASSESSMENT
72 yo man with history of Anemia, HTN, chronic diarrhea, and recent hospitalization at [facility] from 3/3/19-3/21/19 brought in by family for generalized weakness and inability to walk found to have james and hypernatremia

## 2019-04-12 NOTE — PROGRESS NOTE ADULT - PROBLEM SELECTOR PROBLEM 5
Severe malnutrition
Hypocalcemia
Hypocalcemia
Severe malnutrition
Severe malnutrition
Anemia, unspecified type
Hypocalcemia
Severe malnutrition
Hypocalcemia

## 2019-04-12 NOTE — PROGRESS NOTE ADULT - PROBLEM SELECTOR PROBLEM 1
ABG (arterial blood gas) abnormal
PEDRO (acute kidney injury)
PEDRO (acute kidney injury)
ABG (arterial blood gas) abnormal
Hypernatremia
PEDRO (acute kidney injury)

## 2019-04-12 NOTE — PROGRESS NOTE ADULT - PROBLEM SELECTOR PLAN 6
supplemented  monitor
CONTROLLED
CONTROLLED
CONTROLLED  4/2: Stable:  4/4: controlled
CONTROLLED
CONTROLLED
CONTROLLED  3/28: controlled:
CONTROLLED  4/2: Stable:
CONTROLLED  4/2: Stable:
Patient s/p EGD and push enteroscopy earlier this month. Pt found to have gastritis with non-bleeding ulcers and chronic active duodenitis with gastric foveolar metaplasia on pathology. Negative for H. pylori  - continue pantoprazole 40mg daily
better  monitor
controlled!!
controlled!!
controlled!!  4/6 stable
controlled!!  4/6 stable
kcl 10 x3 iv, kcl 20 po x1 given  additional kcl 20 x1  monitor
kcl 10meq iv x 3 given  will change IVF with kcl   monitor
supplemented  additional kcl 40meq x1  monitor
supplemented  monitor
supplemented  repeat bmp  monitor
supplemented  monitor

## 2019-04-12 NOTE — PROGRESS NOTE ADULT - GASTROINTESTINAL DETAILS
nontender/no distention/soft
nontender/soft/no distention
nontender/no distention/soft
nontender/no distention/soft

## 2019-04-12 NOTE — PROGRESS NOTE ADULT - PROBLEM SELECTOR PROBLEM 2
Chronic diarrhea
Chronic diarrhea
Stage 3 chronic kidney disease
Stage 3 chronic kidney disease
Chronic diarrhea
Severe malnutrition
Stage 3 chronic kidney disease
Chronic diarrhea
Stage 3 chronic kidney disease

## 2019-04-12 NOTE — PROGRESS NOTE ADULT - REASON FOR ADMISSION
Generalized weakness and inability to walk

## 2019-04-12 NOTE — PROGRESS NOTE ADULT - PROBLEM SELECTOR PROBLEM 6
Hypokalemia
Essential hypertension
Hypokalemia
PUD (peptic ulcer disease)
Hypokalemia

## 2019-04-12 NOTE — PROGRESS NOTE ADULT - PROBLEM SELECTOR PLAN 7
supplemented  monitor
GI eval noted.  - Stool studies ordered per GI  -Colonoscopy as per GI.
GI eval noted.  Stool studies  Colonoscopy as per GI.
GI f/up noted.  - Stool studies ordered per GI  -Colonoscopy as per GI.
Unclear etiology. Low suspicion for infectious etiology. Prior w/u all negative. C diff PCR negative.   - continue mesalamine 800mg TID for presumed underlying IBD  - obtain anti-gliadin Ab and anti-tissue transglutaminase antibodies to assess for Celiac disease (no prior results noted)  - replete electrolytes if necessary  - monitor stool output
Unclear etiology. Low suspicion for infectious etiology. Prior w/u all negative. C diff PCR negative.   - continue mesalamine 800mg TID for presumed underlying IBD  - obtain anti-gliadin Ab and anti-tissue transglutaminase antibodies to assess for Celiac disease (no prior results noted)  - replete electrolytes if necessary  - monitor stool output
mg 1g iv x1  monitor
supplemented  monitor

## 2019-04-12 NOTE — PROGRESS NOTE ADULT - SUBJECTIVE AND OBJECTIVE BOX
Pt has weakness lizette in the legs as the main symptom, denies any pain, diarrhea, fevers or chills and overall comfortable. He is eating small amounts.      Meds:  ALBUTerol    90 MICROgram(s) HFA Inhaler 2 Puff(s) Inhalation every 6 hours PRN  calcium carbonate   1250 mG (OsCal) 1 Tablet(s) Oral two times a day  cholecalciferol 1000 Unit(s) Oral daily  dicyclomine 20 milliGRAM(s) Oral four times a day before meals PRN  furosemide    Tablet 20 milliGRAM(s) Oral daily  lactobacillus acidophilus 1 Tablet(s) Oral three times a day with meals  loperamide 2 milliGRAM(s) Oral three times a day PRN  melatonin 3 milliGRAM(s) Oral <User Schedule>  mesalamine DR Capsule 800 milliGRAM(s) Oral three times a day  metoprolol succinate ER 25 milliGRAM(s) Oral daily  multivitamin 1 Tablet(s) Oral daily  pantoprazole    Tablet 40 milliGRAM(s) Oral before breakfast  QUEtiapine 25 milliGRAM(s) Oral at bedtime PRN  simethicone 80 milliGRAM(s) Chew four times a day PRN  spironolactone 50 milliGRAM(s) Oral daily  vancomycin  IVPB 750 milliGRAM(s) IV Intermittent daily      Vital Signs Last 24 Hrs  T(C): 36.3 (12 Apr 2019 05:34), Max: 36.8 (11 Apr 2019 20:55)  T(F): 97.3 (12 Apr 2019 05:34), Max: 98.2 (11 Apr 2019 20:55)  HR: 72 (12 Apr 2019 05:34) (72 - 85)  BP: 116/58 (12 Apr 2019 05:34) (100/55 - 116/58)  BP(mean): --  RR: 18 (12 Apr 2019 05:34) (18 - 18)  SpO2: 100% (12 Apr 2019 05:34) (100% - 100%)                          8.0    14.47 )-----------( 70       ( 11 Apr 2019 06:30 )             25.9       04-11    135  |  97<L>  |  8   ----------------------------<  65<L>  3.8   |  29  |  0.98    Ca    7.7<L>      11 Apr 2019 06:39  Phos  3.1     04-11  Mg     1.6     04-11    TPro  6.1  /  Alb  2.3<L>  /  TBili  0.7  /  DBili  x   /  AST  22  /  ALT  18  /  AlkPhos  248<H>  04-11

## 2019-04-12 NOTE — PROGRESS NOTE ADULT - SUBJECTIVE AND OBJECTIVE BOX
[facility]   MD MAGALY MORAN MD ANGELA WONG, PA    TEL:  OFFICE: [phone]  DR ANDERSON CELL: [phone]  DR. MONAE CELL: [phone]  HUBERT WELLINGTON CELL: [phone]        Patient is a 73y old  Male who presents with a chief complaint of Generalized weakness and inability to walk (12 Apr 2019 07:57)      Patient seen and examined at bedside. No chest pain/sob    VITALS:  T(F): 97.3 (04-12-19 @ 05:34), Max: 98.2 (04-11-19 @ 20:55)  HR: 72 (04-12-19 @ 05:34)  BP: 116/58 (04-12-19 @ 05:34)  RR: 18 (04-12-19 @ 05:34)  SpO2: 100% (04-12-19 @ 05:34)  Wt(kg): --        PHYSICAL EXAM:  Constitutional: NAD  Neck: No JVD  Respiratory: CTAB, no wheezes, rales or rhonchi  Cardiovascular: S1, S2, RRR  Gastrointestinal: BS+, soft, NT/ND  Extremities: 1+ peripheral edema    Hospital Medications:   MEDICATIONS  (STANDING):  calcium carbonate   1250 mG (OsCal) 1 Tablet(s) Oral two times a day  cholecalciferol 1000 Unit(s) Oral daily  furosemide    Tablet 20 milliGRAM(s) Oral daily  lactobacillus acidophilus 1 Tablet(s) Oral three times a day with meals  magnesium sulfate  IVPB 1 Gram(s) IV Intermittent once  melatonin 3 milliGRAM(s) Oral <User Schedule>  mesalamine DR Capsule 800 milliGRAM(s) Oral three times a day  metoprolol succinate ER 25 milliGRAM(s) Oral daily  multivitamin 1 Tablet(s) Oral daily  pantoprazole    Tablet 40 milliGRAM(s) Oral before breakfast  potassium chloride    Tablet ER 40 milliEquivalent(s) Oral once  spironolactone 50 milliGRAM(s) Oral daily  vancomycin  IVPB 750 milliGRAM(s) IV Intermittent daily      LABS:  04-12    135  |  99  |  9   ----------------------------<  117<H>  3.4<L>   |  26  |  1.02    Ca    8.0<L>      12 Apr 2019 06:58  Phos  3.0     04-12  Mg     1.5     04-12    TPro  6.3  /  Alb  2.3<L>  /  TBili  0.7  /  DBili      /  AST  22  /  ALT  20  /  AlkPhos  258<H>  04-12    Creatinine Trend: 1.02 <--, 0.98 <--, 0.95 <--, 1.00 <--, 0.90 <--, 0.96 <--, 1.01 <--    Phosphorus Level, Serum: 3.0 mg/dL (04-12 @ 06:58)  Albumin, Serum: 2.3 g/dL (04-12 @ 06:58)                              8.7    13.53 )-----------( 64       ( 12 Apr 2019 06:58 )             27.4     Urine Studies:  Urinalysis - [04-01-19 @ 17:00]      Color YELLOW / Appearance CLEAR / SG 1.015 / pH 6.0      Gluc NEGATIVE / Ketone NEGATIVE  / Bili NEGATIVE / Urobili NORMAL       Blood NEGATIVE / Protein 30 / Leuk Est NEGATIVE / Nitrite NEGATIVE      RBC 0-2 / WBC 3-5 / Hyaline NEGATIVE / Gran  / Sq Epi OCC / Non Sq Epi  / Bacteria FEW      Iron 48, TIBC 78, %sat --      [03-29-19 @ 05:20]  Ferritin 378.6      [03-29-19 @ 05:20]  Vitamin D (25OH) 25.6      [03-23-19 @ 18:15]  TSH 1.98      [03-05-19 @ 06:45]    HBsAb Nonreactive      [03-20-19 @ 05:35]  HBsAg NEGATIVE      [03-20-19 @ 05:35]      RADIOLOGY & ADDITIONAL STUDIES:

## 2019-04-12 NOTE — PROGRESS NOTE ADULT - PROBLEM SELECTOR PROBLEM 4
Hypernatremia
Hypernatremia
UTI due to extended-spectrum beta lactamase (ESBL) producing Escherichia coli
UTI due to extended-spectrum beta lactamase (ESBL) producing Escherichia coli
Essential hypertension
Hypernatremia
UTI due to extended-spectrum beta lactamase (ESBL) producing Escherichia coli
Hypernatremia
UTI due to extended-spectrum beta lactamase (ESBL) producing Escherichia coli
Hypernatremia

## 2019-04-12 NOTE — PROGRESS NOTE ADULT - PROBLEM SELECTOR PLAN 3
PPI
PPI
controlled  monitor.
controlled  monitor.
- s/p invanz
- s/p invanz
Off abx
PPI
PPI  4/6 PPI
PPI  4/6 PPI  4/7 on PPI
controlled  monitor.
PPI
controlled  monitor.

## 2019-04-12 NOTE — PROGRESS NOTE ADULT - PROBLEM SELECTOR PROBLEM 3
Essential hypertension
Essential hypertension
PUD (peptic ulcer disease)
PUD (peptic ulcer disease)
Essential hypertension
PUD (peptic ulcer disease)
UTI due to extended-spectrum beta lactamase (ESBL) producing Escherichia coli
Essential hypertension
PUD (peptic ulcer disease)
Essential hypertension

## 2019-04-12 NOTE — PROGRESS NOTE ADULT - NEGATIVE HEMATOLOGY SYMPTOMS
no gum bleeding/no nose bleeding
no nose bleeding/no gum bleeding
no gum bleeding/no nose bleeding
no gum bleeding/no nose bleeding

## 2019-04-15 ENCOUNTER — INBOUND DOCUMENT (OUTPATIENT)
Age: 74
End: 2019-04-15

## 2019-04-15 PROBLEM — Z00.00 ENCOUNTER FOR PREVENTIVE HEALTH EXAMINATION: Status: ACTIVE | Noted: 2019-04-15

## 2019-04-17 LAB
MISCELLANEOUS - CHEM: SIGNIFICANT CHANGE UP
MISCELLANEOUS - CHEM: SIGNIFICANT CHANGE UP

## 2019-04-18 LAB — ACID FAST STN FLD: SIGNIFICANT CHANGE UP

## 2019-04-22 LAB — MISCELLANEOUS - CHEM: SIGNIFICANT CHANGE UP

## 2019-05-02 LAB — G LAMBLIA AG STL QL: SIGNIFICANT CHANGE UP

## 2019-05-03 LAB — FUNGUS SPEC QL CULT: SIGNIFICANT CHANGE UP

## 2019-05-04 ENCOUNTER — INPATIENT (INPATIENT)
Facility: HOSPITAL | Age: 74
LOS: 0 days | DRG: 82 | End: 2019-05-05
Attending: SURGERY | Admitting: SURGERY
Payer: COMMERCIAL

## 2019-05-04 VITALS
SYSTOLIC BLOOD PRESSURE: 166 MMHG | HEART RATE: 113 BPM | DIASTOLIC BLOOD PRESSURE: 72 MMHG | OXYGEN SATURATION: 100 % | RESPIRATION RATE: 8 BRPM

## 2019-05-04 DIAGNOSIS — R41.82 ALTERED MENTAL STATUS, UNSPECIFIED: ICD-10-CM

## 2019-05-04 LAB
ALBUMIN SERPL ELPH-MCNC: 2.5 G/DL — LOW (ref 3.3–5)
ALP SERPL-CCNC: 250 U/L — HIGH (ref 40–120)
ALT FLD-CCNC: 19 U/L — SIGNIFICANT CHANGE UP (ref 10–45)
ANION GAP SERPL CALC-SCNC: 17 MMOL/L — SIGNIFICANT CHANGE UP (ref 5–17)
APTT BLD: 46.3 SEC — HIGH (ref 27.5–36.3)
AST SERPL-CCNC: 38 U/L — SIGNIFICANT CHANGE UP (ref 10–40)
BASE EXCESS BLDV CALC-SCNC: -5.5 MMOL/L — LOW (ref -2–2)
BASOPHILS # BLD AUTO: 0 K/UL — SIGNIFICANT CHANGE UP (ref 0–0.2)
BASOPHILS NFR BLD AUTO: 0 % — SIGNIFICANT CHANGE UP (ref 0–2)
BILIRUB SERPL-MCNC: 1.1 MG/DL — SIGNIFICANT CHANGE UP (ref 0.2–1.2)
BLD GP AB SCN SERPL QL: NEGATIVE — SIGNIFICANT CHANGE UP
BLD GP AB SCN SERPL QL: NEGATIVE — SIGNIFICANT CHANGE UP
BUN SERPL-MCNC: 21 MG/DL — SIGNIFICANT CHANGE UP (ref 7–23)
CA-I SERPL-SCNC: 1.11 MMOL/L — LOW (ref 1.12–1.3)
CALCIUM SERPL-MCNC: 8 MG/DL — LOW (ref 8.4–10.5)
CHLORIDE BLDV-SCNC: 108 MMOL/L — SIGNIFICANT CHANGE UP (ref 96–108)
CHLORIDE SERPL-SCNC: 101 MMOL/L — SIGNIFICANT CHANGE UP (ref 96–108)
CK SERPL-CCNC: 44 U/L — SIGNIFICANT CHANGE UP (ref 30–200)
CO2 BLDV-SCNC: 20 MMOL/L — LOW (ref 22–30)
CO2 SERPL-SCNC: 16 MMOL/L — LOW (ref 22–31)
CREAT SERPL-MCNC: 1.2 MG/DL — SIGNIFICANT CHANGE UP (ref 0.5–1.3)
EOSINOPHIL # BLD AUTO: 0.9 K/UL — HIGH (ref 0–0.5)
EOSINOPHIL NFR BLD AUTO: 5.4 % — SIGNIFICANT CHANGE UP (ref 0–6)
ETHANOL SERPL-MCNC: SIGNIFICANT CHANGE UP MG/DL (ref 0–10)
GAS PNL BLDV: 134 MMOL/L — LOW (ref 136–145)
GAS PNL BLDV: SIGNIFICANT CHANGE UP
GLUCOSE BLDV-MCNC: 135 MG/DL — HIGH (ref 70–99)
GLUCOSE SERPL-MCNC: 128 MG/DL — HIGH (ref 70–99)
HCO3 BLDV-SCNC: 19 MMOL/L — LOW (ref 21–29)
HCT VFR BLD CALC: 28.2 % — LOW (ref 39–50)
HCT VFR BLDA CALC: 26 % — LOW (ref 39–50)
HGB BLD CALC-MCNC: 8.5 G/DL — LOW (ref 13–17)
HGB BLD-MCNC: 8.7 G/DL — LOW (ref 13–17)
INR BLD: 2.36 RATIO — HIGH (ref 0.88–1.16)
LACTATE BLDV-MCNC: 5.5 MMOL/L — CRITICAL HIGH (ref 0.7–2)
LG PLATELETS BLD QL AUTO: SLIGHT — SIGNIFICANT CHANGE UP
LIDOCAIN IGE QN: 29 U/L — SIGNIFICANT CHANGE UP (ref 7–60)
LYMPHOCYTES # BLD AUTO: 21.6 % — SIGNIFICANT CHANGE UP (ref 13–44)
LYMPHOCYTES # BLD AUTO: 3.5 K/UL — HIGH (ref 1–3.3)
MCHC RBC-ENTMCNC: 30.9 GM/DL — LOW (ref 32–36)
MCHC RBC-ENTMCNC: 31.1 PG — SIGNIFICANT CHANGE UP (ref 27–34)
MCV RBC AUTO: 100 FL — SIGNIFICANT CHANGE UP (ref 80–100)
MONOCYTES # BLD AUTO: 5.6 K/UL — HIGH (ref 0–0.9)
MONOCYTES NFR BLD AUTO: 34.1 % — HIGH (ref 2–14)
NEUTROPHILS # BLD AUTO: 6.4 K/UL — SIGNIFICANT CHANGE UP (ref 1.8–7.4)
NEUTROPHILS NFR BLD AUTO: 38.9 % — LOW (ref 43–77)
PCO2 BLDV: 33 MMHG — LOW (ref 35–50)
PH BLDV: 7.37 — SIGNIFICANT CHANGE UP (ref 7.35–7.45)
PLAT MORPH BLD: NORMAL — SIGNIFICANT CHANGE UP
PLATELET # BLD AUTO: 43 K/UL — LOW (ref 150–400)
PO2 BLDV: 68 MMHG — HIGH (ref 25–45)
POTASSIUM BLDV-SCNC: 3.7 MMOL/L — SIGNIFICANT CHANGE UP (ref 3.5–5.3)
POTASSIUM SERPL-MCNC: 4.2 MMOL/L — SIGNIFICANT CHANGE UP (ref 3.5–5.3)
POTASSIUM SERPL-SCNC: 4.2 MMOL/L — SIGNIFICANT CHANGE UP (ref 3.5–5.3)
PROT SERPL-MCNC: 7.1 G/DL — SIGNIFICANT CHANGE UP (ref 6–8.3)
PROTHROM AB SERPL-ACNC: 27.9 SEC — HIGH (ref 10–12.9)
RBC # BLD: 2.8 M/UL — LOW (ref 4.2–5.8)
RBC # FLD: 18.9 % — HIGH (ref 10.3–14.5)
RBC BLD AUTO: SIGNIFICANT CHANGE UP
RH IG SCN BLD-IMP: POSITIVE — SIGNIFICANT CHANGE UP
RH IG SCN BLD-IMP: POSITIVE — SIGNIFICANT CHANGE UP
SAO2 % BLDV: 92 % — HIGH (ref 67–88)
SODIUM SERPL-SCNC: 134 MMOL/L — LOW (ref 135–145)
TROPONIN T, HIGH SENSITIVITY RESULT: 35 NG/L — SIGNIFICANT CHANGE UP (ref 0–51)
WBC # BLD: 16.4 K/UL — HIGH (ref 3.8–10.5)
WBC # FLD AUTO: 16.4 K/UL — HIGH (ref 3.8–10.5)

## 2019-05-04 PROCEDURE — 99291 CRITICAL CARE FIRST HOUR: CPT

## 2019-05-04 PROCEDURE — 74177 CT ABD & PELVIS W/CONTRAST: CPT | Mod: 26

## 2019-05-04 PROCEDURE — ZZZZZ: CPT

## 2019-05-04 PROCEDURE — 71260 CT THORAX DX C+: CPT | Mod: 26

## 2019-05-04 PROCEDURE — 72125 CT NECK SPINE W/O DYE: CPT | Mod: 26

## 2019-05-04 PROCEDURE — 71045 X-RAY EXAM CHEST 1 VIEW: CPT | Mod: 26

## 2019-05-04 PROCEDURE — 70450 CT HEAD/BRAIN W/O DYE: CPT | Mod: 26

## 2019-05-04 RX ORDER — PHYTONADIONE (VIT K1) 5 MG
10 TABLET ORAL
Qty: 0 | Refills: 0 | Status: COMPLETED | OUTPATIENT
Start: 2019-05-04 | End: 2019-05-05

## 2019-05-04 RX ORDER — SODIUM CHLORIDE 9 MG/ML
1000 INJECTION INTRAMUSCULAR; INTRAVENOUS; SUBCUTANEOUS
Qty: 0 | Refills: 0 | Status: DISCONTINUED | OUTPATIENT
Start: 2019-05-04 | End: 2019-05-05

## 2019-05-04 RX ORDER — CHLORHEXIDINE GLUCONATE 213 G/1000ML
1 SOLUTION TOPICAL
Qty: 0 | Refills: 0 | Status: DISCONTINUED | OUTPATIENT
Start: 2019-05-04 | End: 2019-05-05

## 2019-05-04 RX ORDER — PANTOPRAZOLE SODIUM 20 MG/1
40 TABLET, DELAYED RELEASE ORAL DAILY
Qty: 0 | Refills: 0 | Status: DISCONTINUED | OUTPATIENT
Start: 2019-05-04 | End: 2019-05-05

## 2019-05-04 RX ORDER — CHLORHEXIDINE GLUCONATE 213 G/1000ML
15 SOLUTION TOPICAL
Qty: 0 | Refills: 0 | Status: DISCONTINUED | OUTPATIENT
Start: 2019-05-04 | End: 2019-05-05

## 2019-05-04 RX ORDER — LEVETIRACETAM 250 MG/1
500 TABLET, FILM COATED ORAL EVERY 12 HOURS
Qty: 0 | Refills: 0 | Status: DISCONTINUED | OUTPATIENT
Start: 2019-05-04 | End: 2019-05-05

## 2019-05-04 RX ADMIN — PANTOPRAZOLE SODIUM 40 MILLIGRAM(S): 20 TABLET, DELAYED RELEASE ORAL at 11:41

## 2019-05-04 RX ADMIN — SODIUM CHLORIDE 100 MILLILITER(S): 9 INJECTION INTRAMUSCULAR; INTRAVENOUS; SUBCUTANEOUS at 10:00

## 2019-05-04 RX ADMIN — CHLORHEXIDINE GLUCONATE 15 MILLILITER(S): 213 SOLUTION TOPICAL at 21:05

## 2019-05-04 RX ADMIN — LEVETIRACETAM 400 MILLIGRAM(S): 250 TABLET, FILM COATED ORAL at 17:20

## 2019-05-04 RX ADMIN — Medication 102 MILLIGRAM(S): at 18:22

## 2019-05-04 RX ADMIN — Medication 102 MILLIGRAM(S): at 11:40

## 2019-05-04 RX ADMIN — CHLORHEXIDINE GLUCONATE 15 MILLILITER(S): 213 SOLUTION TOPICAL at 14:48

## 2019-05-04 RX ADMIN — CHLORHEXIDINE GLUCONATE 1 APPLICATION(S): 213 SOLUTION TOPICAL at 11:41

## 2019-05-04 NOTE — CONSULT NOTE ADULT - SUBJECTIVE AND OBJECTIVE BOX
HISTORY OF PRESENT ILLNESS:  DANICA TUCKER is a 73y Male (REAL NAME:  BONIFACIO GUERRERO/ MRN: [id number]/ : 1945) who presents after an unwitnessed fall at [facility] around 3am on 19. Pt was recently hospitalized at [facility] from 3/3-3/21 for intermittent diarrhea since Oct. 2018. Pt had extensive GI workup for diarrhea (push enteroscopy 3/12 -Chronic active duodenitis; repeat scope 3/16 with healing non-bleeding ulcerations; MRCP negative); after workup, diarrhea though to be secondary to IBD since infectious and autoimmune workup was negative. Pt also had anemia due to blood loss from GI tract secondary to PUD, requiring blood transfusion. He was also found to have ESBL E. coli UTI and was treated with IV ertapenem (discharged on IV abx until 3/25). He was also determined not to have the diagnosis of CLL after flow cytometry results were available (after previously having been diagnosed with CLL).      Patient was discharged home with home PT services on 3/21/19 after being hospitalized since 3/3/19. He was brought in by family on 3/22 for generalized weakness and inability to walk. He was treated for diarrhea with extensive GI workup including colonoscopy on  (significant for internal hemorrhoids, 4 mm polyp transverse colon resected, pathology with transverse, descending, sigmoid with focal hyperplastic changes) and diagnostic paracentesis on 4/3 (c/w cirrhosis). Pt eventually discharged to [facility] on 19, discharged on Macrobid until .     On 19, pt had unwitnessed fall around 3 am at [facility]. He was found down in the bathroom after he went by himself. He was brought to the ED where he was intubated for GCS 4. He was pan scanned and found to have a large left SDH (2.5cm) with extensive midline shift (2.1cm) and effacement of left lateral ventricle. Also found to have subfalcine, transtentorial, and uncal herniation. Pt taken to SICU for management of ventilator.    Neurosurgery consulted, and pt is not a surgical candidate. This was discussed with pt's daughter by neurosurgery team.    PAST MEDICAL HISTORY: HTN (hypertension)      PAST SURGICAL HISTORY: No significant past surgical history      FAMILY HISTORY:  non-contributory    SOCIAL HISTORY: Lives with family, Former smoker, No EtOH or illicit substance use    CODE STATUS: 	Full code      HOME MEDICATIONS:  · 	losartan-hydrochlorothiazide 100mg-12.5mg oral tablet: 1 tab(s) orally once a day, Last Dose Taken:    · 	metoprolol succinate 25 mg oral tablet, extended release: 1 tab(s) orally once a day, Last Dose Taken:    · 	amLODIPine 10 mg oral tablet: 1 tab(s) orally once a day, Last Dose Taken:        ALLERGIES: No Known Allergies      VITAL SIGNS:  ICU Vital Signs Last 24 Hrs  T(C): 36.6 (04 May 2019 09:30), Max: 36.6 (04 May 2019 09:30)  T(F): 97.8 (04 May 2019 09:30), Max: 97.8 (04 May 2019 09:30)  HR: 90 (04 May 2019 10:30) (90 - 113)  BP: 136/76 (04 May 2019 10:30) (136/76 - 166/72)  BP(mean): 98 (04 May 2019 10:30) (98 - 98)  RR: 26 (04 May 2019 10:30) (8 - 26)  SpO2: 96% (04 May 2019 10:30) (96% - 100%)      NEURO  Exam: pupils fixed and dilated  GCS: 4T  ( E1T, M2, V1)  Meds:levETIRAcetam  IVPB 500 milliGRAM(s) IV Intermittent every 12 hours      RESPIRATORY  Mechanical Ventilation:  PRVC 450/16/5/30%  Exam: clear to auscultation bilaterally   Meds: x    CARDIOVASCULAR  VBG - ( 04 May 2019 09:49 )  pH: 7.37  /  pCO2: 33    /  pO2: 68    / HCO3: 19    / Base Excess: -5.5  /  SaO2: 92     Lactate: 5.5      Exam: regular rate and rhythm  Cardiac Rhythm: sinus  Meds: x    GI/NUTRITION  Exam: softly distended  Diet: NPO  Meds: x    GENITOURINARY/RENAL  Meds:sodium chloride 0.9%. 1000 milliLiter(s) IV Continuous <Continuous>      -04 @ 07:01  -  04 @ 10:52  --------------------------------------------------------  IN:    sodium chloride 0.9%.: 200 mL  Total IN: 200 mL    OUT:  Total OUT: 0 mL    Total NET: 200 mL              134<L>  |  101  |  21  ----------------------------<  128<H>  4.2   |  16<L>  |  1.20    Ca    8.0<L>      04 May 2019 09:10    TPro  7.1  /  Alb  2.5<L>  /  TBili  1.1  /  DBili  x   /  AST  38  /  ALT  19  /  AlkPhos  250<H>      [x ] Miranda catheter, indication: urine output monitoring in critically ill patient    HEMATOLOGIC  [n/a ] VTE Prophylaxis:                          8.7    16.4  )-----------( x        ( 04 May 2019 09:10 )             28.2     PT/INR - ( 04 May 2019 09:10 )   PT: 27.9 sec;   INR: 2.36 ratio         PTT - ( 04 May 2019 09:10 )  PTT:46.3 sec  Transfusion: [ ] PRBC	[ ] Platelets	[ ] FFP	[ ] Cryoprecipitate      INFECTIOUS DISEASES  Meds: x  RECENT CULTURES: x      ENDOCRINE  Meds: x  CAPILLARY BLOOD GLUCOSE          PATIENT CARE ACCESS DEVICES:  [x ] Peripheral IV  [ ] Central Venous Line	[ ] R	[ ] L	[ ] IJ	[ ] Fem	[ ] SC	Placed:   [ ] Arterial Line		[ ] R	[ ] L	[ ] Fem	[ ] Rad	[ ] Ax	Placed:   [ ] PICC:					[ ] Mediport  [x ] Urinary Catheter, Date Placed: 19  [x] Necessity of urinary, arterial, and venous catheters discussed    OTHER MEDICATIONS: x    IMAGING STUDIES: < from: CT Head No Cont (19 @ 09:45) >    Mixed attenuated acute on chronic subdural hematoma is identified. This   involves the left temporal frontal and parietal region. This finding   measures approximately 2.5 cm in widest diameter. Mass effect on the left   lateral ventricle seen with extensive left-to-right shift (2.1 cm).   Subfalcine herniation is identified. Transtentorial and uncal herniation   herniation is seen as well. Small left tentorial subdural hematoma is   identified as well.    Impression: Large mixed attenuated subdural hematoma with left-to-right   shift and herniation identified as described above.    Impression: Degenerative change involving the cervical spine as described   above. HISTORY OF PRESENT ILLNESS:  DANICA TUCKER is a 73y Male (REAL NAME:  BONIFACIO GUERRERO/ MRN: [id number]/ : 1945) who presents after an unwitnessed fall at [facility] around 3am on 19. Pt was recently hospitalized at [facility] from 3/3-3/21 for intermittent diarrhea since Oct. 2018. Pt had extensive GI workup for diarrhea (push enteroscopy 3/12 -Chronic active duodenitis; repeat scope 3/16 with healing non-bleeding ulcerations; MRCP negative); after workup, diarrhea though to be secondary to IBD since infectious and autoimmune workup was negative. Pt also had anemia due to blood loss from GI tract secondary to PUD, requiring blood transfusion. He was also found to have ESBL E. coli UTI and was treated with IV ertapenem (discharged on IV abx until 3/25). He was also determined not to have the diagnosis of CLL after flow cytometry results were available (after previously having been diagnosed with CLL).      Patient was discharged home with home PT services on 3/21/19 after being hospitalized since 3/3/19. He was brought in by family on 3/22 for generalized weakness and inability to walk. He was treated for diarrhea with extensive GI workup including colonoscopy on  (significant for internal hemorrhoids, 4 mm polyp transverse colon resected, pathology with transverse, descending, sigmoid with focal hyperplastic changes) and diagnostic paracentesis on 4/3 (c/w cirrhosis). Pt eventually discharged to [facility]hab on 19, discharged on Macrobid until .     On 19, pt had unwitnessed fall around 3 am at [facility]. He was found down in the bathroom after he went by himself. He was brought to the ED where he was intubated for GCS 4. He was pan scanned and found to have a large left SDH (2.5cm) with extensive midline shift (2.1cm) and effacement of left lateral ventricle. Also found to have subfalcine, transtentorial, and uncal herniation. Pt taken to SICU for management of ventilator.    Neurosurgery consulted, and pt is not a surgical candidate. This was discussed with pt's daughter by neurosurgery team.    PAST MEDICAL HISTORY: HTN (hypertension)      PAST SURGICAL HISTORY: No significant past surgical history      FAMILY HISTORY:  non-contributory    SOCIAL HISTORY: Lives with family, Former smoker, No EtOH or illicit substance use    CODE STATUS: 	Full code      HOME MEDICATIONS:  · 	losartan-hydrochlorothiazide 100mg-12.5mg oral tablet: 1 tab(s) orally once a day, Last Dose Taken:    · 	metoprolol succinate 25 mg oral tablet, extended release: 1 tab(s) orally once a day, Last Dose Taken:    · 	amLODIPine 10 mg oral tablet: 1 tab(s) orally once a day, Last Dose Taken:        ALLERGIES: No Known Allergies      VITAL SIGNS:  ICU Vital Signs Last 24 Hrs  T(C): 36.6 (04 May 2019 09:30), Max: 36.6 (04 May 2019 09:30)  T(F): 97.8 (04 May 2019 09:30), Max: 97.8 (04 May 2019 09:30)  HR: 90 (04 May 2019 10:30) (90 - 113)  BP: 136/76 (04 May 2019 10:30) (136/76 - 166/72)  BP(mean): 98 (04 May 2019 10:30) (98 - 98)  RR: 26 (04 May 2019 10:30) (8 - 26)  SpO2: 96% (04 May 2019 10:30) (96% - 100%)      NEURO  Exam: pupils fixed and dilated  GCS: 4T  ( E1T, M2, V1)  Meds:levETIRAcetam  IVPB 500 milliGRAM(s) IV Intermittent every 12 hours      RESPIRATORY  Mechanical Ventilation:  PRVC 450/16/5/30%  Exam: clear to auscultation bilaterally   Meds: x    CARDIOVASCULAR  VBG - ( 04 May 2019 09:49 )  pH: 7.37  /  pCO2: 33    /  pO2: 68    / HCO3: 19    / Base Excess: -5.5  /  SaO2: 92     Lactate: 5.5      Exam: regular rate and rhythm  Cardiac Rhythm: sinus  Meds: x    GI/NUTRITION  Exam: softly distended  Diet: NPO  Meds: x    GENITOURINARY/RENAL  Meds:sodium chloride 0.9%. 1000 milliLiter(s) IV Continuous <Continuous>      -04 @ 07:01  -  04 @ 10:52  --------------------------------------------------------  IN:    sodium chloride 0.9%.: 200 mL  Total IN: 200 mL    OUT:  Total OUT: 0 mL    Total NET: 200 mL              134<L>  |  101  |  21  ----------------------------<  128<H>  4.2   |  16<L>  |  1.20    Ca    8.0<L>      04 May 2019 09:10    TPro  7.1  /  Alb  2.5<L>  /  TBili  1.1  /  DBili  x   /  AST  38  /  ALT  19  /  AlkPhos  250<H>      [x ] Miranda catheter, indication: urine output monitoring in critically ill patient    HEMATOLOGIC  [n/a ] VTE Prophylaxis:                          8.7    16.4  )-----------( x        ( 04 May 2019 09:10 )             28.2     PT/INR - ( 04 May 2019 09:10 )   PT: 27.9 sec;   INR: 2.36 ratio         PTT - ( 04 May 2019 09:10 )  PTT:46.3 sec  Transfusion: [ ] PRBC	[ ] Platelets	[ ] FFP	[ ] Cryoprecipitate      INFECTIOUS DISEASES  Meds: x  RECENT CULTURES: x      ENDOCRINE  Meds: x  CAPILLARY BLOOD GLUCOSE          PATIENT CARE ACCESS DEVICES:  [x ] Peripheral IV  [ ] Central Venous Line	[ ] R	[ ] L	[ ] IJ	[ ] Fem	[ ] SC	Placed:   [ ] Arterial Line		[ ] R	[ ] L	[ ] Fem	[ ] Rad	[ ] Ax	Placed:   [ ] PICC:					[ ] Mediport  [x ] Urinary Catheter, Date Placed: 19  [x] Necessity of urinary, arterial, and venous catheters discussed    OTHER MEDICATIONS: x    IMAGING STUDIES: < from: CT Head No Cont (19 @ 09:45) >    Mixed attenuated acute on chronic subdural hematoma is identified. This   involves the left temporal frontal and parietal region. This finding   measures approximately 2.5 cm in widest diameter. Mass effect on the left   lateral ventricle seen with extensive left-to-right shift (2.1 cm).   Subfalcine herniation is identified. Transtentorial and uncal herniation   herniation is seen as well. Small left tentorial subdural hematoma is   identified as well.    Impression: Large mixed attenuated subdural hematoma with left-to-right   shift and herniation identified as described above.    Impression: Degenerative change involving the cervical spine as described   above.      < from: CT Chest w/ IV Cont (19 @ 09:45) >    IMPRESSION:     Chest:    1.  No acute traumatic injury.  2.  Bilateral pleural effusions and atelectasis.    < from: CT Abdomen and Pelvis w/ IV Cont (19 @ 09:46) >    Abdomen and pelvis:    1.  No acute traumatic injury  2.  Moderate volume ascites. HISTORY OF PRESENT ILLNESS:  DANICA TUCKER is a 73y Male (REAL NAME:  BONIFACIO GUERRERO/ MRN: [id number]/ : 1945) who presents after an unwitnessed fall at [facility] around 3am on 19. Pt was recently hospitalized at [facility] from 3/3-3/21 for intermittent diarrhea since Oct. 2018. Pt had extensive GI workup for diarrhea (push enteroscopy 3/12 -Chronic active duodenitis; repeat scope 3/16 with healing non-bleeding ulcerations; MRCP negative); after workup, diarrhea though to be secondary to IBD since infectious and autoimmune workup was negative. Pt also had anemia due to blood loss from GI tract secondary to PUD, requiring blood transfusion. He was also found to have ESBL E. coli UTI and was treated with IV ertapenem (discharged on IV abx until 3/25). He was also determined not to have the diagnosis of CLL after flow cytometry results were available (after previously having been diagnosed with CLL).      Patient was discharged home with home PT services on 3/21/19 after being hospitalized since 3/3/19. He was brought in by family on 3/22 for generalized weakness and inability to walk. He was treated for diarrhea with extensive GI workup including colonoscopy on  (significant for internal hemorrhoids, 4 mm polyp transverse colon resected, pathology with transverse, descending, sigmoid with focal hyperplastic changes) and diagnostic paracentesis on 4/3 (c/w cirrhosis). Pt eventually discharged to [facility] on 19, discharged on Macrobid until .     On 19, pt had unwitnessed fall around 3 am at [facility]. He was found down in the bathroom after he went by himself. He had a progressively declining mental status and so he was brought to the ED around 8:30am where he was intubated for GCS 4. He was pan scanned and found to have a large left SDH (2.5cm) with extensive midline shift (2.1cm) and effacement of left lateral ventricle. Also found to have subfalcine, transtentorial, and uncal herniation. Pt taken to SICU for management of ventilator.    Neurosurgery consulted, and pt is not a surgical candidate. This was discussed with pt's daughter by neurosurgery team.    PAST MEDICAL HISTORY: HTN (hypertension)      PAST SURGICAL HISTORY: No significant past surgical history      FAMILY HISTORY:  non-contributory    SOCIAL HISTORY: Lives with family, Former smoker, No EtOH or illicit substance use    CODE STATUS: 	Full code      HOME MEDICATIONS:  · 	losartan-hydrochlorothiazide 100mg-12.5mg oral tablet: 1 tab(s) orally once a day, Last Dose Taken:    · 	metoprolol succinate 25 mg oral tablet, extended release: 1 tab(s) orally once a day, Last Dose Taken:    · 	amLODIPine 10 mg oral tablet: 1 tab(s) orally once a day, Last Dose Taken:        ALLERGIES: No Known Allergies      VITAL SIGNS:  ICU Vital Signs Last 24 Hrs  T(C): 36.6 (04 May 2019 09:30), Max: 36.6 (04 May 2019 09:30)  T(F): 97.8 (04 May 2019 09:30), Max: 97.8 (04 May 2019 09:30)  HR: 90 (04 May 2019 10:30) (90 - 113)  BP: 136/76 (04 May 2019 10:30) (136/76 - 166/72)  BP(mean): 98 (04 May 2019 10:30) (98 - 98)  RR: 26 (04 May 2019 10:30) (8 - 26)  SpO2: 96% (04 May 2019 10:30) (96% - 100%)      NEURO  Exam: pupils fixed and dilated  GCS: 4T  ( E1T, M2, V1)  Meds:levETIRAcetam  IVPB 500 milliGRAM(s) IV Intermittent every 12 hours      RESPIRATORY  Mechanical Ventilation:  PRVC 450/16/5/30%  Exam: clear to auscultation bilaterally   Meds: x    CARDIOVASCULAR  VBG - ( 04 May 2019 09:49 )  pH: 7.37  /  pCO2: 33    /  pO2: 68    / HCO3: 19    / Base Excess: -5.5  /  SaO2: 92     Lactate: 5.5      Exam: regular rate and rhythm  Cardiac Rhythm: sinus  Meds: x    GI/NUTRITION  Exam: softly distended  Diet: NPO  Meds: x    GENITOURINARY/RENAL  Meds:sodium chloride 0.9%. 1000 milliLiter(s) IV Continuous <Continuous>      05-04 @ 07:01  -  05-04 @ 10:52  --------------------------------------------------------  IN:    sodium chloride 0.9%.: 200 mL  Total IN: 200 mL    OUT:  Total OUT: 0 mL    Total NET: 200 mL              134<L>  |  101  |  21  ----------------------------<  128<H>  4.2   |  16<L>  |  1.20    Ca    8.0<L>      04 May 2019 09:10    TPro  7.1  /  Alb  2.5<L>  /  TBili  1.1  /  DBili  x   /  AST  38  /  ALT  19  /  AlkPhos  250<H>      [x ] Miranda catheter, indication: urine output monitoring in critically ill patient    HEMATOLOGIC  [n/a ] VTE Prophylaxis:                          8.7    16.4  )-----------( x        ( 04 May 2019 09:10 )             28.2     PT/INR - ( 04 May 2019 09:10 )   PT: 27.9 sec;   INR: 2.36 ratio         PTT - ( 04 May 2019 09:10 )  PTT:46.3 sec  Transfusion: [ ] PRBC	[ ] Platelets	[ ] FFP	[ ] Cryoprecipitate      INFECTIOUS DISEASES  Meds: x  RECENT CULTURES: x      ENDOCRINE  Meds: x  CAPILLARY BLOOD GLUCOSE          PATIENT CARE ACCESS DEVICES:  [x ] Peripheral IV  [ ] Central Venous Line	[ ] R	[ ] L	[ ] IJ	[ ] Fem	[ ] SC	Placed:   [ ] Arterial Line		[ ] R	[ ] L	[ ] Fem	[ ] Rad	[ ] Ax	Placed:   [ ] PICC:					[ ] Mediport  [x ] Urinary Catheter, Date Placed: 19  [x] Necessity of urinary, arterial, and venous catheters discussed    OTHER MEDICATIONS: x    IMAGING STUDIES: < from: CT Head No Cont (19 @ 09:45) >    Mixed attenuated acute on chronic subdural hematoma is identified. This   involves the left temporal frontal and parietal region. This finding   measures approximately 2.5 cm in widest diameter. Mass effect on the left   lateral ventricle seen with extensive left-to-right shift (2.1 cm).   Subfalcine herniation is identified. Transtentorial and uncal herniation   herniation is seen as well. Small left tentorial subdural hematoma is   identified as well.    Impression: Large mixed attenuated subdural hematoma with left-to-right   shift and herniation identified as described above.    Impression: Degenerative change involving the cervical spine as described   above.      < from: CT Chest w/ IV Cont (19 @ 09:45) >    IMPRESSION:     Chest:    1.  No acute traumatic injury.  2.  Bilateral pleural effusions and atelectasis.    < from: CT Abdomen and Pelvis w/ IV Cont (19 @ 09:46) >    Abdomen and pelvis:    1.  No acute traumatic injury  2.  Moderate volume ascites.

## 2019-05-04 NOTE — ED PROVIDER NOTE - CLINICAL SUMMARY MEDICAL DECISION MAKING FREE TEXT BOX
male brought in as trauma level 1, intubated on arrival. Trauma protocol initiated and followed as per ATLS. taken to CT and admitted to SICU. patient with a large bleed likely, will use ICH precations. male brought in as trauma level 1, intubated on arrival. Trauma protocol initiated and followed as per ATLS. taken to CT and admitted to SICU. patient with a large bleed likely, will use ICH precations.  Attending Keara Gaspar: elderly male presenting unresponsive after fall. per report pt initially fell at 3am, initially responsive. then found unresponsive this am and not following commands. EMS attempted intubation in the field without success and pt arrived being bagged. level one trauma called based on pre-notification. neurosurgery at bedside upon arrival. pupils fixed and dilated, posturing. concern for intracranial hemorrhage. BP stable. pt intubated by resident. trauma team at bedside. pt taken emergently to ct showing large intracranial hemorrhage. pt taken to the sicu. family arrived and daughter updated on severity of injury.

## 2019-05-04 NOTE — AIRWAY PLACEMENT NOTE ADULT - AIRWAY COMMENTS:
Pt arrived intubated form ED.  Airway was verified in ED.  Pt currently on full vent support in ICU.

## 2019-05-04 NOTE — ED ADULT NURSE NOTE - NSIMPLEMENTINTERV_GEN_ALL_ED
Implemented All Fall Risk Interventions:  [place] to call system. Call bell, personal items and telephone within reach. Instruct patient to call for assistance. Room bathroom lighting operational. Non-slip footwear when patient is off stretcher. Physically safe environment: no spills, clutter or unnecessary equipment. Stretcher in lowest position, wheels locked, appropriate side rails in place. Provide visual cue, wrist band, yellow gown, etc. Monitor gait and stability. Monitor for mental status changes and reorient to person, place, and time. Review medications for side effects contributing to fall risk. Reinforce activity limits and safety measures with patient and family.

## 2019-05-04 NOTE — H&P ADULT - NSHPPHYSICALEXAM_GEN_ALL_CORE
General: Unresponsive  Neuro: Pupils fixed, extensor posturing on right  HEENT: NC/AT, no asymmetry, no scleral icterus, no subconjunctival hemorrhage, no periorbital ecchymosis or swelling  Neck: Soft, supple, no crepitus, trachea midline  Cardio: RRR  Resp: Bilateral breath sounds after intubation  Thorax: No ecchymosis  Back: No step off deformities  GI/Abd: Soft, mildly distended, no abrasions or ecchymosis  Vascular: All 4 extremities warm, B/l radial pulses palpable, b/l femoral pulse palpable  Extremities: All compartments soft, no long bone instability, no joint swelling, no lacerations/abrasions/ecchmyosis

## 2019-05-04 NOTE — ED PROVIDER NOTE - OBJECTIVE STATEMENT
77yoM hx obtained from previous chart, HTN, HLD, recently in [facility] for pna, pw fall with unresponsiveness. Per EMS, patient had a fall last night, and this morning was altered, confused, and EMS was called. on arrival, they were concerned for airway, attempted an intubation but were unable to obtain. Patient was non responsive on arrival, with EMS bagging patient.

## 2019-05-04 NOTE — CONSULT NOTE ADULT - ASSESSMENT
Jessika Miranda  73M pmhx IBD, possib CLL, recently discharged from [facility] for GI bleed and ESBL uti / bacteremia found down at nursing home found to have large L sided subdural with considerable free fluid in abdomen and devastated neurologic exam  - Given patients neurologic status, age, and comorbidities not a surgical candidate  - Recommend GOC discussion with family

## 2019-05-04 NOTE — ED PROVIDER NOTE - ATTENDING CONTRIBUTION TO CARE
Attending MD Kaera Gaspar:  I personally have seen and examined this patient.  Resident note reviewed and agree on plan of care and except where noted.  See HPI, PE, and MDM for details.

## 2019-05-04 NOTE — H&P ADULT - ASSESSMENT
77M with unknown history brought by EMS as level 1 trauma after found down and unresponsive    - Admit to trauma under Dr. Kerns  - CT head, c-spine, chest, abdomen, pelvis done in ED - f/u official reads  - F/u neurosurgery - large SDH with midline shift on CT  - Contact family to discuss [facility]    Acute Care / Trauma Surgery  Pager [phone]

## 2019-05-04 NOTE — H&P ADULT - ATTENDING COMMENTS
45 minutes of critical care as follows  patient arrived GCS of 4  not protecting airway  was intubated  then assessed breathing with good oxygen saturation  hemodynamically stable  under my supervision taken to CT scan  showed very large bleed with substantial mass effect  Reviewed with neurosurgery  then, facilitated transfer to the SICU  likely fetal head injury

## 2019-05-04 NOTE — CONSULT NOTE ADULT - SUBJECTIVE AND OBJECTIVE BOX
p ([id number])     HPI:  Mr. Miranda is a 74 yo man with history of Anemia, HTN, chronic diarrhea, and recent hospitalization at [facility] from 3/3/19-3/21/19. Patient was discharged home with home PT services on 3/21/19 after being hospitalized since 3/3/19. During his hospitalization, he was managed for chronic diarrhea presumed to be 2/2 IBD since infectious and autoimmune w/u was negative, anemia due to blood loss from GI tract 2/2 PUD, requiring blood transfusion, and ESBL E. coli UTI with ertapenem via PICC. He was also determined not to have the diagnosis of CLL after flow cytometry results were available.     Found down at nursing home, brought in as lvl 1 trauma    Exam:  Eyes closed, not speaking, Pupils b/l fixed and dilated  Extensor Posturing RUE  TF BLE    --Anticoagulation:    =====================  PAST MEDICAL HISTORY   HTN (hypertension)    PAST SURGICAL HISTORY   No significant past surgical history        MEDICATIONS:  Antibiotics:    Neuro:    Other:      SOCIAL HISTORY:   Occupation:   Marital Status:     FAMILY HISTORY:      ROS: Negative except per HPI    LABS:                          8.7    16.4  )-----------( x        ( 04 May 2019 09:10 )             28.2

## 2019-05-04 NOTE — ED PROVIDER NOTE - PHYSICAL EXAMINATION
GCS 4   posturing on the right  airway no intact- patient intubated  bilateral breath sounds  circulation intact   disability GCS 4 GCS 4   posturing on the right  airway no intact- patient intubated  bilateral breath sounds  circulation intact   disability GCS 4  Attending Keara Gaspar: Gen: ill appering, heent: atrauamtic, pupils 5mm fixed and dilated, mmm, tongue edema,  neck; nttp, no nuchal rigidity, chest: nttp, no crepitus, cv: rrr, no murmurs, lungs: dffuse rhonchi, b/l breath sounds, abd: soft, nontender, nondistended, no peritoneal signs, +BS, no guarding, ext: wwp, neg homans, skin: no rash, neuro: gcs 4, not following commands, not movingn extremities

## 2019-05-04 NOTE — CONSULT NOTE ADULT - ATTENDING COMMENTS
Traumatic large SDH with significan midline shift.  Acute resp failure post trauma s/p intubated on mechanical ventilation  Coagulopathy from cirrhosis of liver  Anemia, h/o GI bleed, thrombocytopenia  Neuro checks, very poor neuro exam, GCS 4, breathing over vent, seizure prophylaxis with Keppra  Vent adj, CXR reviewed, ETT in good position  IVF NS, monitor hyponatremia  Will reverse coagulopathy with Vit K    Overall extremely poor prognosis. No surgical intervention as per neuro surgery. Will hold off Kcentra, FFP or platelets transfusion. Patient may herniate and become brain dead at some point.  Discussed with son daughter at bedside. Made aware of grave prognosis. DNR terminal wean offered. They are discussing.   Organ donation called.  Discussed with trauma attending Dr Kerns and neuro surgery.    I have personally provided 45 minutes of critical care time concurrently with residents/PA which does not include procedure time

## 2019-05-04 NOTE — CONSULT NOTE ADULT - ASSESSMENT
ASSESSMENT: 77yMale s/p fall with large left SDH and extensive midline shit with herniation    PLAN:   Neurologic: left SDH and extensive midline shit with herniation  - Keppra for seizure prophylaxis  - q1 neuro checks  - Not a neurosurgical candidate as per neurosurgery  - Discussions with family regarding goals of care    Respiratory: respiratory failure  - Continue mechanical ventilation  - Not a candidate for SBT    Cardiovascular: no acute issues  - Permissive hypertension up to 200mmHg    Gastrointestinal/Nutrition: ascites, cirrhosis  - NPO  - Protonix for stress ulcer prophylaxis    Genitourinary/Renal: no acute issues  - Miranda, I&Os  - NS @ 100    Hematologic: coagulopathy, INR 2.3  - Vitamin K 10mg q8 hrs x 3 doses  - Hold chemical VTE prophylaxis  - Venodynes    Infectious Disease: recent ESBL E. coli UTI, leukocytosis  - Monitor    Endocrine: no acute issues  - Monitor glucose on BMP    Disposition: SICU. Goals of care.    Debra Knapp PA-C  [id number] ASSESSMENT: 77yMale s/p fall with large left SDH and extensive midline shit with herniation    PLAN:   Neurologic: left SDH and extensive midline shift with herniation  - Keppra for seizure prophylaxis  - q1 neuro checks  - Not a neurosurgical candidate as per neurosurgery  - Discussions with family regarding goals of care    Respiratory: respiratory failure  - Continue mechanical ventilation  - Not a candidate for SBT    Cardiovascular: no acute issues  - Permissive hypertension up to 200mmHg    Gastrointestinal/Nutrition: ascites, cirrhosis  - NPO  - Protonix for stress ulcer prophylaxis    Genitourinary/Renal: no acute issues  - Miranda, I&Os  - NS @ 100    Hematologic: coagulopathy, INR 2.3  - Vitamin K 10mg q8 hrs x 3 doses  - Hold chemical VTE prophylaxis  - Venodynes    Infectious Disease: recent ESBL E. coli UTI, leukocytosis  - Monitor    Endocrine: no acute issues  - Monitor glucose on BMP    Disposition: SICU. Goals of care.    Debra Knapp PA-C  [id number]

## 2019-05-05 VITALS — HEART RATE: 20 BPM

## 2019-05-05 LAB
APPEARANCE UR: CLEAR — SIGNIFICANT CHANGE UP
BACTERIA # UR AUTO: NEGATIVE — SIGNIFICANT CHANGE UP
BILIRUB UR-MCNC: NEGATIVE — SIGNIFICANT CHANGE UP
COLOR SPEC: YELLOW — SIGNIFICANT CHANGE UP
DIFF PNL FLD: ABNORMAL
EPI CELLS # UR: 1 /HPF — SIGNIFICANT CHANGE UP
GLUCOSE UR QL: NEGATIVE — SIGNIFICANT CHANGE UP
HYALINE CASTS # UR AUTO: 0 /LPF — SIGNIFICANT CHANGE UP (ref 0–2)
KETONES UR-MCNC: NEGATIVE — SIGNIFICANT CHANGE UP
LEUKOCYTE ESTERASE UR-ACNC: ABNORMAL
NITRITE UR-MCNC: NEGATIVE — SIGNIFICANT CHANGE UP
PH UR: 6 — SIGNIFICANT CHANGE UP (ref 5–8)
PROT UR-MCNC: SIGNIFICANT CHANGE UP
RBC CASTS # UR COMP ASSIST: 9 /HPF — HIGH (ref 0–4)
SP GR SPEC: 1.03 — HIGH (ref 1.01–1.02)
UROBILINOGEN FLD QL: NEGATIVE — SIGNIFICANT CHANGE UP
WBC UR QL: 14 /HPF — HIGH (ref 0–5)

## 2019-05-05 PROCEDURE — 86901 BLOOD TYPING SEROLOGIC RH(D): CPT

## 2019-05-05 PROCEDURE — 74177 CT ABD & PELVIS W/CONTRAST: CPT

## 2019-05-05 PROCEDURE — 86900 BLOOD TYPING SEROLOGIC ABO: CPT

## 2019-05-05 PROCEDURE — 84132 ASSAY OF SERUM POTASSIUM: CPT

## 2019-05-05 PROCEDURE — 82803 BLOOD GASES ANY COMBINATION: CPT

## 2019-05-05 PROCEDURE — 83690 ASSAY OF LIPASE: CPT

## 2019-05-05 PROCEDURE — 82330 ASSAY OF CALCIUM: CPT

## 2019-05-05 PROCEDURE — 82947 ASSAY GLUCOSE BLOOD QUANT: CPT

## 2019-05-05 PROCEDURE — 83605 ASSAY OF LACTIC ACID: CPT

## 2019-05-05 PROCEDURE — 72125 CT NECK SPINE W/O DYE: CPT

## 2019-05-05 PROCEDURE — 85014 HEMATOCRIT: CPT

## 2019-05-05 PROCEDURE — 82550 ASSAY OF CK (CPK): CPT

## 2019-05-05 PROCEDURE — 94002 VENT MGMT INPAT INIT DAY: CPT

## 2019-05-05 PROCEDURE — 80307 DRUG TEST PRSMV CHEM ANLYZR: CPT

## 2019-05-05 PROCEDURE — 84484 ASSAY OF TROPONIN QUANT: CPT

## 2019-05-05 PROCEDURE — 85730 THROMBOPLASTIN TIME PARTIAL: CPT

## 2019-05-05 PROCEDURE — 80053 COMPREHEN METABOLIC PANEL: CPT

## 2019-05-05 PROCEDURE — 84295 ASSAY OF SERUM SODIUM: CPT

## 2019-05-05 PROCEDURE — 86850 RBC ANTIBODY SCREEN: CPT

## 2019-05-05 PROCEDURE — 85610 PROTHROMBIN TIME: CPT

## 2019-05-05 PROCEDURE — 82435 ASSAY OF BLOOD CHLORIDE: CPT

## 2019-05-05 PROCEDURE — 81001 URINALYSIS AUTO W/SCOPE: CPT

## 2019-05-05 PROCEDURE — 70450 CT HEAD/BRAIN W/O DYE: CPT

## 2019-05-05 PROCEDURE — G0390: CPT

## 2019-05-05 PROCEDURE — 31500 INSERT EMERGENCY AIRWAY: CPT

## 2019-05-05 PROCEDURE — 94003 VENT MGMT INPAT SUBQ DAY: CPT

## 2019-05-05 PROCEDURE — 94770: CPT

## 2019-05-05 PROCEDURE — 99233 SBSQ HOSP IP/OBS HIGH 50: CPT

## 2019-05-05 PROCEDURE — 71260 CT THORAX DX C+: CPT

## 2019-05-05 PROCEDURE — 85027 COMPLETE CBC AUTOMATED: CPT

## 2019-05-05 PROCEDURE — 71045 X-RAY EXAM CHEST 1 VIEW: CPT

## 2019-05-05 PROCEDURE — 99291 CRITICAL CARE FIRST HOUR: CPT | Mod: 25

## 2019-05-05 RX ORDER — SODIUM CHLORIDE 9 MG/ML
1000 INJECTION INTRAMUSCULAR; INTRAVENOUS; SUBCUTANEOUS ONCE
Qty: 0 | Refills: 0 | Status: COMPLETED | OUTPATIENT
Start: 2019-05-05 | End: 2019-05-05

## 2019-05-05 RX ORDER — MORPHINE SULFATE 50 MG/1
5 CAPSULE, EXTENDED RELEASE ORAL
Qty: 0 | Refills: 0 | Status: DISCONTINUED | OUTPATIENT
Start: 2019-05-05 | End: 2019-05-05

## 2019-05-05 RX ORDER — MORPHINE SULFATE 50 MG/1
2 CAPSULE, EXTENDED RELEASE ORAL
Qty: 0 | Refills: 0 | Status: DISCONTINUED | OUTPATIENT
Start: 2019-05-05 | End: 2019-05-05

## 2019-05-05 RX ORDER — PHENYLEPHRINE HYDROCHLORIDE 10 MG/ML
0.6 INJECTION INTRAVENOUS
Qty: 40 | Refills: 0 | Status: DISCONTINUED | OUTPATIENT
Start: 2019-05-05 | End: 2019-05-05

## 2019-05-05 RX ADMIN — Medication 102 MILLIGRAM(S): at 02:11

## 2019-05-05 RX ADMIN — CHLORHEXIDINE GLUCONATE 1 APPLICATION(S): 213 SOLUTION TOPICAL at 05:04

## 2019-05-05 RX ADMIN — CHLORHEXIDINE GLUCONATE 15 MILLILITER(S): 213 SOLUTION TOPICAL at 05:01

## 2019-05-05 RX ADMIN — LEVETIRACETAM 400 MILLIGRAM(S): 250 TABLET, FILM COATED ORAL at 05:03

## 2019-05-05 RX ADMIN — PHENYLEPHRINE HYDROCHLORIDE 11.68 MICROGRAM(S)/KG/MIN: 10 INJECTION INTRAVENOUS at 11:39

## 2019-05-05 RX ADMIN — SODIUM CHLORIDE 4000 MILLILITER(S): 9 INJECTION INTRAMUSCULAR; INTRAVENOUS; SUBCUTANEOUS at 03:02

## 2019-05-05 RX ADMIN — PHENYLEPHRINE HYDROCHLORIDE 11.68 MICROGRAM(S)/KG/MIN: 10 INJECTION INTRAVENOUS at 02:03

## 2019-05-05 RX ADMIN — MORPHINE SULFATE 5 MILLIGRAM(S): 50 CAPSULE, EXTENDED RELEASE ORAL at 13:53

## 2019-05-05 NOTE — CHART NOTE - NSCHARTNOTEFT_GEN_A_CORE
DEATH NOTE      Called to bedside to evaluate the patient for Jori Miranda at 15:00.      On physical exam, patient did not respond to verbal or noxious stimuli. No spontaneous respirations.  Absent heart and breath sounds.  Carotid pulses absent when checked for 10 sec on each side. Pupils were fixed and dilated. EKG rhythm strip demonstrated asystole.. Patient pronounced dead at 5/5/2019 on 15:03 .     SICU attending Dr. Matt Duarte notified.  Family at bedside and requesting autopsy. Medical Examiner called, accepted by Tiffany.    KIMBER Tirado PGY2  SICU  [id number]

## 2019-05-05 NOTE — PROGRESS NOTE ADULT - SUBJECTIVE AND OBJECTIVE BOX
SURGERY DAILY PROGRESS NOTE:       SUBJECTIVE/ROS: Patient examined at bedside. No acute events overnight.        MEDICATIONS  (STANDING):  chlorhexidine 0.12% Liquid 15 milliLiter(s) Oral Mucosa <User Schedule>  chlorhexidine 4% Liquid 1 Application(s) Topical <User Schedule>  levETIRAcetam  IVPB 500 milliGRAM(s) IV Intermittent every 12 hours  pantoprazole  Injectable 40 milliGRAM(s) IV Push daily  phenylephrine    Infusion 0.6 MICROgram(s)/kG/Min (11.677 mL/Hr) IV Continuous <Continuous>  sodium chloride 0.9%. 1000 milliLiter(s) (100 mL/Hr) IV Continuous <Continuous>    MEDICATIONS  (PRN):      OBJECTIVE:    Vital Signs Last 24 Hrs  T(C): 36.1 (05 May 2019 03:00), Max: 36.8 (04 May 2019 14:30)  T(F): 97 (05 May 2019 03:00), Max: 98.2 (04 May 2019 14:30)  HR: 43 (05 May 2019 05:15) (39 - 113)  BP: 99/50 (05 May 2019 05:15) (71/38 - 166/72)  BP(mean): 70 (05 May 2019 05:15) (50 - 116)  RR: 16 (05 May 2019 05:15) (8 - 27)  SpO2: 100% (05 May 2019 05:15) (95% - 100%)        I&O's Detail    04 May 2019 07:01  -  05 May 2019 06:36  --------------------------------------------------------  IN:    IV PiggyBack: 251 mL    phenylephrine   Infusion: 70.1 mL    Sodium Chloride 0.9% IV Bolus: 1000 mL    sodium chloride 0.9%.: 2000 mL  Total IN: 3321.1 mL    OUT:    Indwelling Catheter - Urethral: 685 mL  Total OUT: 685 mL    Total NET: 2636.1 mL          Daily Height in cm: 172.72 (05 May 2019 01:00)    Daily     LABS:                        8.7    16.4  )-----------( 43       ( 04 May 2019 09:10 )             28.2     05-04    134<L>  |  101  |  21  ----------------------------<  128<H>  4.2   |  16<L>  |  1.20    Ca    8.0<L>      04 May 2019 09:10    TPro  7.1  /  Alb  2.5<L>  /  TBili  1.1  /  DBili  x   /  AST  38  /  ALT  19  /  AlkPhos  250<H>      PT/INR - ( 04 May 2019 09:10 )   PT: 27.9 sec;   INR: 2.36 ratio         PTT - ( 04 May 2019 09:10 )  PTT:46.3 sec  Urinalysis Basic - ( 04 May 2019 23:16 )    Color: Yellow / Appearance: Clear / S.035 / pH: x  Gluc: x / Ketone: Negative  / Bili: Negative / Urobili: Negative   Blood: x / Protein: Trace / Nitrite: Negative   Leuk Esterase: Small / RBC: 9 /hpf / WBC 14 /HPF   Sq Epi: x / Non Sq Epi: 1 /hpf / Bacteria: Negative                PHYSICAL EXAM:  GEN: NAD  Pulm: intubated   GI/Abd: Soft, mildly distended, no abrasions or ecchymosis

## 2019-05-05 NOTE — DISCHARGE NOTE FOR THE EXPIRED PATIENT - HOSPITAL COURSE
BONIFACIO GUERRERO is a 74M, BIBEMS on 19 as a level I trauma after an unwitness[facility].  Pt was recently hospitalized at [facility] from 3/3-3/21 for intermittent diarrhea with extensive workup concluding that diarrhea was likely secondary to IBD. Patient was discharged home with home PT services on 3/21/19 after being hospitalized since 3/3/19. On 19, pt had unwitnessed fall around 3 am at [facility]. He was found down in the bathroom after he went by himself. He had a progressively declining mental status and so he was brought to the ED around 8:30am where he was intubated for GCS 4. Upon arrival, patient's pupils were fixed and dilated, with extensor posturing in RUE. He was pan scanned and found to have a large left SDH (2.5cm) with extensive midline shift (2.1cm) and effacement of left lateral ventricle. Also found to have subfalcine, transtentorial, and uncal herniation. Patient given 10mg Vit K x 3 for coagulopathy (PT 27.9, INR 2.36, aPTT 46.3)    Patient was evaluated by Neurosurgery, and was determined to not be a surgical candidate due to neurologic status. Patient transferred to the SICU for further monitoring. Overnight patient bradycardic to HR 40s, hypotensive requiring Mike gtt for pressor support. Neurologic status did not improve overnight. On the AM of 19 extensive discussion was had with family at bedside, who elected for withdrawal of life support. Patient was extubated and taken off of pressor support at 13:55. Patient  at 15:03.

## 2019-05-05 NOTE — PROGRESS NOTE ADULT - ASSESSMENT
ASSESSMENT: 77yMale PMH HTN and cirrhosis s/p fall with large left SDH and extensive midline shit with herniation    PLAN:   Neurologic: left SDH and extensive midline shift with herniation  - Keppra for seizure prophylaxis  - q1 neuro checks  - Not a neurosurgical candidate as per neurosurgery  - Discussions with family regarding goals of care    Respiratory: respiratory failure  - Continue mechanical ventilation  - Not a candidate for SBT    Cardiovascular: no acute issues  - Permissive hypertension up to 200mmHg    Gastrointestinal/Nutrition: ascites, cirrhosis  - NPO  - Protonix for stress ulcer prophylaxis    Genitourinary/Renal: no acute issues  - Miranda, I&Os  - NS @ 100    Hematologic: coagulopathy, INR 2.3 s/p vit K  - Hold chemical VTE prophylaxis due to bleed  - Venodynes    Infectious Disease: recent ESBL E. coli UTI, leukocytosis  - Monitor temp  - Mild hyponatremia, 134: continue with NS @100ml/hr    Endocrine: no acute issues  - no history of DM and no need to monitor glucose levels while NPO    Disposition: SICU. Goals of care.

## 2019-05-05 NOTE — PROGRESS NOTE ADULT - ASSESSMENT
77M with unknown history brought by EMS as level 1 trauma after found down and unresponsive.     - CT head, c-spine, chest, abdomen, pelvis done in ED - f/u official reads  - F/u neurosurgery - large SDH with midline shift on CT  - GOC  - Appreciate SICU care    Acute Care / Trauma Surgery  Pager [phone]

## 2019-05-05 NOTE — PROGRESS NOTE ADULT - ATTENDING COMMENTS
No neurologic recovery, GCS remains 4  Acute resp failure post head trauma, good gas exchange  Became hypotensive last night, on Neosynephrine drip gtt  IVF NS, on seizure prophylaxis with Keppra    Extensive discussion with son daughter wife and other extended family members, made her DNR  Family decides to withdraw the care, terminally wean off vent, comfort measures only  Will manage as per family decision

## 2019-05-05 NOTE — PROGRESS NOTE ADULT - SUBJECTIVE AND OBJECTIVE BOX
HISTORY  73y Male who presents after an unwitnessed fall at [facility] around 3am on 5/4/19. He was found down in the bathroom after he went by himself. He had a progressively declining mental status since fall and was brought to the ED around 8:30am where he was intubated for GCS 4. CTH revealed left SDH (2.5cm) with extensive midline shift (2.1cm) and effacement of left lateral ventricle. Also found to have subfalcine, transtentorial, and uncal herniation. Pt brought to SICU for management of ventilator.    Neurosurgery consulted, and pt is not a surgical candidate. This was discussed with pt's daughter by neurosurgery team.    Patient was recently hospitalized 3/3/19 and discharged home with home PT services on 3/21/19. He was brought in again by family on 3/22 for generalized weakness and inability to walk. He was treated for diarrhea with extensive GI workup including colonoscopy on 4/1 (significant for internal hemorrhoids, 4 mm polyp transverse colon resected, pathology with transverse, descending, sigmoid with focal hyperplastic changes) and diagnostic paracentesis on 4/3 (c/w cirrhosis). Pt eventually discharged to [facility] on 4/12/19, discharged on Macrobid until 4/14.     24 HOUR EVENTS:  - give vit K to reverse coagulopathy from cirrhosis  - pt continues to have poor neurologic function    SUBJECTIVE/ROS:  [ ] A ten-point review of systems was otherwise negative except as noted.  [x] Due to altered mental status/intubation, subjective information were not able to be obtained from the patient. History was obtained, to the extent possible, from review of the chart and collateral sources of information.    NEURO  Exam: intubated, nonreactive pupils, does not withdraw to pain and decerebrate posturing  Meds: levETIRAcetam  IVPB 500 milliGRAM(s) IV Intermittent every 12 hours  [x] Adequacy of sedation and pain control has been assessed and adjusted      RESPIRATORY  RR: 16 (05-05-19 @ 02:00) (8 - 27)  SpO2: 100% (05-05-19 @ 02:00) (96% - 100%)  Wt(kg): --  Exam: intubated with breath sounds bilaterally  Mechanical Ventilation: Mode: AC/ CMV (Assist Control/ Continuous Mandatory Ventilation), RR (machine): 16, RR (patient): 18, TV (machine): 450, FiO2: 30, PEEP: 5, ITime: 1, MAP: 7, PIP: 14  [N/A] Extubation Readiness Assessed      CARDIOVASCULAR  HR: 47 (05-05-19 @ 02:00) (47 - 113)  BP: 99/50 (05-05-19 @ 02:00) (76/44 - 166/72)  BP(mean): 71 (05-05-19 @ 02:00) (56 - 116)  ABP: --  ABP(mean): --  Wt(kg): --  CVP(cm H2O): --  VBG - ( 04 May 2019 09:49 )  pH: 7.37  /  pCO2: 33    /  pO2: 68    / HCO3: 19    / Base Excess: -5.5  /  SaO2: 92     Lactate: 5.5      Exam: regular rate and rhythm  Cardiac Rhythm: sinus  Perfusion     [x]Adequate   [ ]Inadequate  Mentation   [x]Normal       [ ]Reduced  Extremities  [x]Warm         [ ]Cool  Volume Status [ ]Hypervolemic [x]Euvolemic [ ]Hypovolemic      GI/NUTRITION  Exam: soft, mildly distended  Diet: NPO  Meds: pantoprazole  Injectable 40 milliGRAM(s) IV Push daily      GENITOURINARY  I&O's Detail    05-04 @ 07:01  -  05-05 @ 02:13  --------------------------------------------------------  IN:    IV PiggyBack: 151 mL    sodium chloride 0.9%.: 1700 mL  Total IN: 1851 mL    OUT:    Indwelling Catheter - Urethral: 540 mL  Total OUT: 540 mL    Total NET: 1311 mL    Weight (kg): 51.9 (05-05 @ 01:00)  05-04    134<L>  |  101  |  21  ----------------------------<  128<H>  4.2   |  16<L>  |  1.20    Ca    8.0<L>      04 May 2019 09:10    TPro  7.1  /  Alb  2.5<L>  /  TBili  1.1  /  DBili  x   /  AST  38  /  ALT  19  /  AlkPhos  250<H>  05-04    [x] Miranda catheter, indication: monitoring output in critically ill patient  Meds: sodium chloride 0.9%. 1000 milliLiter(s) IV Continuous <Continuous>      HEMATOLOGIC  [n/a] VTE Prophylaxis                        8.7    16.4  )-----------( 43       ( 04 May 2019 09:10 )             28.2     PT/INR - ( 04 May 2019 09:10 )   PT: 27.9 sec;   INR: 2.36 ratio       PTT - ( 04 May 2019 09:10 )  PTT:46.3 sec  Transfusion     [ ] PRBC   [ ] Platelets   [ ] FFP   [ ] Cryoprecipitate      INFECTIOUS DISEASES  WBC Count: 16.4 K/uL (05-04 @ 09:10)    RECENT CULTURES: n/a  Meds: n/a      ENDOCRINE  CAPILLARY BLOOD GLUCOSE  Meds: n/a      ACCESS DEVICES:  [x] Peripheral IV  [ ] Central Venous Line	[ ] R	[ ] L	[ ] IJ	[ ] Fem	[ ] SC	Placed:   [ ] Arterial Line		[ ] R	[ ] L	[ ] Fem	[ ] Rad	[ ] Ax	Placed:   [ ] PICC:					[ ] Mediport  [ ] Urinary Catheter, Date Placed:   [x] Necessity of urinary, arterial, and venous catheters discussed    OTHER MEDICATIONS:  chlorhexidine 0.12% Liquid 15 milliLiter(s) Oral Mucosa <User Schedule>  chlorhexidine 4% Liquid 1 Application(s) Topical <User Schedule>    CODE STATUS: full code    IMAGING:  < from: CT Head No Cont (05.04.19 @ 09:45) >  Mixed attenuated acute on chronic subdural hematoma is identified. This involves the left temporal frontal and parietal region. This finding measures approximately 2.5 cm in widest diameter. Mass effect on the left lateral ventricle seen with extensive left-to-right shift (2.1 cm).   Subfalcine herniation is identified. Transtentorial and uncal herniation herniation is seen as well. Small left tentorial subdural hematoma is identified as well.    Evaluation of the osseous structures with appropriate window appears normal    The visualized paranasal sinuses mastoid and middle ear regions appear clear.    Impression: Large mixed attenuated subdural hematoma with left-to-right shift and herniation identified as described above.    Multiple axial sections were performed through the cervical spine. Coronal and sagittal reconstructions were performed as well    Disc space narrowing endplate right change and mass effect is seen at multiple levels throughout cervical spine region. Vacuum disc changes are seen at multiple levels. These findings are secondary to degenerative changes.    Bilateral hypertrophic facet joint changes seen multiple levels are secondary to degenerative change    There are no acute fractures or dislocations identified.    Evaluation of the paraspinal soft tissues is limited due to lack of IV contrast though grossly unremarkable    The visualized portion of both lung apices appear clear.    < end of copied text >    < from: CT Abdomen and Pelvis w/ IV Cont (05.04.19 @ 09:46) >  IMPRESSION:     Chest:    1.  No acute traumatic injury.  2.  Bilateral pleural effusions and atelectasis.    Abdomen and pelvis:    1.  No acute traumatic injury  2.  Moderate volume ascites.    < end of copied text > HISTORY  73y Male who presents after an unwitnessed fall at [facility] around 3am on 5/4/19. He was found down in the bathroom after he went by himself. He had a progressively declining mental status since fall and was brought to the ED around 8:30am where he was intubated for GCS 4. CTH revealed left SDH (2.5cm) with extensive midline shift (2.1cm) and effacement of left lateral ventricle. Also found to have subfalcine, transtentorial, and uncal herniation. Pt brought to SICU for management of ventilator.    Neurosurgery consulted, and pt is not a surgical candidate. This was discussed with pt's daughter by neurosurgery team.    Patient was recently hospitalized 3/3/19 and discharged home with home PT services on 3/21/19. He was brought in again by family on 3/22 for generalized weakness and inability to walk. He was treated for diarrhea with extensive GI workup including colonoscopy on 4/1 (significant for internal hemorrhoids, 4 mm polyp transverse colon resected, pathology with transverse, descending, sigmoid with focal hyperplastic changes) and diagnostic paracentesis on 4/3 (c/w cirrhosis). Pt eventually discharged to [facility] on 4/12/19, discharged on Macrobid until 4/14.     24 HOUR EVENTS:  - give vit K to reverse coagulopathy from cirrhosis  - pt continues to have poor neurologic function    SUBJECTIVE/ROS:  [ ] A ten-point review of systems was otherwise negative except as noted.  [x] Due to altered mental status/intubation, subjective information were not able to be obtained from the patient. History was obtained, to the extent possible, from review of the chart and collateral sources of information.    NEURO  Exam: intubated, nonreactive pupils, does not withdraw to pain and decerebrate posturing  Meds: levETIRAcetam  IVPB 500 milliGRAM(s) IV Intermittent every 12 hours  [x] Adequacy of sedation and pain control has been assessed and adjusted      RESPIRATORY  RR: 16 (05-05-19 @ 02:00) (8 - 27)  SpO2: 100% (05-05-19 @ 02:00) (96% - 100%)  Wt(kg): --  Exam: intubated with breath sounds bilaterally  Mechanical Ventilation: Mode: AC/ CMV (Assist Control/ Continuous Mandatory Ventilation), RR (machine): 16, RR (patient): 18, TV (machine): 450, FiO2: 30, PEEP: 5, ITime: 1, MAP: 7, PIP: 14  [N/A] Extubation Readiness Assessed      CARDIOVASCULAR  HR: 47 (05-05-19 @ 02:00) (47 - 113)  BP: 99/50 (05-05-19 @ 02:00) (76/44 - 166/72)  BP(mean): 71 (05-05-19 @ 02:00) (56 - 116)  ABP: --  ABP(mean): --  Wt(kg): --  CVP(cm H2O): --  VBG - ( 04 May 2019 09:49 )  pH: 7.37  /  pCO2: 33    /  pO2: 68    / HCO3: 19    / Base Excess: -5.5  /  SaO2: 92     Lactate: 5.5      Exam: regular rate and rhythm  Cardiac Rhythm: sinus  Perfusion     [x]Adequate   [ ]Inadequate  Mentation   [x]Normal       [ ]Reduced  Extremities  [x]Warm         [ ]Cool  Volume Status [ ]Hypervolemic [x]Euvolemic [ ]Hypovolemic      GI/NUTRITION  Exam: soft, mildly distended  Diet: NPO  Meds: pantoprazole  Injectable 40 milliGRAM(s) IV Push daily      GENITOURINARY  I&O's Detail    05-04 @ 07:01  -  05-05 @ 02:13  --------------------------------------------------------  IN:    IV PiggyBack: 151 mL    sodium chloride 0.9%.: 1700 mL  Total IN: 1851 mL    OUT:    Indwelling Catheter - Urethral: 540 mL  Total OUT: 540 mL    Total NET: 1311 mL    Weight (kg): 51.9 (05-05 @ 01:00)  05-04    134<L>  |  101  |  21  ----------------------------<  128<H>  4.2   |  16<L>  |  1.20    Ca    8.0<L>      04 May 2019 09:10    TPro  7.1  /  Alb  2.5<L>  /  TBili  1.1  /  DBili  x   /  AST  38  /  ALT  19  /  AlkPhos  250<H>  05-04    Urinalysis (05.04.19 @ 23:16)    Glucose Qualitative, Urine: Negative    Blood, Urine: Small    pH Urine: 6.0    Color: Yellow    Urine Appearance: Clear    Bilirubin: Negative    Ketone - Urine: Negative    Specific Gravity: 1.035    Protein, Urine: Trace    Urobilinogen: Negative    Nitrite: Negative    Leukocyte Esterase Concentration: Small    [x] Miranda catheter, indication: monitoring output in critically ill patient  Meds: sodium chloride 0.9%. 1000 milliLiter(s) IV Continuous <Continuous>      HEMATOLOGIC  [n/a] VTE Prophylaxis                        8.7    16.4  )-----------( 43       ( 04 May 2019 09:10 )             28.2     PT/INR - ( 04 May 2019 09:10 )   PT: 27.9 sec;   INR: 2.36 ratio       PTT - ( 04 May 2019 09:10 )  PTT:46.3 sec  Transfusion     [ ] PRBC   [ ] Platelets   [ ] FFP   [ ] Cryoprecipitate      INFECTIOUS DISEASES  WBC Count: 16.4 K/uL (05-04 @ 09:10)    RECENT CULTURES: n/a  Meds: n/a      ENDOCRINE  CAPILLARY BLOOD GLUCOSE  Meds: n/a      ACCESS DEVICES:  [x] Peripheral IV  [ ] Central Venous Line	[ ] R	[ ] L	[ ] IJ	[ ] Fem	[ ] SC	Placed:   [ ] Arterial Line		[ ] R	[ ] L	[ ] Fem	[ ] Rad	[ ] Ax	Placed:   [ ] PICC:					[ ] Mediport  [ ] Urinary Catheter, Date Placed:   [x] Necessity of urinary, arterial, and venous catheters discussed    OTHER MEDICATIONS:  chlorhexidine 0.12% Liquid 15 milliLiter(s) Oral Mucosa <User Schedule>  chlorhexidine 4% Liquid 1 Application(s) Topical <User Schedule>    CODE STATUS: full code    IMAGING:  < from: CT Head No Cont (05.04.19 @ 09:45) >  Mixed attenuated acute on chronic subdural hematoma is identified. This involves the left temporal frontal and parietal region. This finding measures approximately 2.5 cm in widest diameter. Mass effect on the left lateral ventricle seen with extensive left-to-right shift (2.1 cm).   Subfalcine herniation is identified. Transtentorial and uncal herniation herniation is seen as well. Small left tentorial subdural hematoma is identified as well.    Evaluation of the osseous structures with appropriate window appears normal    The visualized paranasal sinuses mastoid and middle ear regions appear clear.    Impression: Large mixed attenuated subdural hematoma with left-to-right shift and herniation identified as described above.    Multiple axial sections were performed through the cervical spine. Coronal and sagittal reconstructions were performed as well    Disc space narrowing endplate right change and mass effect is seen at multiple levels throughout cervical spine region. Vacuum disc changes are seen at multiple levels. These findings are secondary to degenerative changes.    Bilateral hypertrophic facet joint changes seen multiple levels are secondary to degenerative change    There are no acute fractures or dislocations identified.    Evaluation of the paraspinal soft tissues is limited due to lack of IV contrast though grossly unremarkable    The visualized portion of both lung apices appear clear.    < end of copied text >    < from: CT Abdomen and Pelvis w/ IV Cont (05.04.19 @ 09:46) >  IMPRESSION:     Chest:    1.  No acute traumatic injury.  2.  Bilateral pleural effusions and atelectasis.    Abdomen and pelvis:    1.  No acute traumatic injury  2.  Moderate volume ascites.    < end of copied text >

## 2019-05-05 NOTE — AIRWAY REMOVAL NOTE  ADULT & PEDS - ARTIFICAL AIRWAY REMOVAL COMMENTS
Written order for extubation verified. The patient was identified by full name and birth date compared to the identification band. Nurse present during procedure.

## 2019-05-07 LAB — ACID FAST STN SPEC: SIGNIFICANT CHANGE UP

## 2019-05-15 LAB — ACID FAST STN FLD: SIGNIFICANT CHANGE UP

## 2019-07-01 NOTE — PROGRESS NOTE ADULT - SUBJECTIVE AND OBJECTIVE BOX
Health Maintenance Due   Topic Date Due   • Shingles Vaccine (2 of 3) 12/27/2011       Patient is on list at [facility]     Over the last 2 weeks, how often have you been bothered by the following problems?          PHQ2 Score:  1  1. Little interest or pleasure in doing things?:  0  2. Feeling down, depressed, or hopeless?:  1              Nephrology Followup Note - [phone] - Dr Marquis / Dr Matthews / Dr Dallas / Dr Lo / Dr Morales / Dr Nelson / Dr Marcum / Dr Valero  Pt seen and examined at bedside      Allergies:  No Known Allergies    Hospital Medications:   MEDICATIONS  (STANDING):  azithromycin  IVPB      cefTRIAXone   IVPB      cefTRIAXone   IVPB 1 Gram(s) IV Intermittent every 24 hours  lidocaine 1% Injectable 10 milliLiter(s) Local Injection once  mesalamine DR Capsule 800 milliGRAM(s) Oral three times a day  metoprolol tartrate 25 milliGRAM(s) Oral two times a day  pantoprazole Infusion 8 mG/Hr (10 mL/Hr) IV Continuous <Continuous>  phytonadione  IVPB 10 milliGRAM(s) IV Intermittent every 24 hours    REVIEW OF SYSTEMS:  CONSTITUTIONAL: No weakness, fevers or chills  EYES/ENT: No visual changes;  No vertigo or throat pain   NECK: No pain or stiffness  RESPIRATORY: No cough, wheezing, hemoptysis; No shortness of breath  CARDIOVASCULAR: No chest pain or palpitations.  GASTROINTESTINAL: No abdominal or epigastric pain. No nausea, vomiting, or hematemesis; No diarrhea or constipation. No melena or hematochezia.  GENITOURINARY: No dysuria, frequency, foamy urine, urinary urgency, incontinence or hematuria  NEUROLOGICAL: No numbness or weakness  SKIN: No itching, burning, rashes, or lesions   VASCULAR: No bilateral lower extremity edema.   All other review of systems is negative unless indicated above.    VITALS:  T(F): 97.2 (03-15-19 @ 11:14), Max: 98.1 (19 @ 22:47)  HR: 88 (03-15-19 @ 11:14)  BP: 141/75 (03-15-19 @ 11:14)  RR: 17 (03-15-19 @ 11:14)  SpO2: 100% (03-15-19 @ 11:14)  Wt(kg): --     @ 07:01  -  03-15 @ 07:00  --------------------------------------------------------  IN: 2115 mL / OUT: 0 mL / NET: 2115 mL    03-15 @ 07:01  -  03-15 @ 13:22  --------------------------------------------------------  IN: 0 mL / OUT: 300 mL / NET: -300 mL      Height (cm): 160.02 (03-15 @ 10:58)  Weight (kg): 52.8 (03-15 @ 10:58)  BMI (kg/m2): 20.6 (03-15 @ 10:58)  BSA (m2): 1.54 (03-15 @ 10:58)  PHYSICAL EXAM:  Constitutional: NAD  HEENT: anicteric sclera, oropharynx clear, MMM  Neck: No JVD  Respiratory: CTAB, no wheezes, rales or rhonchi  Cardiovascular: S1, S2, RRR  Gastrointestinal: BS+, soft, NT/ND  Extremities: No cyanosis or clubbing. No peripheral edema  Neurological: A/O x 3, no focal deficits  Psychiatric: Normal mood, normal affect  : No CVA tenderness. No mata.   Skin: No rashes  Vascular Access:    LABS:  03-15    145  |  117<H>  |  26<H>  ----------------------------<  71  3.9   |  14<L>  |  1.59<H>    Ca    7.8<L>      15 Mar 2019 05:30  Phos  3.0     03-15  Mg     1.6     03-15    TPro  5.0<L>  /  Alb  1.7<L>  /  TBili  0.4  /  DBili      /  AST  15  /  ALT  21  /  AlkPhos  259<H>      Creatinine Trend: 1.59 <--, 1.47 <--, 1.68 <--, 1.39 <--, 1.32 <--, 1.36 <--, 1.25 <--, 1.29 <--, 1.34 <--, 1.28 <--                        10.2   29.09 )-----------( 67       ( 15 Mar 2019 05:30 )             32.8     Urine Studies:  Urinalysis Basic - ( 13 Mar 2019 16:50 )    Color: YELLOW / Appearance: Lt TURBID / S.017 / pH: 5.5  Gluc: NEGATIVE / Ketone: NEGATIVE  / Bili: NEGATIVE / Urobili: NORMAL   Blood: TRACE / Protein: 30 / Nitrite: NEGATIVE   Leuk Esterase: LARGE / RBC: 3-5 / WBC >50   Sq Epi: OCC / Non Sq Epi:  / Bacteria: MODERATE      Sodium, Random Urine: < 20 mmol/L ( @ 16:50)  Potassium, Random Urine: 47.6 mmol/L ( @ 16:50)  Chloride, Random Urine: 33 mmol/L ( @ 16:50)  Osmolality, Random Urine: 445 mosmo/kg ( @ 16:50)    RADIOLOGY & ADDITIONAL STUDIES:  < from: CT Chest No Cont (03.15.19 @ 09:22) >  IMPRESSION:     *  Tracheal wall thickening, centrilobular and tree-in-bud micronodules   predominantly within the upper lungs suggestive of   bronchitis/bronchiolitis.    *  Interlobular septal thickening and patchy groundglass opacities, trace   bilateral pleural effusions and anasarca suggestive of superimposed   pulmonary edema.    *  Ascites new from 3/4/2019.    *  Narrowing of bilateral main bronchi which may represent bronchomalacia.    < end of copied text > Nephrology Followup Note - [phone] - Dr Marquis / Dr Matthews / Dr Dallas / Dr Lo / Dr Morales / Dr Nelson / Dr Marcum / Dr Valero  Pt seen and examined at bedside  No acute events overngiht. Pt comfortable, denies SOB or pain.     Allergies:  No Known Allergies    Hospital Medications:   MEDICATIONS  (STANDING):  azithromycin  IVPB      cefTRIAXone   IVPB      cefTRIAXone   IVPB 1 Gram(s) IV Intermittent every 24 hours  lidocaine 1% Injectable 10 milliLiter(s) Local Injection once  mesalamine DR Capsule 800 milliGRAM(s) Oral three times a day  metoprolol tartrate 25 milliGRAM(s) Oral two times a day  pantoprazole Infusion 8 mG/Hr (10 mL/Hr) IV Continuous <Continuous>  phytonadione  IVPB 10 milliGRAM(s) IV Intermittent every 24 hours    VITALS:  T(F): 97.2 (03-15-19 @ 11:14), Max: 98.1 (19 @ 22:47)  HR: 88 (03-15-19 @ 11:14)  BP: 141/75 (03-15-19 @ 11:14)  RR: 17 (03-15-19 @ 11:14)  SpO2: 100% (03-15-19 @ 11:14)  Wt(kg): --     @ 07:01  -  03-15 @ 07:00  --------------------------------------------------------  IN: 2115 mL / OUT: 0 mL / NET: 2115 mL    03-15 @ 07:01  -  03-15 @ 13:22  --------------------------------------------------------  IN: 0 mL / OUT: 300 mL / NET: -300 mL      Height (cm): 160.02 (03-15 @ 10:58)  Weight (kg): 52.8 (03-15 @ 10:58)  BMI (kg/m2): 20.6 (03-15 @ 10:58)  BSA (m2): 1.54 (03-15 @ 10:58)  PHYSICAL EXAM:  Constitutional: NAD  HEENT: anicteric sclera, oropharynx clear, MMM  Neck: No JVD  Respiratory: CTAB, no wheezes, rales or rhonchi  Cardiovascular: S1, S2, RRR  Gastrointestinal: BS+, soft, NT/ND  Extremities: No cyanosis or clubbing. 2+ peripheral edema  Neurological: A/O x 3, no focal deficits  Psychiatric: Normal mood, normal affect  : No CVA tenderness. No mata.   Skin: No rashes    LABS:  03-15    145  |  117<H>  |  26<H>  ----------------------------<  71  3.9   |  14<L>  |  1.59<H>    Ca    7.8<L>      15 Mar 2019 05:30  Phos  3.0     03-15  Mg     1.6     03-15    TPro  5.0<L>  /  Alb  1.7<L>  /  TBili  0.4  /  DBili      /  AST  15  /  ALT  21  /  AlkPhos  259<H>      Creatinine Trend: 1.59 <--, 1.47 <--, 1.68 <--, 1.39 <--, 1.32 <--, 1.36 <--, 1.25 <--, 1.29 <--, 1.34 <--, 1.28 <--                        10.2   29.09 )-----------( 67       ( 15 Mar 2019 05:30 )             32.8     Urine Studies:  Urinalysis Basic - ( 13 Mar 2019 16:50 )    Color: YELLOW / Appearance: Lt TURBID / S.017 / pH: 5.5  Gluc: NEGATIVE / Ketone: NEGATIVE  / Bili: NEGATIVE / Urobili: NORMAL   Blood: TRACE / Protein: 30 / Nitrite: NEGATIVE   Leuk Esterase: LARGE / RBC: 3-5 / WBC >50   Sq Epi: OCC / Non Sq Epi:  / Bacteria: MODERATE      Sodium, Random Urine: < 20 mmol/L ( @ 16:50)  Potassium, Random Urine: 47.6 mmol/L ( @ 16:50)  Chloride, Random Urine: 33 mmol/L ( @ 16:50)  Osmolality, Random Urine: 445 mosmo/kg ( @ 16:50)    RADIOLOGY & ADDITIONAL STUDIES:  < from: CT Chest No Cont (03.15.19 @ 09:22) >  IMPRESSION:     *  Tracheal wall thickening, centrilobular and tree-in-bud micronodules   predominantly within the upper lungs suggestive of   bronchitis/bronchiolitis.    *  Interlobular septal thickening and patchy groundglass opacities, trace   bilateral pleural effusions and anasarca suggestive of superimposed   pulmonary edema.    *  Ascites new from 3/4/2019.    *  Narrowing of bilateral main bronchi which may represent bronchomalacia.    < end of copied text >

## 2019-08-01 NOTE — PROVIDER CONTACT NOTE (OTHER) - ACTION/TREATMENT ORDERED:
No intervention at this time. Continue to monitor and assess Rhombic Flap Text: The defect edges were debeveled with a #15 scalpel blade.  Given the location of the defect and the proximity to free margins a rhombic flap was deemed most appropriate.  Using a sterile surgical marker, an appropriate rhombic flap was drawn incorporating the defect.    The area thus outlined was incised deep to adipose tissue with a #15 scalpel blade.  The skin margins were undermined to an appropriate distance in all directions utilizing iris scissors.

## 2020-08-26 NOTE — ED ADULT NURSE NOTE - CHIEF COMPLAINT
[facility] Progress Note    CC: Patient presents with: Ankle Pain: Rolled right ankle 2 months ago     HPI: This 77year old female presents today for pain and swelling in her right ankle for approximately 2 months.   She stated that she twisted her The patient is a 73y Male complaining of diarrhea.

## 2021-05-05 NOTE — PATIENT PROFILE ADULT - BRADEN FRICTION AND SHEAR
Physical Therapy  Visit Type: initial evaluation  Precautions:  Medical precautions:  fall risk;.   Lines:       Lines in chart and on patient reviewed, cautions maintained throughout session.  Lower Extremity:    Left:  weight bearing: partial (heel WB only with post-op shoe).  Safety Measures: chair alarm    SUBJECTIVE  Patient agreed to participate in therapy this date.  Pt agrees to session, states mild pain in right toe - unrated.      Chart reviewed.  RN Leon aware of session, needs in reach and alarm intact while seated in recliner at end of session - RN verbalizes understanding.    Patient / Family Goal: return home     OBJECTIVE     Oriented to person   Patient activity tolerance: 1 to 1 activity to rest  Strength (out of 5 unless otherwise indicated)   WFL: LLE, RLE  Hip:  Hip Flexion: Left: 4+ Right: 4  Knee:   - Extension:      • Left: 5      • Right: 5  Ankle:    - Dorsiflexion:      • Left: 5      • Right: 5  Sensation - Lower Extremity  L1 (back, over trochanter and groin):     Light Touch: Left: intact Right: intact  L2 (back, front of thigh to knee):     Light Touch: Left: intact Right: intact  L3 (back, upper buttock, anterior thigh, knee, medial lower leg):     Light Touch: Left: intact Right: intact  L4 (medial buttock, lateral thigh, medial leg, dorsum of foot, great toe):     Light Touch: Left: intact Right: intact  L5 (buttock, posterior and lateral thigh, lateral aspect of leg, dorsum of foot, medial half of sole, 1-3rd toes):     Light Touch: Left: intact Right: intact   S1 (buttock, thigh, posterior leg):     Light Touch: Left: intact Right: intact  Neurological Comments / Details: States tingling in bilateral hands and plantar surface of feet.    Bed Mobility:        Supine to sit: modified independent (bedrail on left)  Transfers:    Assistive devices: gait belt and 4-wheeled walker    Sit to stand: modified independent    Stand to sit: modified independent  Training completed:       Safety considerations due to heel WB only and not having donned post-op shoe.  Gait/Ambulation:     Assistance: modified independent   Assistive device: 4-wheeled walker  Training Completed:    Tasks: gait training on level surfaces    Post-op shoe donned left LE with assist due to bulky dressing.  Cues for heel WB with good carry-over.      Interventions     Additional Intervention Details: Education re: PT POC, goals, in-room safety; pt verbalizes understanding.       ASSESSMENT    Impairments: strength, balance deficits and activity tolerance  Functional Limitations: ambulation and stair climbing  PT evaluation completed this date s/p admission for right foot cellulitis with recent partial left great toe amputation 4/26.  Demos functional mobility at modified independent level grossly for safety consideration and uses 4WW for ambulation and intermittent standing balance; cues needed for heel WB left foot with assist to don post-op shoe.  Pt reports nephew assist at present, previously daughter.  States \"someone is always with me\".  Anticipate return home when medically appropriate, with 1-2 more sessions of PT indicated to address generalized weakness, standing balance and activity tolerance.  Home PT indicated, as pt active with [facility] prior to admission.       Discharge Recommendations  Recommendation for Discharge: PT WI: Home, Home therapy         PT/OT Mobility Equipment for Discharge: Owns 4WW      PT Identified Barriers to Discharge: medical     Skilled therapy is required to address these limitations in attempt to maximize the patient's independence.  Progress: improving as expected  Predicted patient presentation: Low (stable) - Patient comorbidities and complexities, as defined above, will have little effect on progress for prescribed plan of care.    End of Session:   Location: on cart  Safety measures: alarm system in place/re-engaged and equipment intact  Handoff to: nurse    PLAN    Suggestions for  next session as indicated: Flat bed mobility.  Progress gait using 4WW (has own in room), functional transfers, assess stairs x 4.  Reinforce heel WB only left LE.    PT Frequency: 1-2 sessions  Frequency Comments: William, 5/4      Interventions: balance, gait training, neuromuscular re-education, strengthening, ROM, patient/family training, safety education, continued evaluation, bed mobility, stairs retraining and functional transfer training        GOALS  Review Date: 5/12/2021  Long Term Goals: (to be met by time of discharge from hospital)  Sit to supine: Patient will complete sit to supine modified independent.  Supine to sit: Patient will complete supine to sit modified independent.  Sit to stand: Patient will complete sit to stand transfer with 4-wheeled walker, modified independent.   Stand to sit: Patient will complete stand to sit transfer with 4-wheeled walker, modified independent.   Stand pivot: Patient will complete stand pivot transfer with 4-wheeled walker, modified independent.   Ambulation (even): Patient will ambulate on even surface for 50 feet with 4-wheeled walker, modified independent.   3-4 steps: Patient will ambulate 3-4 steps with supervision. : rail prn.    Documented in the chart in the following areas: Prior Level of Function. Pain. Assessment. Patient Education.      Therapy procedure time and total treatment time can be found documented on the Time Entry flowsheet   (3) no apparent problem

## 2021-05-20 NOTE — PATIENT PROFILE ADULT - HAVE YOU EXPERIENCED VIOLENCE OR A TRAUMATIC EVENT?
1   Continue use of CPAP equipment nightly  2  Continue to clean your equipment, as discussed  3  Contact the Sleep [address] [place] [address] with any questions or concerns prior to your next visit, as needed  4  Schedule visit for follow-up in 1 year      Nursing Support:  When: Monday through Friday 7A-5PM except holidays  Where: Our direct line is [phone]  If you are having a true emergency please call 911  In the event that the line is busy or it is after hours please leave a voice message and we will return your call  Please speak clearly, leaving your full name, birth date, best number to reach you and the reason for your call  Medication refills: We will need the name of the medication, the dosage, the ordering provider, whether you get a 30 or 90 day refill, and the pharmacy name and address  Medications will be ordered by the provider only  Nurses cannot call in prescriptions  Please allow 7 days for medication refills  Physician requested updates: If your provider requested that you call with an update after starting medication, please be ready to provide us the medication and dosage, what time you take your medication, the time you attempt to fall asleep, time you fall asleep, when you wake up, and what time you get out of bed  Sleep Study Results: We will contact you with sleep study results and/or next steps after the physician has reviewed your testing  no

## 2022-04-13 NOTE — ED PROVIDER NOTE - NS ED ATTENDING STATEMENT MOD
Initial (On Arrival) I have personally seen and examined this patient.  I have fully participated in the care of this patient. I have reviewed all pertinent clinical information, including history, physical exam, plan and the Resident’s note and agree except as noted.

## 2022-04-14 NOTE — ED ADULT NURSE NOTE - NS PRO PASSIVE SMOKE EXP
[de-identified] : Well developed, well nourished in no apparent distress, awake, alert and orientated to person, place and time with appropriate mood and affect\par Respirations are even and unlabored. Gait evaluation does not reveal a limp. There is no inguinal adenopathy. The affected limb is well-perfused with palpable pedal pulse, without skin lesions, shows a grossly normal motor and sensory examination. Incision is healed. Knee motion is 0-115
Unknown

## 2023-06-27 NOTE — PROGRESS NOTE ADULT - PROBLEM SELECTOR PLAN 1
[facility]  [address]  [facility] [address]     June 27, 2023    Patient: Delfino Swartz   YOB: 1994   Date of Visit: 6/27/2023       To Whom It May Concern:    Delfino Swartz was seen and treated in our department on 6/27/2023.  Please excuse him from work on 6/27 through 6/29/2023.    Sincerely,     Rebel De Dios P.A.-C.                 renal function improving  PEDRO FEna<1 likely prerenal no hydro on US, renal function improved with IVF  again not eating much with diarrhea on IVF @ 50cc/hr  avoid nephrotoxic agents  monitor bmp.

## 2024-08-12 NOTE — DIETITIAN INITIAL EVALUATION ADULT. - ETIOLOGY
OP HEMATOLOGY/ONCOLOGY PROGRESS NOTE      Pt Name: Akhil Alejo  YOB: 1956  MRN: [id number]  Referring provider: ALISA Rabago  Date of evaluation: 24    History Obtained From:  patient, spouse, electronic medical record    CHIEF COMPLAINT:    Chief Complaint   Patient presents with    Follow-up     MGUS (monoclonal gammopathy of unknown significance)     HISTORY OF PRESENT ILLNESS:    Akhil Alejo is a pleasant 68 y.o.  male monitored for a history of kappa light chain MGUS with less than 5% involvement of plasma cell neoplasm noted on Bone marrow aspirate and biopsy dated 2020.  Akhil has mild neuropathy to the bottom of his feet, treated with gabapentin and felt diabetic related.  He denies lower extremity weakness.  He is also monitored for occasionally elevated H/H, currently 18.1/53.9.  Akhil states he no longer drives due to history of hypoglycemic episode while driving 2023.  He got lost at that time.  He is here for hematology follow-up, accompanied by his wife-Meaghan.    HEMATOLOGY HISTORY: [place] light chain MGUS 2020  Akhil Alejo was seen in initial hematology consultation 10/7/2020, referred by ALISA Ceja for evaluation of an elevated light chain ratio.    PMH includes CKD, diabetes mellitus type 2, hypertension, hyperlipidemia, hypothyroidism, GERD etc.  Akhil was recently referred to ALISA Ceja for abnormal renal function, diagnosed with stage III CKD related to diabetes.    Serology for evaluation of CKD 8/3/2020:  CBC: WBC 9.03, Hgb 16.2, platelets 240,000  CMP: Creatinine 1.31, GFR 55  HgbA1c: 9.14  Vitamin D: 27.7    Elevated serum immunoglobulin free light chains  Labs 2020:  CMP: Creatinine 1.27/GFR 58, AST 56, ALT 53, calcium 9.5  Total protein: 7.5  SPEP: No M-spike  Ig, IgA: 334, IgM: 92-all within normal limits  Kappa light chain 51.9, lambda light chain 25.2 with K/L ratio mildly elevated at 2.06  Urinalysis: 3+  related to Pt meeting criteria for severe malnutrition in chronic disease